# Patient Record
Sex: MALE | Race: WHITE | NOT HISPANIC OR LATINO | Employment: OTHER | URBAN - NONMETROPOLITAN AREA
[De-identification: names, ages, dates, MRNs, and addresses within clinical notes are randomized per-mention and may not be internally consistent; named-entity substitution may affect disease eponyms.]

---

## 2017-01-27 ENCOUNTER — LAB REQUISITION (OUTPATIENT)
Dept: LAB | Facility: HOSPITAL | Age: 62
End: 2017-01-27

## 2017-01-27 DIAGNOSIS — Z94.0 HISTORY OF KIDNEY TRANSPLANT: ICD-10-CM

## 2017-01-27 LAB
ALBUMIN SERPL-MCNC: 4.2 G/DL (ref 3.5–5)
ANION GAP SERPL CALCULATED.3IONS-SCNC: 15 MMOL/L (ref 4–13)
BUN BLD-MCNC: 14 MG/DL (ref 5–21)
BUN/CREAT SERPL: 16.3 (ref 7–25)
CALCIUM SPEC-SCNC: 9.9 MG/DL (ref 8.4–10.4)
CHLORIDE SERPL-SCNC: 105 MMOL/L (ref 98–110)
CO2 SERPL-SCNC: 18 MMOL/L (ref 24–31)
CREAT BLD-MCNC: 0.86 MG/DL (ref 0.5–1.4)
GFR SERPL CREATININE-BSD FRML MDRD: 90 ML/MIN/1.73
GLUCOSE BLD-MCNC: 126 MG/DL (ref 70–100)
PHOSPHATE SERPL-MCNC: 4.4 MG/DL (ref 2.5–4.5)
POTASSIUM BLD-SCNC: 4.5 MMOL/L (ref 3.5–5.3)
SODIUM BLD-SCNC: 138 MMOL/L (ref 135–145)

## 2017-01-27 PROCEDURE — 80069 RENAL FUNCTION PANEL: CPT | Performed by: NURSE PRACTITIONER

## 2017-02-06 ENCOUNTER — OFFICE VISIT (OUTPATIENT)
Dept: PRIMARY CARE CLINIC | Age: 62
End: 2017-02-06
Payer: MEDICARE

## 2017-02-06 VITALS
WEIGHT: 283.8 LBS | OXYGEN SATURATION: 97 % | DIASTOLIC BLOOD PRESSURE: 71 MMHG | BODY MASS INDEX: 37.61 KG/M2 | SYSTOLIC BLOOD PRESSURE: 136 MMHG | HEIGHT: 73 IN | HEART RATE: 75 BPM | TEMPERATURE: 98.5 F | RESPIRATION RATE: 19 BRPM

## 2017-02-06 DIAGNOSIS — J32.9 SINOBRONCHITIS: Primary | ICD-10-CM

## 2017-02-06 DIAGNOSIS — J40 SINOBRONCHITIS: Primary | ICD-10-CM

## 2017-02-06 DIAGNOSIS — Z12.11 SCREENING FOR COLON CANCER: ICD-10-CM

## 2017-02-06 DIAGNOSIS — E11.9 CONTROLLED TYPE 2 DIABETES MELLITUS WITHOUT COMPLICATION, WITHOUT LONG-TERM CURRENT USE OF INSULIN (HCC): ICD-10-CM

## 2017-02-06 PROCEDURE — G8427 DOCREV CUR MEDS BY ELIG CLIN: HCPCS | Performed by: NURSE PRACTITIONER

## 2017-02-06 PROCEDURE — G8419 CALC BMI OUT NRM PARAM NOF/U: HCPCS | Performed by: NURSE PRACTITIONER

## 2017-02-06 PROCEDURE — 99213 OFFICE O/P EST LOW 20 MIN: CPT | Performed by: NURSE PRACTITIONER

## 2017-02-06 PROCEDURE — 1036F TOBACCO NON-USER: CPT | Performed by: NURSE PRACTITIONER

## 2017-02-06 PROCEDURE — G8484 FLU IMMUNIZE NO ADMIN: HCPCS | Performed by: NURSE PRACTITIONER

## 2017-02-06 PROCEDURE — 3046F HEMOGLOBIN A1C LEVEL >9.0%: CPT | Performed by: NURSE PRACTITIONER

## 2017-02-06 PROCEDURE — 3017F COLORECTAL CA SCREEN DOC REV: CPT | Performed by: NURSE PRACTITIONER

## 2017-02-06 RX ORDER — AZITHROMYCIN 250 MG/1
TABLET, FILM COATED ORAL
Qty: 1 PACKET | Refills: 0 | Status: SHIPPED | OUTPATIENT
Start: 2017-02-06 | End: 2017-02-16

## 2017-02-06 RX ORDER — MONTELUKAST SODIUM 10 MG/1
10 TABLET ORAL DAILY
Qty: 30 TABLET | Refills: 3 | Status: SHIPPED | OUTPATIENT
Start: 2017-02-06 | End: 2018-02-22

## 2017-02-06 RX ORDER — BENZONATATE 100 MG/1
200 CAPSULE ORAL 3 TIMES DAILY PRN
Qty: 30 CAPSULE | Refills: 1 | Status: SHIPPED | OUTPATIENT
Start: 2017-02-06 | End: 2017-02-16

## 2017-02-10 ASSESSMENT — ENCOUNTER SYMPTOMS
WHEEZING: 0
SORE THROAT: 1
RHINORRHEA: 1
SINUS PRESSURE: 1
VOICE CHANGE: 1
COUGH: 1
TROUBLE SWALLOWING: 0
SHORTNESS OF BREATH: 0

## 2017-03-22 DIAGNOSIS — E11.29 TYPE 2 DIABETES MELLITUS WITH OTHER KIDNEY COMPLICATION: ICD-10-CM

## 2017-03-22 DIAGNOSIS — I10 ESSENTIAL HYPERTENSION: ICD-10-CM

## 2017-03-22 DIAGNOSIS — Z94.0 KIDNEY TRANSPLANT STATUS, CADAVERIC: ICD-10-CM

## 2017-03-22 LAB
ALBUMIN SERPL-MCNC: 4.2 G/DL (ref 3.5–5.2)
ALP BLD-CCNC: 66 U/L (ref 40–130)
ALT SERPL-CCNC: 21 U/L (ref 5–41)
ANION GAP SERPL CALCULATED.3IONS-SCNC: 16 MMOL/L (ref 7–19)
AST SERPL-CCNC: 23 U/L (ref 5–40)
BILIRUB SERPL-MCNC: 1.1 MG/DL (ref 0.2–1.2)
BILIRUBIN URINE: NEGATIVE
BLOOD, URINE: NEGATIVE
BUN BLDV-MCNC: 12 MG/DL (ref 8–23)
CALCIUM SERPL-MCNC: 9.6 MG/DL (ref 8.8–10.2)
CHLORIDE BLD-SCNC: 103 MMOL/L (ref 98–111)
CHOLESTEROL, TOTAL: 126 MG/DL (ref 160–199)
CLARITY: CLEAR
CO2: 19 MMOL/L (ref 22–29)
COLOR: YELLOW
CREAT SERPL-MCNC: 0.8 MG/DL (ref 0.5–1.2)
CREATININE URINE: 116.8 MG/DL (ref 4.2–622)
GFR NON-AFRICAN AMERICAN: >60
GLOBULIN: 2.7 G/DL
GLUCOSE BLD-MCNC: 159 MG/DL (ref 74–109)
GLUCOSE URINE: NEGATIVE MG/DL
HBA1C MFR BLD: 7.1 %
HCT VFR BLD CALC: 47.6 % (ref 42–52)
HDLC SERPL-MCNC: 28 MG/DL (ref 55–121)
HEMOGLOBIN: 16.2 G/DL (ref 14–18)
KETONES, URINE: NEGATIVE MG/DL
LDL CHOLESTEROL CALCULATED: 63 MG/DL
LEUKOCYTE ESTERASE, URINE: NEGATIVE
MCH RBC QN AUTO: 31 PG (ref 27–31)
MCHC RBC AUTO-ENTMCNC: 34 G/DL (ref 33–37)
MCV RBC AUTO: 91.2 FL (ref 80–94)
NITRITE, URINE: NEGATIVE
PARATHYROID HORMONE INTACT: 78.5 PG/ML (ref 15–65)
PDW BLD-RTO: 14.6 % (ref 11.5–14.5)
PH UA: 6
PLATELET # BLD: 227 K/UL (ref 130–400)
PMV BLD AUTO: 11 FL (ref 7.4–10.4)
POTASSIUM SERPL-SCNC: 4.8 MMOL/L (ref 3.5–5)
PROTEIN PROTEIN: 11 MG/DL (ref 15–45)
PROTEIN UA: NEGATIVE MG/DL
RBC # BLD: 5.22 M/UL (ref 4.7–6.1)
SODIUM BLD-SCNC: 138 MMOL/L (ref 136–145)
SPECIFIC GRAVITY UA: 1.02
TOTAL PROTEIN: 6.9 G/DL (ref 6.6–8.7)
TRIGL SERPL-MCNC: 177 MG/DL (ref 150–199)
TSH SERPL DL<=0.05 MIU/L-ACNC: 1.67 UIU/ML (ref 0.27–4.2)
URIC ACID, SERUM: 6.6 MG/DL (ref 3.4–7)
UROBILINOGEN, URINE: 0.2 E.U./DL
WBC # BLD: 8.6 K/UL (ref 4.8–10.8)

## 2017-03-24 LAB — TACROLIMUS BLOOD: 7.6 NG/ML

## 2017-03-25 LAB
VITAMIN D2 AND D3, TOTAL: 25 NG/ML (ref 30–80)
VITAMIN D2, 25 HYDROXY: 1.6 NG/ML
VITAMIN D3,25 HYDROXY: 23.4 NG/ML

## 2017-03-28 ENCOUNTER — OFFICE VISIT (OUTPATIENT)
Dept: PRIMARY CARE CLINIC | Age: 62
End: 2017-03-28
Payer: MEDICARE

## 2017-03-28 VITALS
HEART RATE: 65 BPM | WEIGHT: 286 LBS | BODY MASS INDEX: 37.91 KG/M2 | SYSTOLIC BLOOD PRESSURE: 140 MMHG | DIASTOLIC BLOOD PRESSURE: 76 MMHG | HEIGHT: 73 IN | RESPIRATION RATE: 20 BRPM | OXYGEN SATURATION: 97 %

## 2017-03-28 DIAGNOSIS — L85.8 KERATOACANTHOMA OF CHEEK: ICD-10-CM

## 2017-03-28 DIAGNOSIS — Z94.0 KIDNEY TRANSPLANT STATUS, CADAVERIC: ICD-10-CM

## 2017-03-28 DIAGNOSIS — E11.9 CONTROLLED TYPE 2 DIABETES MELLITUS WITHOUT COMPLICATION, WITHOUT LONG-TERM CURRENT USE OF INSULIN (HCC): ICD-10-CM

## 2017-03-28 DIAGNOSIS — I10 ESSENTIAL HYPERTENSION: Primary | ICD-10-CM

## 2017-03-28 PROCEDURE — 3017F COLORECTAL CA SCREEN DOC REV: CPT | Performed by: INTERNAL MEDICINE

## 2017-03-28 PROCEDURE — G8417 CALC BMI ABV UP PARAM F/U: HCPCS | Performed by: INTERNAL MEDICINE

## 2017-03-28 PROCEDURE — G8484 FLU IMMUNIZE NO ADMIN: HCPCS | Performed by: INTERNAL MEDICINE

## 2017-03-28 PROCEDURE — 99214 OFFICE O/P EST MOD 30 MIN: CPT | Performed by: INTERNAL MEDICINE

## 2017-03-28 PROCEDURE — 3045F PR MOST RECENT HEMOGLOBIN A1C LEVEL 7.0-9.0%: CPT | Performed by: INTERNAL MEDICINE

## 2017-03-28 PROCEDURE — G8427 DOCREV CUR MEDS BY ELIG CLIN: HCPCS | Performed by: INTERNAL MEDICINE

## 2017-03-28 PROCEDURE — 1036F TOBACCO NON-USER: CPT | Performed by: INTERNAL MEDICINE

## 2017-03-28 RX ORDER — ZOLPIDEM TARTRATE 5 MG/1
5 TABLET ORAL NIGHTLY PRN
Qty: 30 TABLET | Refills: 3 | Status: SHIPPED | OUTPATIENT
Start: 2017-03-28 | End: 2017-04-27

## 2017-03-28 RX ORDER — TAMSULOSIN HYDROCHLORIDE 0.4 MG/1
0.4 CAPSULE ORAL DAILY
Qty: 30 CAPSULE | Refills: 3 | Status: SHIPPED | OUTPATIENT
Start: 2017-03-28 | End: 2017-09-07 | Stop reason: SDUPTHER

## 2017-03-28 ASSESSMENT — ENCOUNTER SYMPTOMS
CONSTIPATION: 0
VOMITING: 0
BACK PAIN: 0
NAUSEA: 0
SHORTNESS OF BREATH: 0
DIARRHEA: 0

## 2017-05-16 ENCOUNTER — PREP FOR SURGERY (OUTPATIENT)
Dept: GASTROENTEROLOGY | Facility: CLINIC | Age: 62
End: 2017-05-16

## 2017-05-16 DIAGNOSIS — Z12.11 ENCOUNTER FOR SCREENING FOR MALIGNANT NEOPLASM OF COLON: Primary | ICD-10-CM

## 2017-05-17 RX ORDER — LIDOCAINE HYDROCHLORIDE 10 MG/ML
0.1 INJECTION, SOLUTION EPIDURAL; INFILTRATION; INTRACAUDAL; PERINEURAL ONCE AS NEEDED
Status: CANCELLED | OUTPATIENT
Start: 2017-05-17

## 2017-05-17 RX ORDER — SODIUM CHLORIDE 0.9 % (FLUSH) 0.9 %
1-10 SYRINGE (ML) INJECTION AS NEEDED
Status: CANCELLED | OUTPATIENT
Start: 2017-05-17

## 2017-06-22 ENCOUNTER — ANESTHESIA EVENT (OUTPATIENT)
Dept: GASTROENTEROLOGY | Facility: HOSPITAL | Age: 62
End: 2017-06-22

## 2017-06-23 ENCOUNTER — TELEPHONE (OUTPATIENT)
Dept: GASTROENTEROLOGY | Facility: CLINIC | Age: 62
End: 2017-06-23

## 2017-06-23 ENCOUNTER — ANESTHESIA (OUTPATIENT)
Dept: GASTROENTEROLOGY | Facility: HOSPITAL | Age: 62
End: 2017-06-23

## 2017-06-23 ENCOUNTER — HOSPITAL ENCOUNTER (OUTPATIENT)
Facility: HOSPITAL | Age: 62
Setting detail: HOSPITAL OUTPATIENT SURGERY
Discharge: HOME | End: 2017-06-23
Attending: INTERNAL MEDICINE | Admitting: INTERNAL MEDICINE

## 2017-06-23 VITALS
HEIGHT: 73 IN | OXYGEN SATURATION: 96 % | SYSTOLIC BLOOD PRESSURE: 151 MMHG | RESPIRATION RATE: 18 BRPM | TEMPERATURE: 98.3 F | HEART RATE: 60 BPM | WEIGHT: 287 LBS | DIASTOLIC BLOOD PRESSURE: 71 MMHG | BODY MASS INDEX: 38.04 KG/M2

## 2017-06-23 DIAGNOSIS — Z12.11 ENCOUNTER FOR SCREENING FOR MALIGNANT NEOPLASM OF COLON: ICD-10-CM

## 2017-06-23 PROCEDURE — 25010000002 PROPOFOL 10 MG/ML EMULSION: Performed by: NURSE ANESTHETIST, CERTIFIED REGISTERED

## 2017-06-23 PROCEDURE — G0105 COLORECTAL SCRN; HI RISK IND: HCPCS | Performed by: INTERNAL MEDICINE

## 2017-06-23 RX ORDER — SODIUM CHLORIDE 9 MG/ML
100 INJECTION, SOLUTION INTRAVENOUS CONTINUOUS
Status: DISCONTINUED | OUTPATIENT
Start: 2017-06-23 | End: 2017-06-23 | Stop reason: HOSPADM

## 2017-06-23 RX ORDER — TAMSULOSIN HYDROCHLORIDE 0.4 MG/1
1 CAPSULE ORAL NIGHTLY
COMMUNITY
End: 2023-03-13

## 2017-06-23 RX ORDER — SODIUM CHLORIDE 0.9 % (FLUSH) 0.9 %
1-10 SYRINGE (ML) INJECTION AS NEEDED
Status: DISCONTINUED | OUTPATIENT
Start: 2017-06-23 | End: 2017-06-23 | Stop reason: HOSPADM

## 2017-06-23 RX ORDER — TACROLIMUS 1 MG/1
1 CAPSULE ORAL 2 TIMES DAILY
COMMUNITY
End: 2023-03-13

## 2017-06-23 RX ORDER — ONDANSETRON 2 MG/ML
4 INJECTION INTRAMUSCULAR; INTRAVENOUS ONCE AS NEEDED
Status: DISCONTINUED | OUTPATIENT
Start: 2017-06-23 | End: 2017-06-23 | Stop reason: HOSPADM

## 2017-06-23 RX ORDER — ASCORBIC ACID 500 MG
500 TABLET ORAL DAILY
COMMUNITY
End: 2023-03-13

## 2017-06-23 RX ORDER — CALCIUM CARBONATE/VITAMIN D3 600 MG-10
1200 TABLET ORAL DAILY
COMMUNITY

## 2017-06-23 RX ORDER — ZOLPIDEM TARTRATE 10 MG/1
10 TABLET ORAL NIGHTLY PRN
COMMUNITY

## 2017-06-23 RX ORDER — LIDOCAINE HYDROCHLORIDE 10 MG/ML
0.1 INJECTION, SOLUTION EPIDURAL; INFILTRATION; INTRACAUDAL; PERINEURAL ONCE AS NEEDED
Status: DISCONTINUED | OUTPATIENT
Start: 2017-06-23 | End: 2017-06-23 | Stop reason: HOSPADM

## 2017-06-23 RX ORDER — MAGNESIUM GLUCONATE 27 MG(500)
500 TABLET ORAL DAILY
COMMUNITY
End: 2023-03-13

## 2017-06-23 RX ORDER — MONTELUKAST SODIUM 10 MG/1
10 TABLET ORAL NIGHTLY
COMMUNITY
End: 2023-03-13

## 2017-06-23 RX ORDER — CALCITRIOL 0.25 UG/1
0.25 CAPSULE, LIQUID FILLED ORAL 3 TIMES WEEKLY
COMMUNITY

## 2017-06-23 RX ORDER — MYCOPHENOLATE MOFETIL 500 MG/1
500 TABLET ORAL 2 TIMES DAILY
COMMUNITY
End: 2023-03-13

## 2017-06-23 RX ORDER — PROPRANOLOL HYDROCHLORIDE 80 MG/1
80 TABLET ORAL 2 TIMES DAILY
COMMUNITY

## 2017-06-23 RX ORDER — ALLOPURINOL 300 MG/1
300 TABLET ORAL DAILY
COMMUNITY
End: 2023-03-13

## 2017-06-23 RX ORDER — PROPOFOL 10 MG/ML
VIAL (ML) INTRAVENOUS AS NEEDED
Status: DISCONTINUED | OUTPATIENT
Start: 2017-06-23 | End: 2017-06-23 | Stop reason: SURG

## 2017-06-23 RX ADMIN — PROPOFOL 40 MG: 10 INJECTION, EMULSION INTRAVENOUS at 09:11

## 2017-06-23 RX ADMIN — PROPOFOL 40 MG: 10 INJECTION, EMULSION INTRAVENOUS at 09:16

## 2017-06-23 RX ADMIN — PROPOFOL 40 MG: 10 INJECTION, EMULSION INTRAVENOUS at 09:14

## 2017-06-23 RX ADMIN — PROPOFOL 40 MG: 10 INJECTION, EMULSION INTRAVENOUS at 09:08

## 2017-06-23 RX ADMIN — PROPOFOL 50 MG: 10 INJECTION, EMULSION INTRAVENOUS at 09:20

## 2017-06-23 RX ADMIN — PROPOFOL 50 MG: 10 INJECTION, EMULSION INTRAVENOUS at 09:25

## 2017-06-23 RX ADMIN — PROPOFOL 40 MG: 10 INJECTION, EMULSION INTRAVENOUS at 09:09

## 2017-06-23 RX ADMIN — SODIUM CHLORIDE 100 ML/HR: 9 INJECTION, SOLUTION INTRAVENOUS at 08:19

## 2017-06-23 NOTE — PLAN OF CARE
Problem: Patient Care Overview (Adult)  Goal: Adult Individualization and Mutuality    06/23/17 0739   Individualization   Patient Specific Preferences none   Patient Specific Goals none   Patient Specific Interventions none   Mutuality/Individual Preferences   What Anxieties, Fears or Concerns Do You Have About Your Health or Care? none   What Questions Do You Have About Your Health or Care? none   What Information Would Help Us Give You More Personalized Care? none

## 2017-06-23 NOTE — PLAN OF CARE
Problem: Patient Care Overview (Adult)  Goal: Plan of Care Review    06/23/17 0926   Patient Care Overview   Progress improving   Outcome Evaluation   Outcome Summary/Follow up Plan tolerated procedure well         Problem: GI Endoscopy (Adult)  Goal: Signs and Symptoms of Listed Potential Problems Will be Absent or Manageable (GI Endoscopy)  Outcome: Ongoing (interventions implemented as appropriate)    06/23/17 0926   GI Endoscopy   Problems Assessed (GI Endoscopy) all   Problems Present (GI Endoscopy) none

## 2017-06-23 NOTE — ANESTHESIA POSTPROCEDURE EVALUATION
"Patient: Jovani Kate    Procedure Summary     Date Anesthesia Start Anesthesia Stop Room / Location    06/23/17 0857 0927  [facility] /  [facility]       Procedure Diagnosis Surgeon Provider    COLONOSCOPY WITH ANESTHESIA (N/A ) Encounter for screening for malignant neoplasm of colon  (Encounter for screening for malignant neoplasm of colon [Z12.11]) MD Vivien Beasley, JAMIR          Anesthesia Type: general  Last vitals  BP      Temp      Pulse     Resp      SpO2        Post Anesthesia Care and Evaluation      Comments: Blood pressure 167/82, pulse 69, temperature 98.3 °F (36.8 °C), temperature source Temporal Artery , resp. rate 20, height 73\" (185.4 cm), weight 287 lb (130 kg), SpO2 94 %.        "

## 2017-06-23 NOTE — ANESTHESIA POSTPROCEDURE EVALUATION
Patient: Jovani Kate    Procedure Summary     Date Anesthesia Start Anesthesia Stop Room / Location    06/23/17 0857 0927  [facility]       Procedure Diagnosis Surgeon Provider    COLONOSCOPY WITH ANESTHESIA (N/A ) Encounter for screening for malignant neoplasm of colon  (Encounter for screening for malignant neoplasm of colon [Z12.11]) MD Vivien Beasley CRNA          Anesthesia Type: general  Last vitals  BP      Temp      Pulse     Resp      SpO2        Post Anesthesia Care and Evaluation    Patient location during evaluation: PHASE II  Patient participation: complete - patient cannot participate  Level of consciousness: awake and awake and alert  Pain score: 0  Pain management: adequate  Airway patency: patent  Anesthetic complications: No anesthetic complications  PONV Status: none  Cardiovascular status: acceptable and stable  Respiratory status: acceptable and unassisted  Hydration status: acceptable

## 2017-06-23 NOTE — PLAN OF CARE
Problem: Patient Care Overview (Adult)  Goal: Discharge Needs Assessment  Outcome: Outcome(s) achieved Date Met:  06/23/17

## 2017-06-23 NOTE — H&P
[facility] Gastroenterology  Pre Procedure History & Physical    Chief Complaint:   Colon polyps    Subjective     HPI:   The patient has a history of colon polyps who presents for exam.    Past Medical History:   Past Medical History:   Diagnosis Date   • Arthritis    • CHF (congestive heart failure)    • Hypertension    • Renal disease        Past Surgical History:  [unfilled]    Family History:  History reviewed. No pertinent family history.    Social History:   reports that he has quit smoking. He does not have any smokeless tobacco history on file. He reports that he does not drink alcohol or use illicit drugs.    Medications:   Prior to Admission medications    Medication Sig Start Date End Date Taking? Authorizing Provider   allopurinol (ZYLOPRIM) 300 MG tablet Take 300 mg by mouth Daily.   Yes Historical Provider, MD   calcitriol (ROCALTROL) 0.25 MCG capsule Take 0.25 mcg by mouth 3 (Three) Times a Week.   Yes Historical Provider, MD   magnesium gluconate (MAGONATE) 500 MG tablet Take 500 mg by mouth Daily.   Yes Historical Provider, MD   metFORMIN (GLUCOPHAGE) 1000 MG tablet Take 1,000 mg by mouth 2 (Two) Times a Day With Meals.   Yes Historical Provider, MD   mycophenolate (CELLCEPT) 500 MG tablet Take 500 mg by mouth 2 (Two) Times a Day.   Yes Historical Provider, MD   Omega 3 1200 MG capsule Take 1,200 mg by mouth Daily.   Yes Historical Provider, MD   propranolol (INDERAL) 40 MG tablet Take 40 mg by mouth 2 (Two) Times a Day.   Yes Historical Provider, MD   tacrolimus (PROGRAF) 1 MG capsule Take 1 mg by mouth 2 (Two) Times a Day.   Yes Historical Provider, MD   tamsulosin (FLOMAX) 0.4 MG capsule 24 hr capsule Take 1 capsule by mouth Every Night.   Yes Historical Provider, MD   vitamin C (ASCORBIC ACID) 500 MG tablet Take 500 mg by mouth Daily.   Yes Historical Provider, MD   zolpidem (AMBIEN) 5 MG tablet Take 5 mg by mouth At Night As Needed for Sleep.   Yes Historical Provider, MD   montelukast  "(SINGULAIR) 10 MG tablet Take 10 mg by mouth Every Night.    Historical Provider, MD       Allergies:  Review of patient's allergies indicates no known allergies.    Objective     Blood pressure 167/82, pulse 69, temperature 98.3 °F (36.8 °C), temperature source Temporal Artery , resp. rate 20, height 73\" (185.4 cm), weight 287 lb (130 kg), SpO2 94 %.    Physical Exam   Constitutional: Pt is oriented to person, place, and in no distress.   HENT: Mouth/Throat: Oropharynx is clear.   Cardiovascular: Normal rate, regular rhythm.    Pulmonary/Chest: Effort normal. No respiratory distress. No  wheezes.   Abdominal: Soft. Non-distended.  Skin: Skin is warm and dry.   Psychiatric: Mood, memory, affect and judgment appear normal.     Assessment/Plan     Diagnosis:  Colon polyps    Anticipated Surgical Procedure:  Colonoscopy    The risks, benefits, and alternatives of this procedure have been discussed with the patient or the responsible party- the patient understands and agrees to proceed.      EMR Dragon/transcription disclaimer:  Much of this encounter note is electronic transcription/translation of spoken language to printed text.  The electronic translation of spoken language may be erroneous, or at times, nonsensical words or phrases may be inadvertently transcribed.  Although I have reviewed the note for such errors, some may still exist.  "

## 2017-06-23 NOTE — PLAN OF CARE
Problem: GI Endoscopy (Adult)  Goal: Signs and Symptoms of Listed Potential Problems Will be Absent or Manageable (GI Endoscopy)  Outcome: Outcome(s) achieved Date Met:  06/23/17

## 2017-06-23 NOTE — PLAN OF CARE
Problem: Patient Care Overview (Adult)  Goal: Plan of Care Review  Outcome: Outcome(s) achieved Date Met:  06/23/17

## 2017-06-23 NOTE — PLAN OF CARE
Problem: Patient Care Overview (Adult)  Goal: Adult Individualization and Mutuality  Outcome: Outcome(s) achieved Date Met:  06/23/17

## 2017-09-07 RX ORDER — TAMSULOSIN HYDROCHLORIDE 0.4 MG/1
0.4 CAPSULE ORAL DAILY
Qty: 30 CAPSULE | Refills: 3 | Status: SHIPPED | OUTPATIENT
Start: 2017-09-07 | End: 2017-09-28 | Stop reason: SDUPTHER

## 2017-09-19 DIAGNOSIS — I10 ESSENTIAL HYPERTENSION: Primary | ICD-10-CM

## 2017-09-19 DIAGNOSIS — Z12.9 CANCER SCREENING: ICD-10-CM

## 2017-09-19 DIAGNOSIS — E11.9 CONTROLLED TYPE 2 DIABETES MELLITUS WITHOUT COMPLICATION, WITHOUT LONG-TERM CURRENT USE OF INSULIN (HCC): ICD-10-CM

## 2017-09-19 DIAGNOSIS — Z94.0 KIDNEY TRANSPLANT STATUS, CADAVERIC: ICD-10-CM

## 2017-09-22 DIAGNOSIS — I10 ESSENTIAL HYPERTENSION: ICD-10-CM

## 2017-09-22 DIAGNOSIS — E11.9 CONTROLLED TYPE 2 DIABETES MELLITUS WITHOUT COMPLICATION, WITHOUT LONG-TERM CURRENT USE OF INSULIN (HCC): ICD-10-CM

## 2017-09-22 DIAGNOSIS — Z12.9 CANCER SCREENING: ICD-10-CM

## 2017-09-22 DIAGNOSIS — Z94.0 KIDNEY TRANSPLANT STATUS, CADAVERIC: ICD-10-CM

## 2017-09-22 LAB
ALBUMIN SERPL-MCNC: 4 G/DL (ref 3.5–5.2)
ALP BLD-CCNC: 64 U/L (ref 40–130)
ALT SERPL-CCNC: 22 U/L (ref 5–41)
ANION GAP SERPL CALCULATED.3IONS-SCNC: 14 MMOL/L (ref 7–19)
AST SERPL-CCNC: 17 U/L (ref 5–40)
BASOPHILS ABSOLUTE: 0.1 K/UL (ref 0–0.2)
BASOPHILS RELATIVE PERCENT: 0.8 % (ref 0–1)
BILIRUB SERPL-MCNC: 1.8 MG/DL (ref 0.2–1.2)
BILIRUBIN URINE: NEGATIVE
BLOOD, URINE: NEGATIVE
BUN BLDV-MCNC: 8 MG/DL (ref 8–23)
CALCIUM SERPL-MCNC: 9.3 MG/DL (ref 8.8–10.2)
CHLORIDE BLD-SCNC: 101 MMOL/L (ref 98–111)
CHOLESTEROL, TOTAL: 122 MG/DL (ref 160–199)
CLARITY: CLEAR
CO2: 22 MMOL/L (ref 22–29)
COLOR: YELLOW
CREAT SERPL-MCNC: 0.8 MG/DL (ref 0.5–1.2)
CREATININE URINE: 98.4 MG/DL (ref 4.2–622)
EOSINOPHILS ABSOLUTE: 1 K/UL (ref 0–0.6)
EOSINOPHILS RELATIVE PERCENT: 7.2 % (ref 0–5)
GFR NON-AFRICAN AMERICAN: >60
GLUCOSE BLD-MCNC: 150 MG/DL (ref 74–109)
GLUCOSE URINE: NEGATIVE MG/DL
HBA1C MFR BLD: 8.5 %
HCT VFR BLD CALC: 48 % (ref 42–52)
HDLC SERPL-MCNC: 32 MG/DL (ref 55–121)
HEMOGLOBIN: 16.3 G/DL (ref 14–18)
KETONES, URINE: NEGATIVE MG/DL
LDL CHOLESTEROL CALCULATED: 67 MG/DL
LEUKOCYTE ESTERASE, URINE: NEGATIVE
LYMPHOCYTES ABSOLUTE: 1.3 K/UL (ref 1.1–4.5)
LYMPHOCYTES RELATIVE PERCENT: 9.5 % (ref 20–40)
MCH RBC QN AUTO: 31 PG (ref 27–31)
MCHC RBC AUTO-ENTMCNC: 34 G/DL (ref 33–37)
MCV RBC AUTO: 91.4 FL (ref 80–94)
MONOCYTES ABSOLUTE: 1.1 K/UL (ref 0–0.9)
MONOCYTES RELATIVE PERCENT: 8.6 % (ref 0–10)
NEUTROPHILS ABSOLUTE: 9.7 K/UL (ref 1.5–7.5)
NEUTROPHILS RELATIVE PERCENT: 73.4 % (ref 50–65)
NITRITE, URINE: NEGATIVE
PARATHYROID HORMONE INTACT: 100.9 PG/ML (ref 15–65)
PDW BLD-RTO: 13.5 % (ref 11.5–14.5)
PH UA: 7
PLATELET # BLD: 174 K/UL (ref 130–400)
PMV BLD AUTO: 10.6 FL (ref 9.4–12.4)
POTASSIUM SERPL-SCNC: 4.3 MMOL/L (ref 3.5–5)
PROSTATE SPECIFIC ANTIGEN: 1.76 NG/ML (ref 0–4)
PROTEIN PROTEIN: 14 MG/DL (ref 15–45)
PROTEIN UA: NEGATIVE MG/DL
RBC # BLD: 5.25 M/UL (ref 4.7–6.1)
SODIUM BLD-SCNC: 137 MMOL/L (ref 136–145)
SPECIFIC GRAVITY UA: 1.01
TOTAL PROTEIN: 6.8 G/DL (ref 6.6–8.7)
TRIGL SERPL-MCNC: 115 MG/DL (ref 150–199)
TSH SERPL DL<=0.05 MIU/L-ACNC: 1.03 UIU/ML (ref 0.27–4.2)
URIC ACID, SERUM: 7.7 MG/DL (ref 3.4–7)
UROBILINOGEN, URINE: 1 E.U./DL
WBC # BLD: 13.3 K/UL (ref 4.8–10.8)

## 2017-09-26 LAB
VITAMIN D2 AND D3, TOTAL: 24.8 NG/ML (ref 30–80)
VITAMIN D2, 25 HYDROXY: 1.4 NG/ML
VITAMIN D3,25 HYDROXY: 23.4 NG/ML

## 2017-09-28 ENCOUNTER — OFFICE VISIT (OUTPATIENT)
Dept: PRIMARY CARE CLINIC | Age: 62
End: 2017-09-28
Payer: MEDICARE

## 2017-09-28 VITALS
HEIGHT: 73 IN | HEART RATE: 68 BPM | SYSTOLIC BLOOD PRESSURE: 138 MMHG | BODY MASS INDEX: 38.2 KG/M2 | OXYGEN SATURATION: 96 % | DIASTOLIC BLOOD PRESSURE: 76 MMHG | WEIGHT: 288.2 LBS | TEMPERATURE: 98.3 F

## 2017-09-28 DIAGNOSIS — I10 ESSENTIAL HYPERTENSION: Primary | ICD-10-CM

## 2017-09-28 DIAGNOSIS — J40 BRONCHITIS: ICD-10-CM

## 2017-09-28 DIAGNOSIS — E11.9 CONTROLLED TYPE 2 DIABETES MELLITUS WITHOUT COMPLICATION, WITHOUT LONG-TERM CURRENT USE OF INSULIN (HCC): ICD-10-CM

## 2017-09-28 DIAGNOSIS — D84.9 IMMUNOSUPPRESSION (HCC): ICD-10-CM

## 2017-09-28 DIAGNOSIS — Z94.0 KIDNEY TRANSPLANT STATUS, CADAVERIC: ICD-10-CM

## 2017-09-28 PROCEDURE — 1036F TOBACCO NON-USER: CPT | Performed by: INTERNAL MEDICINE

## 2017-09-28 PROCEDURE — G8417 CALC BMI ABV UP PARAM F/U: HCPCS | Performed by: INTERNAL MEDICINE

## 2017-09-28 PROCEDURE — 3017F COLORECTAL CA SCREEN DOC REV: CPT | Performed by: INTERNAL MEDICINE

## 2017-09-28 PROCEDURE — 99214 OFFICE O/P EST MOD 30 MIN: CPT | Performed by: INTERNAL MEDICINE

## 2017-09-28 PROCEDURE — G8428 CUR MEDS NOT DOCUMENT: HCPCS | Performed by: INTERNAL MEDICINE

## 2017-09-28 PROCEDURE — 3046F HEMOGLOBIN A1C LEVEL >9.0%: CPT | Performed by: INTERNAL MEDICINE

## 2017-09-28 RX ORDER — TAMSULOSIN HYDROCHLORIDE 0.4 MG/1
0.4 CAPSULE ORAL DAILY
Qty: 30 CAPSULE | Refills: 5 | Status: SHIPPED | OUTPATIENT
Start: 2017-09-28 | End: 2018-02-14 | Stop reason: SDUPTHER

## 2017-09-28 RX ORDER — CALCITRIOL 0.25 UG/1
CAPSULE, LIQUID FILLED ORAL
Qty: 30 CAPSULE | Refills: 5 | Status: SHIPPED | OUTPATIENT
Start: 2017-09-28 | End: 2018-02-22

## 2017-09-28 RX ORDER — ALLOPURINOL 300 MG/1
300 TABLET ORAL DAILY
Qty: 30 TABLET | Refills: 5 | Status: SHIPPED | OUTPATIENT
Start: 2017-09-28 | End: 2018-02-22

## 2017-09-28 RX ORDER — AZITHROMYCIN 250 MG/1
250 TABLET, FILM COATED ORAL DAILY
Qty: 10 TABLET | Refills: 0 | Status: SHIPPED | OUTPATIENT
Start: 2017-09-28 | End: 2017-10-08

## 2017-09-28 ASSESSMENT — ENCOUNTER SYMPTOMS
BACK PAIN: 0
DIARRHEA: 0
NAUSEA: 0
CONSTIPATION: 0
WHEEZING: 1
COUGH: 1
VOMITING: 0
SHORTNESS OF BREATH: 0

## 2017-12-27 RX ORDER — LANCETS 30 GAUGE
EACH MISCELLANEOUS
Qty: 100 EACH | Refills: 0 | Status: SHIPPED | OUTPATIENT
Start: 2017-12-27 | End: 2022-09-20 | Stop reason: SDUPTHER

## 2018-01-18 ENCOUNTER — TELEPHONE (OUTPATIENT)
Dept: PRIMARY CARE CLINIC | Age: 63
End: 2018-01-18

## 2018-02-14 RX ORDER — TAMSULOSIN HYDROCHLORIDE 0.4 MG/1
0.4 CAPSULE ORAL DAILY
Qty: 30 CAPSULE | Refills: 5 | Status: SHIPPED | OUTPATIENT
Start: 2018-02-14 | End: 2018-08-06 | Stop reason: SDUPTHER

## 2018-02-22 ENCOUNTER — APPOINTMENT (OUTPATIENT)
Dept: CT IMAGING | Age: 63
DRG: 252 | End: 2018-02-22
Payer: MEDICARE

## 2018-02-22 ENCOUNTER — HOSPITAL ENCOUNTER (INPATIENT)
Age: 63
LOS: 5 days | Discharge: HOME | DRG: 252 | End: 2018-02-28
Attending: FAMILY MEDICINE | Admitting: FAMILY MEDICINE
Payer: MEDICARE

## 2018-02-22 DIAGNOSIS — L03.113 CELLULITIS OF RIGHT UPPER EXTREMITY: Primary | ICD-10-CM

## 2018-02-22 DIAGNOSIS — D84.9 IMMUNOSUPPRESSION (HCC): ICD-10-CM

## 2018-02-22 PROBLEM — L03.90 CELLULITIS: Status: ACTIVE | Noted: 2018-02-22

## 2018-02-22 LAB
ALBUMIN SERPL-MCNC: 4.2 G/DL (ref 3.5–5.2)
ALP BLD-CCNC: 66 U/L (ref 40–130)
ALT SERPL-CCNC: 15 U/L (ref 5–41)
ANION GAP SERPL CALCULATED.3IONS-SCNC: 15 MMOL/L (ref 7–19)
AST SERPL-CCNC: 17 U/L (ref 5–40)
BASOPHILS ABSOLUTE: 0.1 K/UL (ref 0–0.2)
BASOPHILS RELATIVE PERCENT: 0.7 % (ref 0–1)
BILIRUB SERPL-MCNC: 1.4 MG/DL (ref 0.2–1.2)
BUN BLDV-MCNC: 8 MG/DL (ref 8–23)
C-REACTIVE PROTEIN: 3.35 MG/DL (ref 0–0.5)
CALCIUM SERPL-MCNC: 9 MG/DL (ref 8.8–10.2)
CHLORIDE BLD-SCNC: 99 MMOL/L (ref 98–111)
CO2: 22 MMOL/L (ref 22–29)
CREAT SERPL-MCNC: 0.7 MG/DL (ref 0.5–1.2)
EOSINOPHILS ABSOLUTE: 0.7 K/UL (ref 0–0.6)
EOSINOPHILS RELATIVE PERCENT: 5.2 % (ref 0–5)
GFR NON-AFRICAN AMERICAN: >60
GLUCOSE BLD-MCNC: 160 MG/DL (ref 74–109)
GLUCOSE BLD-MCNC: 207 MG/DL (ref 70–99)
HCT VFR BLD CALC: 47.4 % (ref 42–52)
HEMOGLOBIN: 15.9 G/DL (ref 14–18)
LACTIC ACID: 1.8 MMOL/L (ref 0.5–1.9)
LYMPHOCYTES ABSOLUTE: 1.4 K/UL (ref 1.1–4.5)
LYMPHOCYTES RELATIVE PERCENT: 10.4 % (ref 20–40)
MCH RBC QN AUTO: 30.5 PG (ref 27–31)
MCHC RBC AUTO-ENTMCNC: 33.5 G/DL (ref 33–37)
MCV RBC AUTO: 90.8 FL (ref 80–94)
MONOCYTES ABSOLUTE: 1.3 K/UL (ref 0–0.9)
MONOCYTES RELATIVE PERCENT: 9.1 % (ref 0–10)
NEUTROPHILS ABSOLUTE: 10.3 K/UL (ref 1.5–7.5)
NEUTROPHILS RELATIVE PERCENT: 73.9 % (ref 50–65)
PDW BLD-RTO: 13 % (ref 11.5–14.5)
PERFORMED ON: ABNORMAL
PLATELET # BLD: 189 K/UL (ref 130–400)
PMV BLD AUTO: 9.8 FL (ref 9.4–12.4)
POTASSIUM SERPL-SCNC: 4.5 MMOL/L (ref 3.5–5)
RBC # BLD: 5.22 M/UL (ref 4.7–6.1)
SODIUM BLD-SCNC: 136 MMOL/L (ref 136–145)
TOTAL PROTEIN: 6.2 G/DL (ref 6.6–8.7)
WBC # BLD: 13.9 K/UL (ref 4.8–10.8)

## 2018-02-22 PROCEDURE — 93971 EXTREMITY STUDY: CPT

## 2018-02-22 PROCEDURE — 83605 ASSAY OF LACTIC ACID: CPT

## 2018-02-22 PROCEDURE — 73200 CT UPPER EXTREMITY W/O DYE: CPT

## 2018-02-22 PROCEDURE — 96367 TX/PROPH/DG ADDL SEQ IV INF: CPT

## 2018-02-22 PROCEDURE — 6370000000 HC RX 637 (ALT 250 FOR IP): Performed by: INTERNAL MEDICINE

## 2018-02-22 PROCEDURE — 82948 REAGENT STRIP/BLOOD GLUCOSE: CPT

## 2018-02-22 PROCEDURE — 96365 THER/PROPH/DIAG IV INF INIT: CPT

## 2018-02-22 PROCEDURE — 86140 C-REACTIVE PROTEIN: CPT

## 2018-02-22 PROCEDURE — 2500000003 HC RX 250 WO HCPCS: Performed by: PHYSICIAN ASSISTANT

## 2018-02-22 PROCEDURE — 36415 COLL VENOUS BLD VENIPUNCTURE: CPT

## 2018-02-22 PROCEDURE — 85025 COMPLETE CBC W/AUTO DIFF WBC: CPT

## 2018-02-22 PROCEDURE — G0378 HOSPITAL OBSERVATION PER HR: HCPCS

## 2018-02-22 PROCEDURE — 6360000002 HC RX W HCPCS: Performed by: INTERNAL MEDICINE

## 2018-02-22 PROCEDURE — 99285 EMERGENCY DEPT VISIT HI MDM: CPT

## 2018-02-22 PROCEDURE — 2580000003 HC RX 258: Performed by: PHYSICIAN ASSISTANT

## 2018-02-22 PROCEDURE — 99223 1ST HOSP IP/OBS HIGH 75: CPT | Performed by: INTERNAL MEDICINE

## 2018-02-22 PROCEDURE — 87040 BLOOD CULTURE FOR BACTERIA: CPT

## 2018-02-22 PROCEDURE — 2580000003 HC RX 258: Performed by: INTERNAL MEDICINE

## 2018-02-22 PROCEDURE — 96372 THER/PROPH/DIAG INJ SC/IM: CPT

## 2018-02-22 PROCEDURE — 80053 COMPREHEN METABOLIC PANEL: CPT

## 2018-02-22 RX ORDER — 0.9 % SODIUM CHLORIDE 0.9 %
1000 INTRAVENOUS SOLUTION INTRAVENOUS ONCE
Status: COMPLETED | OUTPATIENT
Start: 2018-02-22 | End: 2018-02-22

## 2018-02-22 RX ORDER — ONDANSETRON 2 MG/ML
4 INJECTION INTRAMUSCULAR; INTRAVENOUS EVERY 6 HOURS PRN
Status: DISCONTINUED | OUTPATIENT
Start: 2018-02-22 | End: 2018-02-28 | Stop reason: HOSPADM

## 2018-02-22 RX ORDER — ACETAMINOPHEN 500 MG
1000 TABLET ORAL PRN
COMMUNITY
End: 2019-11-22

## 2018-02-22 RX ORDER — TAMSULOSIN HYDROCHLORIDE 0.4 MG/1
0.4 CAPSULE ORAL NIGHTLY
Status: DISCONTINUED | OUTPATIENT
Start: 2018-02-22 | End: 2018-02-28 | Stop reason: HOSPADM

## 2018-02-22 RX ORDER — DEXTROSE MONOHYDRATE 25 G/50ML
12.5 INJECTION, SOLUTION INTRAVENOUS PRN
Status: DISCONTINUED | OUTPATIENT
Start: 2018-02-22 | End: 2018-02-28 | Stop reason: HOSPADM

## 2018-02-22 RX ORDER — DEXTROSE MONOHYDRATE 50 MG/ML
100 INJECTION, SOLUTION INTRAVENOUS PRN
Status: DISCONTINUED | OUTPATIENT
Start: 2018-02-22 | End: 2018-02-28 | Stop reason: HOSPADM

## 2018-02-22 RX ORDER — SODIUM CHLORIDE 9 MG/ML
INJECTION, SOLUTION INTRAVENOUS CONTINUOUS
Status: DISCONTINUED | OUTPATIENT
Start: 2018-02-22 | End: 2018-02-23

## 2018-02-22 RX ORDER — PROPRANOLOL HYDROCHLORIDE 40 MG/1
40 TABLET ORAL 2 TIMES DAILY
Status: DISCONTINUED | OUTPATIENT
Start: 2018-02-22 | End: 2018-02-28 | Stop reason: HOSPADM

## 2018-02-22 RX ORDER — NICOTINE POLACRILEX 4 MG
15 LOZENGE BUCCAL PRN
Status: DISCONTINUED | OUTPATIENT
Start: 2018-02-22 | End: 2018-02-28 | Stop reason: HOSPADM

## 2018-02-22 RX ORDER — ZOLPIDEM TARTRATE 5 MG/1
10 TABLET ORAL NIGHTLY PRN
Status: DISCONTINUED | OUTPATIENT
Start: 2018-02-22 | End: 2018-02-28 | Stop reason: HOSPADM

## 2018-02-22 RX ORDER — CLINDAMYCIN PHOSPHATE 600 MG/50ML
600 INJECTION INTRAVENOUS ONCE
Status: COMPLETED | OUTPATIENT
Start: 2018-02-22 | End: 2018-02-22

## 2018-02-22 RX ORDER — ACETAMINOPHEN 325 MG/1
650 TABLET ORAL EVERY 4 HOURS PRN
Status: DISCONTINUED | OUTPATIENT
Start: 2018-02-22 | End: 2018-02-23

## 2018-02-22 RX ORDER — ZOLPIDEM TARTRATE 10 MG/1
TABLET ORAL NIGHTLY PRN
COMMUNITY
End: 2018-03-07 | Stop reason: SDUPTHER

## 2018-02-22 RX ORDER — HYDRALAZINE HYDROCHLORIDE 20 MG/ML
10 INJECTION INTRAMUSCULAR; INTRAVENOUS EVERY 6 HOURS PRN
Status: DISCONTINUED | OUTPATIENT
Start: 2018-02-22 | End: 2018-02-28 | Stop reason: HOSPADM

## 2018-02-22 RX ADMIN — SODIUM CHLORIDE 1000 ML: 9 INJECTION, SOLUTION INTRAVENOUS at 17:08

## 2018-02-22 RX ADMIN — CLINDAMYCIN PHOSPHATE 600 MG: 600 INJECTION INTRAVENOUS at 17:08

## 2018-02-22 RX ADMIN — PROPRANOLOL HYDROCHLORIDE 40 MG: 40 TABLET ORAL at 22:42

## 2018-02-22 RX ADMIN — TAMSULOSIN HYDROCHLORIDE 0.4 MG: 0.4 CAPSULE ORAL at 22:42

## 2018-02-22 RX ADMIN — INSULIN LISPRO 1 UNITS: 100 INJECTION, SOLUTION INTRAVENOUS; SUBCUTANEOUS at 22:37

## 2018-02-22 RX ADMIN — CEFTAROLINE FOSAMIL 600 MG: 600 POWDER, FOR SOLUTION INTRAVENOUS at 22:29

## 2018-02-22 RX ADMIN — SODIUM CHLORIDE: 9 INJECTION, SOLUTION INTRAVENOUS at 22:29

## 2018-02-22 RX ADMIN — ENOXAPARIN SODIUM 40 MG: 40 INJECTION SUBCUTANEOUS at 22:28

## 2018-02-22 ASSESSMENT — ENCOUNTER SYMPTOMS
VOMITING: 0
ORTHOPNEA: 0
RHINORRHEA: 0
EYE PAIN: 0
PHOTOPHOBIA: 0
DIARRHEA: 0
COLOR CHANGE: 1
BLURRED VISION: 0
COUGH: 0
STRIDOR: 0
SORE THROAT: 0
CONSTIPATION: 0
HEMOPTYSIS: 0
HEARTBURN: 0
NAUSEA: 0
CHEST TIGHTNESS: 0
SPUTUM PRODUCTION: 0
ABDOMINAL DISTENTION: 0
SHORTNESS OF BREATH: 0
ABDOMINAL PAIN: 0
DOUBLE VISION: 0
BACK PAIN: 0
WHEEZING: 0

## 2018-02-22 NOTE — ED PROVIDER NOTES
eMERGENCY dEPARTMENT eNCOUnter      Pt Name: Hilton Taylor  MRN: [id number]  [place] 1955  Date of evaluation: 2/22/2018  Provider: Julia Jane Dr       Chief Complaint   Patient presents with    Arm Swelling     right; sent by Zahraa Sanchez for poss blood clot         HISTORY OF PRESENT ILLNESS  (Location/Symptom, Timing/Onset, Context/Setting, Quality, Duration, Modifying Factors, Severity.)   Hilton Taylor is a 57 y.o. male who presents to the emergency department with arm swelling. Patient noted redness and swelling to his right forearm yesterday afternoon. Patient had a previous dialysis shunt in the right forearm however has not been used in over 9 years. He denies any trauma to the arm. Denies any fevers at home nausea or vomiting. He was seen by his primary care doctor who sent him to the emergency department for evaluation of a possible blood clot. Nursing Notes were reviewed and I agree. REVIEW OF SYSTEMS    (2-9 systems for level 4, 10 or more for level 5)     Review of Systems   Constitutional: Negative for chills, fatigue and fever. HENT: Negative for congestion, rhinorrhea, sneezing and sore throat. Respiratory: Negative for cough, chest tightness, shortness of breath, wheezing and stridor. Cardiovascular: Negative for chest pain. Gastrointestinal: Negative for abdominal distention, abdominal pain, constipation, nausea and vomiting. Genitourinary: Negative for dysuria, flank pain and hematuria. Musculoskeletal: Negative for arthralgias, back pain, joint swelling and neck pain. Skin: Positive for color change and wound. Negative for rash. Neurological: Negative for dizziness, syncope and headaches. Psychiatric/Behavioral: Negative for confusion. Except as noted above the remainder of the review of systems was reviewed and negative.        PAST MEDICAL HISTORY     Past Medical History:   Diagnosis Date    Diabetes mellitus ([facility]     5.2 (*)     Neutrophils # 10.3 (*)     Monocytes # 1.30 (*)     Eosinophils # 0.70 (*)     All other components within normal limits   COMPREHENSIVE METABOLIC PANEL - Abnormal; Notable for the following:     Glucose 160 (*)     Total Protein 6.2 (*)     Total Bilirubin 1.4 (*)     All other components within normal limits   C-REACTIVE PROTEIN - Abnormal; Notable for the following:     CRP 3.35 (*)     All other components within normal limits   POCT GLUCOSE - Abnormal; Notable for the following:     POC Glucose 207 (*)     All other components within normal limits   CULTURE BLOOD #1   CULTURE BLOOD #2   LACTIC ACID, PLASMA   CBC WITH AUTO DIFFERENTIAL   BASIC METABOLIC PANEL W/ REFLEX TO MG FOR LOW K    POCT GLUCOSE       All other labs were within normal range or not returned as of this dictation. EMERGENCY DEPARTMENT COURSE and DIFFERENTIAL DIAGNOSIS/MDM:   Vitals:    Vitals:    02/22/18 1730 02/22/18 1802 02/22/18 1856 02/22/18 1926   BP: (!) 153/66 (!) 161/75 (!) 153/84 (!) 176/81   Pulse: 88 70 71 75   Resp: 19 20 18 16   Temp:    98.4 °F (36.9 °C)   TempSrc:    Temporal   SpO2: 96% 94% 97% 98%   Weight:    288 lb 6.4 oz (130.8 kg)   Height:               MDM  Number of Diagnoses or Management Options  Diagnosis management comments: No evidence of DVT on ultrasound of the patient's arm. Patient's arm is diffusely cellulitic. He has an elevated white count, we have started IV clindamycin. I discussed patient's case with Dr. Jeniffer Coronado. I feel the patient needs to be admitted for IV antibiotics. He is a kidney transplant patient is currently immunosuppressed. Discussed patient's case with Dr. Blaise Alves, hospitalist who will admit the patient to the floor. He requested that the patient on the CT of the upper extremity to evaluate for possible abscess. PROCEDURES:    Procedures      FINAL IMPRESSION      1. Cellulitis of right upper extremity    2.  Immunosuppression ([address]          DISPOSITION/PLAN

## 2018-02-23 ENCOUNTER — ANESTHESIA EVENT (OUTPATIENT)
Dept: OPERATING ROOM | Age: 63
DRG: 252 | End: 2018-02-23
Payer: MEDICARE

## 2018-02-23 ENCOUNTER — ANESTHESIA (OUTPATIENT)
Dept: OPERATING ROOM | Age: 63
DRG: 252 | End: 2018-02-23
Payer: MEDICARE

## 2018-02-23 ENCOUNTER — APPOINTMENT (OUTPATIENT)
Dept: GENERAL RADIOLOGY | Age: 63
DRG: 252 | End: 2018-02-23
Payer: MEDICARE

## 2018-02-23 VITALS
DIASTOLIC BLOOD PRESSURE: 62 MMHG | TEMPERATURE: 98 F | SYSTOLIC BLOOD PRESSURE: 150 MMHG | OXYGEN SATURATION: 98 % | RESPIRATION RATE: 37 BRPM

## 2018-02-23 PROBLEM — T82.7XXA INFECTION OF AV GRAFT FOR DIALYSIS (HCC): Status: ACTIVE | Noted: 2018-02-23

## 2018-02-23 LAB
ANION GAP SERPL CALCULATED.3IONS-SCNC: 13 MMOL/L (ref 7–19)
BASOPHILS ABSOLUTE: 0.1 K/UL (ref 0–0.2)
BASOPHILS RELATIVE PERCENT: 0.7 % (ref 0–1)
BILIRUBIN URINE: NEGATIVE
BLOOD, URINE: NEGATIVE
BUN BLDV-MCNC: 8 MG/DL (ref 8–23)
CALCIUM SERPL-MCNC: 8.3 MG/DL (ref 8.8–10.2)
CHLORIDE BLD-SCNC: 99 MMOL/L (ref 98–111)
CLARITY: CLEAR
CO2: 23 MMOL/L (ref 22–29)
COLOR: YELLOW
CREAT SERPL-MCNC: 0.9 MG/DL (ref 0.5–1.2)
EOSINOPHILS ABSOLUTE: 0.6 K/UL (ref 0–0.6)
EOSINOPHILS RELATIVE PERCENT: 5 % (ref 0–5)
GFR NON-AFRICAN AMERICAN: >60
GLUCOSE BLD-MCNC: 131 MG/DL (ref 70–99)
GLUCOSE BLD-MCNC: 151 MG/DL (ref 74–109)
GLUCOSE BLD-MCNC: 152 MG/DL (ref 70–99)
GLUCOSE BLD-MCNC: 185 MG/DL (ref 70–99)
GLUCOSE BLD-MCNC: 246 MG/DL (ref 70–99)
GLUCOSE URINE: 500 MG/DL
HCT VFR BLD CALC: 42.6 % (ref 42–52)
HEMOGLOBIN: 14.1 G/DL (ref 14–18)
KETONES, URINE: ABNORMAL MG/DL
LEUKOCYTE ESTERASE, URINE: NEGATIVE
LYMPHOCYTES ABSOLUTE: 1.5 K/UL (ref 1.1–4.5)
LYMPHOCYTES RELATIVE PERCENT: 13.8 % (ref 20–40)
MCH RBC QN AUTO: 30.1 PG (ref 27–31)
MCHC RBC AUTO-ENTMCNC: 33.1 G/DL (ref 33–37)
MCV RBC AUTO: 91 FL (ref 80–94)
MONOCYTES ABSOLUTE: 1.3 K/UL (ref 0–0.9)
MONOCYTES RELATIVE PERCENT: 11.4 % (ref 0–10)
NEUTROPHILS ABSOLUTE: 7.6 K/UL (ref 1.5–7.5)
NEUTROPHILS RELATIVE PERCENT: 68.4 % (ref 50–65)
NITRITE, URINE: NEGATIVE
PDW BLD-RTO: 13.1 % (ref 11.5–14.5)
PERFORMED ON: ABNORMAL
PH UA: 6
PLATELET # BLD: 169 K/UL (ref 130–400)
PMV BLD AUTO: 9.9 FL (ref 9.4–12.4)
POTASSIUM REFLEX MAGNESIUM: 3.9 MMOL/L (ref 3.5–5)
PROTEIN UA: NEGATIVE MG/DL
RBC # BLD: 4.68 M/UL (ref 4.7–6.1)
SODIUM BLD-SCNC: 135 MMOL/L (ref 136–145)
SPECIFIC GRAVITY UA: 1.01
UROBILINOGEN, URINE: 0.2 E.U./DL
WBC # BLD: 11.1 K/UL (ref 4.8–10.8)

## 2018-02-23 PROCEDURE — 2500000003 HC RX 250 WO HCPCS: Performed by: NURSE ANESTHETIST, CERTIFIED REGISTERED

## 2018-02-23 PROCEDURE — 6360000002 HC RX W HCPCS: Performed by: INTERNAL MEDICINE

## 2018-02-23 PROCEDURE — 87070 CULTURE OTHR SPECIMN AEROBIC: CPT

## 2018-02-23 PROCEDURE — 6370000000 HC RX 637 (ALT 250 FOR IP): Performed by: SURGERY

## 2018-02-23 PROCEDURE — 6360000002 HC RX W HCPCS: Performed by: ANESTHESIOLOGY

## 2018-02-23 PROCEDURE — 93005 ELECTROCARDIOGRAM TRACING: CPT

## 2018-02-23 PROCEDURE — 03U707Z SUPPLEMENT RIGHT BRACHIAL ARTERY WITH AUTOLOGOUS TISSUE SUBSTITUTE, OPEN APPROACH: ICD-10-PCS | Performed by: SURGERY

## 2018-02-23 PROCEDURE — 2580000003 HC RX 258: Performed by: ANESTHESIOLOGY

## 2018-02-23 PROCEDURE — 6360000002 HC RX W HCPCS: Performed by: NURSE ANESTHETIST, CERTIFIED REGISTERED

## 2018-02-23 PROCEDURE — 05PY0JZ REMOVAL OF SYNTHETIC SUBSTITUTE FROM UPPER VEIN, OPEN APPROACH: ICD-10-PCS | Performed by: SURGERY

## 2018-02-23 PROCEDURE — 3700000001 HC ADD 15 MINUTES (ANESTHESIA): Performed by: SURGERY

## 2018-02-23 PROCEDURE — S0028 INJECTION, FAMOTIDINE, 20 MG: HCPCS | Performed by: ANESTHESIOLOGY

## 2018-02-23 PROCEDURE — 3600000004 HC SURGERY LEVEL 4 BASE: Performed by: SURGERY

## 2018-02-23 PROCEDURE — 05BD0ZZ EXCISION OF RIGHT CEPHALIC VEIN, OPEN APPROACH: ICD-10-PCS | Performed by: SURGERY

## 2018-02-23 PROCEDURE — 6370000000 HC RX 637 (ALT 250 FOR IP): Performed by: INTERNAL MEDICINE

## 2018-02-23 PROCEDURE — 1210000000 HC MED SURG R&B

## 2018-02-23 PROCEDURE — 36415 COLL VENOUS BLD VENIPUNCTURE: CPT

## 2018-02-23 PROCEDURE — 80048 BASIC METABOLIC PNL TOTAL CA: CPT

## 2018-02-23 PROCEDURE — 3700000000 HC ANESTHESIA ATTENDED CARE: Performed by: SURGERY

## 2018-02-23 PROCEDURE — 87102 FUNGUS ISOLATION CULTURE: CPT

## 2018-02-23 PROCEDURE — 99232 SBSQ HOSP IP/OBS MODERATE 35: CPT | Performed by: PHYSICIAN ASSISTANT

## 2018-02-23 PROCEDURE — 7100000000 HC PACU RECOVERY - FIRST 15 MIN: Performed by: SURGERY

## 2018-02-23 PROCEDURE — 81003 URINALYSIS AUTO W/O SCOPE: CPT

## 2018-02-23 PROCEDURE — 71045 X-RAY EXAM CHEST 1 VIEW: CPT

## 2018-02-23 PROCEDURE — 86403 PARTICLE AGGLUT ANTBDY SCRN: CPT

## 2018-02-23 PROCEDURE — 2580000003 HC RX 258: Performed by: SURGERY

## 2018-02-23 PROCEDURE — 6360000002 HC RX W HCPCS: Performed by: SURGERY

## 2018-02-23 PROCEDURE — 87205 SMEAR GRAM STAIN: CPT

## 2018-02-23 PROCEDURE — 85025 COMPLETE CBC W/AUTO DIFF WBC: CPT

## 2018-02-23 PROCEDURE — 35903 EXCISION GRAFT EXTREMITY: CPT | Performed by: SURGERY

## 2018-02-23 PROCEDURE — 2580000003 HC RX 258: Performed by: INTERNAL MEDICINE

## 2018-02-23 PROCEDURE — 03PY0JZ REMOVAL OF SYNTHETIC SUBSTITUTE FROM UPPER ARTERY, OPEN APPROACH: ICD-10-PCS | Performed by: SURGERY

## 2018-02-23 PROCEDURE — 82948 REAGENT STRIP/BLOOD GLUCOSE: CPT

## 2018-02-23 PROCEDURE — 2500000003 HC RX 250 WO HCPCS: Performed by: ANESTHESIOLOGY

## 2018-02-23 PROCEDURE — 3600000014 HC SURGERY LEVEL 4 ADDTL 15MIN: Performed by: SURGERY

## 2018-02-23 PROCEDURE — 7100000001 HC PACU RECOVERY - ADDTL 15 MIN: Performed by: SURGERY

## 2018-02-23 PROCEDURE — 87186 SC STD MICRODIL/AGAR DIL: CPT

## 2018-02-23 PROCEDURE — 2720000001 HC MISC SURG SUPPLY STERILE $51-500: Performed by: SURGERY

## 2018-02-23 RX ORDER — MORPHINE SULFATE 4 MG/ML
2 INJECTION, SOLUTION INTRAMUSCULAR; INTRAVENOUS EVERY 5 MIN PRN
Status: DISCONTINUED | OUTPATIENT
Start: 2018-02-23 | End: 2018-02-23 | Stop reason: HOSPADM

## 2018-02-23 RX ORDER — MORPHINE SULFATE 4 MG/ML
4 INJECTION, SOLUTION INTRAMUSCULAR; INTRAVENOUS
Status: DISCONTINUED | OUTPATIENT
Start: 2018-02-23 | End: 2018-02-23 | Stop reason: HOSPADM

## 2018-02-23 RX ORDER — ACETAMINOPHEN 500 MG
1000 TABLET ORAL DAILY
Status: DISCONTINUED | OUTPATIENT
Start: 2018-02-23 | End: 2018-02-28 | Stop reason: HOSPADM

## 2018-02-23 RX ORDER — FENTANYL CITRATE 50 UG/ML
INJECTION, SOLUTION INTRAMUSCULAR; INTRAVENOUS PRN
Status: DISCONTINUED | OUTPATIENT
Start: 2018-02-23 | End: 2018-02-23 | Stop reason: SDUPTHER

## 2018-02-23 RX ORDER — METOCLOPRAMIDE HYDROCHLORIDE 5 MG/ML
10 INJECTION INTRAMUSCULAR; INTRAVENOUS ONCE
Status: COMPLETED | OUTPATIENT
Start: 2018-02-23 | End: 2018-02-23

## 2018-02-23 RX ORDER — VANCOMYCIN HYDROCHLORIDE 1 G/200ML
1000 INJECTION, SOLUTION INTRAVENOUS
Status: COMPLETED | OUTPATIENT
Start: 2018-02-23 | End: 2018-02-23

## 2018-02-23 RX ORDER — SODIUM CHLORIDE 0.9 % (FLUSH) 0.9 %
10 SYRINGE (ML) INJECTION PRN
Status: DISCONTINUED | OUTPATIENT
Start: 2018-02-23 | End: 2018-02-23 | Stop reason: HOSPADM

## 2018-02-23 RX ORDER — SUCCINYLCHOLINE CHLORIDE 20 MG/ML
INJECTION INTRAMUSCULAR; INTRAVENOUS PRN
Status: DISCONTINUED | OUTPATIENT
Start: 2018-02-23 | End: 2018-02-23 | Stop reason: SDUPTHER

## 2018-02-23 RX ORDER — SODIUM CHLORIDE, SODIUM LACTATE, POTASSIUM CHLORIDE, CALCIUM CHLORIDE 600; 310; 30; 20 MG/100ML; MG/100ML; MG/100ML; MG/100ML
INJECTION, SOLUTION INTRAVENOUS CONTINUOUS
Status: DISCONTINUED | OUTPATIENT
Start: 2018-02-23 | End: 2018-02-23

## 2018-02-23 RX ORDER — FENTANYL CITRATE 50 UG/ML
50 INJECTION, SOLUTION INTRAMUSCULAR; INTRAVENOUS
Status: COMPLETED | OUTPATIENT
Start: 2018-02-23 | End: 2018-02-23

## 2018-02-23 RX ORDER — METOCLOPRAMIDE HYDROCHLORIDE 5 MG/ML
10 INJECTION INTRAMUSCULAR; INTRAVENOUS
Status: DISCONTINUED | OUTPATIENT
Start: 2018-02-23 | End: 2018-02-23 | Stop reason: HOSPADM

## 2018-02-23 RX ORDER — LIDOCAINE HYDROCHLORIDE 10 MG/ML
INJECTION, SOLUTION INFILTRATION; PERINEURAL PRN
Status: DISCONTINUED | OUTPATIENT
Start: 2018-02-23 | End: 2018-02-23 | Stop reason: SDUPTHER

## 2018-02-23 RX ORDER — HYDROCODONE BITARTRATE AND ACETAMINOPHEN 5; 325 MG/1; MG/1
2 TABLET ORAL EVERY 4 HOURS PRN
Status: DISCONTINUED | OUTPATIENT
Start: 2018-02-23 | End: 2018-02-28 | Stop reason: HOSPADM

## 2018-02-23 RX ORDER — ENALAPRILAT 2.5 MG/2ML
1.25 INJECTION INTRAVENOUS
Status: DISCONTINUED | OUTPATIENT
Start: 2018-02-23 | End: 2018-02-23 | Stop reason: HOSPADM

## 2018-02-23 RX ORDER — HYDRALAZINE HYDROCHLORIDE 20 MG/ML
5 INJECTION INTRAMUSCULAR; INTRAVENOUS EVERY 10 MIN PRN
Status: DISCONTINUED | OUTPATIENT
Start: 2018-02-23 | End: 2018-02-23 | Stop reason: HOSPADM

## 2018-02-23 RX ORDER — MYCOPHENOLATE MOFETIL 250 MG/1
500 CAPSULE ORAL 2 TIMES DAILY
Status: DISCONTINUED | OUTPATIENT
Start: 2018-02-23 | End: 2018-02-28 | Stop reason: HOSPADM

## 2018-02-23 RX ORDER — SODIUM CHLORIDE 9 MG/ML
INJECTION, SOLUTION INTRAVENOUS CONTINUOUS
Status: DISCONTINUED | OUTPATIENT
Start: 2018-02-23 | End: 2018-02-28 | Stop reason: HOSPADM

## 2018-02-23 RX ORDER — SODIUM CHLORIDE 0.9 % (FLUSH) 0.9 %
10 SYRINGE (ML) INJECTION EVERY 12 HOURS SCHEDULED
Status: DISCONTINUED | OUTPATIENT
Start: 2018-02-23 | End: 2018-02-23 | Stop reason: HOSPADM

## 2018-02-23 RX ORDER — DIPHENHYDRAMINE HYDROCHLORIDE 50 MG/ML
12.5 INJECTION INTRAMUSCULAR; INTRAVENOUS
Status: DISCONTINUED | OUTPATIENT
Start: 2018-02-23 | End: 2018-02-23 | Stop reason: HOSPADM

## 2018-02-23 RX ORDER — HEPARIN SODIUM 1000 [USP'U]/ML
INJECTION, SOLUTION INTRAVENOUS; SUBCUTANEOUS PRN
Status: DISCONTINUED | OUTPATIENT
Start: 2018-02-23 | End: 2018-02-23 | Stop reason: SDUPTHER

## 2018-02-23 RX ORDER — GLYCOPYRROLATE 0.2 MG/ML
INJECTION INTRAMUSCULAR; INTRAVENOUS PRN
Status: DISCONTINUED | OUTPATIENT
Start: 2018-02-23 | End: 2018-02-23 | Stop reason: SDUPTHER

## 2018-02-23 RX ORDER — ROCURONIUM BROMIDE 10 MG/ML
INJECTION, SOLUTION INTRAVENOUS PRN
Status: DISCONTINUED | OUTPATIENT
Start: 2018-02-23 | End: 2018-02-23 | Stop reason: SDUPTHER

## 2018-02-23 RX ORDER — LIDOCAINE HYDROCHLORIDE 10 MG/ML
1 INJECTION, SOLUTION EPIDURAL; INFILTRATION; INTRACAUDAL; PERINEURAL
Status: DISCONTINUED | OUTPATIENT
Start: 2018-02-23 | End: 2018-02-23 | Stop reason: HOSPADM

## 2018-02-23 RX ORDER — MEPERIDINE HYDROCHLORIDE 50 MG/ML
12.5 INJECTION INTRAMUSCULAR; INTRAVENOUS; SUBCUTANEOUS EVERY 5 MIN PRN
Status: DISCONTINUED | OUTPATIENT
Start: 2018-02-23 | End: 2018-02-23 | Stop reason: HOSPADM

## 2018-02-23 RX ORDER — ONDANSETRON 2 MG/ML
INJECTION INTRAMUSCULAR; INTRAVENOUS PRN
Status: DISCONTINUED | OUTPATIENT
Start: 2018-02-23 | End: 2018-02-23 | Stop reason: SDUPTHER

## 2018-02-23 RX ORDER — DEXAMETHASONE SODIUM PHOSPHATE 10 MG/ML
INJECTION INTRAMUSCULAR; INTRAVENOUS PRN
Status: DISCONTINUED | OUTPATIENT
Start: 2018-02-23 | End: 2018-02-23 | Stop reason: SDUPTHER

## 2018-02-23 RX ORDER — TACROLIMUS 0.5 MG/1
0.5 CAPSULE ORAL 2 TIMES DAILY
Status: DISCONTINUED | OUTPATIENT
Start: 2018-02-23 | End: 2018-02-28 | Stop reason: HOSPADM

## 2018-02-23 RX ORDER — MORPHINE SULFATE 4 MG/ML
2 INJECTION, SOLUTION INTRAMUSCULAR; INTRAVENOUS
Status: DISCONTINUED | OUTPATIENT
Start: 2018-02-23 | End: 2018-02-25

## 2018-02-23 RX ORDER — HYDROCODONE BITARTRATE AND ACETAMINOPHEN 5; 325 MG/1; MG/1
1 TABLET ORAL EVERY 4 HOURS PRN
Status: DISCONTINUED | OUTPATIENT
Start: 2018-02-23 | End: 2018-02-28 | Stop reason: HOSPADM

## 2018-02-23 RX ORDER — SCOLOPAMINE TRANSDERMAL SYSTEM 1 MG/1
1 PATCH, EXTENDED RELEASE TRANSDERMAL ONCE
Status: DISCONTINUED | OUTPATIENT
Start: 2018-02-23 | End: 2018-02-23 | Stop reason: HOSPADM

## 2018-02-23 RX ORDER — LABETALOL HYDROCHLORIDE 5 MG/ML
5 INJECTION, SOLUTION INTRAVENOUS EVERY 10 MIN PRN
Status: DISCONTINUED | OUTPATIENT
Start: 2018-02-23 | End: 2018-02-23 | Stop reason: HOSPADM

## 2018-02-23 RX ORDER — MIDAZOLAM HYDROCHLORIDE 1 MG/ML
2 INJECTION INTRAMUSCULAR; INTRAVENOUS
Status: COMPLETED | OUTPATIENT
Start: 2018-02-23 | End: 2018-02-23

## 2018-02-23 RX ORDER — PROMETHAZINE HYDROCHLORIDE 25 MG/ML
6.25 INJECTION, SOLUTION INTRAMUSCULAR; INTRAVENOUS
Status: DISCONTINUED | OUTPATIENT
Start: 2018-02-23 | End: 2018-02-23 | Stop reason: HOSPADM

## 2018-02-23 RX ORDER — PROPOFOL 10 MG/ML
INJECTION, EMULSION INTRAVENOUS PRN
Status: DISCONTINUED | OUTPATIENT
Start: 2018-02-23 | End: 2018-02-23 | Stop reason: SDUPTHER

## 2018-02-23 RX ORDER — ACETAMINOPHEN 325 MG/1
650 TABLET ORAL EVERY 4 HOURS PRN
Status: DISCONTINUED | OUTPATIENT
Start: 2018-02-23 | End: 2018-02-28 | Stop reason: HOSPADM

## 2018-02-23 RX ORDER — MORPHINE SULFATE 4 MG/ML
4 INJECTION, SOLUTION INTRAMUSCULAR; INTRAVENOUS EVERY 5 MIN PRN
Status: DISCONTINUED | OUTPATIENT
Start: 2018-02-23 | End: 2018-02-23 | Stop reason: HOSPADM

## 2018-02-23 RX ADMIN — MYCOPHENOLATE MOFETIL 500 MG: 250 CAPSULE ORAL at 10:30

## 2018-02-23 RX ADMIN — NEOSTIGMINE METHYLSULFATE 2 MG: 1 INJECTION, SOLUTION INTRAMUSCULAR; INTRAVENOUS; SUBCUTANEOUS at 16:22

## 2018-02-23 RX ADMIN — CEFTAROLINE FOSAMIL 600 MG: 600 POWDER, FOR SOLUTION INTRAVENOUS at 23:01

## 2018-02-23 RX ADMIN — PROPRANOLOL HYDROCHLORIDE 40 MG: 40 TABLET ORAL at 23:01

## 2018-02-23 RX ADMIN — HYDRALAZINE HYDROCHLORIDE 5 MG: 20 INJECTION INTRAMUSCULAR; INTRAVENOUS at 16:49

## 2018-02-23 RX ADMIN — HYDROCODONE BITARTRATE AND ACETAMINOPHEN 2 TABLET: 5; 325 TABLET ORAL at 23:00

## 2018-02-23 RX ADMIN — SODIUM CHLORIDE, SODIUM LACTATE, POTASSIUM CHLORIDE, AND CALCIUM CHLORIDE: 600; 310; 30; 20 INJECTION, SOLUTION INTRAVENOUS at 14:54

## 2018-02-23 RX ADMIN — TACROLIMUS 0.5 MG: 0.5 CAPSULE ORAL at 23:01

## 2018-02-23 RX ADMIN — FENTANYL CITRATE 50 MCG: 50 INJECTION, SOLUTION INTRAMUSCULAR; INTRAVENOUS at 15:15

## 2018-02-23 RX ADMIN — INSULIN LISPRO 2 UNITS: 100 INJECTION, SOLUTION INTRAVENOUS; SUBCUTANEOUS at 23:03

## 2018-02-23 RX ADMIN — PROPOFOL 150 MG: 10 INJECTION, EMULSION INTRAVENOUS at 14:58

## 2018-02-23 RX ADMIN — FAMOTIDINE 20 MG: 10 INJECTION, SOLUTION INTRAVENOUS at 14:35

## 2018-02-23 RX ADMIN — ROCURONIUM BROMIDE 10 MG: 10 INJECTION INTRAVENOUS at 14:58

## 2018-02-23 RX ADMIN — HEPARIN SODIUM 4000 UNITS: 1000 INJECTION, SOLUTION INTRAVENOUS; SUBCUTANEOUS at 15:15

## 2018-02-23 RX ADMIN — TAMSULOSIN HYDROCHLORIDE 0.4 MG: 0.4 CAPSULE ORAL at 23:22

## 2018-02-23 RX ADMIN — TACROLIMUS 0.5 MG: 0.5 CAPSULE ORAL at 10:30

## 2018-02-23 RX ADMIN — ENOXAPARIN SODIUM 40 MG: 40 INJECTION SUBCUTANEOUS at 23:01

## 2018-02-23 RX ADMIN — MYCOPHENOLATE MOFETIL 500 MG: 250 CAPSULE ORAL at 23:01

## 2018-02-23 RX ADMIN — LIDOCAINE HYDROCHLORIDE 50 MG: 10 INJECTION, SOLUTION INFILTRATION; PERINEURAL at 14:58

## 2018-02-23 RX ADMIN — SODIUM CHLORIDE: 9 INJECTION, SOLUTION INTRAVENOUS at 10:29

## 2018-02-23 RX ADMIN — CEFTAROLINE FOSAMIL 600 MG: 600 POWDER, FOR SOLUTION INTRAVENOUS at 10:30

## 2018-02-23 RX ADMIN — PROPRANOLOL HYDROCHLORIDE 40 MG: 40 TABLET ORAL at 10:30

## 2018-02-23 RX ADMIN — GLYCOPYRROLATE 0.4 MG: 0.2 INJECTION, SOLUTION INTRAMUSCULAR; INTRAVENOUS at 16:22

## 2018-02-23 RX ADMIN — Medication 160 MG: at 14:58

## 2018-02-23 RX ADMIN — VANCOMYCIN HYDROCHLORIDE 1000 MG: 1 INJECTION, SOLUTION INTRAVENOUS at 14:27

## 2018-02-23 RX ADMIN — PROPOFOL 50 MG: 10 INJECTION, EMULSION INTRAVENOUS at 15:33

## 2018-02-23 RX ADMIN — SODIUM CHLORIDE: 9 INJECTION, SOLUTION INTRAVENOUS at 17:46

## 2018-02-23 RX ADMIN — FENTANYL CITRATE 25 MCG: 50 INJECTION, SOLUTION INTRAMUSCULAR; INTRAVENOUS at 16:21

## 2018-02-23 RX ADMIN — LABETALOL HYDROCHLORIDE 5 MG: 5 INJECTION INTRAVENOUS at 17:01

## 2018-02-23 RX ADMIN — ONDANSETRON HYDROCHLORIDE 4 MG: 2 SOLUTION INTRAMUSCULAR; INTRAVENOUS at 16:05

## 2018-02-23 RX ADMIN — FENTANYL CITRATE 100 MCG: 50 INJECTION, SOLUTION INTRAMUSCULAR; INTRAVENOUS at 14:58

## 2018-02-23 RX ADMIN — FENTANYL CITRATE 50 MCG: 50 INJECTION, SOLUTION INTRAMUSCULAR; INTRAVENOUS at 16:54

## 2018-02-23 RX ADMIN — SODIUM CHLORIDE, SODIUM LACTATE, POTASSIUM CHLORIDE, AND CALCIUM CHLORIDE: 600; 310; 30; 20 INJECTION, SOLUTION INTRAVENOUS at 14:28

## 2018-02-23 RX ADMIN — ACETAMINOPHEN 1000 MG: 500 TABLET ORAL at 20:10

## 2018-02-23 RX ADMIN — TACROLIMUS 0.5 MG: 0.5 CAPSULE ORAL at 02:20

## 2018-02-23 RX ADMIN — DEXAMETHASONE SODIUM PHOSPHATE 10 MG: 10 INJECTION INTRAMUSCULAR; INTRAVENOUS at 15:05

## 2018-02-23 RX ADMIN — ROCURONIUM BROMIDE 20 MG: 10 INJECTION INTRAVENOUS at 15:05

## 2018-02-23 RX ADMIN — METOCLOPRAMIDE 10 MG: 5 INJECTION, SOLUTION INTRAMUSCULAR; INTRAVENOUS at 14:36

## 2018-02-23 RX ADMIN — MYCOPHENOLATE MOFETIL 500 MG: 250 CAPSULE ORAL at 02:20

## 2018-02-23 RX ADMIN — MIDAZOLAM HYDROCHLORIDE 2 MG: 1 INJECTION, SOLUTION INTRAMUSCULAR; INTRAVENOUS at 14:54

## 2018-02-23 ASSESSMENT — PAIN SCALES - GENERAL
PAINLEVEL_OUTOF10: 0
PAINLEVEL_OUTOF10: 6
PAINLEVEL_OUTOF10: 0
PAINLEVEL_OUTOF10: 10
PAINLEVEL_OUTOF10: 0
PAINLEVEL_OUTOF10: 3

## 2018-02-23 ASSESSMENT — LIFESTYLE VARIABLES: SMOKING_STATUS: 0

## 2018-02-23 NOTE — CONSULTS
Inpatient consult to Nephrology  Consult performed by: Jason Pa ordered by: Tierra Jang  Assessment/Recommendations: Renal Consult Note    Thank you to requesting provider: Dr Aaron Ron, for asking us to see Tika Buck    Reason for consultation:  S/p kidney transplant    Chief Complaint:  Right arm pain and redness    History of Presenting Illness     Patient is a 58 yo pleasant man who has had ESRD (from benign hypertension). He had HD then PD. In , he underwent a  donor kidney transplant at [facility]). His course was benign. He is followed by Dr Issa carvajal he enjoyed a good renal function. Recently, he developed severe redness over right arm. He has a AV graft over this limb that was previously used for dialysis. He also has an AVF in his left arm, but it has been deep and difficult to cannulate. He denies NSAIDs. He has developed diabetes after transplant. Renal service ws consulted to manage his immunosuppression. He has no dysuria. Past Medical/Surgical History     Active Ambulatory Problems    Hypertension      Chest pressure         Date Noted: 2016      Diabetes mellitus ([address]         Date Noted: 2016      Ex-cigarette smoker         Date Noted: 2016      Chest pain      Normal cardiac stress test    Resolved Ambulatory Problems  No Resolved Ambulatory Problems  Past Medical History:  No date: Diabetes mellitus ([address]  2016:  Ex-cigarette smoker  No date: Hypertension  No date: Obese  No date: Vision problem      Review of Systems    Constitutional:  No weight loss, no fever/chills  Eyes:  No eye pain, no eye redness  Cardiovascular:  No chest pain, no worsening of edema  Respiratory:  No hemoptysis, no stidor  Gastrointestinal:  No blood in stool, no n/v, no diarrhea  Genitoruinary:  No hematuria, no difficulty with urination  Musculoskeletal:  No joint swelling, no redness  Integumentary:  Right arm rednsess, no itching  Neurological:  No focal weakness, No new sensory deficit  Psychiatric:  No depression, no confusion  Endocrine:  No polyuria, no polydipsia       Medications     Current Facility-Administered Medications:   mycophenolate (CELLCEPT) capsule 500 mg, 500 mg, Oral, BID, Casimiro Yang MD, 500 mg at 02/23/18 1030  tacrolimus (proGRAF) capsule 0.5 mg, 0.5 mg, Oral, BID, Niki Xavier MD, 0.5 mg at 02/23/18 1030  propranolol (INDERAL) tablet 40 mg, 40 mg, Oral, BID, Casimiro Yang MD, 40 mg at 02/23/18 1030  tamsulosin (FLOMAX) capsule 0.4 mg, 0.4 mg, Oral, Nightly, Niki Xavier MD, 0.4 mg at 02/22/18 2242  zolpidem (AMBIEN) tablet 10 mg, 10 mg, Oral, Nightly PRN, Casimiro Yang MD   glucose (GLUTOSE) 40 % oral gel 15 g, 15 g, Oral, PRN, Casimiro Yang MD  dextrose 50 % solution 12.5 g, 12.5 g, Intravenous, PRN, Casimiro Yang MD  glucagon (rDNA) injection 1 mg, 1 mg, Intramuscular, PRN, Casimiro Yang MD   dextrose 5 % solution, 100 mL/hr, Intravenous, PRN, Casimiro Yang MD  insulin lispro (HUMALOG) injection vial 0-12 Units, 0-12 Units, Subcutaneous, TID WC, Casimiro Yang MD  insulin lispro (HUMALOG) injection vial 0-6 Units, 0-6 Units, Subcutaneous, Nightly, Casimiro Yang MD, 1 Units at 02/22/18 2237  0.9 % sodium chloride infusion, , Intravenous, Continuous, Casimiro Yang MD, Last Rate: 75 mL/hr at 02/23/18 1029  acetaminophen (TYLENOL) tablet 650 mg, 650 mg, Oral, Q4H PRN, Casimiro Yang MD  ondansetron (ZOFRAN) injection 4 mg, 4 mg, Intravenous, Q6H PRN, Casimiro Yang MD  enoxaparin (LOVENOX) injection 40 mg, 40 mg, Subcutaneous, Nightly, Casimiro Yang MD, 40 mg at 02/22/18 2228  hydrALAZINE (APRESOLINE) injection 10 mg, 10 mg, Intravenous, Q6H PRN, Casimiro Yang MD  ceftaroline fosamil (TEFLARO) 600 mg in dextrose 5 % 50 mL IVPB, 600 mg, Intravenous, Q12H, Casimiro Yang MD, Last Rate: 50 mL/hr at 02/23/18 1030, 600 mg at 02/23/18 1030  Outpatient

## 2018-02-23 NOTE — PLAN OF CARE
Problem: Pain:  Goal: Pain level will decrease  Pain level will decrease   Outcome: Ongoing    Goal: Control of acute pain  Control of acute pain   Outcome: Ongoing    Goal: Control of chronic pain  Control of chronic pain   Outcome: Ongoing      Problem: Discharge Planning:  Goal: Discharged to appropriate level of care  Discharged to appropriate level of care  Outcome: Ongoing      Problem: Skin Integrity - Impaired:  Goal: Will show no infection signs and symptoms  Will show no infection signs and symptoms  Outcome: Ongoing      Problem: Skin Integrity:  Goal: Will show no infection signs and symptoms  Will show no infection signs and symptoms  Outcome: Ongoing    Goal: Absence of new skin breakdown  Absence of new skin breakdown  Outcome: Ongoing

## 2018-02-23 NOTE — H&P
histories  REVIEW OF SYSTEMS:  Review of Systems   Constitutional: Positive for chills. HENT: Negative for ear discharge, ear pain, hearing loss and tinnitus. Eyes: Negative for blurred vision, double vision, photophobia and pain. Respiratory: Negative for cough, hemoptysis and sputum production. Cardiovascular: Negative for chest pain, palpitations and orthopnea. Gastrointestinal: Negative for abdominal pain, diarrhea, heartburn, nausea and vomiting. Genitourinary: Negative. Musculoskeletal: Negative for back pain, myalgias and neck pain. Skin:        Warm red swelling   Neurological: Negative. Endo/Heme/Allergies: Negative for environmental allergies. Does not bruise/bleed easily. Psychiatric/Behavioral: Negative. PHYSICAL EXAM:  BP (!) 176/81   Pulse 75   Temp 98.4 °F (36.9 °C) (Temporal)   Resp 16   Ht 6' 1\" (1.854 m)   Wt 288 lb 6.4 oz (130.8 kg)   SpO2 98%   BMI 38.05 kg/m²   Physical Exam   Constitutional: He is oriented to person, place, and time. He appears well-developed and well-nourished. Non-toxic appearance. He does not have a sickly appearance. He does not appear ill. No distress. HENT:   Head: Normocephalic and atraumatic. Eyes: Conjunctivae and lids are normal. Pupils are equal, round, and reactive to light. Neck: Neck supple. No JVD present. Carotid bruit is not present. Cardiovascular: Normal rate and regular rhythm. Exam reveals no S3. No murmur heard. Pulses:       Carotid pulses are 2+ on the right side, and 2+ on the left side. Radial pulses are 2+ on the right side, and 2+ on the left side. Pulmonary/Chest: He has no decreased breath sounds. He has no wheezes. He has no rhonchi. He has no rales. Abdominal: Soft. Normal appearance and bowel sounds are normal. There is no hepatosplenomegaly. There is no tenderness. Musculoskeletal:        Right elbow: He exhibits swelling. Right lower leg: He exhibits no swelling and no edema.

## 2018-02-23 NOTE — CONSULTS
Consultation     He has a history of CKD and was started on dialysis in 2007. He had a fistula first in his left arm which failed. In 2008, right arm loop graft placed by  [facility]. Had transplant in 2010 and has been off dialysis ever since. Graft occluded a long time ago. He does have a working fistula in his upper left arm. About Wednesday he developed redness over the graft. He came to the ER after seeing Dr. Erin Chi with fever and elevated WBC and cellulitis of arm. We were consulted for suspected graft infection.       Lab Results   Component Value Date    CREATININE 0.9 02/23/2018    BUN 8 02/23/2018     (L) 02/23/2018    K 3.9 02/23/2018    CL 99 02/23/2018    CO2 23 02/23/2018       Darnell Esquivel is a 57 y.o. male with the following history reviewed and recorded in [employer]:  Patient Active Problem List    Diagnosis Date Noted    Cellulitis 02/22/2018    Chest pressure 07/17/2016    Diabetes mellitus ([address] 07/17/2016    Ex-cigarette smoker 07/17/2016    Hypertension     Chest pain     Normal cardiac stress test      Current Facility-Administered Medications   Medication Dose Route Frequency Provider Last Rate Last Dose    mycophenolate (CELLCEPT) capsule 500 mg  500 mg Oral BID Casimiro Yang MD   500 mg at 02/23/18 1030    tacrolimus (proGRAF) capsule 0.5 mg  0.5 mg Oral BID Casimiro Yang MD   0.5 mg at 02/23/18 1030    vancomycin (VANCOCIN) 1000 mg in dextrose 5% 200 mL IVPB  1,000 mg Intravenous On Call to [facility] Pietro Salguero MD        propranolol (INDERAL) tablet 40 mg  40 mg Oral BID Casimiro Yang MD   40 mg at 02/23/18 1030    tamsulosin (FLOMAX) capsule 0.4 mg  0.4 mg Oral Nightly Casimiro Yang MD   0.4 mg at 02/22/18 2242    zolpidem (AMBIEN) tablet 10 mg  10 mg Oral Nightly PRN Casimiro Yang MD        glucose (GLUTOSE) 40 % oral gel 15 g  15 g Oral PRN Casimiro Yang MD        dextrose 50 % solution 12.5 g  12.5 g Intravenous PRN Alin Faust MD femoral pulse: present 2+; Right popliteal pulse: absent Right DP: absent; Right PT absent; Left femoral pulse: present 2+; Left popliteal pulse: absent; Left DP: absent; Left PT: absent Redness over right forearm graft. No cyanosis, clubbing, or significant edema. No signs atheroembolic event. Palmar arch intact bilaterally. Pulmonary  effort appears normal.  No respiratory distress. Lungs - Breath sounds normal. No wheezes or rales. GI - Abdomen  soft, non tender, bowel sounds X 4 quadrants. No guarding or rebound tenderness. No distension or palpable mass. Genitourinary  deferred. Musculoskeletal  ROM appears normal.  No significant edema. Neurologic  alert and oriented X 3. Physiologic. Skin  redness and erythema of right forearm consistent with graft infection  Psychiatric  mood, affect, and behavior appear normal.  Judgment and thought processes appear normal.    Options have been discussed with the patient including continued medical management vs. proceeding with removal of right arm av graft. Patient has opted to proceed with this. Risks have been discussed with the patient including but not limited to MI, death, CVA, bleed, nerve injury, and infection. I met with patient and went over with him how graft is removed and that there wound be open wounds to heal on his right forearm. He asked appropriate questions and agrees to proceed. Assessment    1. Cellulitis of right upper extremity    2. Immunosuppression ([address]    3. Suspected graft infection      Plan    Proceed with removal of av graft  Vancomycin pre op.

## 2018-02-23 NOTE — CARE COORDINATION
as ordered, but does report that he has some issues with paying for them and has stopped taking some over the past year or two for that reason. Offered to give him info on drug assistance programs. Agreeable. Will follow up at discharge as indicated. No future appointments. Health Maintenance  There are no preventive care reminders to display for this patient.     Riley Murray RN

## 2018-02-23 NOTE — ANESTHESIA PRE PROCEDURE
Department of Anesthesiology  Preprocedure Note       Name:  Yaakov Mcgovern   Age:  57 y.o.  :  1955                                          MRN:  [id number]         Date:  2018      Surgeon: Tamanna Jones):  Lucas Boothe MD    Procedure: Procedure(s):  AV FISTULA GRAFT REMOVAL    Medications prior to admission:   Prior to Admission medications    Medication Sig Start Date End Date Taking? Authorizing Provider   acetaminophen (TYLENOL) 500 MG tablet Take 1,000 mg by mouth daily   Yes Historical Provider, MD   zolpidem (AMBIEN) 10 MG tablet Take by mouth nightly as needed for Sleep.    Yes Historical Provider, MD   tamsulosin (FLOMAX) 0.4 MG capsule Take 1 capsule by mouth daily  Patient taking differently: Take 0.4 mg by mouth nightly  18  Yes Kristina Vargas DO   metFORMIN (GLUCOPHAGE) 1000 MG tablet Take 1 tablet by mouth 2 times daily (with meals) 17  Yes Kristina Vargas DO   Lancets MISC Use to check blood sugar daily Dx E11.9 17  Yes Kristina Vargas DO   glucose blood VI test strips (ACCU-CHEK ANANT) strip Contour strips,check daily E11.9 17  Yes Kristina Vargas DO   tacrolimus (PROGRAF) 0.5 MG capsule Take 0.5 mg by mouth 2 times daily    Yes Historical Provider, MD   mycophenolate (CELLCEPT) 250 MG capsule Take by mouth 2 times daily Indications: Two capsules twice a day   Yes Historical Provider, MD   propranolol (INDERAL) 40 MG tablet Take 40 mg by mouth 2 times daily    Yes Historical Provider, MD       Current medications:    Current Facility-Administered Medications   Medication Dose Route Frequency Provider Last Rate Last Dose    [MAR Hold] mycophenolate (CELLCEPT) capsule 500 mg  500 mg Oral BID Casimiro Yang MD   500 mg at 18 1030    [MAR Hold] tacrolimus (proGRAF) capsule 0.5 mg  0.5 mg Oral BID Casimiro Yang MD   0.5 mg at 18 1030    [MAR Hold] vancomycin (VANCOCIN) 1000 mg in dextrose 5% 200 mL IVPB  1,000 mg Intravenous On Call (+) GERD: well controlled,          ROS comment: S/p kidney transplant 2008. Endo/Other:    (+) Diabetes, . Abdominal:           Vascular: negative vascular ROS. Anesthesia Plan      general     ASA 3 - emergent     (Pepcid/reglan preop)  Induction: intravenous. MIPS: Postoperative opioids intended and Prophylactic antiemetics administered. Anesthetic plan and risks discussed with patient.                       Vita Hernandez MD   2/23/2018

## 2018-02-24 LAB
ANION GAP SERPL CALCULATED.3IONS-SCNC: 20 MMOL/L (ref 7–19)
BUN BLDV-MCNC: 9 MG/DL (ref 8–23)
CALCIUM SERPL-MCNC: 8.8 MG/DL (ref 8.8–10.2)
CHLORIDE BLD-SCNC: 98 MMOL/L (ref 98–111)
CO2: 17 MMOL/L (ref 22–29)
CREAT SERPL-MCNC: 0.7 MG/DL (ref 0.5–1.2)
GFR NON-AFRICAN AMERICAN: >60
GLUCOSE BLD-MCNC: 182 MG/DL (ref 70–99)
GLUCOSE BLD-MCNC: 206 MG/DL (ref 70–99)
GLUCOSE BLD-MCNC: 206 MG/DL (ref 70–99)
GLUCOSE BLD-MCNC: 228 MG/DL (ref 70–99)
GLUCOSE BLD-MCNC: 236 MG/DL (ref 74–109)
HCT VFR BLD CALC: 43.4 % (ref 42–52)
HEMOGLOBIN: 14.7 G/DL (ref 14–18)
MAGNESIUM: 1.5 MG/DL (ref 1.6–2.4)
MCH RBC QN AUTO: 30.5 PG (ref 27–31)
MCHC RBC AUTO-ENTMCNC: 33.9 G/DL (ref 33–37)
MCV RBC AUTO: 90 FL (ref 80–94)
PDW BLD-RTO: 12.9 % (ref 11.5–14.5)
PERFORMED ON: ABNORMAL
PLATELET # BLD: 177 K/UL (ref 130–400)
PMV BLD AUTO: 10.4 FL (ref 9.4–12.4)
POTASSIUM REFLEX MAGNESIUM: 4.6 MMOL/L (ref 3.5–5)
RBC # BLD: 4.82 M/UL (ref 4.7–6.1)
SODIUM BLD-SCNC: 135 MMOL/L (ref 136–145)
URIC ACID, SERUM: 6.8 MG/DL (ref 3.4–7)
WBC # BLD: 12.4 K/UL (ref 4.8–10.8)

## 2018-02-24 PROCEDURE — 6370000000 HC RX 637 (ALT 250 FOR IP): Performed by: INTERNAL MEDICINE

## 2018-02-24 PROCEDURE — 2580000003 HC RX 258: Performed by: SURGERY

## 2018-02-24 PROCEDURE — 80197 ASSAY OF TACROLIMUS: CPT

## 2018-02-24 PROCEDURE — 6370000000 HC RX 637 (ALT 250 FOR IP): Performed by: SURGERY

## 2018-02-24 PROCEDURE — 84550 ASSAY OF BLOOD/URIC ACID: CPT

## 2018-02-24 PROCEDURE — 36415 COLL VENOUS BLD VENIPUNCTURE: CPT

## 2018-02-24 PROCEDURE — 2580000003 HC RX 258: Performed by: INTERNAL MEDICINE

## 2018-02-24 PROCEDURE — 82948 REAGENT STRIP/BLOOD GLUCOSE: CPT

## 2018-02-24 PROCEDURE — 1210000000 HC MED SURG R&B

## 2018-02-24 PROCEDURE — 6360000002 HC RX W HCPCS: Performed by: INTERNAL MEDICINE

## 2018-02-24 PROCEDURE — 85027 COMPLETE CBC AUTOMATED: CPT

## 2018-02-24 PROCEDURE — 99024 POSTOP FOLLOW-UP VISIT: CPT | Performed by: SURGERY

## 2018-02-24 PROCEDURE — 83735 ASSAY OF MAGNESIUM: CPT

## 2018-02-24 PROCEDURE — 99232 SBSQ HOSP IP/OBS MODERATE 35: CPT | Performed by: PHYSICIAN ASSISTANT

## 2018-02-24 PROCEDURE — 80048 BASIC METABOLIC PNL TOTAL CA: CPT

## 2018-02-24 RX ADMIN — SODIUM CHLORIDE: 9 INJECTION, SOLUTION INTRAVENOUS at 04:55

## 2018-02-24 RX ADMIN — MAGNESIUM OXIDE TAB 400 MG (241.3 MG ELEMENTAL MG) 400 MG: 400 (241.3 MG) TAB at 13:00

## 2018-02-24 RX ADMIN — INSULIN LISPRO 4 UNITS: 100 INJECTION, SOLUTION INTRAVENOUS; SUBCUTANEOUS at 08:21

## 2018-02-24 RX ADMIN — CEFTAROLINE FOSAMIL 600 MG: 600 POWDER, FOR SOLUTION INTRAVENOUS at 21:40

## 2018-02-24 RX ADMIN — TACROLIMUS 0.5 MG: 0.5 CAPSULE ORAL at 08:19

## 2018-02-24 RX ADMIN — PROPRANOLOL HYDROCHLORIDE 40 MG: 40 TABLET ORAL at 21:40

## 2018-02-24 RX ADMIN — ACETAMINOPHEN 1000 MG: 500 TABLET ORAL at 08:20

## 2018-02-24 RX ADMIN — INSULIN LISPRO 2 UNITS: 100 INJECTION, SOLUTION INTRAVENOUS; SUBCUTANEOUS at 17:22

## 2018-02-24 RX ADMIN — MYCOPHENOLATE MOFETIL 500 MG: 250 CAPSULE ORAL at 21:40

## 2018-02-24 RX ADMIN — TAMSULOSIN HYDROCHLORIDE 0.4 MG: 0.4 CAPSULE ORAL at 21:41

## 2018-02-24 RX ADMIN — ENOXAPARIN SODIUM 40 MG: 40 INJECTION SUBCUTANEOUS at 21:41

## 2018-02-24 RX ADMIN — MYCOPHENOLATE MOFETIL 500 MG: 250 CAPSULE ORAL at 08:20

## 2018-02-24 RX ADMIN — CEFTAROLINE FOSAMIL 600 MG: 600 POWDER, FOR SOLUTION INTRAVENOUS at 08:19

## 2018-02-24 RX ADMIN — PROPRANOLOL HYDROCHLORIDE 40 MG: 40 TABLET ORAL at 08:20

## 2018-02-24 RX ADMIN — INSULIN LISPRO 2 UNITS: 100 INJECTION, SOLUTION INTRAVENOUS; SUBCUTANEOUS at 21:51

## 2018-02-24 RX ADMIN — INSULIN LISPRO 4 UNITS: 100 INJECTION, SOLUTION INTRAVENOUS; SUBCUTANEOUS at 13:00

## 2018-02-24 RX ADMIN — TACROLIMUS 0.5 MG: 0.5 CAPSULE ORAL at 21:40

## 2018-02-24 ASSESSMENT — PAIN SCALES - GENERAL: PAINLEVEL_OUTOF10: 0

## 2018-02-24 NOTE — PROGRESS NOTES
antibiotic      Antibiotics continued after surgery due to:   Infection -  [x]  Abscess    []  Pneumonia    []  Aspiration Pneumonia    []  Sepsis    [x]  Cellulitis    []  Positive culture    []  UTI    []  Osteomyelitis    []  Fungal infection    []  H.pylori                              DVT prophylaxis: pharmacologic prophylaxis (with any of the following: enoxaparin (Lovenox) 40mg SQ 2h prior to surgery then QD)           Beta Blocker:  continued    Spencer Catheter:  n/a

## 2018-02-24 NOTE — OP NOTE
the  graft and the graft was rather poorly incorporated. I then made an incision through the old incision more distally on the  forearm where the apex of the loop had been. At this point, the patient was given 4000 units of intravenous heparin. I  transected both the graft leaving the venous and arterial anastomosis  intact at this point and then worked the graft loose in each tunnel so as  to totally remove all of the graft material.  I then turned my attention to  the venous anastomosis where I oversewed the vein distal to where the graft  had been sewn in as well as proximal.  Proximally, I did take a piece of  vein to use for an arterial patch and then removed the intervening segment. I oversewed the vein on each side with 4-0 Prolene. I then dissected off  the portion of vein that I could use for the arterial patch. Next, we had exsanguinated the arm and raised sterile tourniquet to 150  mmHg pressure. I very carefully removed the arterial end of the graft and  removed all of the graft material.  There was no graft material remaining  in his arm. This left me with about a 1 cm hole in the brachial artery. I  used my cephalic vein, used a two-suture technique of 6-0 Prolene and  patched open my arterotomy. Just prior to completing the patch closure, we  allowed tourniquet to deflate so as to allow for backbleeding and flushing  and then tied our suture. Hemostasis was secured at the suture line. At this point, we then checked for and secured hemostasis throughout the  wound. I brought Penrose drains through each of the tunnels from the  distal forearm wound as well as proximal.  I did use interrupted sutures of  3-0 Vicryl to close soft tissue over the artery, which fortunately was a  good amount. I then used interrupted 3-0 and 4-0 nylon to partially close  the antecubital incision as well as the distal forearm incision.   At this  point, we then placed dressings as well as an Ace wrap on

## 2018-02-24 NOTE — PROGRESS NOTES
thickening along the anterior soft tissues of the of the forearm slightly more conspicuous, focally along the distal graft. The induration does extend diffusely particularly on the medial aspect of the forearm from the elbow to the wrist region. These findings are compatible with cellulitis. There is edema tracking along the superficial aspect of the forearm musculature anteriorly and mild myositis changes would be difficult to exclude. There is no discrete focal fluid collection indicate definite abscess. There is no abnormal soft tissue gas. There are no bony abnormalities indicate osteomyelitis. 1. CT findings compatible with diffuse cellulitis involving the forearm, anterior superficial soft tissues. No discrete fluid collection to indicate a focal abscess. Signed by Dr Destini Schneider on 2/22/2018 7:05 PM       Assessment     -s.p kidney transplant  -Benign HTN  -Cellulitis right arm (r/o AV graft infection). -Type 2 diabetes  -Hypomagnesemia    Plan:  IV antibiotics for now. Continue current immunosuppression protocol. Follow up labs. Will add Mg suppl.

## 2018-02-25 LAB
ANION GAP SERPL CALCULATED.3IONS-SCNC: 14 MMOL/L (ref 7–19)
BUN BLDV-MCNC: 12 MG/DL (ref 8–23)
CALCIUM SERPL-MCNC: 9 MG/DL (ref 8.8–10.2)
CHLORIDE BLD-SCNC: 102 MMOL/L (ref 98–111)
CO2: 20 MMOL/L (ref 22–29)
CREAT SERPL-MCNC: 0.8 MG/DL (ref 0.5–1.2)
EKG P AXIS: 1 DEGREES
EKG P-R INTERVAL: 164 MS
EKG Q-T INTERVAL: 396 MS
EKG QRS DURATION: 106 MS
EKG QTC CALCULATION (BAZETT): 404 MS
EKG T AXIS: 24 DEGREES
GFR NON-AFRICAN AMERICAN: >60
GLUCOSE BLD-MCNC: 134 MG/DL (ref 70–99)
GLUCOSE BLD-MCNC: 154 MG/DL (ref 70–99)
GLUCOSE BLD-MCNC: 163 MG/DL (ref 70–99)
GLUCOSE BLD-MCNC: 181 MG/DL (ref 70–99)
GLUCOSE BLD-MCNC: 186 MG/DL (ref 74–109)
HCT VFR BLD CALC: 41.8 % (ref 42–52)
HEMOGLOBIN: 13.8 G/DL (ref 14–18)
MAGNESIUM: 1.8 MG/DL (ref 1.6–2.4)
MCH RBC QN AUTO: 30.5 PG (ref 27–31)
MCHC RBC AUTO-ENTMCNC: 33 G/DL (ref 33–37)
MCV RBC AUTO: 92.3 FL (ref 80–94)
PDW BLD-RTO: 13 % (ref 11.5–14.5)
PERFORMED ON: ABNORMAL
PLATELET # BLD: 181 K/UL (ref 130–400)
PMV BLD AUTO: 10.5 FL (ref 9.4–12.4)
POTASSIUM SERPL-SCNC: 4.1 MMOL/L (ref 3.5–5)
RBC # BLD: 4.53 M/UL (ref 4.7–6.1)
SODIUM BLD-SCNC: 136 MMOL/L (ref 136–145)
WBC # BLD: 12.5 K/UL (ref 4.8–10.8)

## 2018-02-25 PROCEDURE — 6370000000 HC RX 637 (ALT 250 FOR IP): Performed by: SURGERY

## 2018-02-25 PROCEDURE — 85027 COMPLETE CBC AUTOMATED: CPT

## 2018-02-25 PROCEDURE — 82948 REAGENT STRIP/BLOOD GLUCOSE: CPT

## 2018-02-25 PROCEDURE — 2580000003 HC RX 258: Performed by: INTERNAL MEDICINE

## 2018-02-25 PROCEDURE — 93005 ELECTROCARDIOGRAM TRACING: CPT

## 2018-02-25 PROCEDURE — 6360000002 HC RX W HCPCS: Performed by: NURSE PRACTITIONER

## 2018-02-25 PROCEDURE — 6360000002 HC RX W HCPCS: Performed by: INTERNAL MEDICINE

## 2018-02-25 PROCEDURE — 6370000000 HC RX 637 (ALT 250 FOR IP): Performed by: INTERNAL MEDICINE

## 2018-02-25 PROCEDURE — 2500000003 HC RX 250 WO HCPCS: Performed by: NURSE PRACTITIONER

## 2018-02-25 PROCEDURE — 36415 COLL VENOUS BLD VENIPUNCTURE: CPT

## 2018-02-25 PROCEDURE — 2580000003 HC RX 258: Performed by: NURSE PRACTITIONER

## 2018-02-25 PROCEDURE — 80197 ASSAY OF TACROLIMUS: CPT

## 2018-02-25 PROCEDURE — 83735 ASSAY OF MAGNESIUM: CPT

## 2018-02-25 PROCEDURE — 2140000000 HC CCU INTERMEDIATE R&B

## 2018-02-25 PROCEDURE — 99024 POSTOP FOLLOW-UP VISIT: CPT | Performed by: SURGERY

## 2018-02-25 PROCEDURE — 99233 SBSQ HOSP IP/OBS HIGH 50: CPT | Performed by: FAMILY MEDICINE

## 2018-02-25 PROCEDURE — 80048 BASIC METABOLIC PNL TOTAL CA: CPT

## 2018-02-25 RX ORDER — DILTIAZEM HYDROCHLORIDE 5 MG/ML
10 INJECTION INTRAVENOUS ONCE
Status: COMPLETED | OUTPATIENT
Start: 2018-02-25 | End: 2018-02-25

## 2018-02-25 RX ORDER — METOPROLOL TARTRATE 5 MG/5ML
5 INJECTION INTRAVENOUS ONCE
Status: COMPLETED | OUTPATIENT
Start: 2018-02-25 | End: 2018-02-25

## 2018-02-25 RX ORDER — PANTOPRAZOLE SODIUM 40 MG/1
40 TABLET, DELAYED RELEASE ORAL
Status: DISCONTINUED | OUTPATIENT
Start: 2018-02-26 | End: 2018-02-28 | Stop reason: HOSPADM

## 2018-02-25 RX ORDER — METOPROLOL TARTRATE 5 MG/5ML
INJECTION INTRAVENOUS
Status: DISPENSED
Start: 2018-02-25 | End: 2018-02-26

## 2018-02-25 RX ADMIN — PROPRANOLOL HYDROCHLORIDE 40 MG: 40 TABLET ORAL at 20:24

## 2018-02-25 RX ADMIN — PROPRANOLOL HYDROCHLORIDE 40 MG: 40 TABLET ORAL at 08:25

## 2018-02-25 RX ADMIN — MYCOPHENOLATE MOFETIL 500 MG: 250 CAPSULE ORAL at 08:25

## 2018-02-25 RX ADMIN — CEFAZOLIN 1 G: 1 INJECTION, POWDER, FOR SOLUTION INTRAMUSCULAR; INTRAVENOUS at 10:50

## 2018-02-25 RX ADMIN — MYCOPHENOLATE MOFETIL 500 MG: 250 CAPSULE ORAL at 20:23

## 2018-02-25 RX ADMIN — INSULIN LISPRO 2 UNITS: 100 INJECTION, SOLUTION INTRAVENOUS; SUBCUTANEOUS at 17:12

## 2018-02-25 RX ADMIN — MAGNESIUM OXIDE TAB 400 MG (241.3 MG ELEMENTAL MG) 400 MG: 400 (241.3 MG) TAB at 08:25

## 2018-02-25 RX ADMIN — SODIUM CHLORIDE: 9 INJECTION, SOLUTION INTRAVENOUS at 03:42

## 2018-02-25 RX ADMIN — CEFAZOLIN 1 G: 1 INJECTION, POWDER, FOR SOLUTION INTRAMUSCULAR; INTRAVENOUS at 17:12

## 2018-02-25 RX ADMIN — TAMSULOSIN HYDROCHLORIDE 0.4 MG: 0.4 CAPSULE ORAL at 20:24

## 2018-02-25 RX ADMIN — TACROLIMUS 0.5 MG: 0.5 CAPSULE ORAL at 20:24

## 2018-02-25 RX ADMIN — ACETAMINOPHEN 1000 MG: 500 TABLET ORAL at 08:25

## 2018-02-25 RX ADMIN — DILTIAZEM HYDROCHLORIDE 10 MG: 5 INJECTION INTRAVENOUS at 17:45

## 2018-02-25 RX ADMIN — ENOXAPARIN SODIUM 135 MG: 150 INJECTION SUBCUTANEOUS at 20:24

## 2018-02-25 RX ADMIN — INSULIN LISPRO 2 UNITS: 100 INJECTION, SOLUTION INTRAVENOUS; SUBCUTANEOUS at 08:31

## 2018-02-25 RX ADMIN — TACROLIMUS 0.5 MG: 0.5 CAPSULE ORAL at 08:27

## 2018-02-25 RX ADMIN — DILTIAZEM HYDROCHLORIDE 5 MG/HR: 5 INJECTION INTRAVENOUS at 17:46

## 2018-02-25 RX ADMIN — ZOLPIDEM TARTRATE 10 MG: 5 TABLET ORAL at 03:10

## 2018-02-25 RX ADMIN — METOROPROLOL TARTRATE 5 MG: 5 INJECTION, SOLUTION INTRAVENOUS at 15:42

## 2018-02-25 RX ADMIN — CEFTAROLINE FOSAMIL 600 MG: 600 POWDER, FOR SOLUTION INTRAVENOUS at 08:25

## 2018-02-25 ASSESSMENT — PAIN SCALES - GENERAL
PAINLEVEL_OUTOF10: 5
PAINLEVEL_OUTOF10: 0
PAINLEVEL_OUTOF10: 0
PAINLEVEL_OUTOF10: 5

## 2018-02-25 NOTE — PLAN OF CARE
Problem: Pain:  Goal: Pain level will decrease  Pain level will decrease   Outcome: Ongoing      Problem: Discharge Planning:  Goal: Discharged to appropriate level of care  Discharged to appropriate level of care   Outcome: Ongoing      Problem: Skin Integrity - Impaired:  Goal: Will show no infection signs and symptoms  Will show no infection signs and symptoms   Outcome: Ongoing      Problem: Skin Integrity:  Goal: Will show no infection signs and symptoms  Will show no infection signs and symptoms   Outcome: Ongoing

## 2018-02-25 NOTE — CONSULTS
CONSULTATION NOTE :ID       Patient - Yancy Alberts,  Age - 57 y.o.    - 1955      Room Number - 0310/310-01   [facility] -  [id number]   Acct # - [de-identified]  Date of Admission -  2018  2:56 PM  Patient's PCP: Nicole Araiza DO     Requesting Physician: Susan Live MD    REASON FOR CONSULTATION   infected dialysis graft     HISTORY OF PRESENT ILLNESS       This is a very pleasant 57 y.o. male who was admitted to the hospital with a chief complaints of  Redness and tenderness of RUE. The patient reports that he noticed His right upper extremity was somewhat red and tender on Wednesday. He reports he did not feel bad and went ahead and went to [employer]. He thought maybe he had prompted up the wrong way or ever used it. He contacted Dr. Hunter Edwards office the following day and relate his concerns. They made an appointment for later that day however the nursing staff call back and would go the emergency department as after Kisha was concern for the possibility of clot or additional infection. Patient sits quietly admitted, started on antibiotics and taken to surgery per Dr. Pooja Odom for removal of infected graft. He does not recall any break in the skin of his right upper extremity. It does not recall any pimples, bites, excoriations or trauma.     PAST MEDICAL AND SURGICAL HISTORY       Past Medical History:   Diagnosis Date    Diabetes mellitus ([address]     Ex-cigarette smoker 2016    Hypertension     Obese     Vision problem     wears glasses       Past Surgical History:   Procedure Laterality Date    CHOLECYSTECTOMY      DIALYSIS FISTULA CREATION  [place]     left arm radial cephalic    HERNIA REPAIR      KIDNEY TRANSPLANT      2010 in [address] TUNNELED VENOUS CATHETER PLACEMENT  multiple    VASCULAR SURGERY Right vera     av loop graft         MEDICATIONS :       Scheduled Meds:   magnesium oxide  400 mg Oral Daily    mycophenolate  500 mg Oral BID    tacrolimus  0.5 mg Oral BID    acetaminophen  1,000 mg Oral Daily    propranolol  40 mg Oral BID    tamsulosin  0.4 mg Oral Nightly    insulin lispro  0-12 Units Subcutaneous TID     insulin lispro  0-6 Units Subcutaneous Nightly    enoxaparin  40 mg Subcutaneous Nightly    ceftaroline fosamil (TEFLARO) IVPB  600 mg Intravenous Q12H     Continuous Infusions:   sodium chloride 50 mL/hr at 18 0342    dextrose       PRN Meds:acetaminophen, HYDROcodone 5 mg - acetaminophen **OR** HYDROcodone 5 mg - acetaminophen, morphine, zolpidem, glucose, dextrose, glucagon (rDNA), dextrose, ondansetron, hydrALAZINE    ALLERGIES:      Patient has no known allergies. SOCIAL HISTORY:     TOBACCO:   reports that he has quit smoking. He does not have any smokeless tobacco history on file. ETOH:   reports that he does not drink alcohol. Patient currently lives at home    FAMILY HISTORY:     History reviewed. No pertinent family history. REVIEW OF SYSTEMS:     Constitutional: no fever, no night sweats  Ears: some hearing difficult  Mouth/throat: Extensive dental work prior to his transplant  Lungs: no cough  no shortness of breath  CVS: no palpitation, no chest pain  GI: no abdominal pain  MIKA: no dysuria  Musculoskeletal: Right upper extremity edema  Hematology: no anemia  Dermatology: Right upper extremity cellulitis on admission      PHYSICAL EXAM:     BP (!) 142/70   Pulse 63   Temp 96.7 °F (35.9 °C) (Temporal)   Resp 16   Ht 6' 1\" (1.854 m)   Wt 284 lb 2 oz (128.9 kg)   SpO2 94%   BMI 37.49 kg/m²  Temp (24hrs), Av.2 °F (36.2 °C), Min:96.7 °F (35.9 °C), Max:97.7 °F (36.5 °C)    General:  Morbidly obese gentleman lying in bed, Awake, alert, not in distress. HEENT: pink conjunctiva, anicteric sclera, moist oral mucosa. Evidence of multiple fillings.  No active or evident  Chest:  Lungs without crackles, ronchi or wheezing  Cardiovascular:  RRR ,S1S2, no called. Narrative:     ORDER#: [id number]                          ORDERED BY: Main Garcia  SOURCE: Blood LFA                          COLLECTED:  02/22/18 17:03  ANTIBIOTICS AT [facility]. :                      RECEIVED :  02/22/18 17:12           UA:   Recent Labs      02/23/18   2030   SPECGRAV  1.007   PHUR  6.0   COLORU  YELLOW   CLARITYU  Clear   PROTEINU  Negative   BLOODU  Negative   NITRU  Negative   GLUCOSEU  500*   BILIRUBINUR  Negative   UROBILINOGEN  0.2   KETUA  TRACE*           IMAGING:   XR CHEST PORTABLE [[id number]] Resulted: 02/23/18 1512      Order Status: Completed Updated: 02/23/18 1414     Narrative:       EXAMINATION: XR CHEST PORTABLE 2/23/2018 2:11 PM  HISTORY: Preoperative evaluation, hypertension, obesity  COMPARISON: 7/17/2016  FINDINGS:  Heart and mediastinal contours appear normal. The lungs demonstrate  some mild interstitial prominence, similar to the previous exam,  without evidence of acute consolidation or effusion. No pneumothorax. The pulmonary vasculature is stable.     Impression:       Mild chronic lung changes without appreciable acute  cardiopulmonary process.   Signed by Dr Yelitza Carmona on 2/23/2018 2:12 PM           ASSESSMENT AND PLAN     Patient Active Problem List   Diagnosis    Hypertension    Chest pressure    Diabetes mellitus ([address]    Ex-cigarette smoker    Chest pain    Normal cardiac stress test    Cellulitis    Infection of AV graft for dialysis ([address]    Cellulitis of right upper extremity     Infected right upper extremity AV dialysis graft with methicillin susceptible Staphylococcus aureus status post removal of AV graft  New Problem additional workup planned    Immunocompromised patient with renal transplant 8 years ago    Diabetes mellitus    Morbid obesity      Discontinue ceftaroline  Begin cefazolin  Recommend Tight glucose control  Local care to right arm surgical site per Dr. Rach Moctezuma        Thank you Beverli Closs, MD for allowing me to participate in this patient's care  Electronically signed by Ted Wilson MD on 2/25/2018 at 9:15 AM

## 2018-02-25 NOTE — PROGRESS NOTES
Chief complaint:   Chief Complaint   Patient presents with   • Follow-up     1 yr fu   • Medication Refill       Vitals:  Visit Vitals  /78 (BP Location: [facility], Patient Position: Sitting, Cuff Size: Regular)   Pulse 71   Ht 5' 8\" (1.727 m)   Wt 107.5 kg   SpO2 96%   BMI 36.04 kg/m²       HISTORY OF PRESENT ILLNESS     Hyperlipidemia - Medications help, nothing provokes, no associated symptoms, improving.          Other significant problems:  Patient Active Problem List    Diagnosis Date Noted   • Chronic atrial fibrillation (CMS/[facility]) 06/13/2016     Priority: Low   • Hypertension, controlled on Lisinopril 03/18/2015     Priority: Low   • Vitamin D deficiency 09/15/2014     Priority: Low     VITAMIN D2 INSUFFICIENCY     • Gout flare      Priority: Low   • Hyperuricemia and renal insufficiency      Priority: Low   • Essential hypertension, benign 09/12/2013     Priority: Low   • Cardiomyopathy, dilated, nonischemic (CMS/[facility]) 09/12/2013     Priority: Low   • Complete heart block, Status post atrioventricular node ablation in 2009      Priority: Low     s/p AV node ablation, 2009     • Long term current use of anticoagulant therapy.on Xarelto      Priority: Low     Warfarin     • Status post biventricular implantable cardioverter defibrillator implant in 12/2009 , [employer], with now biventricular implantable cardioverter defibrillator system.      Priority: Low   • Left ventricular dysfunction, improved with last ejection fraction 61% in 09/2012.      Priority: Low     EF 25% 2009 pre implant, 42% 04/2010, EF - 61% per Echo 9/12/2012     • Dilated nonischemic cardiomyopathy which is resolved, most likely tachycardia induced 08/14/2012     Priority: Low     dilated       • AICD present, double chamber 08/14/2012     Priority: Low   • Congestive heart failure, New York Heart Association class 1 status, chronic, systolic. 08/14/2012     Priority: Low   • Dyspnea 08/14/2012     Priority: Low   • Drug refractory  Tele strip provided to Jennifer Cardona PA-C. atrial fibrillation, CHADS-VASc score is 2, currently on Xarelto. 02/07/2012     Priority: Low     CHADSVASc score 1 (on warfarin)         PAST MEDICAL, FAMILY AND SOCIAL HISTORY     Medications:  Current Outpatient Prescriptions   Medication   • atorvastatin (LIPITOR) 40 MG tablet   • carvedilol (COREG) 25 MG tablet   • lisinopril (ZESTRIL) 20 MG tablet   • XARELTO 20 MG Tab   • Vitamin D, Ergocalciferol, 59432 UNITS capsule   • ergocalciferol (DRISDOL) 85653 UNITS capsule     No current facility-administered medications for this visit.        Allergies:  ALLERGIES:  No Known Allergies    Past Medical  History/Surgeries:  Past Medical History:   Diagnosis Date   • Atrial fibrillation (CMS/[facility]) 2/7/2012   • Cardiomyopathy (CMS/[facility]) 8/14/2012   • CHB (complete heart block) (CMS/[facility])    • CHF (congestive heart failure) (CMS/[facility]) 8/14/2012   • Encounter for long-term (current) use of anticoagulants- Warfarin    • Gout flare    • Hyperuricemia and renal insufficiency    • NYHA class III    • Severe LV dysfunction- EF 25%    • Vitamin D deficiency 9/15/2014    VITAMIN D2 INSUFFICIENCY       Past Surgical History:   Procedure Laterality Date   • ECHO HEART RESTING  09/12/2012    LV EF- 61%   • EP ABLATION  12/21/2009    S/p AVJ ablation; patient tolerant to ACEI   • EP CARDIOVERSION  02/2009   • EP ICD IMPLANT  12/29/2009    [employer] Dual BiV chamber   • LEFT HEART CATH INCLUDING LEFT VENTICULOGRAPHY W MD GIRALDO AND FLORENCE  2009       Family History:  Family History   Problem Relation Age of Onset   • High blood pressure Mother        Social History:  Social History   Substance Use Topics   • Smoking status: Never Smoker   • Smokeless tobacco: Never Used   • Alcohol use 0.0 oz/week      Comment: on occasions       REVIEW OF SYSTEMS     Review of Systems   Constitutional: Negative for fatigue and unexpected weight change.   HENT: Negative for ear pain, postnasal drip, rhinorrhea and sore throat.    Eyes: Negative for  pain and redness.   Respiratory: Negative for cough and shortness of breath.    Cardiovascular: Negative for chest pain and palpitations.   Gastrointestinal: Negative for abdominal pain, constipation, diarrhea, nausea and vomiting.   Genitourinary: Negative for dysuria, frequency and urgency.   Musculoskeletal: Negative for back pain and joint swelling.   Skin: Negative for wound.   Neurological: Negative for tremors and weakness.   Hematological: Negative for adenopathy. Does not bruise/bleed easily.   Psychiatric/Behavioral: Negative for dysphoric mood. The patient is not nervous/anxious.        PHYSICAL EXAM     Physical Exam   Constitutional: He is oriented to person, place, and time. He appears well-developed and well-nourished. No distress.   HENT:   Head: Normocephalic and atraumatic.   Right Ear: External ear normal.   Left Ear: External ear normal.   Nose: Nose normal.   Mouth/Throat: Oropharynx is clear and moist. No oropharyngeal exudate.   Eyes: Conjunctivae and EOM are normal. Pupils are equal, round, and reactive to light. Right eye exhibits no discharge. Left eye exhibits no discharge. No scleral icterus.   Neck: Normal range of motion. Neck supple. No JVD present. No tracheal deviation present. No thyromegaly present.   Cardiovascular: Normal rate, regular rhythm and normal heart sounds.  Exam reveals no gallop and no friction rub.    No murmur heard.  Pulmonary/Chest: Effort normal and breath sounds normal. No stridor. No respiratory distress.   Abdominal: Soft. Bowel sounds are normal. He exhibits no distension and no mass. There is no tenderness. There is no rebound and no guarding.   Musculoskeletal: Normal range of motion. He exhibits no edema or tenderness.   Lymphadenopathy:     He has no cervical adenopathy.   Neurological: He is oriented to person, place, and time. He exhibits normal muscle tone. Coordination normal.   Skin: Skin is warm and dry. No rash noted. He is not diaphoretic. No  erythema.   Psychiatric: He has a normal mood and affect. His behavior is normal. Judgment and thought content normal.       ASSESSMENT/PLAN     Hyperlipidemia - lipitor

## 2018-02-25 NOTE — PROGRESS NOTES
+---------------+-----------------+----------------------+-----------------+ ! Axillary       ! Yes              ! Yes                   ! None             ! +---------------+-----------------+----------------------+-----------------+ ! Brachial       !Yes              ! Yes                   ! None             ! +---------------+-----------------+----------------------+-----------------+ ! Radial         !Yes              ! Yes                   ! None             ! +---------------+-----------------+----------------------+-----------------+ ! Ulnar          ! Yes              ! Yes                   ! None             ! +---------------+-----------------+----------------------+-----------------+ ! Cephalic       ! Yes              ! Yes                   ! None             ! +---------------+-----------------+----------------------+-----------------+ ! Basilic        ! Yes              ! Yes                   ! None             ! +---------------+-----------------+----------------------+-----------------+ Left UE Vein Measurements 2D Measurements +---------------+-----------------+----------------------+-----------------+ ! Location       ! Visualized       ! Compressibility       ! Thrombosis       ! +---------------+-----------------+----------------------+-----------------+ ! IJV            ! Yes              ! Yes                   ! None             ! +---------------+-----------------+----------------------+-----------------+ ! SCV            ! Yes              ! Yes                   ! None             ! +---------------+-----------------+----------------------+-----------------+    Ct Upper Extremity Right Wo Contrast    Result Date: 2/22/2018  CT UPPER EXTREMITY RIGHT WO CONTRAST 2/22/2018 5:15 PM HISTORY: Possible abscess COMPARISON: None. TECHNIQUE:  Radiation dose equals  mGy cm. AUTOMATED EXPOSURE CONTROL dose reduction technique was implemented. Thin section axial images were obtained.  2-D sagittal and coronal reconstruction

## 2018-02-25 NOTE — PROGRESS NOTES
Patient atrial fib 140's stated felt funny. BP elevated 132/93, pulse 143. Lopressor order given IV now pulse 124, /86. Patient states fells better. Denies any discomfort at pressent.

## 2018-02-25 NOTE — PROGRESS NOTES
focal findings or movement disorder noted  Assessment/Plan  1. POD-2 doing well  2. Continue antibiotic  3. Possibly home tomorrow? MSSA  4. ? Home antibiotics      Antibiotics continued after surgery due to:   Infection -  [x]  Abscess    []  Pneumonia    []  Aspiration Pneumonia    []  Sepsis    [x]  Cellulitis    []  Positive culture    []  UTI    []  Osteomyelitis    []  Fungal infection    []  H.pylori                              DVT prophylaxis: pharmacologic prophylaxis (with any of the following: enoxaparin (Lovenox) 40mg SQ 2h prior to surgery then QD)           Beta Blocker:  continued    Spencer Catheter:  n/a

## 2018-02-25 NOTE — PROGRESS NOTES
[facility]      Patient:    Darnell Esquivel  YOB: 1955  Date of Service:  2/25/2018  MRN:    [id number]    Room:   [address] [place] [address]   PCP:    Raquel Lea DO  Advance Directive:  Full Code  Admit Date:   2/22/2018       Hospital Day:  2    Chief Complaint:  Right arm cellulitis    Subjective:  Patient states his pain has resolved. He reports that he has not required pain medication since before surgery. He is eating/ drinking well. He denies fever or chills. He underwent dressing change by Dr. Coral Catalan yesterday. The patient states Dr. Coral Catalan was happy with the appearance. No new complaints.       HPI this admission:  The patient is a 57 y. o. male who presents to er c/o right arm swelling. He has a history of renal transplant, and had a fistula same arm few years ago.  +chills, no fever, no sob    Cumulative Hospital Course: The patient was admitted to the hospital and placed on IV antibiotics. Vascular surgery was consulted. He was found to have an infected right forearm dialysis graft. The patient underwent AV Graft removal on 2/23/18 with Dr. Coral Catalan and tolerated well.      Review of Systems:   Constitutional / general:  Denies fever / chills / sweats  Head:  Denies headache / neck stiffness / trauma / visual change  Eyes:  Denies blurry vision / acute visual change or loss / itching / redness  ENT: Denies sore throat / hoarseness / nasal drainage / ear pain  CV:  Denies chest pain / palpitations/ orthopnea   Respiratory:  Denies cough / shortness of breath / sputum / hemoptysis  GI: Denies nausea / vomiting / abdominal pain / diarrhea / constipation  :  Denies dysuria / hesitancy / urgency / hematuria   Neuro: Denies paralysis / syncope / seizure / dysphagia / headache / paresthesias  Musculoskeletal:  Denies muscle weakness /joint stiffness / pain  Vascular: Denies edema / claudication / varicosities  Heme / endocrine: Denies easy bruising / bleeding / excessive sweating / heat or

## 2018-02-26 PROBLEM — I48.91 ATRIAL FIBRILLATION WITH RVR (HCC): Status: ACTIVE | Noted: 2018-02-26

## 2018-02-26 LAB
ANION GAP SERPL CALCULATED.3IONS-SCNC: 12 MMOL/L (ref 7–19)
BUN BLDV-MCNC: 12 MG/DL (ref 8–23)
CALCIUM SERPL-MCNC: 8.5 MG/DL (ref 8.8–10.2)
CHLORIDE BLD-SCNC: 104 MMOL/L (ref 98–111)
CO2: 22 MMOL/L (ref 22–29)
CREAT SERPL-MCNC: 0.8 MG/DL (ref 0.5–1.2)
GFR NON-AFRICAN AMERICAN: >60
GLUCOSE BLD-MCNC: 149 MG/DL (ref 70–99)
GLUCOSE BLD-MCNC: 152 MG/DL (ref 70–99)
GLUCOSE BLD-MCNC: 153 MG/DL (ref 70–99)
GLUCOSE BLD-MCNC: 163 MG/DL (ref 70–99)
GLUCOSE BLD-MCNC: 174 MG/DL (ref 74–109)
HCT VFR BLD CALC: 42.3 % (ref 42–52)
HEMOGLOBIN: 14 G/DL (ref 14–18)
LV EF: 56 %
LVEF MODALITY: NORMAL
MAGNESIUM: 1.7 MG/DL (ref 1.6–2.4)
MCH RBC QN AUTO: 30.6 PG (ref 27–31)
MCHC RBC AUTO-ENTMCNC: 33.1 G/DL (ref 33–37)
MCV RBC AUTO: 92.6 FL (ref 80–94)
PDW BLD-RTO: 13.2 % (ref 11.5–14.5)
PERFORMED ON: ABNORMAL
PLATELET # BLD: 199 K/UL (ref 130–400)
PMV BLD AUTO: 10.6 FL (ref 9.4–12.4)
POTASSIUM SERPL-SCNC: 4.3 MMOL/L (ref 3.5–5)
RBC # BLD: 4.57 M/UL (ref 4.7–6.1)
SODIUM BLD-SCNC: 138 MMOL/L (ref 136–145)
T4 FREE: 2.2 NG/DL (ref 0.9–1.7)
TROPONIN: <0.01 NG/ML (ref 0–0.03)
TROPONIN: <0.01 NG/ML (ref 0–0.03)
TSH SERPL DL<=0.05 MIU/L-ACNC: 0.86 UIU/ML (ref 0.27–4.2)
WBC # BLD: 9.8 K/UL (ref 4.8–10.8)

## 2018-02-26 PROCEDURE — 36415 COLL VENOUS BLD VENIPUNCTURE: CPT

## 2018-02-26 PROCEDURE — 83735 ASSAY OF MAGNESIUM: CPT

## 2018-02-26 PROCEDURE — 2580000003 HC RX 258: Performed by: INTERNAL MEDICINE

## 2018-02-26 PROCEDURE — 6360000002 HC RX W HCPCS: Performed by: INTERNAL MEDICINE

## 2018-02-26 PROCEDURE — 2140000000 HC CCU INTERMEDIATE R&B

## 2018-02-26 PROCEDURE — 6370000000 HC RX 637 (ALT 250 FOR IP): Performed by: INTERNAL MEDICINE

## 2018-02-26 PROCEDURE — 6370000000 HC RX 637 (ALT 250 FOR IP): Performed by: FAMILY MEDICINE

## 2018-02-26 PROCEDURE — 82948 REAGENT STRIP/BLOOD GLUCOSE: CPT

## 2018-02-26 PROCEDURE — 80048 BASIC METABOLIC PNL TOTAL CA: CPT

## 2018-02-26 PROCEDURE — 84484 ASSAY OF TROPONIN QUANT: CPT

## 2018-02-26 PROCEDURE — 84439 ASSAY OF FREE THYROXINE: CPT

## 2018-02-26 PROCEDURE — 6370000000 HC RX 637 (ALT 250 FOR IP): Performed by: HOSPITALIST

## 2018-02-26 PROCEDURE — 84443 ASSAY THYROID STIM HORMONE: CPT

## 2018-02-26 PROCEDURE — 6360000002 HC RX W HCPCS: Performed by: NURSE PRACTITIONER

## 2018-02-26 PROCEDURE — 6370000000 HC RX 637 (ALT 250 FOR IP): Performed by: SURGERY

## 2018-02-26 PROCEDURE — 99223 1ST HOSP IP/OBS HIGH 75: CPT | Performed by: INTERNAL MEDICINE

## 2018-02-26 PROCEDURE — 85027 COMPLETE CBC AUTOMATED: CPT

## 2018-02-26 PROCEDURE — 99233 SBSQ HOSP IP/OBS HIGH 50: CPT | Performed by: HOSPITALIST

## 2018-02-26 PROCEDURE — 93306 TTE W/DOPPLER COMPLETE: CPT

## 2018-02-26 RX ADMIN — DILTIAZEM HYDROCHLORIDE 30 MG: 30 TABLET, FILM COATED ORAL at 17:17

## 2018-02-26 RX ADMIN — CEFAZOLIN 1 G: 1 INJECTION, POWDER, FOR SOLUTION INTRAMUSCULAR; INTRAVENOUS at 17:17

## 2018-02-26 RX ADMIN — TAMSULOSIN HYDROCHLORIDE 0.4 MG: 0.4 CAPSULE ORAL at 20:01

## 2018-02-26 RX ADMIN — DILTIAZEM HYDROCHLORIDE 30 MG: 30 TABLET, FILM COATED ORAL at 12:49

## 2018-02-26 RX ADMIN — CEFAZOLIN 1 G: 1 INJECTION, POWDER, FOR SOLUTION INTRAMUSCULAR; INTRAVENOUS at 09:42

## 2018-02-26 RX ADMIN — INSULIN LISPRO 3 UNITS: 100 INJECTION, SOLUTION INTRAVENOUS; SUBCUTANEOUS at 12:24

## 2018-02-26 RX ADMIN — CEFAZOLIN 1 G: 1 INJECTION, POWDER, FOR SOLUTION INTRAMUSCULAR; INTRAVENOUS at 03:38

## 2018-02-26 RX ADMIN — ZOLPIDEM TARTRATE 10 MG: 5 TABLET ORAL at 01:11

## 2018-02-26 RX ADMIN — ACETAMINOPHEN 1000 MG: 500 TABLET ORAL at 08:20

## 2018-02-26 RX ADMIN — ONDANSETRON 4 MG: 2 INJECTION, SOLUTION INTRAMUSCULAR; INTRAVENOUS at 11:44

## 2018-02-26 RX ADMIN — MYCOPHENOLATE MOFETIL 500 MG: 250 CAPSULE ORAL at 19:59

## 2018-02-26 RX ADMIN — MAGNESIUM OXIDE TAB 400 MG (241.3 MG ELEMENTAL MG) 400 MG: 400 (241.3 MG) TAB at 08:20

## 2018-02-26 RX ADMIN — TACROLIMUS 0.5 MG: 0.5 CAPSULE ORAL at 08:20

## 2018-02-26 RX ADMIN — INSULIN LISPRO 2 UNITS: 100 INJECTION, SOLUTION INTRAVENOUS; SUBCUTANEOUS at 08:18

## 2018-02-26 RX ADMIN — ENOXAPARIN SODIUM 135 MG: 150 INJECTION SUBCUTANEOUS at 20:00

## 2018-02-26 RX ADMIN — PROPRANOLOL HYDROCHLORIDE 40 MG: 40 TABLET ORAL at 08:25

## 2018-02-26 RX ADMIN — PANTOPRAZOLE SODIUM 40 MG: 40 TABLET, DELAYED RELEASE ORAL at 08:18

## 2018-02-26 RX ADMIN — TACROLIMUS 0.5 MG: 0.5 CAPSULE ORAL at 20:00

## 2018-02-26 RX ADMIN — MYCOPHENOLATE MOFETIL 500 MG: 250 CAPSULE ORAL at 08:20

## 2018-02-26 RX ADMIN — PROPRANOLOL HYDROCHLORIDE 40 MG: 40 TABLET ORAL at 20:02

## 2018-02-26 ASSESSMENT — PAIN SCALES - GENERAL
PAINLEVEL_OUTOF10: 2
PAINLEVEL_OUTOF10: 0

## 2018-02-26 NOTE — CONSULTS
and insight appear appropriate      LABORATORY EVALUATION & TESTING:    I have personally reviewed and interpreted the results of the following diagnostic testing      EKG and or Telemetry:  which was personally reviewed me:  Atrial fibrillation rhythm, 117 bpm    Troponin:  negative myocardial necrosis; the creatinine is normal    CBC:   Recent Labs      02/24/18   0503  02/25/18   0322  02/26/18   0210   WBC  12.4*  12.5*  9.8   HGB  14.7  13.8*  14.0   HCT  43.4  41.8*  42.3   MCV  90.0  92.3  92.6   PLT  177  181  199     BMP:   Recent Labs      02/24/18   0503  02/25/18   0321  02/26/18   0210   NA  135*  136  138   K  4.6  4.1  4.3   CL  98  102  104   CO2  17*  20*  22   BUN  9  12  12   CREATININE  0.7  0.8  0.8     Cardiac Enzymes:   Recent Labs      02/26/18   1323   TROPONINI  <0.01     PT/INR: No results for input(s): PROTIME, INR in the last 72 hours. APTT: No results for input(s): APTT in the last 72 hours.   Liver Profile:  Lab Results   Component Value Date    AST 17 02/22/2018    ALT 15 02/22/2018    BILITOT 1.4 02/22/2018    ALKPHOS 66 02/22/2018     Lab Results   Component Value Date    CHOL 122 09/22/2017    HDL 32 09/22/2017    TRIG 115 09/22/2017     TSH:  Lab Results   Component Value Date    TSH 0.860 02/26/2018     UA:   Lab Results   Component Value Date    COLORU YELLOW 02/23/2018    PHUR 6.0 02/23/2018    CLARITYU Clear 02/23/2018    SPECGRAV 1.007 02/23/2018    LEUKOCYTESUR Negative 02/23/2018    UROBILINOGEN 0.2 02/23/2018    BILIRUBINUR Negative 02/23/2018    BLOODU Negative 02/23/2018    GLUCOSEU 500 02/23/2018             ALL THE CARDIOLOGY PROBLEMS ARE LISTED ABOVE; HOWEVER, THE FOLLOWING SPECIFIC CARDIAC PROBLEMS WERE ADDRESSED AND TREATED DURING THE HOSPITAL VISIT TODAY:

## 2018-02-26 NOTE — PLAN OF CARE
Problem: Skin Integrity - Impaired:  Goal: Will show no infection signs and symptoms  Will show no infection signs and symptoms   Outcome: Met This Shift      Problem: Skin Integrity:  Goal: Will show no infection signs and symptoms  Will show no infection signs and symptoms   Outcome: Met This Shift    Goal: Absence of new skin breakdown  Absence of new skin breakdown   Outcome: Met This Shift

## 2018-02-26 NOTE — PLAN OF CARE
Problem: Pain:  Goal: Pain level will decrease  Pain level will decrease   Outcome: Ongoing      Problem: Discharge Planning:  Goal: Discharged to appropriate level of care  Discharged to appropriate level of care   Outcome: Ongoing      Problem: Skin Integrity - Impaired:  Goal: Will show no infection signs and symptoms  Will show no infection signs and symptoms   Outcome: Ongoing      Problem: Skin Integrity:  Goal: Will show no infection signs and symptoms  Will show no infection signs and symptoms   Outcome: Ongoing      Problem: Tissue Perfusion - Cardiopulmonary, Altered:  Goal: Absence of angina  Absence of angina   Outcome: Ongoing

## 2018-02-26 NOTE — PROGRESS NOTES
Intravenous Continuous CHRIS Arevalo 5 mL/hr at 02/25/18 1746 5 mg/hr at 02/25/18 1746    pantoprazole (PROTONIX) tablet 40 mg  40 mg Oral QAM AC Felicia Faye MD   40 mg at 02/26/18 0818    magnesium oxide (MAG-OX) tablet 400 mg  400 mg Oral Daily Gavino Wolf MD   400 mg at 02/26/18 0820    mycophenolate (CELLCEPT) capsule 500 mg  500 mg Oral BID Ifeanyi Schuler MD   500 mg at 02/26/18 0820    tacrolimus (proGRAF) capsule 0.5 mg  0.5 mg Oral BID Ifeanyi Schuler MD   0.5 mg at 02/26/18 0820    acetaminophen (TYLENOL) tablet 1,000 mg  1,000 mg Oral Daily Keisha Patrick MD   1,000 mg at 02/26/18 0820    acetaminophen (TYLENOL) tablet 650 mg  650 mg Oral Q4H PRN Keisha Patrick MD        HYDROcodone-acetaminophen (NORCO) 5-325 MG per tablet 1 tablet  1 tablet Oral Q4H PRN Keisha Patrick MD        Or    HYDROcodone-acetaminophen (NORCO) 5-325 MG per tablet 2 tablet  2 tablet Oral Q4H PRN Keisha Patrick MD   2 tablet at 02/23/18 2300    0.9 % sodium chloride infusion   Intravenous Continuous Gavino Wolf MD 50 mL/hr at 02/25/18 0342      propranolol (INDERAL) tablet 40 mg  40 mg Oral BID Ifeanyi Schuler MD   40 mg at 02/26/18 0825    tamsulosin (FLOMAX) capsule 0.4 mg  0.4 mg Oral Nightly Anithann Freida Yang MD   0.4 mg at 02/25/18 2024    zolpidem (AMBIEN) tablet 10 mg  10 mg Oral Nightly PRN Ifeanyi Schuler MD   10 mg at 02/26/18 0111    glucose (GLUTOSE) 40 % oral gel 15 g  15 g Oral PRN Casimiro Yang MD        dextrose 50 % solution 12.5 g  12.5 g Intravenous PRN Ifeanyi Schuler MD        glucagon (rDNA) injection 1 mg  1 mg Intramuscular PRN Ifeanyi Schuler MD        dextrose 5 % solution  100 mL/hr Intravenous PRN Casimiro Yang MD        ondansetron (ZOFRAN) injection 4 mg  4 mg Intravenous Q6H PRN Casimiro Yang MD   4 mg at 02/26/18 1144    hydrALAZINE (APRESOLINE) injection 10 mg  10 mg Intravenous Q6H PRN Ifeanyi Schuler MD            Labs: Recent Labs      02/24/18   0503  02/25/18   0322  02/26/18   0210   WBC  12.4*  12.5*  9.8   RBC  4.82  4.53*  4.57*   HGB  14.7  13.8*  14.0   HCT  43.4  41.8*  42.3   MCV  90.0  92.3  92.6   MCH  30.5  30.5  30.6   MCHC  33.9  33.0  33.1   PLT  177  181  199     Recent Labs      02/24/18   0503  02/25/18   0321  02/26/18   0210   NA  135*  136  138   K  4.6  4.1  4.3   ANIONGAP  20*  14  12   CL  98  102  104   CO2  17*  20*  22   BUN  9  12  12   CREATININE  0.7  0.8  0.8   GLUCOSE  236*  186*  174*   CALCIUM  8.8  9.0  8.5*     Recent Labs      02/24/18   0503  02/25/18   0319  02/26/18   1323   MG  1.5*  1.8  1.7       FLP:    Lab Results   Component Value Date    TRIG 115 09/22/2017    HDL 32 09/22/2017    LDLCALC 67 09/22/2017     TSH:    Lab Results   Component Value Date    TSH 0.860 02/26/2018     Troponin T:   Recent Labs      02/26/18   1323   TROPONINI  <0.01     Mg 1.7    pCXR 2/23/28  Impression  Mild chronic lung changes without appreciable acute cardiopulmonary process.    Signed by Dr Pili Hand on 2/23/2018 2:12 PM    Echo -2/26/18 -  pending      Objective:   Vitals: BP (!) 142/76   Pulse 66   Temp 97.4 °F (36.3 °C) (Temporal)   Resp 18   Ht 6' 1\" (1.854 m)   Wt 290 lb 6.4 oz (131.7 kg)   SpO2 94%   BMI 38.31 kg/m²   24HR INTAKE/OUTPUT:      Intake/Output Summary (Last 24 hours) at 02/26/18 1510  Last data filed at 02/26/18 1030   Gross per 24 hour   Intake             4637 ml   Output             1225 ml   Net             3412 ml     General appearance: alert and cooperative with exam  HEENT: atraumatic, eyes with clear conjunctiva and normal lids, pupils and irises normal, external ears and nose are normal, lips normal.  Neck without masses, lympadenopathy, bruit, thyroid normal  Lungs: no increased work of breathing, \"clear to auscultation bilaterally\" without rales, rhonchi or wheezes  Heart: irregular rate and rhythm, S1, S2 normal, no murmur, click, rub or gallop  Abdomen:

## 2018-02-26 NOTE — PROGRESS NOTES
02/25/18 2024    diltiazem 125 mg in dextrose 5 % 125 mL infusion  10 mg/hr Intravenous Continuous CHRIS Ang 5 mL/hr at 02/25/18 1746 5 mg/hr at 02/25/18 1746    pantoprazole (PROTONIX) tablet 40 mg  40 mg Oral QAM AC Lian Bassett MD   40 mg at 02/26/18 0818    magnesium oxide (MAG-OX) tablet 400 mg  400 mg Oral Daily Misbah Coronado MD   400 mg at 02/26/18 0820    mycophenolate (CELLCEPT) capsule 500 mg  500 mg Oral BID Dar Camacho MD   500 mg at 02/26/18 0820    tacrolimus (proGRAF) capsule 0.5 mg  0.5 mg Oral BID Dar Camacho MD   0.5 mg at 02/26/18 0820    acetaminophen (TYLENOL) tablet 1,000 mg  1,000 mg Oral Daily Danay Celis MD   1,000 mg at 02/26/18 0820    acetaminophen (TYLENOL) tablet 650 mg  650 mg Oral Q4H PRN Danay Celis MD        HYDROcodone-acetaminophen (NORCO) 5-325 MG per tablet 1 tablet  1 tablet Oral Q4H PRN Danay Celis MD        Or    HYDROcodone-acetaminophen (NORCO) 5-325 MG per tablet 2 tablet  2 tablet Oral Q4H PRN Danay Celis MD   2 tablet at 02/23/18 2300    0.9 % sodium chloride infusion   Intravenous Continuous Misbah Coronado MD 50 mL/hr at 02/25/18 0342      propranolol (INDERAL) tablet 40 mg  40 mg Oral BID Dar Camacho MD   40 mg at 02/26/18 0825    tamsulosin (FLOMAX) capsule 0.4 mg  0.4 mg Oral Nightly Casimiro Yang MD   0.4 mg at 02/25/18 2024    zolpidem (AMBIEN) tablet 10 mg  10 mg Oral Nightly PRN Dar Camacho MD   10 mg at 02/26/18 0111    glucose (GLUTOSE) 40 % oral gel 15 g  15 g Oral PRN Casimiro Yang MD        dextrose 50 % solution 12.5 g  12.5 g Intravenous PRN Dar Camacho MD        glucagon (rDNA) injection 1 mg  1 mg Intramuscular PRN Dar Camacho MD        dextrose 5 % solution  100 mL/hr Intravenous PRN Casimiro Yang MD        ondansetron (ZOFRAN) injection 4 mg  4 mg Intravenous Q6H PRN Dar Camacho MD   4 mg at 02/26/18 1144    hydrALAZINE (APRESOLINE)

## 2018-02-26 NOTE — PROGRESS NOTES
dextrose 5 % solution PRN   ondansetron (ZOFRAN) injection 4 mg Q6H PRN   hydrALAZINE (APRESOLINE) injection 10 mg Q6H PRN       ROS   Gen. no fever or chills  Cutaneous: No itching or rash  GI: No diarrhea        Vital Signs:  BP (!) 142/76   Pulse 66   Temp 97.4 °F (36.3 °C) (Temporal)   Resp 18   Ht 6' 1\" (1.854 m)   Wt 290 lb 6.4 oz (131.7 kg)   SpO2 94%   BMI 38.31 kg/m²   Temp (24hrs), Av.6 °F (36.4 °C), Min:97.2 °F (36.2 °C), Max:98.2 °F (36.8 °C)      Physical Exam    Gen. : The patient is obese gentleman sitting up at bedside in no acute distress  HEENT: Sclerae anicteric and noninjected  Heart: S1 and S2 rate is regular. Patient is telemetry monitor strips were reviewed as well and he is currently in sinus rhythm. Lungs: Clear posteriorly  Right upper extremity postoperative dressing with Ace wrap is in place and clean dry and intact Psych Colace Pleasant Cooperative  Neuro: He Is Alert and Oriented, Speech Is Clear, Moves Extremities without Difficulty    Line/IV site: No erythema, warmth, induration, or tenderness.  Left upper extremity IV    LAB RESULTS:    CBC with DIFF: Recent Labs      18   0503  18   0322  18   0210   WBC  12.4*  12.5*  9.8   RBC  4.82  4.53*  4.57*   HGB  14.7  13.8*  14.0   HCT  43.4  41.8*  42.3   MCV  90.0  92.3  92.6   MCH  30.5  30.5  30.6   MCHC  33.9  33.0  33.1   RDW  12.9  13.0  13.2   PLT  177  181  199   MPV  10.4  10.5  10.6       CMP/BMP:  Recent Labs      18   0503  18   0321  18   0210   NA  135*  136  138   K  4.6  4.1  4.3   CL  98  102  104   CO2  17*  20*  22   ANIONGAP  20*  14  12   GLUCOSE  236*  186*  174*   BUN  9  12  12   CREATININE  0.7  0.8  0.8   LABGLOM  >60  >60  >60   CALCIUM  8.8  9.0  8.5*             UA: Recent Labs      18   SPECGRAV  1.007   PHUR  6.0   COLORU  YELLOW   CLARITYU  Clear   PROTEINU  Negative   BLOODU  Negative   NITRU  Negative   GLUCOSEU  500*   BILIRUBINUR  Negative

## 2018-02-26 NOTE — PROGRESS NOTES
Tele monitor called to inform pt had converted to Sinus rhythm since 12:13 pm. cardizem gtt turned off. Pt is also on PO cardizem. EKG obtained, pt noted to be in Sinus Rhythm at rate of 63. Will continue to monitor.

## 2018-02-27 ENCOUNTER — APPOINTMENT (OUTPATIENT)
Dept: NUCLEAR MEDICINE | Age: 63
DRG: 252 | End: 2018-02-27
Payer: MEDICARE

## 2018-02-27 LAB
ANAEROBIC CULTURE: ABNORMAL
ANAEROBIC CULTURE: ABNORMAL
ANION GAP SERPL CALCULATED.3IONS-SCNC: 10 MMOL/L (ref 7–19)
BLOOD CULTURE, ROUTINE: NORMAL
BUN BLDV-MCNC: 10 MG/DL (ref 8–23)
CALCIUM SERPL-MCNC: 8.6 MG/DL (ref 8.8–10.2)
CHLORIDE BLD-SCNC: 102 MMOL/L (ref 98–111)
CO2: 24 MMOL/L (ref 22–29)
CREAT SERPL-MCNC: 0.9 MG/DL (ref 0.5–1.2)
CULTURE SURGICAL: ABNORMAL
CULTURE SURGICAL: ABNORMAL
CULTURE, BLOOD 2: NORMAL
EKG P AXIS: 4 DEGREES
EKG P AXIS: NORMAL DEGREES
EKG P-R INTERVAL: 156 MS
EKG P-R INTERVAL: NORMAL MS
EKG Q-T INTERVAL: 334 MS
EKG Q-T INTERVAL: 420 MS
EKG QRS DURATION: 108 MS
EKG QRS DURATION: 114 MS
EKG QTC CALCULATION (BAZETT): 423 MS
EKG QTC CALCULATION (BAZETT): 439 MS
EKG T AXIS: 24 DEGREES
EKG T AXIS: 68 DEGREES
GFR NON-AFRICAN AMERICAN: >60
GLUCOSE BLD-MCNC: 133 MG/DL (ref 70–99)
GLUCOSE BLD-MCNC: 136 MG/DL (ref 70–99)
GLUCOSE BLD-MCNC: 150 MG/DL (ref 70–99)
GLUCOSE BLD-MCNC: 163 MG/DL (ref 70–99)
GLUCOSE BLD-MCNC: 175 MG/DL (ref 74–109)
GRAM STAIN RESULT: ABNORMAL
GRAM STAIN RESULT: ABNORMAL
ORGANISM: ABNORMAL
ORGANISM: ABNORMAL
PERFORMED ON: ABNORMAL
POTASSIUM SERPL-SCNC: 4.2 MMOL/L (ref 3.5–5)
SODIUM BLD-SCNC: 136 MMOL/L (ref 136–145)
TACROLIMUS BLOOD: 5.2 NG/ML
TROPONIN: <0.01 NG/ML (ref 0–0.03)

## 2018-02-27 PROCEDURE — 2140000000 HC CCU INTERMEDIATE R&B

## 2018-02-27 PROCEDURE — 6360000002 HC RX W HCPCS: Performed by: INTERNAL MEDICINE

## 2018-02-27 PROCEDURE — 82948 REAGENT STRIP/BLOOD GLUCOSE: CPT

## 2018-02-27 PROCEDURE — 6370000000 HC RX 637 (ALT 250 FOR IP): Performed by: INTERNAL MEDICINE

## 2018-02-27 PROCEDURE — 99232 SBSQ HOSP IP/OBS MODERATE 35: CPT | Performed by: INTERNAL MEDICINE

## 2018-02-27 PROCEDURE — 3430000000 HC RX DIAGNOSTIC RADIOPHARMACEUTICAL: Performed by: INTERNAL MEDICINE

## 2018-02-27 PROCEDURE — 84484 ASSAY OF TROPONIN QUANT: CPT

## 2018-02-27 PROCEDURE — 80048 BASIC METABOLIC PNL TOTAL CA: CPT

## 2018-02-27 PROCEDURE — 6370000000 HC RX 637 (ALT 250 FOR IP): Performed by: FAMILY MEDICINE

## 2018-02-27 PROCEDURE — 6370000000 HC RX 637 (ALT 250 FOR IP): Performed by: SURGERY

## 2018-02-27 PROCEDURE — 2580000003 HC RX 258: Performed by: INTERNAL MEDICINE

## 2018-02-27 PROCEDURE — 93017 CV STRESS TEST TRACING ONLY: CPT

## 2018-02-27 PROCEDURE — 93005 ELECTROCARDIOGRAM TRACING: CPT

## 2018-02-27 PROCEDURE — 78452 HT MUSCLE IMAGE SPECT MULT: CPT

## 2018-02-27 PROCEDURE — 36415 COLL VENOUS BLD VENIPUNCTURE: CPT

## 2018-02-27 PROCEDURE — A9500 TC99M SESTAMIBI: HCPCS | Performed by: INTERNAL MEDICINE

## 2018-02-27 RX ADMIN — TAMSULOSIN HYDROCHLORIDE 0.4 MG: 0.4 CAPSULE ORAL at 20:37

## 2018-02-27 RX ADMIN — APIXABAN 5 MG: 2.5 TABLET, FILM COATED ORAL at 20:37

## 2018-02-27 RX ADMIN — PROPRANOLOL HYDROCHLORIDE 40 MG: 40 TABLET ORAL at 11:12

## 2018-02-27 RX ADMIN — TETRAKIS(2-METHOXYISOBUTYLISOCYANIDE)COPPER(I) TETRAFLUOROBORATE 30 MILLICURIE: 1 INJECTION, POWDER, LYOPHILIZED, FOR SOLUTION INTRAVENOUS at 11:40

## 2018-02-27 RX ADMIN — ACETAMINOPHEN 1000 MG: 500 TABLET ORAL at 11:11

## 2018-02-27 RX ADMIN — PANTOPRAZOLE SODIUM 40 MG: 40 TABLET, DELAYED RELEASE ORAL at 11:12

## 2018-02-27 RX ADMIN — CEFAZOLIN 1 G: 1 INJECTION, POWDER, FOR SOLUTION INTRAMUSCULAR; INTRAVENOUS at 03:00

## 2018-02-27 RX ADMIN — MAGNESIUM OXIDE TAB 400 MG (241.3 MG ELEMENTAL MG) 400 MG: 400 (241.3 MG) TAB at 11:11

## 2018-02-27 RX ADMIN — MYCOPHENOLATE MOFETIL 500 MG: 250 CAPSULE ORAL at 11:11

## 2018-02-27 RX ADMIN — TACROLIMUS 0.5 MG: 0.5 CAPSULE ORAL at 11:19

## 2018-02-27 RX ADMIN — CEFAZOLIN 1 G: 1 INJECTION, POWDER, FOR SOLUTION INTRAMUSCULAR; INTRAVENOUS at 11:27

## 2018-02-27 RX ADMIN — CEFAZOLIN 1 G: 1 INJECTION, POWDER, FOR SOLUTION INTRAMUSCULAR; INTRAVENOUS at 18:11

## 2018-02-27 RX ADMIN — REGADENOSON 0.4 MG: 0.08 INJECTION, SOLUTION INTRAVENOUS at 11:40

## 2018-02-27 RX ADMIN — TETRAKIS(2-METHOXYISOBUTYLISOCYANIDE)COPPER(I) TETRAFLUOROBORATE 10 MILLICURIE: 1 INJECTION, POWDER, LYOPHILIZED, FOR SOLUTION INTRAVENOUS at 11:40

## 2018-02-27 RX ADMIN — PROPRANOLOL HYDROCHLORIDE 40 MG: 40 TABLET ORAL at 20:37

## 2018-02-27 ASSESSMENT — PAIN SCALES - GENERAL
PAINLEVEL_OUTOF10: 2
PAINLEVEL_OUTOF10: 5
PAINLEVEL_OUTOF10: 0
PAINLEVEL_OUTOF10: 0

## 2018-02-27 NOTE — PLAN OF CARE
Problem: Skin Integrity - Impaired:  Goal: Will show no infection signs and symptoms  Will show no infection signs and symptoms   Outcome: Ongoing

## 2018-02-27 NOTE — PROGRESS NOTES
Nephrology ([facility]) Progress Note    Patient:  Pee Roblero  YOB: 1955  Date of Service: 2018  MRN: [id number]   Acct: [de-identified]   Primary Care Physician: Quinton Bowles DO  Advance Directive: Full Code  Admit Date: 2018       Hospital Day: 4  Referring Provider: Josue Alcazar MD    Patient independently seen and examined, Chart, Consults, Notes, Operative notes, Labs, Cardiology, and Radiology studies reviewed as able. Subjective:  Pee Roblero is a 57 y.o. male  whom we were consulted for kidney transplant. He had ESRD (from benign hypertension). He had HD then PD. In , he underwent a  donor kidney transplant at [facility]). His course was benign. He is followed by Taunya Landau, FNP and he enjoyed a good renal function. Recently, he developed severe redness over right arm. He has a AV graft over this limb that was previously used for dialysis. He also has an AVF in his left arm, but it has been deep and difficult to cannulate. He denies NSAIDs. He has developed diabetes after transplant. Renal service was consulted to manage his immunosuppression. He has no dysuria. On , he had his right arm AV graft removed by vascular surgery. Today, -n/v. No cp/soa.         Allergies:  Patient has no known allergies. Medicines:  Current Facility-Administered Medications   Medication Dose Route Frequency Provider Last Rate Last Dose    technetium sestamibi (CARDIOLITE) injection 10 millicurie  10 millicurie Intravenous ONCE PRN C. Lawyer Merlin MD        regadenoson [facility]) injection 0.4 mg  0.4 mg Intravenous ONCE PRN C. Lawyer Merlin MD        technetium sestamibi (CARDIOLITE) injection 30 millicurie  30 millicurie Intravenous ONCE PRN C.  Lawyer Merlin MD        insulin lispro (HUMALOG) injection vial 0-35 Units  0-35 Units Subcutaneous 4x Daily AC & HS Lei Rueda MD   3 Units at 18 0148 ----------------------------------------------------------------  Velocities are measured in cm/s ; Diameters are measured in mm Right UE Vein Measurements 2D Measurements +---------------+-----------------+----------------------+-----------------+ ! Location       ! Visualized       ! Compressibility       ! Thrombosis       ! +---------------+-----------------+----------------------+-----------------+ ! IJV            ! Yes              ! Yes                   ! None             ! +---------------+-----------------+----------------------+-----------------+ ! SCV            ! Yes              ! Yes                   ! None             ! +---------------+-----------------+----------------------+-----------------+ ! Axillary       ! Yes              ! Yes                   ! None             ! +---------------+-----------------+----------------------+-----------------+ ! Brachial       !Yes              ! Yes                   ! None             ! +---------------+-----------------+----------------------+-----------------+ ! Radial         !Yes              ! Yes                   ! None             ! +---------------+-----------------+----------------------+-----------------+ ! Ulnar          ! Yes              ! Yes                   ! None             ! +---------------+-----------------+----------------------+-----------------+ ! Cephalic       ! Yes              ! Yes                   ! None             ! +---------------+-----------------+----------------------+-----------------+ ! Basilic        ! Yes              ! Yes                   ! None             ! +---------------+-----------------+----------------------+-----------------+ Left UE Vein Measurements 2D Measurements +---------------+-----------------+----------------------+-----------------+ ! Location       ! Visualized       ! Compressibility       ! Thrombosis       ! +---------------+-----------------+----------------------+-----------------+ ! IJV            ! Yes              ! Yes

## 2018-02-27 NOTE — PROGRESS NOTES
Mary Sultana MD   2 tablet at 02/23/18 2300    0.9 % sodium chloride infusion   Intravenous Continuous Laure Car MD 50 mL/hr at 02/25/18 0342      propranolol (INDERAL) tablet 40 mg  40 mg Oral BID Ghassan Chi MD   40 mg at 02/27/18 1112    tamsulosin (FLOMAX) capsule 0.4 mg  0.4 mg Oral Nightly Casimiro Yang MD   0.4 mg at 02/26/18 2001    zolpidem (AMBIEN) tablet 10 mg  10 mg Oral Nightly PRN Ghassan Chi MD   10 mg at 02/26/18 0111    glucose (GLUTOSE) 40 % oral gel 15 g  15 g Oral PRN Casimiro Yang MD        dextrose 50 % solution 12.5 g  12.5 g Intravenous PRN Ghassan Chi MD        glucagon (rDNA) injection 1 mg  1 mg Intramuscular PRN Casimiro Yang MD        dextrose 5 % solution  100 mL/hr Intravenous PRN Ghassan Chi MD        ondansetron (ZOFRAN) injection 4 mg  4 mg Intravenous Q6H PRN Reina Yang MD   4 mg at 02/26/18 1144    hydrALAZINE (APRESOLINE) injection 10 mg  10 mg Intravenous Q6H PRN Ghassan Chi MD            Labs:     Recent Labs      02/25/18   0322  02/26/18   0210   WBC  12.5*  9.8   RBC  4.53*  4.57*   HGB  13.8*  14.0   HCT  41.8*  42.3   MCV  92.3  92.6   MCH  30.5  30.6   MCHC  33.0  33.1   PLT  181  199     Recent Labs      02/25/18   0321  02/26/18   0210  02/27/18   0013   NA  136  138  136   K  4.1  4.3  4.2   ANIONGAP  14  12  10   CL  102  104  102   CO2  20*  22  24   BUN  12  12  10   CREATININE  0.8  0.8  0.9   GLUCOSE  186*  174*  175*   CALCIUM  9.0  8.5*  8.6*     Recent Labs      02/25/18   0319  02/26/18   1323   MG  1.8  1.7     No results for input(s): AST, ALT, ALB, BILITOT, ALKPHOS, ALB in the last 72 hours. ABGs:No results for input(s): PHART, CJE1LQT, PO2ART, XLM7DYC, BEART, HGBAE, M4LFGALB, CARBOXHGBART, 02THERAPY in the last 72 hours. HgBA1c: No results for input(s): LABA1C in the last 72 hours.   FLP:  Lab Results   Component Value Date    TRIG 115 09/22/2017    HDL 32 09/22/2017    LDLCALC 67 09/22/2017

## 2018-02-27 NOTE — PROGRESS NOTES
Vascular Surgery Rounding Note    2/27/2018  3:07 PM  Antibiotic : Ancef 1g IVPB q8h  Post op day #4 -  removal of infected right forearm graft. Subjective- No complaints, states arm feels okay    Objective-   Invalid input(s): 24H    Intake/Output Summary (Last 24 hours) at 02/27/18 1507  Last data filed at 02/27/18 1100   Gross per 24 hour   Intake           2767.5 ml   Output             1250 ml   Net           1517.5 ml     No intake/output data recorded. CBC:   Recent Labs      02/25/18   0322  02/26/18   0210   WBC  12.5*  9.8   RBC  4.53*  4.57*   HGB  13.8*  14.0   HCT  41.8*  42.3   MCV  92.3  92.6   MCH  30.5  30.6   MCHC  33.0  33.1   RDW  13.0  13.2   PLT  181  199   MPV  10.5  10.6      Last 3 CMP:   Recent Labs      02/25/18   0321  02/26/18   0210  02/27/18   0013   NA  136  138  136   K  4.1  4.3  4.2   CL  102  104  102   CO2  20*  22  24   BUN  12  12  10   CREATININE  0.8  0.8  0.9   GLUCOSE  186*  174*  175*   CALCIUM  9.0  8.5*  8.6*        Physical Exam:  Awake, alert, appropriate Yes  BP (!) 156/74   Pulse 63   Temp 97.5 °F (36.4 °C) (Temporal)   Resp 18   Ht 6' 1\" (1.854 m)   Wt 289 lb (131.1 kg)   SpO2 98%   BMI 38.13 kg/m²   Heart-Normal S1 and S2.  Regular rhythm. No murmurs, gallops, or rubs. Lung-negative  Neck-suppleno adenopathy, no carotid bruit, no JVD, supple, symmetrical, trachea midline and thyroid not enlarged, symmetric, no tenderness/mass/nodules  Abdomen-Abdomen soft, non-tender. BS normal. No masses,  No organomegaly  Extremities-arm and wound examined, minimal erythema, minimal drainage, minimal swelling, sutures intact  Neurologic- alert, oriented, normal speech, no focal findings or movement disorder noted    Assessment/Plan  1. POD #4 -doing well  2. Antibiotics per ID  3. Follow up with Vascular for suture removal is in AVS        Antibiotics continued after surgery due to:   Infection -  [x]  Abscess    []  Pneumonia    []  Aspiration Pneumonia    []

## 2018-02-28 VITALS
WEIGHT: 289 LBS | HEIGHT: 73 IN | OXYGEN SATURATION: 90 % | HEART RATE: 62 BPM | RESPIRATION RATE: 20 BRPM | SYSTOLIC BLOOD PRESSURE: 161 MMHG | TEMPERATURE: 98.5 F | BODY MASS INDEX: 38.3 KG/M2 | DIASTOLIC BLOOD PRESSURE: 72 MMHG

## 2018-02-28 LAB
ANAEROBIC CULTURE: ABNORMAL
ANION GAP SERPL CALCULATED.3IONS-SCNC: 12 MMOL/L (ref 7–19)
BUN BLDV-MCNC: 9 MG/DL (ref 8–23)
CALCIUM SERPL-MCNC: 8.9 MG/DL (ref 8.8–10.2)
CHLORIDE BLD-SCNC: 102 MMOL/L (ref 98–111)
CO2: 23 MMOL/L (ref 22–29)
CREAT SERPL-MCNC: 0.9 MG/DL (ref 0.5–1.2)
CULTURE SURGICAL: ABNORMAL
EKG P AXIS: 29 DEGREES
EKG P AXIS: 5 DEGREES
EKG P-R INTERVAL: 156 MS
EKG P-R INTERVAL: 160 MS
EKG Q-T INTERVAL: 414 MS
EKG Q-T INTERVAL: 432 MS
EKG QRS DURATION: 112 MS
EKG QRS DURATION: 114 MS
EKG QTC CALCULATION (BAZETT): 421 MS
EKG QTC CALCULATION (BAZETT): 435 MS
EKG T AXIS: 40 DEGREES
EKG T AXIS: 55 DEGREES
GFR NON-AFRICAN AMERICAN: >60
GLUCOSE BLD-MCNC: 127 MG/DL (ref 70–99)
GLUCOSE BLD-MCNC: 131 MG/DL (ref 70–99)
GLUCOSE BLD-MCNC: 134 MG/DL (ref 70–99)
GLUCOSE BLD-MCNC: 157 MG/DL (ref 74–109)
GRAM STAIN RESULT: ABNORMAL
ORGANISM: ABNORMAL
PARATHYROID HORMONE INTACT: 85.6 PG/ML (ref 15–65)
PERFORMED ON: ABNORMAL
POTASSIUM SERPL-SCNC: 4 MMOL/L (ref 3.5–5)
SODIUM BLD-SCNC: 137 MMOL/L (ref 136–145)
VITAMIN D 25-HYDROXY: 17.7 NG/ML

## 2018-02-28 PROCEDURE — 36415 COLL VENOUS BLD VENIPUNCTURE: CPT

## 2018-02-28 PROCEDURE — 6370000000 HC RX 637 (ALT 250 FOR IP): Performed by: SURGERY

## 2018-02-28 PROCEDURE — 6370000000 HC RX 637 (ALT 250 FOR IP): Performed by: FAMILY MEDICINE

## 2018-02-28 PROCEDURE — 6370000000 HC RX 637 (ALT 250 FOR IP): Performed by: INTERNAL MEDICINE

## 2018-02-28 PROCEDURE — 6360000002 HC RX W HCPCS: Performed by: INTERNAL MEDICINE

## 2018-02-28 PROCEDURE — 99239 HOSP IP/OBS DSCHRG MGMT >30: CPT | Performed by: INTERNAL MEDICINE

## 2018-02-28 PROCEDURE — 2580000003 HC RX 258: Performed by: INTERNAL MEDICINE

## 2018-02-28 PROCEDURE — 83970 ASSAY OF PARATHORMONE: CPT

## 2018-02-28 PROCEDURE — 82948 REAGENT STRIP/BLOOD GLUCOSE: CPT

## 2018-02-28 PROCEDURE — 99232 SBSQ HOSP IP/OBS MODERATE 35: CPT | Performed by: INTERNAL MEDICINE

## 2018-02-28 PROCEDURE — 80048 BASIC METABOLIC PNL TOTAL CA: CPT

## 2018-02-28 PROCEDURE — 93005 ELECTROCARDIOGRAM TRACING: CPT

## 2018-02-28 PROCEDURE — 82306 VITAMIN D 25 HYDROXY: CPT

## 2018-02-28 RX ORDER — CEPHALEXIN 500 MG/1
500 CAPSULE ORAL 4 TIMES DAILY
Qty: 36 CAPSULE | Refills: 0 | Status: SHIPPED | OUTPATIENT
Start: 2018-02-28 | End: 2018-03-09

## 2018-02-28 RX ORDER — CEPHALEXIN 500 MG/1
500 CAPSULE ORAL 4 TIMES DAILY
Qty: 36 CAPSULE | Refills: 0 | Status: SHIPPED | OUTPATIENT
Start: 2018-02-28 | End: 2018-02-28

## 2018-02-28 RX ADMIN — PROPRANOLOL HYDROCHLORIDE 40 MG: 40 TABLET ORAL at 08:06

## 2018-02-28 RX ADMIN — MAGNESIUM OXIDE TAB 400 MG (241.3 MG ELEMENTAL MG) 400 MG: 400 (241.3 MG) TAB at 08:04

## 2018-02-28 RX ADMIN — VITAMIN D, TAB 1000IU (100/BT) 1000 UNITS: 25 TAB at 14:15

## 2018-02-28 RX ADMIN — CEFAZOLIN 1 G: 1 INJECTION, POWDER, FOR SOLUTION INTRAMUSCULAR; INTRAVENOUS at 17:21

## 2018-02-28 RX ADMIN — CEFAZOLIN 1 G: 1 INJECTION, POWDER, FOR SOLUTION INTRAMUSCULAR; INTRAVENOUS at 11:39

## 2018-02-28 RX ADMIN — ACETAMINOPHEN 1000 MG: 500 TABLET ORAL at 08:06

## 2018-02-28 RX ADMIN — MYCOPHENOLATE MOFETIL 500 MG: 250 CAPSULE ORAL at 01:01

## 2018-02-28 RX ADMIN — CEFAZOLIN 1 G: 1 INJECTION, POWDER, FOR SOLUTION INTRAMUSCULAR; INTRAVENOUS at 01:02

## 2018-02-28 RX ADMIN — TACROLIMUS 0.5 MG: 0.5 CAPSULE ORAL at 01:01

## 2018-02-28 RX ADMIN — APIXABAN 5 MG: 2.5 TABLET, FILM COATED ORAL at 08:04

## 2018-02-28 RX ADMIN — SODIUM CHLORIDE: 9 INJECTION, SOLUTION INTRAVENOUS at 08:22

## 2018-02-28 RX ADMIN — TACROLIMUS 0.5 MG: 0.5 CAPSULE ORAL at 08:05

## 2018-02-28 RX ADMIN — MYCOPHENOLATE MOFETIL 500 MG: 250 CAPSULE ORAL at 08:05

## 2018-02-28 ASSESSMENT — PAIN SCALES - GENERAL
PAINLEVEL_OUTOF10: 0

## 2018-02-28 NOTE — PROGRESS NOTES
0013   TROPONINI  <0.01  <0.01  <0.01     FASTING LIPID PANEL:  Lab Results   Component Value Date    HDL 32 2017    LDLCALC 67 2017    TRIG 115 2017     LIVER PROFILE:No results for input(s): AST, ALT, LABALBU in the last 72 hours. NURSE:  CATIE Huston : 1955, Male, 57 y.o. History:  Paroxysmal atrial fibrillation    Nursing note and diagnostics above reviewed and evaluated    [Allergies/Contraindications:  Patient has no known allergies.]     Todays status: ready for discharge    Physical Examination    Respiratory -  clear. Cardiovascular   regular  Abdominal -  Soft. Bowel sounds present. Nontender. Extremities -  Bandage about the right upper extremity. Assessment/Plan     home on Eliquis and his beta blocker. Office follow-up. Discussed with patient and nursing      CFeliz Andrea MD  [facility]

## 2018-02-28 NOTE — PROGRESS NOTES
UTI    []  Osteomyelitis    []  Fungal infection    []  H.pylori                              DVT prophylaxis: pharmacologic prophylaxis (with any of the following: enoxaparin (Lovenox) 40mg SQ 2h prior to surgery then QD)           Beta Blocker:  continued

## 2018-02-28 NOTE — DISCHARGE SUMMARY
Take 1 capsule by mouth daily  Qty: 30 capsule, Refills: 5      metFORMIN (GLUCOPHAGE) 1000 MG tablet Take 1 tablet by mouth 2 times daily (with meals)  Qty: 60 tablet, Refills: 5      Lancets MISC Use to check blood sugar daily Dx E11.9  Qty: 100 each, Refills: 0      glucose blood VI test strips (ACCU-CHEK ANANT) strip Contour strips,check daily E11.9  Qty: 100 each, Refills: 0      tacrolimus (PROGRAF) 0.5 MG capsule Take 0.5 mg by mouth 2 times daily       mycophenolate (CELLCEPT) 250 MG capsule Take by mouth 2 times daily Indications: Two capsules twice a day      propranolol (INDERAL) 40 MG tablet Take 40 mg by mouth 2 times daily            Current Facility-Administered Medications   Medication Dose Route Frequency Provider Last Rate Last Dose    vitamin D (CHOLECALCIFEROL) tablet 1,000 Units  1,000 Units Oral Daily Shawn Jackson MD   1,000 Units at 02/28/18 1415    apixaban (ELIQUIS) tablet 5 mg  5 mg Oral BID MIROSLAVA Asher MD   5 mg at 02/28/18 0804    insulin lispro (HUMALOG) injection vial 0-35 Units  0-35 Units Subcutaneous 4x Daily AC & HS Tita Urbina MD   3 Units at 02/26/18 1224    ceFAZolin (ANCEF) 1 g in sterile water 10 mL IV syringe  1 g Intravenous Q8H Reji Contreras MD   1 g at 02/28/18 1139    pantoprazole (PROTONIX) tablet 40 mg  40 mg Oral QAM AC Kelly Dutta MD   40 mg at 02/27/18 1112    magnesium oxide (MAG-OX) tablet 400 mg  400 mg Oral Daily Vernashima Anna MD   400 mg at 02/28/18 0804    mycophenolate (CELLCEPT) capsule 500 mg  500 mg Oral BID Kirt Yang MD   500 mg at 02/28/18 0805    tacrolimus (proGRAF) capsule 0.5 mg  0.5 mg Oral BID Kirt Yang MD   0.5 mg at 02/28/18 0805    acetaminophen (TYLENOL) tablet 1,000 mg  1,000 mg Oral Daily Keri Benavides MD   1,000 mg at 02/28/18 7503    acetaminophen (TYLENOL) tablet 650 mg  650 mg Oral Q4H PRN Keri Benavides MD        HYDROcodone-acetaminophen (NORCO) 5-325 MG per tablet 1

## 2018-03-01 ENCOUNTER — CARE COORDINATION (OUTPATIENT)
Dept: CASE MANAGEMENT | Age: 63
End: 2018-03-01

## 2018-03-01 NOTE — PROGRESS NOTES
0.4 mg by mouth nightly ) 30 capsule 5    metFORMIN (GLUCOPHAGE) 1000 MG tablet Take 1 tablet by mouth 2 times daily (with meals) 60 tablet 5    Lancets MISC Use to check blood sugar daily Dx E11.9 100 each 0    glucose blood VI test strips (ACCU-CHEK ANANT) strip Contour strips,check daily E11.9 100 each 0    tacrolimus (PROGRAF) 0.5 MG capsule Take 0.5 mg by mouth 2 times daily       mycophenolate (CELLCEPT) 250 MG capsule Take by mouth 2 times daily Indications: Two capsules twice a day      propranolol (INDERAL) 40 MG tablet Take 40 mg by mouth 2 times daily          Past Medical History:  Past Medical History:   Diagnosis Date    Diabetes mellitus ([address]     Ex-cigarette smoker 7/17/2016    Hypertension     Obese     Vision problem     wears glasses       Past Surgical History:  Past Surgical History:   Procedure Laterality Date    CHOLECYSTECTOMY      DIALYSIS FISTULA CREATION  [place] 2007    left arm radial cephalic    HERNIA REPAIR      KIDNEY TRANSPLANT      2/18/2010 in [address] [place] [address] Right 2/23/2018    AV FISTULA GRAFT REMOVAL performed by Keri Benavides MD at [address]  multiple    VASCULAR SURGERY Right vera 2008    av loop graft       Family History  History reviewed. No pertinent family history. Social History  Social History     Social History    Marital status:      Spouse name: N/A    Number of children: N/A    Years of education: N/A     Occupational History    Not on file.      Social History Main Topics    Smoking status: Former Smoker    Smokeless tobacco: Not on file    Alcohol use No    Drug use: Unknown    Sexual activity: Not on file     Other Topics Concern    Not on file     Social History Narrative    No narrative on file         Review of Systems:  History obtained from chart review and the patient  General ROS: No fever or chills  Respiratory ROS: No cough, +---------------+-----------------+----------------------+-----------------+ ! Location       ! Visualized       ! Compressibility       ! Thrombosis       ! +---------------+-----------------+----------------------+-----------------+ ! IJV            ! Yes              ! Yes                   ! None             ! +---------------+-----------------+----------------------+-----------------+ ! SCV            ! Yes              ! Yes                   ! None             ! +---------------+-----------------+----------------------+-----------------+    Ct Upper Extremity Right Wo Contrast    Result Date: 2/22/2018  CT UPPER EXTREMITY RIGHT WO CONTRAST 2/22/2018 5:15 PM HISTORY: Possible abscess COMPARISON: None. TECHNIQUE:  Radiation dose equals  mGy cm. AUTOMATED EXPOSURE CONTROL dose reduction technique was implemented. Thin section axial images were obtained. 2-D sagittal and coronal reconstruction images were generated. FINDINGS: There is a dialysis graft/fistula appreciated in the forearm. There is diffuse subcutaneous edema changes and skin thickening along the anterior soft tissues of the of the forearm slightly more conspicuous, focally along the distal graft. The induration does extend diffusely particularly on the medial aspect of the forearm from the elbow to the wrist region. These findings are compatible with cellulitis. There is edema tracking along the superficial aspect of the forearm musculature anteriorly and mild myositis changes would be difficult to exclude. There is no discrete focal fluid collection indicate definite abscess. There is no abnormal soft tissue gas. There are no bony abnormalities indicate osteomyelitis. 1. CT findings compatible with diffuse cellulitis involving the forearm, anterior superficial soft tissues. No discrete fluid collection to indicate a focal abscess.  Signed by Dr Herber Carrillo on 2/22/2018 7:05 PM       Assessment   CKD s/p DDKT  HTN  RUE

## 2018-03-01 NOTE — PROGRESS NOTES
abscess  COMPARISON: None. TECHNIQUE:  Radiation dose equals  mGy cm. AUTOMATED EXPOSURE  CONTROL dose reduction technique was implemented. Thin section axial images were obtained. 2-D sagittal and coronal  reconstruction images were generated. FINDINGS:   There is a dialysis graft/fistula appreciated in the forearm. There is diffuse subcutaneous edema changes and skin thickening along  the anterior soft tissues of the of the forearm slightly more  conspicuous, focally along the distal graft. The induration does  extend diffusely particularly on the medial aspect of the forearm from  the elbow to the wrist region. These findings are compatible with  cellulitis. There is edema tracking along the superficial aspect of the forearm  musculature anteriorly and mild myositis changes would be difficult to  exclude. There is no discrete focal fluid collection indicate definite abscess. There is no abnormal soft tissue gas. There are no bony abnormalities indicate osteomyelitis.     Impression:       1. CT findings compatible with diffuse cellulitis involving the  forearm, anterior superficial soft tissues. No discrete fluid  collection to indicate a focal abscess.   Signed by Dr Maureen Espinosa on 2/22/2018 7:05 PM           Patient Active Problem List   Diagnosis    Hypertension    Chest pressure    Diabetes mellitus ([address]    Ex-cigarette smoker    Chest pain    Normal cardiac stress test    Cellulitis    Infection of AV graft for dialysis ([address]    Cellulitis of right upper extremity    Atrial fibrillation with RVR (HCC)    Immunosuppression (formerly [facility])       IMPRESSION:  Infected right upper extremity AV dialysis graft with methicillin susceptible Staphylococcus aureus status post removal of AV graft- d/w Will Vides RN as she saw wound today    A. fib with rapid ventricular responseresolved    Immunocompromised patient with renal transplant 8 years ago     Diabetes mellitus     Morbid

## 2018-03-02 LAB
LV EF: 56 %
LVEF MODALITY: NORMAL

## 2018-03-02 NOTE — CARE COORDINATION
[facility] Transitions Initial Follow Up Call    Call within 2 business days of discharge: Yes    Patient: Pee Roblero Patient : 1955   MRN: [id number]  Reason for Admission: There are no discharge diagnoses documented for the most recent discharge. Discharge Date: 18 RARS: [facility] Risk Score: 10.5     Spoke with: [id number] [facility]: [facility]    Non-face-to-face services provided:      Care Transitions 24 Hour Call    Do you have any ongoing symptoms?:  No  Do you have a copy of your discharge instructions?:  Yes  Do you have all of your prescriptions and are they filled?:  Yes  Have you scheduled your follow up appointment?:  Yes  How are you going to get to your appointment?:  Car - family or friend to transport  Were you discharged with any Home Care or Post Acute Services:  No  Do you have support at home?:  Child  Do you feel like you have everything you need to keep you well at home?:  Yes  Are you an active caregiver in your home?:  No  Care Transitions Interventions         Follow Up:Spoke with patient for initial follow up call. He reported that he is doing well today. Reported that he has started his antibiotics and is not having any side effects from them. Denied any unusual symptoms. Reported that his arm is a lot better. Reported that he is eating and drinking well. Reported that he is up and about in the home without any difficulty. No new problems reported at this time. Will follow as  Indicated.   Future Appointments  Date Time Provider Ivan Hall   3/6/2018 10:00 AM SCHEDULE, [facility] VASCULAR 1 Vasc Spec [facility]   3/7/2018 3:00 PM DO MOE Soriano [facility]   2018 10:45 AM CHRIS Alan Cardio [place]       Harriet Nguyen RN

## 2018-03-06 ENCOUNTER — OFFICE VISIT (OUTPATIENT)
Dept: VASCULAR SURGERY | Age: 63
End: 2018-03-06

## 2018-03-06 VITALS
TEMPERATURE: 96.9 F | SYSTOLIC BLOOD PRESSURE: 146 MMHG | HEART RATE: 64 BPM | DIASTOLIC BLOOD PRESSURE: 79 MMHG | RESPIRATION RATE: 18 BRPM

## 2018-03-06 DIAGNOSIS — Z09 FOLLOW UP: Primary | ICD-10-CM

## 2018-03-06 PROCEDURE — 99024 POSTOP FOLLOW-UP VISIT: CPT | Performed by: PHYSICIAN ASSISTANT

## 2018-03-07 ENCOUNTER — CARE COORDINATION (OUTPATIENT)
Dept: CARE COORDINATION | Age: 63
End: 2018-03-07

## 2018-03-07 ENCOUNTER — CARE COORDINATION (OUTPATIENT)
Dept: CASE MANAGEMENT | Age: 63
End: 2018-03-07

## 2018-03-07 ENCOUNTER — OFFICE VISIT (OUTPATIENT)
Dept: PRIMARY CARE CLINIC | Age: 63
End: 2018-03-07
Payer: MEDICARE

## 2018-03-07 VITALS
HEART RATE: 58 BPM | HEIGHT: 73 IN | WEIGHT: 287.8 LBS | DIASTOLIC BLOOD PRESSURE: 72 MMHG | SYSTOLIC BLOOD PRESSURE: 110 MMHG | BODY MASS INDEX: 38.14 KG/M2 | TEMPERATURE: 98 F | OXYGEN SATURATION: 97 %

## 2018-03-07 DIAGNOSIS — E11.9 CONTROLLED TYPE 2 DIABETES MELLITUS WITHOUT COMPLICATION, WITHOUT LONG-TERM CURRENT USE OF INSULIN (HCC): Primary | ICD-10-CM

## 2018-03-07 DIAGNOSIS — D84.9 IMMUNOSUPPRESSION (HCC): ICD-10-CM

## 2018-03-07 DIAGNOSIS — I10 ESSENTIAL HYPERTENSION: ICD-10-CM

## 2018-03-07 LAB — HBA1C MFR BLD: 7.4 %

## 2018-03-07 PROCEDURE — 99496 TRANSJ CARE MGMT HIGH F2F 7D: CPT | Performed by: INTERNAL MEDICINE

## 2018-03-07 PROCEDURE — 83036 HEMOGLOBIN GLYCOSYLATED A1C: CPT | Performed by: INTERNAL MEDICINE

## 2018-03-07 RX ORDER — PROPRANOLOL HYDROCHLORIDE 40 MG/1
40 TABLET ORAL 2 TIMES DAILY
Qty: 60 TABLET | Refills: 11 | Status: SHIPPED | OUTPATIENT
Start: 2018-03-07 | End: 2019-07-08 | Stop reason: SDUPTHER

## 2018-03-07 RX ORDER — ZOLPIDEM TARTRATE 10 MG/1
10 TABLET ORAL NIGHTLY PRN
Qty: 30 TABLET | Refills: 3 | Status: SHIPPED | OUTPATIENT
Start: 2018-03-07 | End: 2018-04-06

## 2018-03-08 ENCOUNTER — CARE COORDINATION (OUTPATIENT)
Dept: CASE MANAGEMENT | Age: 63
End: 2018-03-08

## 2018-03-15 ENCOUNTER — CARE COORDINATION (OUTPATIENT)
Dept: CASE MANAGEMENT | Age: 63
End: 2018-03-15

## 2018-03-27 LAB
FUNGUS (MYCOLOGY) CULTURE: NORMAL
KOH PREP: NORMAL

## 2018-04-09 ENCOUNTER — TELEPHONE (OUTPATIENT)
Dept: NEUROLOGY | Age: 63
End: 2018-04-09

## 2018-04-09 ENCOUNTER — OFFICE VISIT (OUTPATIENT)
Dept: CARDIOLOGY | Age: 63
End: 2018-04-09
Payer: MEDICARE

## 2018-04-09 VITALS
SYSTOLIC BLOOD PRESSURE: 132 MMHG | DIASTOLIC BLOOD PRESSURE: 60 MMHG | HEIGHT: 73 IN | HEART RATE: 59 BPM | WEIGHT: 283 LBS | BODY MASS INDEX: 37.51 KG/M2

## 2018-04-09 DIAGNOSIS — R53.83 FATIGUE, UNSPECIFIED TYPE: ICD-10-CM

## 2018-04-09 DIAGNOSIS — I48.0 PAF (PAROXYSMAL ATRIAL FIBRILLATION) (HCC): ICD-10-CM

## 2018-04-09 DIAGNOSIS — Z87.898 HISTORY OF SNORING: Primary | ICD-10-CM

## 2018-04-09 DIAGNOSIS — R40.0 DAYTIME SOMNOLENCE: ICD-10-CM

## 2018-04-09 DIAGNOSIS — I10 ESSENTIAL HYPERTENSION: ICD-10-CM

## 2018-04-09 PROCEDURE — G8427 DOCREV CUR MEDS BY ELIG CLIN: HCPCS | Performed by: NURSE PRACTITIONER

## 2018-04-09 PROCEDURE — 1036F TOBACCO NON-USER: CPT | Performed by: NURSE PRACTITIONER

## 2018-04-09 PROCEDURE — 3017F COLORECTAL CA SCREEN DOC REV: CPT | Performed by: NURSE PRACTITIONER

## 2018-04-09 PROCEDURE — 99214 OFFICE O/P EST MOD 30 MIN: CPT | Performed by: NURSE PRACTITIONER

## 2018-04-09 PROCEDURE — G8417 CALC BMI ABV UP PARAM F/U: HCPCS | Performed by: NURSE PRACTITIONER

## 2018-04-09 PROCEDURE — 93000 ELECTROCARDIOGRAM COMPLETE: CPT | Performed by: NURSE PRACTITIONER

## 2018-04-17 ENCOUNTER — CARE COORDINATION (OUTPATIENT)
Dept: CASE MANAGEMENT | Age: 63
End: 2018-04-17

## 2018-06-06 ENCOUNTER — TELEPHONE (OUTPATIENT)
Dept: CARDIOLOGY | Age: 63
End: 2018-06-06

## 2018-06-25 ENCOUNTER — OFFICE VISIT (OUTPATIENT)
Dept: CARDIOLOGY | Age: 63
End: 2018-06-25
Payer: MEDICARE

## 2018-06-25 VITALS
WEIGHT: 287 LBS | HEIGHT: 73 IN | HEART RATE: 68 BPM | DIASTOLIC BLOOD PRESSURE: 84 MMHG | BODY MASS INDEX: 38.04 KG/M2 | SYSTOLIC BLOOD PRESSURE: 138 MMHG

## 2018-06-25 DIAGNOSIS — I10 ESSENTIAL HYPERTENSION: ICD-10-CM

## 2018-06-25 DIAGNOSIS — I48.0 PAF (PAROXYSMAL ATRIAL FIBRILLATION) (HCC): Primary | ICD-10-CM

## 2018-06-25 PROCEDURE — G8417 CALC BMI ABV UP PARAM F/U: HCPCS | Performed by: INTERNAL MEDICINE

## 2018-06-25 PROCEDURE — 1036F TOBACCO NON-USER: CPT | Performed by: INTERNAL MEDICINE

## 2018-06-25 PROCEDURE — 99213 OFFICE O/P EST LOW 20 MIN: CPT | Performed by: INTERNAL MEDICINE

## 2018-06-25 PROCEDURE — G8427 DOCREV CUR MEDS BY ELIG CLIN: HCPCS | Performed by: INTERNAL MEDICINE

## 2018-06-25 PROCEDURE — 3017F COLORECTAL CA SCREEN DOC REV: CPT | Performed by: INTERNAL MEDICINE

## 2018-06-25 RX ORDER — MYCOPHENOLATE MOFETIL 250 MG/1
500 CAPSULE ORAL 2 TIMES DAILY
COMMUNITY

## 2018-07-20 NOTE — PROGRESS NOTES
[address] [facility], [address] [place] [address] Drive, [address] [place] [address] Street, [address]  The following was transcribed by Lucero Lazaro M.T. Dub Olszewski : 1955, 57 y.o. Male        Office Visit:  2018    Chief Complaint   Patient presents with    Follow-up     2 month follow up for A-Fib    Hypertension    Atrial Fibrillation     Reason for visit:   1. Follow up of paroxysmal atrial fibrillation and hypertension. History of Present Illness:   Mr. Dub Olszewski has occasional lightheadedness but no jake syncope. He has no paroxysmal nocturnal dyspnea, orthopnea or palpitations. He has had no unusual chest discomfort. Patient Active Problem List   Diagnosis Code    Essential hypertension I10    Chest pressure R07.89    Diabetes mellitus ([address] E11.9    Ex-cigarette smoker Z87.891    Chest pain R07.9    Normal cardiac stress test Z13.6    Cellulitis L03.90    Infection of AV graft for dialysis ([address] T82. 7XXA    Cellulitis of right upper extremity L03. 113    Atrial fibrillation with RVR ([facility]) I48.91    Immunosuppression ([facility]) D89.9    Daytime somnolence R40.0    PAF (paroxysmal atrial fibrillation) ([facility]) I48.0    Fatigue R53.83    History of snoring Z87.898     Past Medical History:   Diagnosis Date    Diabetes mellitus ([address]     Ex-cigarette smoker 2016    Hypertension     Obese     Vision problem     wears glasses     Past Surgical History:   Procedure Laterality Date    CHOLECYSTECTOMY      DIALYSIS FISTULA CREATION  [place]     left arm radial cephalic    HERNIA REPAIR      KIDNEY TRANSPLANT      2010 in [address] [place] [address] Right 2018    AV FISTULA GRAFT REMOVAL performed by Eh Moody MD at [address]  multiple    VASCULAR SURGERY Right evra     av loop graft      His Family history is unknown by patient.     Social History   Substance epistaxis. MOUTH:  Teeth, gums and palate normal.   THROAT:  No lesions on lips or buccal mucosa. Tongue protrudes in midline and is well papillated. NECK:  There are no carotid bruits. No noted jugular venous distention. No thyromegaly. CHEST:  Clear bilateral breath sounds without wheezes or rhonchi. RESPIRATORY:  The lungs are clear. CARDIOVASCULAR:  The heart's rhythm is regular without audible murmurs or gallops. ABDOMEN:  The abdomen is soft without tenderness or mass. UPPER EXTREMITY EVALUATION:  Radial pulses palpable bilaterally. LOWER EXTREMITY EVALUATION:  Negative for peripheral edema. Femoral, popliteal, dorsalis pedis, and posterior tibialis pulses 2+ to palpation bilaterally. No cyanosis or clubbing. MUSCULOSKELETAL:  Normal muscle strength and tone. No atrophy or abnormalities. SKIN:  Warm, dry, intact. No dermatitis or ulcers. NEUROLOGIC:  Intact cranial nerves II through XII and no focal weakness. Impression / Plan:   1. Paroxysmal atrial fibrillation - he is anticoagulated with Eliquis and he also takes propranolol (Inderal). Continue these medications. 2.  Hypertension - blood pressure is well controlled on current therapy. 3.  Return to see nurse practitioner in six months.       _____________________________________________________  Electronically Signed by:   MIROSLAVA Clifton M.D., F.A.C.C.    [facility]  _____________________________________________________  Copy to pcp: Darleen Gonzalez DO

## 2018-08-08 RX ORDER — TAMSULOSIN HYDROCHLORIDE 0.4 MG/1
0.4 CAPSULE ORAL DAILY
Qty: 30 CAPSULE | Refills: 5 | Status: SHIPPED | OUTPATIENT
Start: 2018-08-08 | End: 2019-04-17 | Stop reason: SDUPTHER

## 2019-01-07 ENCOUNTER — OFFICE VISIT (OUTPATIENT)
Dept: CARDIOLOGY | Age: 64
End: 2019-01-07
Payer: MEDICARE

## 2019-01-07 VITALS
HEIGHT: 73 IN | WEIGHT: 280 LBS | DIASTOLIC BLOOD PRESSURE: 88 MMHG | BODY MASS INDEX: 37.11 KG/M2 | SYSTOLIC BLOOD PRESSURE: 138 MMHG | HEART RATE: 60 BPM

## 2019-01-07 DIAGNOSIS — I48.0 PAF (PAROXYSMAL ATRIAL FIBRILLATION) (HCC): ICD-10-CM

## 2019-01-07 DIAGNOSIS — I10 ESSENTIAL HYPERTENSION: ICD-10-CM

## 2019-01-07 DIAGNOSIS — Z79.01 CHRONIC ANTICOAGULATION: ICD-10-CM

## 2019-01-07 DIAGNOSIS — R42 DIZZINESS: Primary | ICD-10-CM

## 2019-01-07 DIAGNOSIS — Z87.898 HISTORY OF TACHYCARDIA: ICD-10-CM

## 2019-01-07 LAB
ANION GAP SERPL CALCULATED.3IONS-SCNC: 19 MMOL/L (ref 7–19)
BUN BLDV-MCNC: 11 MG/DL (ref 8–23)
CALCIUM SERPL-MCNC: 10 MG/DL (ref 8.8–10.2)
CHLORIDE BLD-SCNC: 101 MMOL/L (ref 98–111)
CO2: 20 MMOL/L (ref 22–29)
CREAT SERPL-MCNC: 0.9 MG/DL (ref 0.5–1.2)
GFR NON-AFRICAN AMERICAN: >60
GLUCOSE BLD-MCNC: 152 MG/DL (ref 74–109)
HCT VFR BLD CALC: 50.9 % (ref 42–52)
HEMOGLOBIN: 16.4 G/DL (ref 14–18)
MAGNESIUM: 1.6 MG/DL (ref 1.6–2.4)
MCH RBC QN AUTO: 28.7 PG (ref 27–31)
MCHC RBC AUTO-ENTMCNC: 32.2 G/DL (ref 33–37)
MCV RBC AUTO: 89 FL (ref 80–94)
PDW BLD-RTO: 13.7 % (ref 11.5–14.5)
PLATELET # BLD: 233 K/UL (ref 130–400)
PMV BLD AUTO: 10.5 FL (ref 9.4–12.4)
POTASSIUM SERPL-SCNC: 4.8 MMOL/L (ref 3.5–5)
RBC # BLD: 5.72 M/UL (ref 4.7–6.1)
SODIUM BLD-SCNC: 140 MMOL/L (ref 136–145)
WBC # BLD: 9.2 K/UL (ref 4.8–10.8)

## 2019-01-07 PROCEDURE — 4040F PNEUMOC VAC/ADMIN/RCVD: CPT | Performed by: NURSE PRACTITIONER

## 2019-01-07 PROCEDURE — G8427 DOCREV CUR MEDS BY ELIG CLIN: HCPCS | Performed by: NURSE PRACTITIONER

## 2019-01-07 PROCEDURE — G8417 CALC BMI ABV UP PARAM F/U: HCPCS | Performed by: NURSE PRACTITIONER

## 2019-01-07 PROCEDURE — 1123F ACP DISCUSS/DSCN MKR DOCD: CPT | Performed by: NURSE PRACTITIONER

## 2019-01-07 PROCEDURE — G8484 FLU IMMUNIZE NO ADMIN: HCPCS | Performed by: NURSE PRACTITIONER

## 2019-01-07 PROCEDURE — 0296T PR EXT ECG > 48HR TO 21 DAY RCRD W/CONECT INTL RCRD: CPT | Performed by: NURSE PRACTITIONER

## 2019-01-07 PROCEDURE — 99214 OFFICE O/P EST MOD 30 MIN: CPT | Performed by: NURSE PRACTITIONER

## 2019-01-07 PROCEDURE — 3017F COLORECTAL CA SCREEN DOC REV: CPT | Performed by: NURSE PRACTITIONER

## 2019-01-07 PROCEDURE — 1036F TOBACCO NON-USER: CPT | Performed by: NURSE PRACTITIONER

## 2019-01-07 PROCEDURE — 1101F PT FALLS ASSESS-DOCD LE1/YR: CPT | Performed by: NURSE PRACTITIONER

## 2019-01-29 ENCOUNTER — TELEPHONE (OUTPATIENT)
Dept: CARDIOLOGY | Age: 64
End: 2019-01-29

## 2019-01-29 DIAGNOSIS — I48.0 PAF (PAROXYSMAL ATRIAL FIBRILLATION) (HCC): Primary | ICD-10-CM

## 2019-01-30 ENCOUNTER — TELEPHONE (OUTPATIENT)
Dept: CARDIOLOGY | Age: 64
End: 2019-01-30

## 2019-02-01 DIAGNOSIS — I48.0 PAF (PAROXYSMAL ATRIAL FIBRILLATION) (HCC): ICD-10-CM

## 2019-02-05 ENCOUNTER — OFFICE VISIT (OUTPATIENT)
Dept: CARDIOLOGY | Age: 64
End: 2019-02-05
Payer: MEDICARE

## 2019-02-05 VITALS
BODY MASS INDEX: 36.98 KG/M2 | HEIGHT: 73 IN | HEART RATE: 64 BPM | WEIGHT: 279 LBS | DIASTOLIC BLOOD PRESSURE: 88 MMHG | SYSTOLIC BLOOD PRESSURE: 138 MMHG

## 2019-02-05 DIAGNOSIS — I47.1 PSVT (PAROXYSMAL SUPRAVENTRICULAR TACHYCARDIA) (HCC): ICD-10-CM

## 2019-02-05 DIAGNOSIS — I48.0 PAF (PAROXYSMAL ATRIAL FIBRILLATION) (HCC): Primary | ICD-10-CM

## 2019-02-05 DIAGNOSIS — I10 ESSENTIAL HYPERTENSION: ICD-10-CM

## 2019-02-05 DIAGNOSIS — R42 ORTHOSTATIC DIZZINESS: ICD-10-CM

## 2019-02-05 DIAGNOSIS — Z79.01 CHRONIC ANTICOAGULATION: ICD-10-CM

## 2019-02-05 PROBLEM — I47.10 PSVT (PAROXYSMAL SUPRAVENTRICULAR TACHYCARDIA): Status: ACTIVE | Noted: 2019-02-05

## 2019-02-05 PROCEDURE — G8417 CALC BMI ABV UP PARAM F/U: HCPCS | Performed by: NURSE PRACTITIONER

## 2019-02-05 PROCEDURE — 4040F PNEUMOC VAC/ADMIN/RCVD: CPT | Performed by: NURSE PRACTITIONER

## 2019-02-05 PROCEDURE — 1101F PT FALLS ASSESS-DOCD LE1/YR: CPT | Performed by: NURSE PRACTITIONER

## 2019-02-05 PROCEDURE — 3017F COLORECTAL CA SCREEN DOC REV: CPT | Performed by: NURSE PRACTITIONER

## 2019-02-05 PROCEDURE — G8427 DOCREV CUR MEDS BY ELIG CLIN: HCPCS | Performed by: NURSE PRACTITIONER

## 2019-02-05 PROCEDURE — 1036F TOBACCO NON-USER: CPT | Performed by: NURSE PRACTITIONER

## 2019-02-05 PROCEDURE — 99213 OFFICE O/P EST LOW 20 MIN: CPT | Performed by: NURSE PRACTITIONER

## 2019-02-05 PROCEDURE — 1123F ACP DISCUSS/DSCN MKR DOCD: CPT | Performed by: NURSE PRACTITIONER

## 2019-02-05 PROCEDURE — G8484 FLU IMMUNIZE NO ADMIN: HCPCS | Performed by: NURSE PRACTITIONER

## 2019-02-06 DIAGNOSIS — Z13.9 ENCOUNTER FOR SCREENING: Primary | ICD-10-CM

## 2019-02-06 DIAGNOSIS — E55.9 VITAMIN D DEFICIENCY: ICD-10-CM

## 2019-02-13 DIAGNOSIS — Z13.9 ENCOUNTER FOR SCREENING: ICD-10-CM

## 2019-02-13 DIAGNOSIS — E55.9 VITAMIN D DEFICIENCY: ICD-10-CM

## 2019-02-13 LAB
ALBUMIN SERPL-MCNC: 4.2 G/DL (ref 3.5–5.2)
ALP BLD-CCNC: 72 U/L (ref 40–130)
ALT SERPL-CCNC: 17 U/L (ref 5–41)
ANION GAP SERPL CALCULATED.3IONS-SCNC: 14 MMOL/L (ref 7–19)
AST SERPL-CCNC: 16 U/L (ref 5–40)
BACTERIA: NEGATIVE /HPF
BILIRUB SERPL-MCNC: 1.4 MG/DL (ref 0.2–1.2)
BILIRUBIN URINE: NEGATIVE
BLOOD, URINE: ABNORMAL
BUN BLDV-MCNC: 12 MG/DL (ref 8–23)
CALCIUM SERPL-MCNC: 9.7 MG/DL (ref 8.8–10.2)
CHLORIDE BLD-SCNC: 102 MMOL/L (ref 98–111)
CHOLESTEROL, TOTAL: 134 MG/DL (ref 160–199)
CLARITY: CLEAR
CO2: 22 MMOL/L (ref 22–29)
COLOR: YELLOW
CREAT SERPL-MCNC: 1 MG/DL (ref 0.5–1.2)
CREATININE URINE: 118.2 MG/DL (ref 4.2–622)
EPITHELIAL CELLS, UA: 0 /HPF (ref 0–5)
GFR NON-AFRICAN AMERICAN: >60
GLUCOSE BLD-MCNC: 197 MG/DL (ref 74–109)
GLUCOSE URINE: NEGATIVE MG/DL
HBA1C MFR BLD: 8 % (ref 4–6)
HCT VFR BLD CALC: 47.6 % (ref 42–52)
HDLC SERPL-MCNC: 28 MG/DL (ref 55–121)
HEMOGLOBIN: 15.5 G/DL (ref 14–18)
HIV 1,2 COMBO ANTIGEN/ANTIBODY: NORMAL
HYALINE CASTS: 3 /HPF (ref 0–8)
KETONES, URINE: NEGATIVE MG/DL
LDL CHOLESTEROL CALCULATED: 80 MG/DL
LEUKOCYTE ESTERASE, URINE: NEGATIVE
MCH RBC QN AUTO: 29 PG (ref 27–31)
MCHC RBC AUTO-ENTMCNC: 32.6 G/DL (ref 33–37)
MCV RBC AUTO: 89 FL (ref 80–94)
NITRITE, URINE: NEGATIVE
PDW BLD-RTO: 13.8 % (ref 11.5–14.5)
PH UA: 5.5
PLATELET # BLD: 212 K/UL (ref 130–400)
PMV BLD AUTO: 10.7 FL (ref 9.4–12.4)
POTASSIUM SERPL-SCNC: 5 MMOL/L (ref 3.5–5)
PROTEIN PROTEIN: 11 MG/DL (ref 15–45)
PROTEIN UA: NEGATIVE MG/DL
RBC # BLD: 5.35 M/UL (ref 4.7–6.1)
RBC UA: 1 /HPF (ref 0–4)
SODIUM BLD-SCNC: 138 MMOL/L (ref 136–145)
SPECIFIC GRAVITY UA: 1.02
TOTAL PROTEIN: 6.8 G/DL (ref 6.6–8.7)
TRIGL SERPL-MCNC: 130 MG/DL (ref 0–149)
TSH SERPL DL<=0.05 MIU/L-ACNC: 1.72 UIU/ML (ref 0.27–4.2)
UROBILINOGEN, URINE: 0.2 E.U./DL
VITAMIN D 25-HYDROXY: 15.2 NG/ML
WBC # BLD: 8.7 K/UL (ref 4.8–10.8)
WBC UA: 1 /HPF (ref 0–5)

## 2019-02-18 LAB
HCV QNT BY NAAT IU/ML: NOT DETECTED
HCV QNT BY NAAT LOG IU/ML: NOT DETECTED
INTERPRETATION: NOT DETECTED

## 2019-02-19 ENCOUNTER — OFFICE VISIT (OUTPATIENT)
Dept: FAMILY MEDICINE CLINIC | Age: 64
End: 2019-02-19
Payer: MEDICARE

## 2019-02-19 VITALS
SYSTOLIC BLOOD PRESSURE: 126 MMHG | RESPIRATION RATE: 16 BRPM | DIASTOLIC BLOOD PRESSURE: 80 MMHG | TEMPERATURE: 96.9 F | HEART RATE: 68 BPM | BODY MASS INDEX: 36.68 KG/M2 | WEIGHT: 278 LBS | OXYGEN SATURATION: 98 %

## 2019-02-19 DIAGNOSIS — I48.0 PAF (PAROXYSMAL ATRIAL FIBRILLATION) (HCC): Primary | ICD-10-CM

## 2019-02-19 DIAGNOSIS — Z94.0 KIDNEY TRANSPLANT STATUS, CADAVERIC: ICD-10-CM

## 2019-02-19 DIAGNOSIS — E13.628 OTHER SPECIFIED DIABETES MELLITUS WITH OTHER SKIN COMPLICATION, UNSPECIFIED WHETHER LONG TERM INSULIN USE (HCC): ICD-10-CM

## 2019-02-19 DIAGNOSIS — E11.22 TYPE 2 DIABETES MELLITUS WITH CHRONIC KIDNEY DISEASE, WITHOUT LONG-TERM CURRENT USE OF INSULIN, UNSPECIFIED CKD STAGE (HCC): ICD-10-CM

## 2019-02-19 PROCEDURE — G8484 FLU IMMUNIZE NO ADMIN: HCPCS | Performed by: INTERNAL MEDICINE

## 2019-02-19 PROCEDURE — 3017F COLORECTAL CA SCREEN DOC REV: CPT | Performed by: INTERNAL MEDICINE

## 2019-02-19 PROCEDURE — 3045F PR MOST RECENT HEMOGLOBIN A1C LEVEL 7.0-9.0%: CPT | Performed by: INTERNAL MEDICINE

## 2019-02-19 PROCEDURE — 4040F PNEUMOC VAC/ADMIN/RCVD: CPT | Performed by: INTERNAL MEDICINE

## 2019-02-19 PROCEDURE — 1123F ACP DISCUSS/DSCN MKR DOCD: CPT | Performed by: INTERNAL MEDICINE

## 2019-02-19 PROCEDURE — G8427 DOCREV CUR MEDS BY ELIG CLIN: HCPCS | Performed by: INTERNAL MEDICINE

## 2019-02-19 PROCEDURE — 2022F DILAT RTA XM EVC RTNOPTHY: CPT | Performed by: INTERNAL MEDICINE

## 2019-02-19 PROCEDURE — 1101F PT FALLS ASSESS-DOCD LE1/YR: CPT | Performed by: INTERNAL MEDICINE

## 2019-02-19 PROCEDURE — 99214 OFFICE O/P EST MOD 30 MIN: CPT | Performed by: INTERNAL MEDICINE

## 2019-02-19 PROCEDURE — 1036F TOBACCO NON-USER: CPT | Performed by: INTERNAL MEDICINE

## 2019-02-19 PROCEDURE — G8417 CALC BMI ABV UP PARAM F/U: HCPCS | Performed by: INTERNAL MEDICINE

## 2019-02-19 RX ORDER — MECLIZINE HCL 12.5 MG/1
12.5 TABLET ORAL 3 TIMES DAILY PRN
Qty: 30 TABLET | Refills: 2 | Status: SHIPPED | OUTPATIENT
Start: 2019-02-19 | End: 2019-05-28 | Stop reason: SDUPTHER

## 2019-02-19 ASSESSMENT — PATIENT HEALTH QUESTIONNAIRE - PHQ9
1. LITTLE INTEREST OR PLEASURE IN DOING THINGS: 0
SUM OF ALL RESPONSES TO PHQ QUESTIONS 1-9: 0
SUM OF ALL RESPONSES TO PHQ9 QUESTIONS 1 & 2: 0
SUM OF ALL RESPONSES TO PHQ QUESTIONS 1-9: 0
2. FEELING DOWN, DEPRESSED OR HOPELESS: 0

## 2019-02-25 ENCOUNTER — TELEPHONE (OUTPATIENT)
Dept: FAMILY MEDICINE CLINIC | Age: 64
End: 2019-02-25

## 2019-03-06 ASSESSMENT — ENCOUNTER SYMPTOMS
VOMITING: 0
NAUSEA: 0
RHINORRHEA: 0
BACK PAIN: 0
CONSTIPATION: 0
DIARRHEA: 0
SHORTNESS OF BREATH: 0

## 2019-03-28 ENCOUNTER — TELEPHONE (OUTPATIENT)
Dept: CARDIOLOGY | Age: 64
End: 2019-03-28

## 2019-04-17 ENCOUNTER — OFFICE VISIT (OUTPATIENT)
Dept: FAMILY MEDICINE CLINIC | Age: 64
End: 2019-04-17
Payer: MEDICARE

## 2019-04-17 VITALS
BODY MASS INDEX: 35.39 KG/M2 | SYSTOLIC BLOOD PRESSURE: 130 MMHG | DIASTOLIC BLOOD PRESSURE: 76 MMHG | WEIGHT: 267 LBS | TEMPERATURE: 98.1 F | OXYGEN SATURATION: 97 % | HEIGHT: 73 IN | HEART RATE: 76 BPM | RESPIRATION RATE: 16 BRPM

## 2019-04-17 DIAGNOSIS — G25.0 ESSENTIAL TREMOR: ICD-10-CM

## 2019-04-17 DIAGNOSIS — R39.14 BENIGN PROSTATIC HYPERPLASIA WITH INCOMPLETE BLADDER EMPTYING: ICD-10-CM

## 2019-04-17 DIAGNOSIS — D84.9 IMMUNOSUPPRESSION (HCC): ICD-10-CM

## 2019-04-17 DIAGNOSIS — Z94.0 HISTORY OF KIDNEY TRANSPLANT: ICD-10-CM

## 2019-04-17 DIAGNOSIS — E11.65 TYPE 2 DIABETES MELLITUS WITH HYPERGLYCEMIA, WITHOUT LONG-TERM CURRENT USE OF INSULIN (HCC): Primary | ICD-10-CM

## 2019-04-17 DIAGNOSIS — I48.91 ATRIAL FIBRILLATION, UNSPECIFIED TYPE (HCC): ICD-10-CM

## 2019-04-17 DIAGNOSIS — N40.1 BENIGN PROSTATIC HYPERPLASIA WITH INCOMPLETE BLADDER EMPTYING: ICD-10-CM

## 2019-04-17 PROCEDURE — 1123F ACP DISCUSS/DSCN MKR DOCD: CPT | Performed by: FAMILY MEDICINE

## 2019-04-17 PROCEDURE — 3017F COLORECTAL CA SCREEN DOC REV: CPT | Performed by: FAMILY MEDICINE

## 2019-04-17 PROCEDURE — G8427 DOCREV CUR MEDS BY ELIG CLIN: HCPCS | Performed by: FAMILY MEDICINE

## 2019-04-17 PROCEDURE — 3045F PR MOST RECENT HEMOGLOBIN A1C LEVEL 7.0-9.0%: CPT | Performed by: FAMILY MEDICINE

## 2019-04-17 PROCEDURE — 2022F DILAT RTA XM EVC RTNOPTHY: CPT | Performed by: FAMILY MEDICINE

## 2019-04-17 PROCEDURE — 1036F TOBACCO NON-USER: CPT | Performed by: FAMILY MEDICINE

## 2019-04-17 PROCEDURE — 99214 OFFICE O/P EST MOD 30 MIN: CPT | Performed by: FAMILY MEDICINE

## 2019-04-17 PROCEDURE — G8417 CALC BMI ABV UP PARAM F/U: HCPCS | Performed by: FAMILY MEDICINE

## 2019-04-17 PROCEDURE — 4040F PNEUMOC VAC/ADMIN/RCVD: CPT | Performed by: FAMILY MEDICINE

## 2019-04-17 RX ORDER — TAMSULOSIN HYDROCHLORIDE 0.4 MG/1
0.4 CAPSULE ORAL DAILY
Qty: 90 CAPSULE | Refills: 3 | Status: SHIPPED | OUTPATIENT
Start: 2019-04-17 | End: 2020-04-07

## 2019-04-17 NOTE — PROGRESS NOTES
Khurram Briceño is a 70 y.o. male who presents today for   Chief Complaint   Patient presents with   [address] [facility] Patient     dr Magaly Romo pt    Medication Refill     flomax    Back Pain     middle back  been hurting for 6 months       Chief Complaint: Establish care    HPI  Patient reports that he has a history of BPH and does encounter issues emptying his bladder. He states that with the use of Flomax he's been doing well but without a juice he does struggle more to empty his bladder. Patient does report that he has a history of renal transplant in 2010 has been doing well. He does state that he has a follow-up with the transplant physician next month at this time things seem to be going well. He is tolerating his medications without issues. He does report that he occasionally has some issues with fatigue at times but is been going on since 2006 and denies any specific changes that he is noticed. He has a history of type II diabetes with his lisinopril and A1c of 8.0. He reports he is doing well with the use of Jardiance of metformin and is attempting to be diligent with his diet. He denies any issues with the use of medicine. He denies any symptoms from his diabetes. He does have a history of essential tremor and is taking propranolol which has been beneficial for his tremors. He denies any issues with the use the medicine and denies any significant changes to his tremors. He does have a history of atrial fibrillation and is currently taking Eliquis. He was previously following with Dr. Cong Noel but is still following with cardiology and is going to be getting set up with Dr. Rosario Villarreal in the future. He denies any issues with his Eliquis or any symptoms from his A. fib at this time. Review of Systems   Constitutional: Positive for fatigue. Negative for activity change, appetite change and fever. HENT: Negative for congestion and rhinorrhea. Eyes: Negative for pain and discharge.    Respiratory: Negative for cough and shortness of breath. Cardiovascular: Negative for chest pain and palpitations. Gastrointestinal: Negative for abdominal pain, constipation, diarrhea, nausea and vomiting. Endocrine: Negative for cold intolerance and heat intolerance. Genitourinary: Negative for dysuria and hematuria. Musculoskeletal: Negative for back pain, gait problem and neck pain. Skin: Negative for rash and wound. Neurological: Negative for syncope and weakness. Hematological: Negative for adenopathy. Does not bruise/bleed easily. Psychiatric/Behavioral: Negative for dysphoric mood and sleep disturbance. The patient is not nervous/anxious. Past Medical History:   Diagnosis Date    Diabetes mellitus [facility]     Ex-cigarette smoker 7/17/2016    Hypertension     Obese     Vision problem     wears glasses       Current Outpatient Medications   Medication Sig Dispense Refill    tamsulosin (FLOMAX) 0.4 MG capsule Take 1 capsule by mouth daily 90 capsule 3    empagliflozin (JARDIANCE) 10 MG tablet Take 1 tablet by mouth daily 30 tablet 2    metFORMIN (GLUCOPHAGE) 1000 MG tablet Take 1 tablet by mouth 2 times daily (with meals) 180 tablet 3    ELIQUIS 5 MG TABS tablet TAKE 1 TABLET BY MOUTH TWICE A DAY 60 tablet 5    Tacrolimus (ENVARSUS XR) 1 MG TB24 Take 3 mg by mouth daily      mycophenolate (CELLCEPT) 250 MG capsule Take 500 mg by mouth 2 times daily      propranolol (INDERAL) 40 MG tablet Take 1 tablet by mouth 2 times daily 60 tablet 11    acetaminophen (TYLENOL) 500 MG tablet Take 1,000 mg by mouth daily      Lancets MISC Use to check blood sugar daily Dx E11.9 100 each 0    glucose blood VI test strips (ACCU-CHEK ANANT) strip Contour strips,check daily E11.9 100 each 0     No current facility-administered medications for this visit.          No Known Allergies    Past Surgical History:   Procedure Laterality Date    CHOLECYSTECTOMY      DIALYSIS FISTULA CREATION  [place] 2007    left arm radial cephalic    HERNIA REPAIR      [address]      [address] [facility]    AV FISTULA GRAFT REMOVAL performed by Caron Alcaraz MD at [facility] TUNNELED VENOUS CATHETER PLACEMENT  multiple    VASCULAR SURGERY Right [place] 2008    av loop graft       Social History     Tobacco Use    Smoking status: Former Smoker     Packs/day: 2.00     Years: 4.00     Pack years: 8.00     Last attempt to quit: 1973     Years since quittin.2    Smokeless tobacco: Never Used   Substance Use Topics    Alcohol use: No    Drug use: Not on file       Family History   Family history unknown: Yes       /76 (Site: Right Upper Arm, Position: Sitting, Cuff Size: Large Adult)   Pulse 76   Temp 98.1 °F (36.7 °C) (Oral)   Resp 16   Ht 6' 1\" (1.854 m)   Wt 267 lb (121.1 kg)   SpO2 97%   BMI 35.23 kg/m²     Physical Exam   Constitutional: He is oriented to person, place, and time. He appears well-developed and well-nourished. No distress. HENT:   Head: Normocephalic and atraumatic. Right Ear: External ear normal.   Left Ear: External ear normal.   Nose: Nose normal.   Eyes: Conjunctivae and EOM are normal. Right eye exhibits no discharge. Left eye exhibits no discharge. No scleral icterus. Neck: Neck supple. No tracheal deviation present. No thyromegaly present. Cardiovascular: Normal rate, normal heart sounds and intact distal pulses. Exam reveals no gallop and no friction rub. Pulmonary/Chest: Effort normal and breath sounds normal. No respiratory distress. He has no wheezes. He has no rales. Abdominal: Soft. Bowel sounds are normal. He exhibits no distension. There is no tenderness. Musculoskeletal: He exhibits no edema (Bilateral lower extremities) or deformity (No gross deformities of upper or lower extremities). Lymphadenopathy:     He has no cervical adenopathy.    Neurological: He is alert and oriented to person, place, and time. No cranial nerve deficit. Skin: Skin is warm and dry. No rash noted. He is not diaphoretic. No erythema. Psychiatric: He has a normal mood and affect. His behavior is normal. Thought content normal.   Nursing note and vitals reviewed. Assessment:       ICD-10-CM    1. Type 2 diabetes mellitus with hyperglycemia, without long-term current use of insulin (HCC) E11.65 Hemoglobin A1C     empagliflozin (JARDIANCE) 10 MG tablet     metFORMIN (GLUCOPHAGE) 1000 MG tablet    Discussed importance of DM diet and benefits of exercise. Discussed proper use of medication. Stressed proper foot care and monitoring. Discussed the importance of yearly eye exams. 2. Benign prostatic hyperplasia with incomplete bladder emptying N40.1 tamsulosin (FLOMAX) 0.4 MG capsule    R39.14 Symptomatic improvement with use of Flomax. We'll continue to monitor and adjust course dictates. 3. Essential tremor G25.0 Doing well with the use of propranolol. We'll continue and continue to monitor. 4. Atrial fibrillation, unspecified type ([facility] I48.91 Currently on Eliquis. Denies any symptoms from his A. fib. We'll continue to monitor and assist as needed. Stress importance of maintaining follow-up with cardiology. 5. History of kidney transplant Z94.0 Stress importance of maintaining follow-up with his transplant doctor. We'll continue to monitor and assist as needed. Plan:  Discussed proper use of medication. Discussed signs and symptoms requiring medical attention. All questions were answered and patient voiced understanding and agreement with plan as discussed.     Orders Placed This Encounter   Procedures    Hemoglobin A1C     Standing Status:   Future     Standing Expiration Date:   4/16/2020     Orders Placed This Encounter   Medications    tamsulosin (FLOMAX) 0.4 MG capsule     Sig: Take 1 capsule by mouth daily     Dispense:  90 capsule     Refill:  3    empagliflozin (JARDIANCE) 10 MG tablet Sig: Take 1 tablet by mouth daily     Dispense:  30 tablet     Refill:  2    metFORMIN (GLUCOPHAGE) 1000 MG tablet     Sig: Take 1 tablet by mouth 2 times daily (with meals)     Dispense:  180 tablet     Refill:  3     Medications Discontinued During This Encounter   Medication Reason    tamsulosin (FLOMAX) 0.4 MG capsule REORDER    empagliflozin (JARDIANCE) 10 MG tablet REORDER    metFORMIN (GLUCOPHAGE) 1000 MG tablet REORDER     Patient Instructions   Patient given educational handouts and has had all questions answered. Patient voices understanding and agrees to plans along with risks and benefits of plan. Patient is instructed to continue prior meds,diet, and exercise plans as instructed. Patient agrees to follow up as instructed and sooner if needed. Patient agrees to go to ER if condition becomes emergent. Return in about 3 months (around 7/17/2019), or if symptoms worsen or fail to improve.

## 2019-04-30 DIAGNOSIS — I48.0 PAF (PAROXYSMAL ATRIAL FIBRILLATION) (HCC): ICD-10-CM

## 2019-04-30 RX ORDER — APIXABAN 5 MG/1
TABLET, FILM COATED ORAL
Qty: 60 TABLET | Refills: 5 | Status: SHIPPED | OUTPATIENT
Start: 2019-04-30 | End: 2019-10-30 | Stop reason: SDUPTHER

## 2019-05-02 ASSESSMENT — ENCOUNTER SYMPTOMS
EYE DISCHARGE: 0
ABDOMINAL PAIN: 0
NAUSEA: 0
RHINORRHEA: 0
CONSTIPATION: 0
COUGH: 0
EYE PAIN: 0
VOMITING: 0
BACK PAIN: 0
DIARRHEA: 0
SHORTNESS OF BREATH: 0

## 2019-05-02 NOTE — PATIENT INSTRUCTIONS
Patient Education        Atrial Fibrillation: Care Instructions  Your Care Instructions    Atrial fibrillation is an irregular and often fast heartbeat. Treating this condition is important for several reasons. It can cause blood clots, which can travel from your heart to your brain and cause a stroke. If you have a fast heartbeat, you may feel lightheaded, dizzy, and weak. An irregular heartbeat can also increase your risk for heart failure. Atrial fibrillation is often the result of another heart condition, such as high blood pressure or coronary artery disease. Making changes to improve your heart condition will help you stay healthy and active. Follow-up care is a key part of your treatment and safety. Be sure to make and go to all appointments, and call your doctor if you are having problems. It's also a good idea to know your test results and keep a list of the medicines you take. How can you care for yourself at home? Medicines    · Take your medicines exactly as prescribed. Call your doctor if you think you are having a problem with your medicine. You will get more details on the specific medicines your doctor prescribes.     · If your doctor has given you a blood thinner to prevent a stroke, be sure you get instructions about how to take your medicine safely. Blood thinners can cause serious bleeding problems.     · Do not take any vitamins, over-the-counter drugs, or herbal products without talking to your doctor first.    Lifestyle changes    · Do not smoke. Smoking can increase your chance of a stroke and heart attack. If you need help quitting, talk to your doctor about stop-smoking programs and medicines. These can increase your chances of quitting for good.     · Eat a heart-healthy diet.     · Stay at a healthy weight. Lose weight if you need to.     · Limit alcohol to 2 drinks a day for men and 1 drink a day for women. Too much alcohol can cause health problems.     · Avoid colds and flu.  Get · You have symptoms of a stroke. These may include:  ? Sudden numbness, tingling, weakness, or loss of movement in your face, arm, or leg, especially on only one side of your body. ? Sudden vision changes. ? Sudden trouble speaking. ? Sudden confusion or trouble understanding simple statements. ? Sudden problems with walking or balance. ? A sudden, severe headache that is different from past headaches.     · You passed out (lost consciousness).    Call your doctor now or seek immediate medical care if:    · You have new or increased shortness of breath.     · You feel dizzy or lightheaded, or you feel like you may faint.     · Your heart rate becomes irregular.     · You can feel your heart flutter in your chest or skip heartbeats. Tell your doctor if these symptoms are new or worse.    Watch closely for changes in your health, and be sure to contact your doctor if you have any problems. Where can you learn more? Go to https://Lumenpulse.bluebird bio. org and sign in to your [employer] Away account. Enter U020 in the Wisecam box to learn more about \"Atrial Fibrillation: Care Instructions. \"     If you do not have an account, please click on the \"Sign Up Now\" link. Current as of: July 22, 2018  Content Version: 11.9  © 4591-5266 [employer]. Care instructions adapted under license by [facility] ([facility]). If you have questions about a medical condition or this instruction, always ask your healthcare professional. [place] [address] any warranty or liability for your use of this information.

## 2019-05-17 RX ORDER — MECLIZINE HCL 12.5 MG/1
12.5 TABLET ORAL 3 TIMES DAILY PRN
Qty: 90 TABLET | Refills: 2 | Status: CANCELLED | OUTPATIENT
Start: 2019-05-17 | End: 2019-05-27

## 2019-05-20 DIAGNOSIS — E11.65 TYPE 2 DIABETES MELLITUS WITH HYPERGLYCEMIA, WITHOUT LONG-TERM CURRENT USE OF INSULIN (HCC): ICD-10-CM

## 2019-05-20 LAB — HBA1C MFR BLD: 7.2 % (ref 4–6)

## 2019-05-21 ENCOUNTER — TELEPHONE (OUTPATIENT)
Dept: FAMILY MEDICINE CLINIC | Age: 64
End: 2019-05-21

## 2019-05-22 ENCOUNTER — TELEPHONE (OUTPATIENT)
Dept: FAMILY MEDICINE CLINIC | Age: 64
End: 2019-05-22

## 2019-05-22 NOTE — TELEPHONE ENCOUNTER
Jared Soriano with [facility] left voicemail r/t the order she had faxed over on 5/20/19 to [phone]. Order was for patient to have renal function panel and tacrolimus level. Neither Viral Barton or I have seen the order/form. Faxed request to Jared Soriano asking her to resend the order.

## 2019-05-28 LAB
ALBUMIN SERPL-MCNC: 4.2 G/DL (ref 3.5–5.2)
ANION GAP SERPL CALCULATED.3IONS-SCNC: 14 MMOL/L (ref 7–19)
BUN BLDV-MCNC: 18 MG/DL (ref 8–23)
CALCIUM SERPL-MCNC: 9.5 MG/DL (ref 8.8–10.2)
CHLORIDE BLD-SCNC: 103 MMOL/L (ref 98–111)
CO2: 20 MMOL/L (ref 22–29)
CREAT SERPL-MCNC: 1 MG/DL (ref 0.5–1.2)
GFR NON-AFRICAN AMERICAN: >60
GLUCOSE BLD-MCNC: 150 MG/DL (ref 74–109)
PHOSPHORUS: 3.1 MG/DL (ref 2.5–4.5)
POTASSIUM SERPL-SCNC: 4.7 MMOL/L (ref 3.5–5)
SODIUM BLD-SCNC: 137 MMOL/L (ref 136–145)

## 2019-05-28 RX ORDER — MECLIZINE HCL 12.5 MG/1
12.5 TABLET ORAL 3 TIMES DAILY PRN
Qty: 30 TABLET | Refills: 2 | Status: SHIPPED | OUTPATIENT
Start: 2019-05-28 | End: 2019-06-07

## 2019-05-29 LAB — TACROLIMUS BLOOD: 7.6 NG/ML

## 2019-06-05 DIAGNOSIS — E11.65 TYPE 2 DIABETES MELLITUS WITH HYPERGLYCEMIA, WITHOUT LONG-TERM CURRENT USE OF INSULIN (HCC): Primary | ICD-10-CM

## 2019-07-05 ENCOUNTER — OFFICE VISIT (OUTPATIENT)
Dept: CARDIOLOGY | Age: 64
End: 2019-07-05
Payer: MEDICARE

## 2019-07-05 VITALS
WEIGHT: 266 LBS | HEART RATE: 80 BPM | DIASTOLIC BLOOD PRESSURE: 72 MMHG | BODY MASS INDEX: 35.25 KG/M2 | HEIGHT: 73 IN | SYSTOLIC BLOOD PRESSURE: 132 MMHG

## 2019-07-05 DIAGNOSIS — I48.91 ATRIAL FIBRILLATION, UNSPECIFIED TYPE (HCC): Primary | ICD-10-CM

## 2019-07-05 PROCEDURE — 99203 OFFICE O/P NEW LOW 30 MIN: CPT | Performed by: INTERNAL MEDICINE

## 2019-07-05 ASSESSMENT — ENCOUNTER SYMPTOMS
BACK PAIN: 0
ABDOMINAL DISTENTION: 0
SHORTNESS OF BREATH: 1
BLOOD IN STOOL: 0
ABDOMINAL PAIN: 0
COUGH: 0
VOMITING: 0
WHEEZING: 0
DIARRHEA: 0

## 2019-07-05 NOTE — PROGRESS NOTES
propranolol (INDERAL) 40 MG tablet Take 1 tablet by mouth 2 times daily 60 tablet 11    acetaminophen (TYLENOL) 500 MG tablet Take 1,000 mg by mouth daily      Lancets MISC Use to check blood sugar daily Dx E11.9 100 each 0    glucose blood VI test strips (ACCU-CHEK ANANT) strip Contour strips,check daily E11.9 100 each 0     No current facility-administered medications for this visit. Allergies:  Patient has no known allergies. Past Social History:  Social History     Socioeconomic History    Marital status:       Spouse name: Not on file    Number of children: Not on file    Years of education: Not on file    Highest education level: Not on file   Occupational History    Not on file   Social Needs    Financial resource strain: Not on file    Food insecurity:     Worry: Not on file     Inability: Not on file    Transportation needs:     Medical: Not on file     Non-medical: Not on file   Tobacco Use    Smoking status: Former Smoker     Packs/day: 2.00     Years: 4.00     Pack years: 8.00     Last attempt to quit: 1973     Years since quittin.4    Smokeless tobacco: Never Used   Substance and Sexual Activity    Alcohol use: No    Drug use: Not on file    Sexual activity: Not on file   Lifestyle    Physical activity:     Days per week: Not on file     Minutes per session: Not on file    Stress: Not on file   Relationships    Social connections:     Talks on phone: Not on file     Gets together: Not on file     Attends Confucianism service: Not on file     Active member of club or organization: Not on file     Attends meetings of clubs or organizations: Not on file     Relationship status: Not on file    Intimate partner violence:     Fear of current or ex partner: Not on file     Emotionally abused: Not on file     Physically abused: Not on file     Forced sexual activity: Not on file   Other Topics Concern    Not on file   Social History Narrative    Not on file Family History:       Family history unknown: Yes         Physical Examination:  /72 (Site: Right Upper Arm)   Pulse 80   Ht 6' 1\" (1.854 m)   Wt 266 lb (120.7 kg)   BMI 35.09 kg/m²   Physical Exam  Morbid obesity  No JVD  No edema   No carotid bruits  S1 and S2 of normal intensity, no significant systolic or diastolic murmurs appreciated  Lungs are clear bilaterally with no crepitations or wheezes  Abdomen is soft, nontender, no palpable organomegaly, no abnormal pulsations are felt, no bruits audible  Neurological status is intact  No deformities    Labs:   CBC: No results for input(s): WBC, HGB, HCT, PLT in the last 72 hours. BMP:No results for input(s): NA, K, CO2, BUN, CREATININE, LABGLOM, GLUCOSE in the last 72 hours. BNP: No results for input(s): BNP in the last 72 hours. PT/INR: No results for input(s): PROTIME, INR in the last 72 hours. APTT:No results for input(s): APTT in the last 72 hours. CARDIAC ENZYMES:No results for input(s): CKTOTAL, CKMB, CKMBINDEX, TROPONINI in the last 72 hours. FASTING LIPID PANEL:  Lab Results   Component Value Date    HDL 28 02/13/2019    LDLCALC 80 02/13/2019    TRIG 130 02/13/2019     LIVER PROFILE:No results for input(s): AST, ALT, LABALBU in the last 72 hours. Imaging:          ASSESSMENT and PLAN:  68-year-old gentleman with past medical history of end-stage renal disease status post renal transplantation February 2010, on immunosuppressive therapy, hypertensive disease, non-insulin-dependent diabetes mellitus, paroxysmal atrial fibrillation on Eliquis, normal LV ejection fraction with moderate LVH here for follow-up evaluation. 1.  Despite the negative Lexiscan study, he does have significant fatigue which limits him though this is not new. He does have a high likelihood of obstructive CAD given his renal disease. The option would be to proceed with a cardiac catheterization. I have discussed this with him.   He is not inclined in this direction at this time. If there is any change in status he will be in touch with me. 2.  He remains on anticoagulation for atrial fibrillation and he remains in sinus rhythm. 3.  He will follow-up with me in 6 months. Orders:  No orders of the defined types were placed in this encounter. No orders of the defined types were placed in this encounter. No follow-ups on file. Electronically signed by Hermelindo Roger MD on 7/5/2019 at 11:38 AM    [facility]      Thisdictation was generated by voice recognition computer software. Although all attempts are made to edit the dictation for accuracy, there may be errors in the transcription that are not intended.

## 2019-07-08 RX ORDER — PROPRANOLOL HYDROCHLORIDE 40 MG/1
40 TABLET ORAL 2 TIMES DAILY
Qty: 60 TABLET | Refills: 5 | Status: SHIPPED | OUTPATIENT
Start: 2019-07-08 | End: 2019-11-29 | Stop reason: SDUPTHER

## 2019-07-09 ENCOUNTER — TELEPHONE (OUTPATIENT)
Dept: FAMILY MEDICINE CLINIC | Age: 64
End: 2019-07-09

## 2019-07-09 DIAGNOSIS — E11.65 TYPE 2 DIABETES MELLITUS WITH HYPERGLYCEMIA, WITHOUT LONG-TERM CURRENT USE OF INSULIN (HCC): Primary | ICD-10-CM

## 2019-07-09 DIAGNOSIS — I48.91 ATRIAL FIBRILLATION WITH RVR (HCC): ICD-10-CM

## 2019-07-09 DIAGNOSIS — I47.1 PSVT (PAROXYSMAL SUPRAVENTRICULAR TACHYCARDIA) (HCC): ICD-10-CM

## 2019-07-09 DIAGNOSIS — E55.9 VITAMIN D DEFICIENCY: ICD-10-CM

## 2019-07-09 DIAGNOSIS — I48.0 PAF (PAROXYSMAL ATRIAL FIBRILLATION) (HCC): ICD-10-CM

## 2019-07-11 DIAGNOSIS — E11.65 TYPE 2 DIABETES MELLITUS WITH HYPERGLYCEMIA, WITHOUT LONG-TERM CURRENT USE OF INSULIN (HCC): ICD-10-CM

## 2019-07-11 DIAGNOSIS — I48.0 PAF (PAROXYSMAL ATRIAL FIBRILLATION) (HCC): ICD-10-CM

## 2019-07-11 DIAGNOSIS — I47.10 PSVT (PAROXYSMAL SUPRAVENTRICULAR TACHYCARDIA): ICD-10-CM

## 2019-07-11 DIAGNOSIS — E55.9 VITAMIN D DEFICIENCY: ICD-10-CM

## 2019-07-11 DIAGNOSIS — I48.91 ATRIAL FIBRILLATION WITH RVR (HCC): ICD-10-CM

## 2019-07-11 LAB
ALBUMIN SERPL-MCNC: 4.2 G/DL (ref 3.5–5.2)
ALP BLD-CCNC: 68 U/L (ref 40–130)
ALT SERPL-CCNC: 15 U/L (ref 5–41)
ANION GAP SERPL CALCULATED.3IONS-SCNC: 16 MMOL/L (ref 7–19)
AST SERPL-CCNC: 15 U/L (ref 5–40)
BASOPHILS ABSOLUTE: 0.1 K/UL (ref 0–0.2)
BASOPHILS RELATIVE PERCENT: 1 % (ref 0–1)
BILIRUB SERPL-MCNC: 1.1 MG/DL (ref 0.2–1.2)
BUN BLDV-MCNC: 19 MG/DL (ref 8–23)
CALCIUM SERPL-MCNC: 9.7 MG/DL (ref 8.8–10.2)
CHLORIDE BLD-SCNC: 102 MMOL/L (ref 98–111)
CHOLESTEROL, TOTAL: 116 MG/DL (ref 160–199)
CO2: 19 MMOL/L (ref 22–29)
CREAT SERPL-MCNC: 1 MG/DL (ref 0.5–1.2)
CREATININE URINE: 129.6 MG/DL (ref 4.2–622)
EOSINOPHILS ABSOLUTE: 1.3 K/UL (ref 0–0.6)
EOSINOPHILS RELATIVE PERCENT: 11.7 % (ref 0–5)
GFR NON-AFRICAN AMERICAN: >60
GLUCOSE BLD-MCNC: 145 MG/DL (ref 74–109)
HBA1C MFR BLD: 7.1 % (ref 4–6)
HCT VFR BLD CALC: 49 % (ref 42–52)
HDLC SERPL-MCNC: 25 MG/DL (ref 55–121)
HEMOGLOBIN: 15.6 G/DL (ref 14–18)
LDL CHOLESTEROL CALCULATED: 51 MG/DL
LYMPHOCYTES ABSOLUTE: 1.6 K/UL (ref 1.1–4.5)
LYMPHOCYTES RELATIVE PERCENT: 14.3 % (ref 20–40)
MCH RBC QN AUTO: 29.3 PG (ref 27–31)
MCHC RBC AUTO-ENTMCNC: 31.8 G/DL (ref 33–37)
MCV RBC AUTO: 91.9 FL (ref 80–94)
MICROALBUMIN UR-MCNC: <1.2 MG/DL (ref 0–19)
MICROALBUMIN/CREAT UR-RTO: NORMAL MG/G
MONOCYTES ABSOLUTE: 0.9 K/UL (ref 0–0.9)
MONOCYTES RELATIVE PERCENT: 8.3 % (ref 0–10)
NEUTROPHILS ABSOLUTE: 7 K/UL (ref 1.5–7.5)
NEUTROPHILS RELATIVE PERCENT: 63.6 % (ref 50–65)
PDW BLD-RTO: 13.7 % (ref 11.5–14.5)
PLATELET # BLD: 198 K/UL (ref 130–400)
PMV BLD AUTO: 10.6 FL (ref 9.4–12.4)
POTASSIUM SERPL-SCNC: 4.5 MMOL/L (ref 3.5–5)
RBC # BLD: 5.33 M/UL (ref 4.7–6.1)
SODIUM BLD-SCNC: 137 MMOL/L (ref 136–145)
TOTAL PROTEIN: 7.1 G/DL (ref 6.6–8.7)
TRIGL SERPL-MCNC: 199 MG/DL (ref 0–149)
VITAMIN D 25-HYDROXY: 25.7 NG/ML
WBC # BLD: 11 K/UL (ref 4.8–10.8)

## 2019-07-16 ENCOUNTER — OFFICE VISIT (OUTPATIENT)
Dept: FAMILY MEDICINE CLINIC | Age: 64
End: 2019-07-16
Payer: MEDICARE

## 2019-07-16 VITALS
RESPIRATION RATE: 16 BRPM | DIASTOLIC BLOOD PRESSURE: 60 MMHG | TEMPERATURE: 97.7 F | OXYGEN SATURATION: 96 % | SYSTOLIC BLOOD PRESSURE: 114 MMHG | HEART RATE: 74 BPM | BODY MASS INDEX: 34.72 KG/M2 | HEIGHT: 73 IN | WEIGHT: 262 LBS

## 2019-07-16 DIAGNOSIS — H81.10 BENIGN PAROXYSMAL POSITIONAL VERTIGO, UNSPECIFIED LATERALITY: ICD-10-CM

## 2019-07-16 DIAGNOSIS — I10 ESSENTIAL HYPERTENSION: ICD-10-CM

## 2019-07-16 DIAGNOSIS — D84.9 IMMUNOSUPPRESSION (HCC): ICD-10-CM

## 2019-07-16 DIAGNOSIS — Z94.0 S/P KIDNEY TRANSPLANT: ICD-10-CM

## 2019-07-16 DIAGNOSIS — N40.1 BENIGN PROSTATIC HYPERPLASIA WITH LOWER URINARY TRACT SYMPTOMS, SYMPTOM DETAILS UNSPECIFIED: ICD-10-CM

## 2019-07-16 DIAGNOSIS — E55.9 VITAMIN D DEFICIENCY: ICD-10-CM

## 2019-07-16 DIAGNOSIS — E11.22 TYPE 2 DIABETES MELLITUS WITH CHRONIC KIDNEY DISEASE, WITHOUT LONG-TERM CURRENT USE OF INSULIN, UNSPECIFIED CKD STAGE (HCC): Primary | ICD-10-CM

## 2019-07-16 PROCEDURE — G8427 DOCREV CUR MEDS BY ELIG CLIN: HCPCS | Performed by: FAMILY MEDICINE

## 2019-07-16 PROCEDURE — 2022F DILAT RTA XM EVC RTNOPTHY: CPT | Performed by: FAMILY MEDICINE

## 2019-07-16 PROCEDURE — 99214 OFFICE O/P EST MOD 30 MIN: CPT | Performed by: FAMILY MEDICINE

## 2019-07-16 PROCEDURE — 3017F COLORECTAL CA SCREEN DOC REV: CPT | Performed by: FAMILY MEDICINE

## 2019-07-16 PROCEDURE — G8417 CALC BMI ABV UP PARAM F/U: HCPCS | Performed by: FAMILY MEDICINE

## 2019-07-16 PROCEDURE — 1036F TOBACCO NON-USER: CPT | Performed by: FAMILY MEDICINE

## 2019-07-16 PROCEDURE — 3045F PR MOST RECENT HEMOGLOBIN A1C LEVEL 7.0-9.0%: CPT | Performed by: FAMILY MEDICINE

## 2019-07-16 PROCEDURE — 4040F PNEUMOC VAC/ADMIN/RCVD: CPT | Performed by: FAMILY MEDICINE

## 2019-07-16 PROCEDURE — 1123F ACP DISCUSS/DSCN MKR DOCD: CPT | Performed by: FAMILY MEDICINE

## 2019-07-16 RX ORDER — ERGOCALCIFEROL 1.25 MG/1
50000 CAPSULE ORAL WEEKLY
Qty: 4 CAPSULE | Refills: 5 | Status: ON HOLD | OUTPATIENT
Start: 2019-07-16 | End: 2019-11-02 | Stop reason: SINTOL

## 2019-07-16 NOTE — PROGRESS NOTES
Laterality Date    CHOLECYSTECTOMY      DIALYSIS FISTULA CREATION  [place]     left arm radial cephalic    HERNIA REPAIR      KIDNEY TRANSPLANT      2010 in [address] Right 2018    AV FISTULA GRAFT REMOVAL performed by Renee Call MD at [facility] TUNNELED VENOUS CATHETER PLACEMENT  multiple    VASCULAR SURGERY Right [place]     av loop graft       Social History     Tobacco Use    Smoking status: Former Smoker     Packs/day: 2.00     Years: 4.00     Pack years: 8.00     Last attempt to quit: 1973     Years since quittin.5    Smokeless tobacco: Never Used   Substance Use Topics    Alcohol use: No    Drug use: Not on file       Family History   Family history unknown: Yes       /60 (Site: Right Upper Arm, Position: Sitting, Cuff Size: Large Adult)   Pulse 74   Temp 97.7 °F (36.5 °C) (Oral)   Resp 16   Ht 6' 1\" (1.854 m)   Wt 262 lb (118.8 kg)   SpO2 96%   BMI 34.57 kg/m²     Physical Exam   Constitutional: He is oriented to person, place, and time. He appears well-developed and well-nourished. No distress. HENT:   Head: Normocephalic and atraumatic. Right Ear: External ear normal.   Left Ear: External ear normal.   Nose: Nose normal.   Eyes: Conjunctivae and EOM are normal. Right eye exhibits no discharge. Left eye exhibits no discharge. No scleral icterus. Neck: Neck supple. No tracheal deviation present. No thyromegaly present. Cardiovascular: Normal rate, normal heart sounds and intact distal pulses. Exam reveals no gallop and no friction rub. Pulmonary/Chest: Effort normal and breath sounds normal. No respiratory distress. He has no wheezes. He has no rales. Abdominal: Soft. Bowel sounds are normal. He exhibits no distension. There is no tenderness. Musculoskeletal: He exhibits no edema (Bilateral lower extremities) or deformity (No gross deformities of upper or lower extremities).

## 2019-08-11 ASSESSMENT — ENCOUNTER SYMPTOMS
VOMITING: 0
DIARRHEA: 0
ABDOMINAL PAIN: 0
NAUSEA: 0
BACK PAIN: 0
EYE DISCHARGE: 0
EYE PAIN: 0
COUGH: 0
RHINORRHEA: 0
SHORTNESS OF BREATH: 0
CONSTIPATION: 0

## 2019-08-12 NOTE — PATIENT INSTRUCTIONS
loss of movement in your face, arm, or leg, especially on only one side of your body. ? Sudden vision changes. ? Sudden trouble speaking. ? Sudden confusion or trouble understanding simple statements. ? Sudden problems with walking or balance. ? A sudden, severe headache that is different from past headaches.    Call your doctor now or seek immediate medical care if:    · You have new or worse nausea and vomiting.     · You have new symptoms such as hearing loss or roaring in your ears.    Watch closely for changes in your health, and be sure to contact your doctor if:    · You are not getting better as expected.     · Your vertigo gets worse. Where can you learn more? Go to [email or web address] org and sign in to your [employer] account. Enter  in the [employer] box to learn more about \"Benign Paroxysmal Positional Vertigo (BPPV): Care Instructions. \"     If you do not have an account, please click on the \"Sign Up Now\" link. Current as of: October 21, 2018  Content Version: 12.1  © 4028-3117 Healthwise, Incorporated. Care instructions adapted under license by [facility] ([facility]). If you have questions about a medical condition or this instruction, always ask your healthcare professional. [place] [address] any warranty or liability for your use of this information.

## 2019-09-05 DIAGNOSIS — E11.65 TYPE 2 DIABETES MELLITUS WITH HYPERGLYCEMIA, WITHOUT LONG-TERM CURRENT USE OF INSULIN (HCC): ICD-10-CM

## 2019-10-24 NOTE — ANESTHESIA PREPROCEDURE EVALUATION
Anesthesia Evaluation     Patient summary reviewed   no history of anesthetic complications:  NPO Solid Status: > 8 hours  NPO Liquid Status: > 8 hours     Airway   Mallampati: III  TM distance: >3 FB  Neck ROM: full  no difficulty expected  Dental    (+) poor dentition    Pulmonary - negative pulmonary ROS and normal exam    breath sounds clear to auscultation  Cardiovascular - normal exam  Exercise tolerance: poor (<4 METS)    Patient on routine beta blocker and Beta blocker given within 24 hours of surgery  Rhythm: regular  Rate: normal    (+) hypertension well controlled, CHF,       Neuro/Psych- negative ROS  GI/Hepatic/Renal/Endo    (+) obesity, morbid obesity, renal disease, diabetes mellitus type 2 well controlled,     ROS Comment: S/P right kidney transplant    Musculoskeletal     Abdominal   (+) obese,    Substance History - negative use     OB/GYN          Other   (+) arthritis                                     Anesthesia Plan    ASA 3     general     intravenous induction   Anesthetic plan and risks discussed with patient and child.    Plan discussed with CRNA.       [Negative] : Heme/Lymph [FreeTextEntry9] : As noted in HPI

## 2019-10-30 DIAGNOSIS — I48.0 PAF (PAROXYSMAL ATRIAL FIBRILLATION) (HCC): ICD-10-CM

## 2019-10-30 RX ORDER — APIXABAN 5 MG/1
TABLET, FILM COATED ORAL
Qty: 60 TABLET | Refills: 5 | Status: ON HOLD | OUTPATIENT
Start: 2019-10-30 | End: 2019-11-06 | Stop reason: HOSPADM

## 2019-11-01 ENCOUNTER — HOSPITAL ENCOUNTER (INPATIENT)
Age: 64
LOS: 5 days | Discharge: HOME | DRG: 378 | End: 2019-11-06
Attending: EMERGENCY MEDICINE | Admitting: INTERNAL MEDICINE
Payer: MEDICARE

## 2019-11-01 DIAGNOSIS — Z94.0 HISTORY OF RENAL TRANSPLANT: ICD-10-CM

## 2019-11-01 DIAGNOSIS — Z87.11 HISTORY OF GASTRIC ULCER: ICD-10-CM

## 2019-11-01 DIAGNOSIS — K92.1 BLOODY STOOL: ICD-10-CM

## 2019-11-01 DIAGNOSIS — K92.1 MELENA: Primary | ICD-10-CM

## 2019-11-01 PROBLEM — K92.2 UGI BLEED: Status: ACTIVE | Noted: 2019-11-01

## 2019-11-01 LAB
ALBUMIN SERPL-MCNC: 3.8 G/DL (ref 3.5–5.2)
ALP BLD-CCNC: 53 U/L (ref 40–130)
ALT SERPL-CCNC: 12 U/L (ref 5–41)
ANION GAP SERPL CALCULATED.3IONS-SCNC: 16 MMOL/L (ref 7–19)
APTT: 28 SEC (ref 26–36.2)
AST SERPL-CCNC: 11 U/L (ref 5–40)
BASOPHILS ABSOLUTE: 0.1 K/UL (ref 0–0.2)
BASOPHILS RELATIVE PERCENT: 0.7 % (ref 0–1)
BILIRUB SERPL-MCNC: 0.9 MG/DL (ref 0.2–1.2)
BILIRUBIN URINE: NEGATIVE
BLOOD, URINE: NEGATIVE
BUN BLDV-MCNC: 51 MG/DL (ref 8–23)
CALCIUM SERPL-MCNC: 9.4 MG/DL (ref 8.8–10.2)
CHLORIDE BLD-SCNC: 101 MMOL/L (ref 98–111)
CLARITY: CLEAR
CO2: 20 MMOL/L (ref 22–29)
COLOR: YELLOW
CREAT SERPL-MCNC: 1.1 MG/DL (ref 0.5–1.2)
EOSINOPHILS ABSOLUTE: 1.2 K/UL (ref 0–0.6)
EOSINOPHILS RELATIVE PERCENT: 7.7 % (ref 0–5)
GFR NON-AFRICAN AMERICAN: >60
GLUCOSE BLD-MCNC: 236 MG/DL (ref 74–109)
GLUCOSE URINE: >=1000 MG/DL
HCT VFR BLD CALC: 37.4 % (ref 42–52)
HEMOGLOBIN: 12.1 G/DL (ref 14–18)
IMMATURE GRANULOCYTES #: 0.2 K/UL
INR BLD: 1.39 (ref 0.88–1.18)
KETONES, URINE: 15 MG/DL
LEUKOCYTE ESTERASE, URINE: NEGATIVE
LYMPHOCYTES ABSOLUTE: 2.3 K/UL (ref 1.1–4.5)
LYMPHOCYTES RELATIVE PERCENT: 15 % (ref 20–40)
MCH RBC QN AUTO: 29.5 PG (ref 27–31)
MCHC RBC AUTO-ENTMCNC: 32.4 G/DL (ref 33–37)
MCV RBC AUTO: 91.2 FL (ref 80–94)
MONOCYTES ABSOLUTE: 1.5 K/UL (ref 0–0.9)
MONOCYTES RELATIVE PERCENT: 9.7 % (ref 0–10)
NEUTROPHILS ABSOLUTE: 9.8 K/UL (ref 1.5–7.5)
NEUTROPHILS RELATIVE PERCENT: 65.4 % (ref 50–65)
NITRITE, URINE: NEGATIVE
PDW BLD-RTO: 14 % (ref 11.5–14.5)
PH UA: 5.5 (ref 5–8)
PLATELET # BLD: 318 K/UL (ref 130–400)
PMV BLD AUTO: 11.4 FL (ref 9.4–12.4)
POTASSIUM REFLEX MAGNESIUM: 5.5 MMOL/L (ref 3.5–5)
PROTEIN UA: NEGATIVE MG/DL
PROTHROMBIN TIME: 16.4 SEC (ref 12–14.6)
RBC # BLD: 4.1 M/UL (ref 4.7–6.1)
SODIUM BLD-SCNC: 137 MMOL/L (ref 136–145)
SPECIFIC GRAVITY UA: 1.03 (ref 1–1.03)
TOTAL PROTEIN: 6 G/DL (ref 6.6–8.7)
UROBILINOGEN, URINE: 0.2 E.U./DL
WBC # BLD: 15 K/UL (ref 4.8–10.8)

## 2019-11-01 PROCEDURE — 6370000000 HC RX 637 (ALT 250 FOR IP): Performed by: EMERGENCY MEDICINE

## 2019-11-01 PROCEDURE — 2000000000 HC ICU R&B

## 2019-11-01 PROCEDURE — 99285 EMERGENCY DEPT VISIT HI MDM: CPT

## 2019-11-01 PROCEDURE — 85730 THROMBOPLASTIN TIME PARTIAL: CPT

## 2019-11-01 PROCEDURE — 85610 PROTHROMBIN TIME: CPT

## 2019-11-01 PROCEDURE — 80053 COMPREHEN METABOLIC PANEL: CPT

## 2019-11-01 PROCEDURE — 93005 ELECTROCARDIOGRAM TRACING: CPT | Performed by: EMERGENCY MEDICINE

## 2019-11-01 PROCEDURE — 6360000002 HC RX W HCPCS: Performed by: EMERGENCY MEDICINE

## 2019-11-01 PROCEDURE — 96374 THER/PROPH/DIAG INJ IV PUSH: CPT

## 2019-11-01 PROCEDURE — 36415 COLL VENOUS BLD VENIPUNCTURE: CPT

## 2019-11-01 PROCEDURE — 81003 URINALYSIS AUTO W/O SCOPE: CPT

## 2019-11-01 PROCEDURE — 85025 COMPLETE CBC W/AUTO DIFF WBC: CPT

## 2019-11-01 RX ORDER — METOCLOPRAMIDE HYDROCHLORIDE 5 MG/ML
10 INJECTION INTRAMUSCULAR; INTRAVENOUS ONCE
Status: COMPLETED | OUTPATIENT
Start: 2019-11-01 | End: 2019-11-01

## 2019-11-01 RX ORDER — SODIUM CHLORIDE 9 MG/ML
10 INJECTION INTRAVENOUS DAILY
Status: DISCONTINUED | OUTPATIENT
Start: 2019-11-02 | End: 2019-11-02 | Stop reason: SDUPTHER

## 2019-11-01 RX ORDER — NITROGLYCERIN 0.4 MG/1
TABLET SUBLINGUAL
Status: DISPENSED
Start: 2019-11-01 | End: 2019-11-02

## 2019-11-01 RX ORDER — PANTOPRAZOLE SODIUM 40 MG/10ML
40 INJECTION, POWDER, LYOPHILIZED, FOR SOLUTION INTRAVENOUS DAILY
Status: DISCONTINUED | OUTPATIENT
Start: 2019-11-02 | End: 2019-11-02 | Stop reason: SDUPTHER

## 2019-11-01 RX ORDER — PANTOPRAZOLE SODIUM 40 MG/10ML
40 INJECTION, POWDER, LYOPHILIZED, FOR SOLUTION INTRAVENOUS ONCE
Status: COMPLETED | OUTPATIENT
Start: 2019-11-01 | End: 2019-11-02

## 2019-11-01 RX ADMIN — METOCLOPRAMIDE 10 MG: 5 INJECTION, SOLUTION INTRAMUSCULAR; INTRAVENOUS at 23:43

## 2019-11-01 RX ADMIN — LIDOCAINE HYDROCHLORIDE: 20 SOLUTION ORAL; TOPICAL at 23:43

## 2019-11-01 ASSESSMENT — ENCOUNTER SYMPTOMS
SHORTNESS OF BREATH: 0
DIARRHEA: 0
COUGH: 0
EYE REDNESS: 0
VOMITING: 0
RHINORRHEA: 0
ABDOMINAL PAIN: 0
VOICE CHANGE: 0
EYE PAIN: 0

## 2019-11-02 LAB
ABO/RH: NORMAL
ANTIBODY SCREEN: NORMAL
GLUCOSE BLD-MCNC: 162 MG/DL (ref 70–99)
GLUCOSE BLD-MCNC: 172 MG/DL (ref 70–99)
GLUCOSE BLD-MCNC: 185 MG/DL (ref 70–99)
GLUCOSE BLD-MCNC: 246 MG/DL (ref 70–99)
HCT VFR BLD CALC: 30.1 % (ref 42–52)
HCT VFR BLD CALC: 31.5 % (ref 42–52)
HCT VFR BLD CALC: 31.7 % (ref 42–52)
HCT VFR BLD CALC: 33.4 % (ref 42–52)
HEMOGLOBIN: 10.1 G/DL (ref 14–18)
HEMOGLOBIN: 10.2 G/DL (ref 14–18)
HEMOGLOBIN: 10.6 G/DL (ref 14–18)
HEMOGLOBIN: 9.6 G/DL (ref 14–18)
LACTIC ACID: 3 MMOL/L (ref 0.5–1.9)
PERFORMED ON: ABNORMAL

## 2019-11-02 PROCEDURE — C9113 INJ PANTOPRAZOLE SODIUM, VIA: HCPCS | Performed by: INTERNAL MEDICINE

## 2019-11-02 PROCEDURE — 2580000003 HC RX 258: Performed by: INTERNAL MEDICINE

## 2019-11-02 PROCEDURE — 83605 ASSAY OF LACTIC ACID: CPT

## 2019-11-02 PROCEDURE — 99999 PR OFFICE/OUTPT VISIT,PROCEDURE ONLY: CPT | Performed by: EMERGENCY MEDICINE

## 2019-11-02 PROCEDURE — 86900 BLOOD TYPING SEROLOGIC ABO: CPT

## 2019-11-02 PROCEDURE — 85014 HEMATOCRIT: CPT

## 2019-11-02 PROCEDURE — 36415 COLL VENOUS BLD VENIPUNCTURE: CPT

## 2019-11-02 PROCEDURE — 86901 BLOOD TYPING SEROLOGIC RH(D): CPT

## 2019-11-02 PROCEDURE — 6360000002 HC RX W HCPCS: Performed by: INTERNAL MEDICINE

## 2019-11-02 PROCEDURE — 82948 REAGENT STRIP/BLOOD GLUCOSE: CPT

## 2019-11-02 PROCEDURE — 1210000000 HC MED SURG R&B

## 2019-11-02 PROCEDURE — 6370000000 HC RX 637 (ALT 250 FOR IP): Performed by: INTERNAL MEDICINE

## 2019-11-02 PROCEDURE — 99221 1ST HOSP IP/OBS SF/LOW 40: CPT | Performed by: INTERNAL MEDICINE

## 2019-11-02 PROCEDURE — 86850 RBC ANTIBODY SCREEN: CPT

## 2019-11-02 PROCEDURE — 96375 TX/PRO/DX INJ NEW DRUG ADDON: CPT

## 2019-11-02 PROCEDURE — 85018 HEMOGLOBIN: CPT

## 2019-11-02 RX ORDER — DEXTROSE MONOHYDRATE 25 G/50ML
12.5 INJECTION, SOLUTION INTRAVENOUS PRN
Status: DISCONTINUED | OUTPATIENT
Start: 2019-11-02 | End: 2019-11-06 | Stop reason: HOSPADM

## 2019-11-02 RX ORDER — NICOTINE POLACRILEX 4 MG
15 LOZENGE BUCCAL PRN
Status: DISCONTINUED | OUTPATIENT
Start: 2019-11-02 | End: 2019-11-06 | Stop reason: HOSPADM

## 2019-11-02 RX ORDER — SODIUM CHLORIDE 0.9 % (FLUSH) 0.9 %
10 SYRINGE (ML) INJECTION EVERY 12 HOURS SCHEDULED
Status: DISCONTINUED | OUTPATIENT
Start: 2019-11-02 | End: 2019-11-06 | Stop reason: HOSPADM

## 2019-11-02 RX ORDER — SENNA PLUS 8.6 MG/1
1 TABLET ORAL DAILY PRN
Status: DISCONTINUED | OUTPATIENT
Start: 2019-11-02 | End: 2019-11-06 | Stop reason: HOSPADM

## 2019-11-02 RX ORDER — SODIUM CHLORIDE 0.9 % (FLUSH) 0.9 %
10 SYRINGE (ML) INJECTION PRN
Status: DISCONTINUED | OUTPATIENT
Start: 2019-11-02 | End: 2019-11-06 | Stop reason: HOSPADM

## 2019-11-02 RX ORDER — DEXTROSE MONOHYDRATE 50 MG/ML
100 INJECTION, SOLUTION INTRAVENOUS PRN
Status: DISCONTINUED | OUTPATIENT
Start: 2019-11-02 | End: 2019-11-06 | Stop reason: HOSPADM

## 2019-11-02 RX ORDER — ONDANSETRON 2 MG/ML
4 INJECTION INTRAMUSCULAR; INTRAVENOUS EVERY 6 HOURS PRN
Status: DISCONTINUED | OUTPATIENT
Start: 2019-11-02 | End: 2019-11-06 | Stop reason: HOSPADM

## 2019-11-02 RX ADMIN — PANTOPRAZOLE SODIUM 40 MG: 40 INJECTION, POWDER, FOR SOLUTION INTRAVENOUS at 00:23

## 2019-11-02 RX ADMIN — PANTOPRAZOLE SODIUM 8 MG/HR: 40 INJECTION, POWDER, FOR SOLUTION INTRAVENOUS at 00:23

## 2019-11-02 RX ADMIN — INSULIN LISPRO 2 UNITS: 100 INJECTION, SOLUTION INTRAVENOUS; SUBCUTANEOUS at 22:38

## 2019-11-02 RX ADMIN — PANTOPRAZOLE SODIUM 8 MG/HR: 40 INJECTION, POWDER, FOR SOLUTION INTRAVENOUS at 09:07

## 2019-11-02 RX ADMIN — INSULIN LISPRO 1 UNITS: 100 INJECTION, SOLUTION INTRAVENOUS; SUBCUTANEOUS at 16:23

## 2019-11-02 ASSESSMENT — PAIN DESCRIPTION - LOCATION: LOCATION: ABDOMEN

## 2019-11-02 ASSESSMENT — PAIN SCALES - GENERAL: PAINLEVEL_OUTOF10: 0

## 2019-11-02 ASSESSMENT — ENCOUNTER SYMPTOMS
ABDOMINAL PAIN: 0
VOMITING: 0
DIARRHEA: 0
ORTHOPNEA: 0
HEMOPTYSIS: 0
NAUSEA: 0
SHORTNESS OF BREATH: 0
BACK PAIN: 0
COUGH: 0
SPUTUM PRODUCTION: 0
HEARTBURN: 0
CONSTIPATION: 0

## 2019-11-02 ASSESSMENT — PAIN DESCRIPTION - PAIN TYPE: TYPE: ACUTE PAIN

## 2019-11-03 LAB
ALBUMIN SERPL-MCNC: 3.6 G/DL (ref 3.5–5.2)
ALP BLD-CCNC: 43 U/L (ref 40–130)
ALT SERPL-CCNC: 8 U/L (ref 5–41)
ANION GAP SERPL CALCULATED.3IONS-SCNC: 14 MMOL/L (ref 7–19)
AST SERPL-CCNC: 10 U/L (ref 5–40)
BASOPHILS ABSOLUTE: 0.1 K/UL (ref 0–0.2)
BASOPHILS RELATIVE PERCENT: 0.8 % (ref 0–1)
BILIRUB SERPL-MCNC: 0.8 MG/DL (ref 0.2–1.2)
BUN BLDV-MCNC: 47 MG/DL (ref 8–23)
CALCIUM SERPL-MCNC: 8.8 MG/DL (ref 8.8–10.2)
CHLORIDE BLD-SCNC: 105 MMOL/L (ref 98–111)
CO2: 20 MMOL/L (ref 22–29)
CREAT SERPL-MCNC: 1.3 MG/DL (ref 0.5–1.2)
EOSINOPHILS ABSOLUTE: 0.4 K/UL (ref 0–0.6)
EOSINOPHILS RELATIVE PERCENT: 3.5 % (ref 0–5)
GFR NON-AFRICAN AMERICAN: 55
GLUCOSE BLD-MCNC: 158 MG/DL (ref 70–99)
GLUCOSE BLD-MCNC: 175 MG/DL (ref 70–99)
GLUCOSE BLD-MCNC: 177 MG/DL (ref 70–99)
GLUCOSE BLD-MCNC: 182 MG/DL (ref 70–99)
GLUCOSE BLD-MCNC: 184 MG/DL (ref 70–99)
GLUCOSE BLD-MCNC: 186 MG/DL (ref 74–109)
HCT VFR BLD CALC: 29.3 % (ref 42–52)
HEMOGLOBIN: 9.1 G/DL (ref 14–18)
IMMATURE GRANULOCYTES #: 0.1 K/UL
LYMPHOCYTES ABSOLUTE: 2.2 K/UL (ref 1.1–4.5)
LYMPHOCYTES RELATIVE PERCENT: 18.4 % (ref 20–40)
MCH RBC QN AUTO: 29.5 PG (ref 27–31)
MCHC RBC AUTO-ENTMCNC: 31.1 G/DL (ref 33–37)
MCV RBC AUTO: 95.1 FL (ref 80–94)
MONOCYTES ABSOLUTE: 1.1 K/UL (ref 0–0.9)
MONOCYTES RELATIVE PERCENT: 8.9 % (ref 0–10)
NEUTROPHILS ABSOLUTE: 8 K/UL (ref 1.5–7.5)
NEUTROPHILS RELATIVE PERCENT: 67.2 % (ref 50–65)
PDW BLD-RTO: 14.9 % (ref 11.5–14.5)
PERFORMED ON: ABNORMAL
PLATELET # BLD: 221 K/UL (ref 130–400)
PMV BLD AUTO: 11.1 FL (ref 9.4–12.4)
POTASSIUM REFLEX MAGNESIUM: 4.2 MMOL/L (ref 3.5–5)
RBC # BLD: 3.08 M/UL (ref 4.7–6.1)
SODIUM BLD-SCNC: 139 MMOL/L (ref 136–145)
TOTAL PROTEIN: 5.5 G/DL (ref 6.6–8.7)
WBC # BLD: 11.9 K/UL (ref 4.8–10.8)

## 2019-11-03 PROCEDURE — 6370000000 HC RX 637 (ALT 250 FOR IP): Performed by: INTERNAL MEDICINE

## 2019-11-03 PROCEDURE — 2580000003 HC RX 258: Performed by: INTERNAL MEDICINE

## 2019-11-03 PROCEDURE — 6360000002 HC RX W HCPCS: Performed by: INTERNAL MEDICINE

## 2019-11-03 PROCEDURE — C9113 INJ PANTOPRAZOLE SODIUM, VIA: HCPCS | Performed by: INTERNAL MEDICINE

## 2019-11-03 PROCEDURE — 1210000000 HC MED SURG R&B

## 2019-11-03 PROCEDURE — 99232 SBSQ HOSP IP/OBS MODERATE 35: CPT | Performed by: INTERNAL MEDICINE

## 2019-11-03 PROCEDURE — 36415 COLL VENOUS BLD VENIPUNCTURE: CPT

## 2019-11-03 PROCEDURE — 80053 COMPREHEN METABOLIC PANEL: CPT

## 2019-11-03 PROCEDURE — 82948 REAGENT STRIP/BLOOD GLUCOSE: CPT

## 2019-11-03 PROCEDURE — 85025 COMPLETE CBC W/AUTO DIFF WBC: CPT

## 2019-11-03 RX ORDER — MYCOPHENOLATE MOFETIL 250 MG/1
1000 CAPSULE ORAL 2 TIMES DAILY
Status: DISCONTINUED | OUTPATIENT
Start: 2019-11-03 | End: 2019-11-03

## 2019-11-03 RX ORDER — TACROLIMUS 1 MG/1
1 CAPSULE ORAL 2 TIMES DAILY
Status: DISCONTINUED | OUTPATIENT
Start: 2019-11-03 | End: 2019-11-06 | Stop reason: HOSPADM

## 2019-11-03 RX ORDER — MYCOPHENOLATE MOFETIL 250 MG/1
500 CAPSULE ORAL 2 TIMES DAILY
Status: DISCONTINUED | OUTPATIENT
Start: 2019-11-03 | End: 2019-11-06 | Stop reason: HOSPADM

## 2019-11-03 RX ORDER — TACROLIMUS 1 MG/1
2 CAPSULE ORAL 2 TIMES DAILY
Status: DISCONTINUED | OUTPATIENT
Start: 2019-11-03 | End: 2019-11-03

## 2019-11-03 RX ADMIN — INSULIN LISPRO 1 UNITS: 100 INJECTION, SOLUTION INTRAVENOUS; SUBCUTANEOUS at 04:25

## 2019-11-03 RX ADMIN — Medication 10 ML: at 08:21

## 2019-11-03 RX ADMIN — TACROLIMUS 1 MG: 1 CAPSULE ORAL at 21:26

## 2019-11-03 RX ADMIN — INSULIN LISPRO 1 UNITS: 100 INJECTION, SOLUTION INTRAVENOUS; SUBCUTANEOUS at 21:26

## 2019-11-03 RX ADMIN — INSULIN LISPRO 1 UNITS: 100 INJECTION, SOLUTION INTRAVENOUS; SUBCUTANEOUS at 08:20

## 2019-11-03 RX ADMIN — MYCOPHENOLATE MOFETIL 500 MG: 250 CAPSULE ORAL at 21:26

## 2019-11-03 RX ADMIN — PANTOPRAZOLE SODIUM 8 MG/HR: 40 INJECTION, POWDER, FOR SOLUTION INTRAVENOUS at 04:00

## 2019-11-03 RX ADMIN — PANTOPRAZOLE SODIUM 8 MG/HR: 40 INJECTION, POWDER, FOR SOLUTION INTRAVENOUS at 15:48

## 2019-11-03 RX ADMIN — MYCOPHENOLATE MOFETIL 500 MG: 250 CAPSULE ORAL at 10:34

## 2019-11-03 RX ADMIN — Medication 10 ML: at 21:26

## 2019-11-03 ASSESSMENT — PAIN SCALES - GENERAL
PAINLEVEL_OUTOF10: 0

## 2019-11-03 ASSESSMENT — PAIN DESCRIPTION - LOCATION: LOCATION: ABDOMEN

## 2019-11-04 ENCOUNTER — ANESTHESIA (OUTPATIENT)
Dept: ENDOSCOPY | Age: 64
DRG: 378 | End: 2019-11-04
Payer: MEDICARE

## 2019-11-04 ENCOUNTER — ANESTHESIA EVENT (OUTPATIENT)
Dept: ENDOSCOPY | Age: 64
DRG: 378 | End: 2019-11-04
Payer: MEDICARE

## 2019-11-04 VITALS
DIASTOLIC BLOOD PRESSURE: 51 MMHG | SYSTOLIC BLOOD PRESSURE: 136 MMHG | OXYGEN SATURATION: 93 % | RESPIRATION RATE: 24 BRPM

## 2019-11-04 LAB
ANION GAP SERPL CALCULATED.3IONS-SCNC: 14 MMOL/L (ref 7–19)
BUN BLDV-MCNC: 22 MG/DL (ref 8–23)
CALCIUM SERPL-MCNC: 8.4 MG/DL (ref 8.8–10.2)
CHLORIDE BLD-SCNC: 107 MMOL/L (ref 98–111)
CO2: 20 MMOL/L (ref 22–29)
CREAT SERPL-MCNC: 1.1 MG/DL (ref 0.5–1.2)
EKG P AXIS: 28 DEGREES
EKG P-R INTERVAL: 154 MS
EKG Q-T INTERVAL: 348 MS
EKG QRS DURATION: 98 MS
EKG QTC CALCULATION (BAZETT): 412 MS
EKG T AXIS: 28 DEGREES
GFR NON-AFRICAN AMERICAN: >60
GLUCOSE BLD-MCNC: 117 MG/DL (ref 70–99)
GLUCOSE BLD-MCNC: 132 MG/DL (ref 70–99)
GLUCOSE BLD-MCNC: 141 MG/DL (ref 70–99)
GLUCOSE BLD-MCNC: 151 MG/DL (ref 70–99)
GLUCOSE BLD-MCNC: 155 MG/DL (ref 74–109)
GLUCOSE BLD-MCNC: 157 MG/DL (ref 70–99)
GLUCOSE BLD-MCNC: 167 MG/DL (ref 70–99)
HCT VFR BLD CALC: 23.9 % (ref 42–52)
HEMOGLOBIN: 7.6 G/DL (ref 14–18)
MCH RBC QN AUTO: 29.6 PG (ref 27–31)
MCHC RBC AUTO-ENTMCNC: 31.8 G/DL (ref 33–37)
MCV RBC AUTO: 93 FL (ref 80–94)
PDW BLD-RTO: 14.6 % (ref 11.5–14.5)
PERFORMED ON: ABNORMAL
PLATELET # BLD: 171 K/UL (ref 130–400)
PMV BLD AUTO: 10.1 FL (ref 9.4–12.4)
POTASSIUM SERPL-SCNC: 3.7 MMOL/L (ref 3.5–5)
RBC # BLD: 2.57 M/UL (ref 4.7–6.1)
SODIUM BLD-SCNC: 141 MMOL/L (ref 136–145)
WBC # BLD: 8.6 K/UL (ref 4.8–10.8)

## 2019-11-04 PROCEDURE — 6370000000 HC RX 637 (ALT 250 FOR IP): Performed by: INTERNAL MEDICINE

## 2019-11-04 PROCEDURE — 3700000000 HC ANESTHESIA ATTENDED CARE: Performed by: INTERNAL MEDICINE

## 2019-11-04 PROCEDURE — 82948 REAGENT STRIP/BLOOD GLUCOSE: CPT

## 2019-11-04 PROCEDURE — 3700000001 HC ADD 15 MINUTES (ANESTHESIA): Performed by: INTERNAL MEDICINE

## 2019-11-04 PROCEDURE — 6360000002 HC RX W HCPCS: Performed by: INTERNAL MEDICINE

## 2019-11-04 PROCEDURE — 1210000000 HC MED SURG R&B

## 2019-11-04 PROCEDURE — 0DB68ZX EXCISION OF STOMACH, VIA NATURAL OR ARTIFICIAL OPENING ENDOSCOPIC, DIAGNOSTIC: ICD-10-PCS | Performed by: INTERNAL MEDICINE

## 2019-11-04 PROCEDURE — 2580000003 HC RX 258: Performed by: INTERNAL MEDICINE

## 2019-11-04 PROCEDURE — 2709999900 HC NON-CHARGEABLE SUPPLY: Performed by: INTERNAL MEDICINE

## 2019-11-04 PROCEDURE — 3609012400 HC EGD TRANSORAL BIOPSY SINGLE/MULTIPLE: Performed by: INTERNAL MEDICINE

## 2019-11-04 PROCEDURE — 7100000001 HC PACU RECOVERY - ADDTL 15 MIN: Performed by: INTERNAL MEDICINE

## 2019-11-04 PROCEDURE — 80048 BASIC METABOLIC PNL TOTAL CA: CPT

## 2019-11-04 PROCEDURE — 2500000003 HC RX 250 WO HCPCS: Performed by: NURSE ANESTHETIST, CERTIFIED REGISTERED

## 2019-11-04 PROCEDURE — C9113 INJ PANTOPRAZOLE SODIUM, VIA: HCPCS | Performed by: INTERNAL MEDICINE

## 2019-11-04 PROCEDURE — 7100000000 HC PACU RECOVERY - FIRST 15 MIN: Performed by: INTERNAL MEDICINE

## 2019-11-04 PROCEDURE — 36415 COLL VENOUS BLD VENIPUNCTURE: CPT

## 2019-11-04 PROCEDURE — 6360000002 HC RX W HCPCS: Performed by: NURSE ANESTHETIST, CERTIFIED REGISTERED

## 2019-11-04 PROCEDURE — 85027 COMPLETE CBC AUTOMATED: CPT

## 2019-11-04 PROCEDURE — 93010 ELECTROCARDIOGRAM REPORT: CPT | Performed by: INTERNAL MEDICINE

## 2019-11-04 PROCEDURE — 43239 EGD BIOPSY SINGLE/MULTIPLE: CPT | Performed by: INTERNAL MEDICINE

## 2019-11-04 RX ORDER — SODIUM CHLORIDE 450 MG/100ML
INJECTION, SOLUTION INTRAVENOUS CONTINUOUS
Status: DISCONTINUED | OUTPATIENT
Start: 2019-11-04 | End: 2019-11-04

## 2019-11-04 RX ORDER — FAMOTIDINE 20 MG/1
20 TABLET, FILM COATED ORAL EVERY EVENING
Status: DISCONTINUED | OUTPATIENT
Start: 2019-11-04 | End: 2019-11-06 | Stop reason: HOSPADM

## 2019-11-04 RX ORDER — PANTOPRAZOLE SODIUM 40 MG/1
40 TABLET, DELAYED RELEASE ORAL
Status: DISCONTINUED | OUTPATIENT
Start: 2019-11-04 | End: 2019-11-06 | Stop reason: HOSPADM

## 2019-11-04 RX ORDER — SUCRALFATE 1 G/1
1 TABLET ORAL EVERY 6 HOURS SCHEDULED
Status: DISCONTINUED | OUTPATIENT
Start: 2019-11-04 | End: 2019-11-06 | Stop reason: HOSPADM

## 2019-11-04 RX ORDER — LIDOCAINE HYDROCHLORIDE 10 MG/ML
INJECTION, SOLUTION EPIDURAL; INFILTRATION; INTRACAUDAL; PERINEURAL PRN
Status: DISCONTINUED | OUTPATIENT
Start: 2019-11-04 | End: 2019-11-04 | Stop reason: SDUPTHER

## 2019-11-04 RX ORDER — PROPOFOL 10 MG/ML
INJECTION, EMULSION INTRAVENOUS PRN
Status: DISCONTINUED | OUTPATIENT
Start: 2019-11-04 | End: 2019-11-04 | Stop reason: SDUPTHER

## 2019-11-04 RX ADMIN — PANTOPRAZOLE SODIUM 8 MG/HR: 40 INJECTION, POWDER, FOR SOLUTION INTRAVENOUS at 12:17

## 2019-11-04 RX ADMIN — MYCOPHENOLATE MOFETIL 500 MG: 250 CAPSULE ORAL at 21:53

## 2019-11-04 RX ADMIN — Medication 10 ML: at 12:18

## 2019-11-04 RX ADMIN — PANTOPRAZOLE SODIUM 40 MG: 40 TABLET, DELAYED RELEASE ORAL at 16:10

## 2019-11-04 RX ADMIN — SUCRALFATE 1 G: 1 TABLET ORAL at 12:17

## 2019-11-04 RX ADMIN — IRON SUCROSE 200 MG: 20 INJECTION, SOLUTION INTRAVENOUS at 16:10

## 2019-11-04 RX ADMIN — Medication 10 ML: at 21:53

## 2019-11-04 RX ADMIN — PROPOFOL 250 MG: 10 INJECTION, EMULSION INTRAVENOUS at 11:19

## 2019-11-04 RX ADMIN — TACROLIMUS 1 MG: 1 CAPSULE ORAL at 21:53

## 2019-11-04 RX ADMIN — PANTOPRAZOLE SODIUM 8 MG/HR: 40 INJECTION, POWDER, FOR SOLUTION INTRAVENOUS at 03:10

## 2019-11-04 RX ADMIN — INSULIN LISPRO 1 UNITS: 100 INJECTION, SOLUTION INTRAVENOUS; SUBCUTANEOUS at 21:59

## 2019-11-04 RX ADMIN — SODIUM CHLORIDE: 4.5 INJECTION, SOLUTION INTRAVENOUS at 12:18

## 2019-11-04 RX ADMIN — SUCRALFATE 1 G: 1 TABLET ORAL at 22:00

## 2019-11-04 RX ADMIN — MYCOPHENOLATE MOFETIL 500 MG: 250 CAPSULE ORAL at 12:18

## 2019-11-04 RX ADMIN — DARBEPOETIN ALFA 200 MCG: 200 SOLUTION INTRAVENOUS; SUBCUTANEOUS at 16:10

## 2019-11-04 RX ADMIN — SUCRALFATE 1 G: 1 TABLET ORAL at 18:14

## 2019-11-04 RX ADMIN — LIDOCAINE HYDROCHLORIDE 30 MG: 10 INJECTION, SOLUTION EPIDURAL; INFILTRATION; INTRACAUDAL; PERINEURAL at 11:19

## 2019-11-04 RX ADMIN — SODIUM CHLORIDE: 4.5 INJECTION, SOLUTION INTRAVENOUS at 10:29

## 2019-11-04 RX ADMIN — TACROLIMUS 1 MG: 1 CAPSULE ORAL at 12:18

## 2019-11-04 RX ADMIN — FAMOTIDINE 20 MG: 20 TABLET, FILM COATED ORAL at 18:14

## 2019-11-04 ASSESSMENT — ENCOUNTER SYMPTOMS
DIARRHEA: 0
VOMITING: 0
NAUSEA: 0
CONSTIPATION: 0
BACK PAIN: 0
SHORTNESS OF BREATH: 0

## 2019-11-04 ASSESSMENT — LIFESTYLE VARIABLES: SMOKING_STATUS: 0

## 2019-11-04 ASSESSMENT — PAIN SCALES - GENERAL
PAINLEVEL_OUTOF10: 0
PAINLEVEL_OUTOF10: 0

## 2019-11-05 PROBLEM — Z94.0 HISTORY OF RENAL TRANSPLANT: Status: ACTIVE | Noted: 2019-11-05

## 2019-11-05 LAB
ALBUMIN SERPL-MCNC: 3.2 G/DL (ref 3.5–5.2)
ALP BLD-CCNC: 52 U/L (ref 40–130)
ALT SERPL-CCNC: 12 U/L (ref 5–41)
ANION GAP SERPL CALCULATED.3IONS-SCNC: 11 MMOL/L (ref 7–19)
AST SERPL-CCNC: 17 U/L (ref 5–40)
BASOPHILS ABSOLUTE: 0.1 K/UL (ref 0–0.2)
BASOPHILS ABSOLUTE: 0.1 K/UL (ref 0–0.2)
BASOPHILS RELATIVE PERCENT: 0.7 % (ref 0–1)
BASOPHILS RELATIVE PERCENT: 0.8 % (ref 0–1)
BILIRUB SERPL-MCNC: 0.9 MG/DL (ref 0.2–1.2)
BUN BLDV-MCNC: 13 MG/DL (ref 8–23)
CALCIUM SERPL-MCNC: 8.2 MG/DL (ref 8.8–10.2)
CHLORIDE BLD-SCNC: 110 MMOL/L (ref 98–111)
CO2: 17 MMOL/L (ref 22–29)
CREAT SERPL-MCNC: 1 MG/DL (ref 0.5–1.2)
EOSINOPHILS ABSOLUTE: 0.4 K/UL (ref 0–0.6)
EOSINOPHILS ABSOLUTE: 0.5 K/UL (ref 0–0.6)
EOSINOPHILS RELATIVE PERCENT: 5 % (ref 0–5)
EOSINOPHILS RELATIVE PERCENT: 6.5 % (ref 0–5)
GFR NON-AFRICAN AMERICAN: >60
GLUCOSE BLD-MCNC: 140 MG/DL (ref 74–109)
GLUCOSE BLD-MCNC: 143 MG/DL (ref 70–99)
GLUCOSE BLD-MCNC: 151 MG/DL (ref 70–99)
GLUCOSE BLD-MCNC: 155 MG/DL (ref 70–99)
GLUCOSE BLD-MCNC: 173 MG/DL (ref 70–99)
HCT VFR BLD CALC: 24 % (ref 42–52)
HCT VFR BLD CALC: 26.3 % (ref 42–52)
HEMOGLOBIN: 7.6 G/DL (ref 14–18)
HEMOGLOBIN: 8 G/DL (ref 14–18)
HYPOCHROMIA: ABNORMAL
IMMATURE GRANULOCYTES #: 0.1 K/UL
IMMATURE GRANULOCYTES #: 0.1 K/UL
LYMPHOCYTES ABSOLUTE: 1 K/UL (ref 1.1–4.5)
LYMPHOCYTES ABSOLUTE: 1.2 K/UL (ref 1.1–4.5)
LYMPHOCYTES RELATIVE PERCENT: 13.5 % (ref 20–40)
LYMPHOCYTES RELATIVE PERCENT: 16.2 % (ref 20–40)
MACROCYTES: ABNORMAL
MCH RBC QN AUTO: 29.9 PG (ref 27–31)
MCH RBC QN AUTO: 30.5 PG (ref 27–31)
MCHC RBC AUTO-ENTMCNC: 28.9 G/DL (ref 33–37)
MCHC RBC AUTO-ENTMCNC: 33.3 G/DL (ref 33–37)
MCV RBC AUTO: 103.5 FL (ref 80–94)
MCV RBC AUTO: 91.6 FL (ref 80–94)
MONOCYTES ABSOLUTE: 0.7 K/UL (ref 0–0.9)
MONOCYTES ABSOLUTE: 0.8 K/UL (ref 0–0.9)
MONOCYTES RELATIVE PERCENT: 10.7 % (ref 0–10)
MONOCYTES RELATIVE PERCENT: 9.9 % (ref 0–10)
NEUTROPHILS ABSOLUTE: 4.7 K/UL (ref 1.5–7.5)
NEUTROPHILS ABSOLUTE: 5.1 K/UL (ref 1.5–7.5)
NEUTROPHILS RELATIVE PERCENT: 64.4 % (ref 50–65)
NEUTROPHILS RELATIVE PERCENT: 69.6 % (ref 50–65)
OVALOCYTES: ABNORMAL
PDW BLD-RTO: 14.8 % (ref 11.5–14.5)
PDW BLD-RTO: 15.1 % (ref 11.5–14.5)
PERFORMED ON: ABNORMAL
PLATELET # BLD: 192 K/UL (ref 130–400)
PLATELET # BLD: 67 K/UL (ref 130–400)
PMV BLD AUTO: 10.4 FL (ref 9.4–12.4)
PMV BLD AUTO: 11.1 FL (ref 9.4–12.4)
POTASSIUM SERPL-SCNC: 3.9 MMOL/L (ref 3.5–5)
RBC # BLD: 2.54 M/UL (ref 4.7–6.1)
RBC # BLD: 2.62 M/UL (ref 4.7–6.1)
SODIUM BLD-SCNC: 138 MMOL/L (ref 136–145)
TEAR DROP CELLS: ABNORMAL
TOTAL PROTEIN: 5.1 G/DL (ref 6.6–8.7)
WBC # BLD: 7.4 K/UL (ref 4.8–10.8)
WBC # BLD: 7.4 K/UL (ref 4.8–10.8)

## 2019-11-05 PROCEDURE — 6360000002 HC RX W HCPCS: Performed by: INTERNAL MEDICINE

## 2019-11-05 PROCEDURE — 6370000000 HC RX 637 (ALT 250 FOR IP): Performed by: INTERNAL MEDICINE

## 2019-11-05 PROCEDURE — 36415 COLL VENOUS BLD VENIPUNCTURE: CPT

## 2019-11-05 PROCEDURE — 1210000000 HC MED SURG R&B

## 2019-11-05 PROCEDURE — 82948 REAGENT STRIP/BLOOD GLUCOSE: CPT

## 2019-11-05 PROCEDURE — 2580000003 HC RX 258: Performed by: INTERNAL MEDICINE

## 2019-11-05 PROCEDURE — 99232 SBSQ HOSP IP/OBS MODERATE 35: CPT | Performed by: INTERNAL MEDICINE

## 2019-11-05 PROCEDURE — 85025 COMPLETE CBC W/AUTO DIFF WBC: CPT

## 2019-11-05 PROCEDURE — 80053 COMPREHEN METABOLIC PANEL: CPT

## 2019-11-05 RX ADMIN — FAMOTIDINE 20 MG: 20 TABLET, FILM COATED ORAL at 16:39

## 2019-11-05 RX ADMIN — PANTOPRAZOLE SODIUM 40 MG: 40 TABLET, DELAYED RELEASE ORAL at 05:53

## 2019-11-05 RX ADMIN — IRON SUCROSE 200 MG: 20 INJECTION, SOLUTION INTRAVENOUS at 16:39

## 2019-11-05 RX ADMIN — Medication 10 ML: at 21:22

## 2019-11-05 RX ADMIN — SUCRALFATE 1 G: 1 TABLET ORAL at 16:39

## 2019-11-05 RX ADMIN — SUCRALFATE 1 G: 1 TABLET ORAL at 12:08

## 2019-11-05 RX ADMIN — MYCOPHENOLATE MOFETIL 500 MG: 250 CAPSULE ORAL at 21:22

## 2019-11-05 RX ADMIN — TACROLIMUS 1 MG: 1 CAPSULE ORAL at 09:18

## 2019-11-05 RX ADMIN — SUCRALFATE 1 G: 1 TABLET ORAL at 22:47

## 2019-11-05 RX ADMIN — MYCOPHENOLATE MOFETIL 500 MG: 250 CAPSULE ORAL at 09:18

## 2019-11-05 RX ADMIN — Medication 10 ML: at 09:18

## 2019-11-05 RX ADMIN — INSULIN LISPRO 1 UNITS: 100 INJECTION, SOLUTION INTRAVENOUS; SUBCUTANEOUS at 21:25

## 2019-11-05 RX ADMIN — TACROLIMUS 1 MG: 1 CAPSULE ORAL at 21:22

## 2019-11-05 RX ADMIN — SUCRALFATE 1 G: 1 TABLET ORAL at 04:46

## 2019-11-05 RX ADMIN — PANTOPRAZOLE SODIUM 40 MG: 40 TABLET, DELAYED RELEASE ORAL at 16:39

## 2019-11-05 ASSESSMENT — PAIN SCALES - GENERAL
PAINLEVEL_OUTOF10: 0
PAINLEVEL_OUTOF10: 0

## 2019-11-06 ENCOUNTER — TELEPHONE (OUTPATIENT)
Dept: GASTROENTEROLOGY | Age: 64
End: 2019-11-06

## 2019-11-06 VITALS
OXYGEN SATURATION: 96 % | SYSTOLIC BLOOD PRESSURE: 123 MMHG | WEIGHT: 249.6 LBS | RESPIRATION RATE: 20 BRPM | DIASTOLIC BLOOD PRESSURE: 60 MMHG | BODY MASS INDEX: 33.08 KG/M2 | HEIGHT: 73 IN | TEMPERATURE: 98.1 F | HEART RATE: 81 BPM

## 2019-11-06 LAB
ANION GAP SERPL CALCULATED.3IONS-SCNC: 10 MMOL/L (ref 7–19)
BUN BLDV-MCNC: 9 MG/DL (ref 8–23)
CALCIUM SERPL-MCNC: 8.2 MG/DL (ref 8.8–10.2)
CHLORIDE BLD-SCNC: 107 MMOL/L (ref 98–111)
CO2: 22 MMOL/L (ref 22–29)
CREAT SERPL-MCNC: 1 MG/DL (ref 0.5–1.2)
GFR NON-AFRICAN AMERICAN: >60
GLUCOSE BLD-MCNC: 138 MG/DL (ref 70–99)
GLUCOSE BLD-MCNC: 145 MG/DL (ref 74–109)
HCT VFR BLD CALC: 25.5 % (ref 42–52)
HEMOGLOBIN: 7.8 G/DL (ref 14–18)
MCH RBC QN AUTO: 29.2 PG (ref 27–31)
MCHC RBC AUTO-ENTMCNC: 30.6 G/DL (ref 33–37)
MCV RBC AUTO: 95.5 FL (ref 80–94)
PDW BLD-RTO: 15.9 % (ref 11.5–14.5)
PERFORMED ON: ABNORMAL
PLATELET # BLD: 202 K/UL (ref 130–400)
PMV BLD AUTO: 10.8 FL (ref 9.4–12.4)
POTASSIUM SERPL-SCNC: 3.6 MMOL/L (ref 3.5–5)
RBC # BLD: 2.67 M/UL (ref 4.7–6.1)
SODIUM BLD-SCNC: 139 MMOL/L (ref 136–145)
WBC # BLD: 8 K/UL (ref 4.8–10.8)

## 2019-11-06 PROCEDURE — 6370000000 HC RX 637 (ALT 250 FOR IP): Performed by: INTERNAL MEDICINE

## 2019-11-06 PROCEDURE — 85027 COMPLETE CBC AUTOMATED: CPT

## 2019-11-06 PROCEDURE — 82948 REAGENT STRIP/BLOOD GLUCOSE: CPT

## 2019-11-06 PROCEDURE — 80048 BASIC METABOLIC PNL TOTAL CA: CPT

## 2019-11-06 PROCEDURE — 36415 COLL VENOUS BLD VENIPUNCTURE: CPT

## 2019-11-06 PROCEDURE — 2580000003 HC RX 258: Performed by: INTERNAL MEDICINE

## 2019-11-06 PROCEDURE — 6360000002 HC RX W HCPCS: Performed by: INTERNAL MEDICINE

## 2019-11-06 RX ORDER — PANTOPRAZOLE SODIUM 40 MG/1
40 TABLET, DELAYED RELEASE ORAL
Qty: 30 TABLET | Refills: 3 | Status: SHIPPED | OUTPATIENT
Start: 2019-11-06 | End: 2019-11-22 | Stop reason: DRUGHIGH

## 2019-11-06 RX ORDER — SUCRALFATE 1 G/1
1 TABLET ORAL 4 TIMES DAILY
Qty: 120 TABLET | Refills: 0 | Status: SHIPPED | OUTPATIENT
Start: 2019-11-06 | End: 2019-11-11 | Stop reason: SDUPTHER

## 2019-11-06 RX ADMIN — SUCRALFATE 1 G: 1 TABLET ORAL at 06:52

## 2019-11-06 RX ADMIN — TACROLIMUS 1 MG: 1 CAPSULE ORAL at 08:16

## 2019-11-06 RX ADMIN — MYCOPHENOLATE MOFETIL 500 MG: 250 CAPSULE ORAL at 08:16

## 2019-11-06 RX ADMIN — SUCRALFATE 1 G: 1 TABLET ORAL at 11:30

## 2019-11-06 RX ADMIN — Medication 10 ML: at 08:16

## 2019-11-06 RX ADMIN — PANTOPRAZOLE SODIUM 40 MG: 40 TABLET, DELAYED RELEASE ORAL at 06:52

## 2019-11-07 ENCOUNTER — TELEPHONE (OUTPATIENT)
Dept: PRIMARY CARE CLINIC | Age: 64
End: 2019-11-07

## 2019-11-07 ENCOUNTER — CARE COORDINATION (OUTPATIENT)
Dept: CASE MANAGEMENT | Age: 64
End: 2019-11-07

## 2019-11-07 DIAGNOSIS — K92.2 UGI BLEED: Primary | ICD-10-CM

## 2019-11-07 PROCEDURE — 1111F DSCHRG MED/CURRENT MED MERGE: CPT | Performed by: FAMILY MEDICINE

## 2019-11-11 ENCOUNTER — OFFICE VISIT (OUTPATIENT)
Dept: FAMILY MEDICINE CLINIC | Age: 64
End: 2019-11-11
Payer: MEDICARE

## 2019-11-11 VITALS
DIASTOLIC BLOOD PRESSURE: 64 MMHG | BODY MASS INDEX: 34.06 KG/M2 | SYSTOLIC BLOOD PRESSURE: 108 MMHG | OXYGEN SATURATION: 97 % | HEIGHT: 73 IN | HEART RATE: 82 BPM | TEMPERATURE: 97.5 F | RESPIRATION RATE: 16 BRPM | WEIGHT: 257 LBS

## 2019-11-11 DIAGNOSIS — Z28.21 REFUSED PNEUMOCOCCAL VACCINATION: ICD-10-CM

## 2019-11-11 DIAGNOSIS — E61.1 IRON DEFICIENCY: ICD-10-CM

## 2019-11-11 DIAGNOSIS — R79.89 ABNORMAL CBC: Primary | ICD-10-CM

## 2019-11-11 DIAGNOSIS — D64.9 ANEMIA, UNSPECIFIED TYPE: ICD-10-CM

## 2019-11-11 DIAGNOSIS — K25.0 ACUTE GASTRIC ULCER WITH HEMORRHAGE: ICD-10-CM

## 2019-11-11 DIAGNOSIS — K92.2 UPPER GI BLEED: Primary | ICD-10-CM

## 2019-11-11 DIAGNOSIS — E11.22 TYPE 2 DIABETES MELLITUS WITH CHRONIC KIDNEY DISEASE, WITHOUT LONG-TERM CURRENT USE OF INSULIN, UNSPECIFIED CKD STAGE (HCC): ICD-10-CM

## 2019-11-11 DIAGNOSIS — K92.2 UPPER GI BLEED: ICD-10-CM

## 2019-11-11 DIAGNOSIS — I48.0 PAF (PAROXYSMAL ATRIAL FIBRILLATION) (HCC): ICD-10-CM

## 2019-11-11 LAB
ANISOCYTOSIS: ABNORMAL
BASOPHILS ABSOLUTE: 0.1 K/UL (ref 0–0.2)
BASOPHILS RELATIVE PERCENT: 0.8 % (ref 0–1)
EOSINOPHILS ABSOLUTE: 0.6 K/UL (ref 0–0.6)
EOSINOPHILS RELATIVE PERCENT: 7.1 % (ref 0–5)
HBA1C MFR BLD: 6 % (ref 4–6)
HCT VFR BLD CALC: 30.8 % (ref 42–52)
HEMOGLOBIN: 8.9 G/DL (ref 14–18)
HYPOCHROMIA: ABNORMAL
IMMATURE GRANULOCYTES #: 0.2 K/UL
IRON SATURATION: 11 % (ref 14–50)
IRON: 34 UG/DL (ref 59–158)
LYMPHOCYTES ABSOLUTE: 1.2 K/UL (ref 1.1–4.5)
LYMPHOCYTES RELATIVE PERCENT: 15.5 % (ref 20–40)
MCH RBC QN AUTO: 28.6 PG (ref 27–31)
MCHC RBC AUTO-ENTMCNC: 28.9 G/DL (ref 33–37)
MCV RBC AUTO: 99 FL (ref 80–94)
MICROCYTES: ABNORMAL
MONOCYTES ABSOLUTE: 0.7 K/UL (ref 0–0.9)
MONOCYTES RELATIVE PERCENT: 9.1 % (ref 0–10)
NEUTROPHILS ABSOLUTE: 5.2 K/UL (ref 1.5–7.5)
NEUTROPHILS RELATIVE PERCENT: 65 % (ref 50–65)
OVALOCYTES: ABNORMAL
PDW BLD-RTO: 16.3 % (ref 11.5–14.5)
PLATELET # BLD: 289 K/UL (ref 130–400)
PLATELET SLIDE REVIEW: ADEQUATE
PMV BLD AUTO: 10.4 FL (ref 9.4–12.4)
POLYCHROMASIA: ABNORMAL
RBC # BLD: 3.11 M/UL (ref 4.7–6.1)
STOMATOCYTES: ABNORMAL
TOTAL IRON BINDING CAPACITY: 311 UG/DL (ref 250–400)
WBC # BLD: 8 K/UL (ref 4.8–10.8)

## 2019-11-11 PROCEDURE — 99496 TRANSJ CARE MGMT HIGH F2F 7D: CPT | Performed by: FAMILY MEDICINE

## 2019-11-11 PROCEDURE — 1111F DSCHRG MED/CURRENT MED MERGE: CPT | Performed by: FAMILY MEDICINE

## 2019-11-11 RX ORDER — ZOLPIDEM TARTRATE 5 MG/1
5 TABLET ORAL
COMMUNITY
End: 2019-11-22

## 2019-11-11 RX ORDER — SUCRALFATE 1 G/1
1 TABLET ORAL 4 TIMES DAILY
Qty: 120 TABLET | Refills: 2 | Status: SHIPPED | OUTPATIENT
Start: 2019-11-11 | End: 2019-11-22 | Stop reason: SDUPTHER

## 2019-11-11 RX ORDER — FERROUS SULFATE 325(65) MG
325 TABLET ORAL
Qty: 30 TABLET | Refills: 5 | Status: SHIPPED | OUTPATIENT
Start: 2019-11-11 | End: 2020-01-21 | Stop reason: SDUPTHER

## 2019-11-11 ASSESSMENT — ENCOUNTER SYMPTOMS
ABDOMINAL PAIN: 0
DIARRHEA: 0
VOMITING: 0
RHINORRHEA: 0
BACK PAIN: 0
EYE PAIN: 0
CONSTIPATION: 0
WHEEZING: 0
NAUSEA: 0
SORE THROAT: 0
SHORTNESS OF BREATH: 0
EYE DISCHARGE: 0
COUGH: 0

## 2019-11-12 ENCOUNTER — CARE COORDINATION (OUTPATIENT)
Dept: CASE MANAGEMENT | Age: 64
End: 2019-11-12

## 2019-11-18 ENCOUNTER — CARE COORDINATION (OUTPATIENT)
Dept: CASE MANAGEMENT | Age: 64
End: 2019-11-18

## 2019-11-19 ENCOUNTER — OFFICE VISIT (OUTPATIENT)
Dept: CARDIOLOGY | Age: 64
End: 2019-11-19
Payer: MEDICARE

## 2019-11-19 VITALS
HEART RATE: 71 BPM | SYSTOLIC BLOOD PRESSURE: 118 MMHG | BODY MASS INDEX: 34.19 KG/M2 | WEIGHT: 258 LBS | HEIGHT: 73 IN | DIASTOLIC BLOOD PRESSURE: 68 MMHG

## 2019-11-19 DIAGNOSIS — K92.2 UGI BLEED: ICD-10-CM

## 2019-11-19 DIAGNOSIS — Z94.0 HISTORY OF RENAL TRANSPLANT: ICD-10-CM

## 2019-11-19 DIAGNOSIS — I10 ESSENTIAL HYPERTENSION: ICD-10-CM

## 2019-11-19 DIAGNOSIS — I48.0 PAF (PAROXYSMAL ATRIAL FIBRILLATION) (HCC): Primary | ICD-10-CM

## 2019-11-19 PROCEDURE — 4040F PNEUMOC VAC/ADMIN/RCVD: CPT | Performed by: CLINICAL NURSE SPECIALIST

## 2019-11-19 PROCEDURE — 99214 OFFICE O/P EST MOD 30 MIN: CPT | Performed by: CLINICAL NURSE SPECIALIST

## 2019-11-19 PROCEDURE — G8417 CALC BMI ABV UP PARAM F/U: HCPCS | Performed by: CLINICAL NURSE SPECIALIST

## 2019-11-19 PROCEDURE — 3017F COLORECTAL CA SCREEN DOC REV: CPT | Performed by: CLINICAL NURSE SPECIALIST

## 2019-11-19 PROCEDURE — 1123F ACP DISCUSS/DSCN MKR DOCD: CPT | Performed by: CLINICAL NURSE SPECIALIST

## 2019-11-19 PROCEDURE — 1036F TOBACCO NON-USER: CPT | Performed by: CLINICAL NURSE SPECIALIST

## 2019-11-19 PROCEDURE — 1111F DSCHRG MED/CURRENT MED MERGE: CPT | Performed by: CLINICAL NURSE SPECIALIST

## 2019-11-19 PROCEDURE — G8484 FLU IMMUNIZE NO ADMIN: HCPCS | Performed by: CLINICAL NURSE SPECIALIST

## 2019-11-19 PROCEDURE — G8427 DOCREV CUR MEDS BY ELIG CLIN: HCPCS | Performed by: CLINICAL NURSE SPECIALIST

## 2019-11-19 RX ORDER — ZOLPIDEM TARTRATE 10 MG/1
10 TABLET ORAL PRN
COMMUNITY
End: 2019-11-22

## 2019-11-20 ENCOUNTER — CARE COORDINATION (OUTPATIENT)
Dept: CASE MANAGEMENT | Age: 64
End: 2019-11-20

## 2019-11-21 ENCOUNTER — CARE COORDINATION (OUTPATIENT)
Dept: CASE MANAGEMENT | Age: 64
End: 2019-11-21

## 2019-11-22 ENCOUNTER — OFFICE VISIT (OUTPATIENT)
Dept: GASTROENTEROLOGY | Age: 64
End: 2019-11-22
Payer: MEDICARE

## 2019-11-22 VITALS
HEIGHT: 73 IN | SYSTOLIC BLOOD PRESSURE: 138 MMHG | DIASTOLIC BLOOD PRESSURE: 60 MMHG | HEART RATE: 69 BPM | WEIGHT: 253 LBS | OXYGEN SATURATION: 94 % | BODY MASS INDEX: 33.53 KG/M2

## 2019-11-22 DIAGNOSIS — K21.9 GASTROESOPHAGEAL REFLUX DISEASE, ESOPHAGITIS PRESENCE NOT SPECIFIED: ICD-10-CM

## 2019-11-22 DIAGNOSIS — K25.0 ACUTE GASTRIC ULCER WITH HEMORRHAGE: Primary | ICD-10-CM

## 2019-11-22 PROCEDURE — 99214 OFFICE O/P EST MOD 30 MIN: CPT | Performed by: NURSE PRACTITIONER

## 2019-11-22 RX ORDER — PANTOPRAZOLE SODIUM 40 MG/1
40 TABLET, DELAYED RELEASE ORAL
Qty: 60 TABLET | Refills: 2 | Status: SHIPPED | OUTPATIENT
Start: 2019-11-22 | End: 2020-01-21

## 2019-11-22 RX ORDER — SUCRALFATE 1 G/1
1 TABLET ORAL 4 TIMES DAILY
Qty: 120 TABLET | Refills: 2 | Status: SHIPPED | OUTPATIENT
Start: 2019-11-22 | End: 2020-01-21

## 2019-11-22 ASSESSMENT — ENCOUNTER SYMPTOMS
DIARRHEA: 0
RECTAL PAIN: 0
CHOKING: 0
ANAL BLEEDING: 0
CONSTIPATION: 0
BLOOD IN STOOL: 1
TROUBLE SWALLOWING: 0
COUGH: 0
NAUSEA: 0
SHORTNESS OF BREATH: 0
ABDOMINAL PAIN: 0
VOMITING: 0
ABDOMINAL DISTENTION: 0

## 2019-12-02 RX ORDER — PROPRANOLOL HYDROCHLORIDE 40 MG/1
TABLET ORAL
Qty: 180 TABLET | Refills: 1 | Status: SHIPPED | OUTPATIENT
Start: 2019-12-02 | End: 2020-07-02 | Stop reason: SDUPTHER

## 2019-12-05 DIAGNOSIS — E11.22 TYPE 2 DIABETES MELLITUS WITH CHRONIC KIDNEY DISEASE, WITHOUT LONG-TERM CURRENT USE OF INSULIN, UNSPECIFIED CKD STAGE (HCC): ICD-10-CM

## 2019-12-05 DIAGNOSIS — E11.65 TYPE 2 DIABETES MELLITUS WITH HYPERGLYCEMIA, WITHOUT LONG-TERM CURRENT USE OF INSULIN (HCC): Primary | ICD-10-CM

## 2019-12-05 RX ORDER — GLUCOSAMINE HCL/CHONDROITIN SU 500-400 MG
CAPSULE ORAL
Qty: 100 STRIP | Refills: 5 | Status: SHIPPED | OUTPATIENT
Start: 2019-12-05 | End: 2021-04-15 | Stop reason: SDUPTHER

## 2020-01-08 ENCOUNTER — OFFICE VISIT (OUTPATIENT)
Dept: CARDIOLOGY | Age: 65
End: 2020-01-08
Payer: MEDICARE

## 2020-01-08 VITALS
DIASTOLIC BLOOD PRESSURE: 70 MMHG | SYSTOLIC BLOOD PRESSURE: 114 MMHG | HEIGHT: 73 IN | BODY MASS INDEX: 33.53 KG/M2 | HEART RATE: 64 BPM | WEIGHT: 253 LBS

## 2020-01-08 PROCEDURE — 1123F ACP DISCUSS/DSCN MKR DOCD: CPT | Performed by: INTERNAL MEDICINE

## 2020-01-08 PROCEDURE — G8417 CALC BMI ABV UP PARAM F/U: HCPCS | Performed by: INTERNAL MEDICINE

## 2020-01-08 PROCEDURE — 99213 OFFICE O/P EST LOW 20 MIN: CPT | Performed by: INTERNAL MEDICINE

## 2020-01-08 PROCEDURE — 1036F TOBACCO NON-USER: CPT | Performed by: INTERNAL MEDICINE

## 2020-01-08 PROCEDURE — 4040F PNEUMOC VAC/ADMIN/RCVD: CPT | Performed by: INTERNAL MEDICINE

## 2020-01-08 PROCEDURE — G8427 DOCREV CUR MEDS BY ELIG CLIN: HCPCS | Performed by: INTERNAL MEDICINE

## 2020-01-08 PROCEDURE — 3017F COLORECTAL CA SCREEN DOC REV: CPT | Performed by: INTERNAL MEDICINE

## 2020-01-08 PROCEDURE — G8484 FLU IMMUNIZE NO ADMIN: HCPCS | Performed by: INTERNAL MEDICINE

## 2020-01-08 ASSESSMENT — ENCOUNTER SYMPTOMS
VOMITING: 0
SHORTNESS OF BREATH: 0
ABDOMINAL DISTENTION: 0
BLOOD IN STOOL: 0
COUGH: 0
ABDOMINAL PAIN: 0
DIARRHEA: 0
WHEEZING: 0
BACK PAIN: 0

## 2020-01-08 NOTE — PROGRESS NOTES
[facility]  Cardiology Office Note  [address]  [id number]  Phone: [phone]  Fax: [phone]                            Date:  1/8/2020  Patient: Braxton Bonds  Age:  75 y.o., 1955    Referral: Marcus Garcia MD      PROBLEM LIST:    Patient Active Problem List    Diagnosis Date Noted    History of renal transplant 11/05/2019     Priority: Low    UGI bleed 11/01/2019     Priority: Low    PSVT (paroxysmal supraventricular tachycardia) ([address] 02/05/2019     Priority: Low    Chronic anticoagulation 01/07/2019     Priority: Low     Overview Note:     Eliquis      Orthostatic dizziness 01/07/2019     Priority: Low    Daytime somnolence 04/09/2018     Priority: Low    PAF (paroxysmal atrial fibrillation) ([address] 04/09/2018     Priority: Low    Fatigue 04/09/2018     Priority: Low    History of tachycardia 04/09/2018     Priority: Low    Immunosuppression ([address]      Priority: Low    Atrial fibrillation with RVR ([address] 02/26/2018     Priority: Low    Infection of AV graft for dialysis ([address] 02/23/2018     Priority: Low    Cellulitis of right upper extremity      Priority: Low    Cellulitis 02/22/2018     Priority: Low    Chest pressure 07/17/2016     Priority: Low    Diabetes mellitus ([address] 07/17/2016     Priority: Low    Ex-cigarette smoker 07/17/2016     Priority: Low    Essential hypertension      Priority: Low    Chest pain      Priority: Low     1. Paroxysmal atrial fibrillation, on Eliquis. LV ejection fraction 56%, moderate LVH, negative Lexiscan study 3/2/2018, short runs of SVT. 2.  End-stage renal disease due to hypertensive renal disease status post renal transplantation 2/18/2010.  3.  Diabetes mellitus non-insulin-requiring. 4.  Morbid obesity    PRESENTATION: Braxton Bonds is a 75y.o. year old male presents for follow-up evaluation. In November he was admitted with dark stools and low hemoglobin of 7.6.   EGD showed a 1.2 cm gastric ulcer with oozing. He was taken off Eliquis. He was placed on Protonix twice daily and Carafate. He has had no further dark stools. He is scheduled for an endoscopy in the coming week. During the [place] time after taking some eggnog he did have a brief bit of atrial fibrillation which she felt by palpitations with heart rate around 110 to 120 bpm.  This subsided on its own. He does feel it when he goes into atrial fibrillation. His initial bout was in February 2018 after removal of an AV fistula that was infected. He had been taking Lindy-Bout[place] previously. He reports not taking any other nonsteroidals. REVIEW OF SYSTEMS:  Review of Systems   Constitutional: Negative for activity change, diaphoresis and fatigue. HENT: Negative for hearing loss, nosebleeds and tinnitus. Eyes: Negative for visual disturbance. Respiratory: Negative for cough, shortness of breath and wheezing. Cardiovascular: Positive for palpitations. Negative for chest pain and leg swelling. Gastrointestinal: Negative for abdominal distention, abdominal pain, blood in stool, diarrhea and vomiting. Endocrine: Negative for cold intolerance, heat intolerance, polydipsia, polyphagia and polyuria. Genitourinary: Negative for difficulty urinating, flank pain and hematuria. Musculoskeletal: Negative for arthralgias, back pain, joint swelling and myalgias. Skin: Negative for pallor and rash. Neurological: Negative for dizziness, seizures, syncope and headaches. Psychiatric/Behavioral: Negative for behavioral problems and dysphoric mood. The patient is not nervous/anxious.         Past Medical History:      Diagnosis Date    Bleeding ulcer 11/2019    Diabetes mellitus [address]     Ex-cigarette smoker 7/17/2016    History of gastric ulcer 2009    Hypertension     Obese     Vision problem     wears glasses       Past Surgical History:      Procedure Laterality Date    CHOLECYSTECTOMY      COLONOSCOPY (ENVARSUS XR) 1 MG TB24 Take 2 mg by mouth every morning Indications: on an empty stomach       mycophenolate (CELLCEPT) 250 MG capsule Take 500 mg by mouth 2 times daily      Lancets MISC Use to check blood sugar daily Dx E11.9 100 each 0    glucose blood VI test strips (ACCU-CHEK ANANT) strip Contour strips,check daily E11.9 100 each 0     No current facility-administered medications for this visit. Allergies:  Patient has no known allergies. Past Social History:  Social History     Socioeconomic History    Marital status:       Spouse name: Not on file    Number of children: Not on file    Years of education: Not on file    Highest education level: Not on file   Occupational History    Not on file   Social Needs    Financial resource strain: Not on file    Food insecurity:     Worry: Not on file     Inability: Not on file    Transportation needs:     Medical: Not on file     Non-medical: Not on file   Tobacco Use    Smoking status: Former Smoker     Packs/day: 2.00     Years: 4.00     Pack years: 8.00     Last attempt to quit: 1973     Years since quittin.9    Smokeless tobacco: Never Used   Substance and Sexual Activity    Alcohol use: No    Drug use: Not on file    Sexual activity: Not on file   Lifestyle    Physical activity:     Days per week: Not on file     Minutes per session: Not on file    Stress: Not on file   Relationships    Social connections:     Talks on phone: Not on file     Gets together: Not on file     Attends Zoroastrian service: Not on file     Active member of club or organization: Not on file     Attends meetings of clubs or organizations: Not on file     Relationship status: Not on file    Intimate partner violence:     Fear of current or ex partner: Not on file     Emotionally abused: Not on file     Physically abused: Not on file     Forced sexual activity: Not on file   Other Topics Concern    Not on file   Social History Narrative    Not on file       Family History:       Problem Relation Age of Onset    Colon Cancer Neg Hx     Colon Polyps Neg Hx          Physical Examination:  /70   Pulse 64   Ht 6' 1\" (1.854 m)   Wt 253 lb (114.8 kg)   BMI 33.38 kg/m²   Physical Exam  Constitutional:       Appearance: He is well-developed. He is obese. Comments: Severe truncal obesity  Blood pressure right arm sitting 120/70 mmHg, pulse 64 bpm regular   HENT:      Mouth/Throat:      Pharynx: No oropharyngeal exudate. Eyes:      General: No scleral icterus. Right eye: No discharge. Left eye: No discharge. Neck:      Thyroid: No thyromegaly. Vascular: No JVD. Cardiovascular:      Rate and Rhythm: Normal rate and regular rhythm. Heart sounds: No murmur. No friction rub. No gallop. Pulmonary:      Effort: No respiratory distress. Breath sounds: No stridor. No wheezing or rales. Abdominal:      General: Bowel sounds are normal. There is no distension. Palpations: Abdomen is soft. There is no mass. Tenderness: There is no tenderness. There is no guarding or rebound. Musculoskeletal:         General: No deformity. Skin:     General: Skin is warm. Coloration: Skin is not pale. Findings: No erythema or rash. Neurological:      Mental Status: He is alert and oriented to person, place, and time. Motor: No abnormal muscle tone. Coordination: Coordination normal.      Deep Tendon Reflexes: Reflexes normal.           Labs:   CBC: No results for input(s): WBC, HGB, HCT, PLT in the last 72 hours. BMP:No results for input(s): NA, K, CO2, BUN, CREATININE, LABGLOM, GLUCOSE in the last 72 hours. BNP: No results for input(s): BNP in the last 72 hours. PT/INR: No results for input(s): PROTIME, INR in the last 72 hours. APTT:No results for input(s): APTT in the last 72 hours. CARDIAC ENZYMES:No results for input(s): CKTOTAL, CKMB, CKMBINDEX, TROPONINI in the last 72 hours.   FASTING LIPID PANEL:  Lab Results   Component Value Date    HDL 25 07/11/2019    LDLCALC 51 07/11/2019    TRIG 199 07/11/2019     LIVER PROFILE:No results for input(s): AST, ALT, LABALBU in the last 72 hours. Imaging:          ASSESSMENT and PLAN:    68-year-old gentleman with past medical history of end-stage renal disease status post renal transplantation February 2010, on immunosuppressive therapy, hypertensive disease, non-insulin-dependent diabetes mellitus, paroxysmal atrial fibrillation on Eliquis, normal LV ejection fraction with moderate LVH, recent bout of upper GI bleed from gastric ulcer, here for follow-up evaluation. 1.  His gastric ulcer/bleed was possibly triggered by Lindy-[place] use compounded by Eliquis. At this time he is off Eliquis. He does get intermittent atrial fibrillation as evidenced during Christmastime as he felt the palpitations. He would benefit from being on Eliquis. He is scheduled for endoscopy. If his ulcer is healed I would recommend that he go back on Eliquis with appropriate GI protection. 2.  He will follow-up with me in 6 months. Orders:  No orders of the defined types were placed in this encounter. No orders of the defined types were placed in this encounter. Return in about 6 months (around 7/8/2020). Electronically signed by Ida Jones MD on 1/8/2020 at 9:58 AM    [facility]      Thisdictation was generated by voice recognition computer software. Although all attempts are made to edit the dictation for accuracy, there may be errors in the transcription that are not intended.

## 2020-01-10 ENCOUNTER — HOSPITAL ENCOUNTER (OUTPATIENT)
Age: 65
Setting detail: OUTPATIENT SURGERY
Discharge: HOME | End: 2020-01-10
Attending: INTERNAL MEDICINE | Admitting: INTERNAL MEDICINE
Payer: MEDICARE

## 2020-01-10 ENCOUNTER — ANESTHESIA EVENT (OUTPATIENT)
Dept: ENDOSCOPY | Age: 65
End: 2020-01-10
Payer: MEDICARE

## 2020-01-10 ENCOUNTER — ANESTHESIA (OUTPATIENT)
Dept: ENDOSCOPY | Age: 65
End: 2020-01-10
Payer: MEDICARE

## 2020-01-10 ENCOUNTER — TELEPHONE (OUTPATIENT)
Dept: GASTROENTEROLOGY | Age: 65
End: 2020-01-10

## 2020-01-10 VITALS
OXYGEN SATURATION: 90 % | RESPIRATION RATE: 17 BRPM | SYSTOLIC BLOOD PRESSURE: 136 MMHG | DIASTOLIC BLOOD PRESSURE: 49 MMHG

## 2020-01-10 VITALS
OXYGEN SATURATION: 97 % | DIASTOLIC BLOOD PRESSURE: 61 MMHG | BODY MASS INDEX: 33.13 KG/M2 | WEIGHT: 250 LBS | HEART RATE: 69 BPM | TEMPERATURE: 97.7 F | HEIGHT: 73 IN | SYSTOLIC BLOOD PRESSURE: 130 MMHG | RESPIRATION RATE: 18 BRPM

## 2020-01-10 LAB
GLUCOSE BLD-MCNC: 145 MG/DL (ref 70–99)
PERFORMED ON: ABNORMAL

## 2020-01-10 PROCEDURE — 2709999900 HC NON-CHARGEABLE SUPPLY: Performed by: INTERNAL MEDICINE

## 2020-01-10 PROCEDURE — 3700000000 HC ANESTHESIA ATTENDED CARE: Performed by: INTERNAL MEDICINE

## 2020-01-10 PROCEDURE — 6360000002 HC RX W HCPCS: Performed by: NURSE ANESTHETIST, CERTIFIED REGISTERED

## 2020-01-10 PROCEDURE — 3609012400 HC EGD TRANSORAL BIOPSY SINGLE/MULTIPLE: Performed by: INTERNAL MEDICINE

## 2020-01-10 PROCEDURE — 7100000010 HC PHASE II RECOVERY - FIRST 15 MIN: Performed by: INTERNAL MEDICINE

## 2020-01-10 PROCEDURE — 2500000003 HC RX 250 WO HCPCS: Performed by: INTERNAL MEDICINE

## 2020-01-10 PROCEDURE — 7100000011 HC PHASE II RECOVERY - ADDTL 15 MIN: Performed by: INTERNAL MEDICINE

## 2020-01-10 PROCEDURE — 2500000003 HC RX 250 WO HCPCS: Performed by: NURSE ANESTHETIST, CERTIFIED REGISTERED

## 2020-01-10 PROCEDURE — 2580000003 HC RX 258: Performed by: INTERNAL MEDICINE

## 2020-01-10 PROCEDURE — 3700000001 HC ADD 15 MINUTES (ANESTHESIA): Performed by: INTERNAL MEDICINE

## 2020-01-10 PROCEDURE — 88342 IMHCHEM/IMCYTCHM 1ST ANTB: CPT

## 2020-01-10 PROCEDURE — 43239 EGD BIOPSY SINGLE/MULTIPLE: CPT | Performed by: INTERNAL MEDICINE

## 2020-01-10 PROCEDURE — 82948 REAGENT STRIP/BLOOD GLUCOSE: CPT

## 2020-01-10 PROCEDURE — 88305 TISSUE EXAM BY PATHOLOGIST: CPT

## 2020-01-10 RX ORDER — MEPERIDINE HYDROCHLORIDE 50 MG/ML
12.5 INJECTION INTRAMUSCULAR; INTRAVENOUS; SUBCUTANEOUS EVERY 5 MIN PRN
Status: DISCONTINUED | OUTPATIENT
Start: 2020-01-10 | End: 2020-01-10 | Stop reason: HOSPADM

## 2020-01-10 RX ORDER — LABETALOL 20 MG/4 ML (5 MG/ML) INTRAVENOUS SYRINGE
5 EVERY 10 MIN PRN
Status: DISCONTINUED | OUTPATIENT
Start: 2020-01-10 | End: 2020-01-10 | Stop reason: HOSPADM

## 2020-01-10 RX ORDER — ENALAPRILAT 2.5 MG/2ML
1.25 INJECTION INTRAVENOUS
Status: DISCONTINUED | OUTPATIENT
Start: 2020-01-10 | End: 2020-01-10 | Stop reason: HOSPADM

## 2020-01-10 RX ORDER — MORPHINE SULFATE 4 MG/ML
4 INJECTION, SOLUTION INTRAMUSCULAR; INTRAVENOUS EVERY 5 MIN PRN
Status: DISCONTINUED | OUTPATIENT
Start: 2020-01-10 | End: 2020-01-10 | Stop reason: HOSPADM

## 2020-01-10 RX ORDER — DIPHENHYDRAMINE HYDROCHLORIDE 50 MG/ML
12.5 INJECTION INTRAMUSCULAR; INTRAVENOUS
Status: DISCONTINUED | OUTPATIENT
Start: 2020-01-10 | End: 2020-01-10 | Stop reason: HOSPADM

## 2020-01-10 RX ORDER — SODIUM CHLORIDE, SODIUM LACTATE, POTASSIUM CHLORIDE, CALCIUM CHLORIDE 600; 310; 30; 20 MG/100ML; MG/100ML; MG/100ML; MG/100ML
INJECTION, SOLUTION INTRAVENOUS CONTINUOUS
Status: DISCONTINUED | OUTPATIENT
Start: 2020-01-10 | End: 2020-01-10 | Stop reason: HOSPADM

## 2020-01-10 RX ORDER — MORPHINE SULFATE 4 MG/ML
2 INJECTION, SOLUTION INTRAMUSCULAR; INTRAVENOUS EVERY 5 MIN PRN
Status: DISCONTINUED | OUTPATIENT
Start: 2020-01-10 | End: 2020-01-10 | Stop reason: HOSPADM

## 2020-01-10 RX ORDER — PROPOFOL 10 MG/ML
INJECTION, EMULSION INTRAVENOUS PRN
Status: DISCONTINUED | OUTPATIENT
Start: 2020-01-10 | End: 2020-01-10 | Stop reason: SDUPTHER

## 2020-01-10 RX ORDER — LIDOCAINE HYDROCHLORIDE 10 MG/ML
1 INJECTION, SOLUTION EPIDURAL; INFILTRATION; INTRACAUDAL; PERINEURAL ONCE
Status: COMPLETED | OUTPATIENT
Start: 2020-01-10 | End: 2020-01-10

## 2020-01-10 RX ORDER — PROMETHAZINE HYDROCHLORIDE 25 MG/ML
6.25 INJECTION, SOLUTION INTRAMUSCULAR; INTRAVENOUS
Status: DISCONTINUED | OUTPATIENT
Start: 2020-01-10 | End: 2020-01-10 | Stop reason: HOSPADM

## 2020-01-10 RX ORDER — METOCLOPRAMIDE HYDROCHLORIDE 5 MG/ML
10 INJECTION INTRAMUSCULAR; INTRAVENOUS
Status: DISCONTINUED | OUTPATIENT
Start: 2020-01-10 | End: 2020-01-10 | Stop reason: HOSPADM

## 2020-01-10 RX ORDER — HYDRALAZINE HYDROCHLORIDE 20 MG/ML
5 INJECTION INTRAMUSCULAR; INTRAVENOUS EVERY 10 MIN PRN
Status: DISCONTINUED | OUTPATIENT
Start: 2020-01-10 | End: 2020-01-10 | Stop reason: HOSPADM

## 2020-01-10 RX ORDER — GLYCOPYRROLATE 1 MG/5 ML
SYRINGE (ML) INTRAVENOUS PRN
Status: DISCONTINUED | OUTPATIENT
Start: 2020-01-10 | End: 2020-01-10 | Stop reason: SDUPTHER

## 2020-01-10 RX ORDER — PANTOPRAZOLE SODIUM 40 MG/1
40 TABLET, DELAYED RELEASE ORAL
Qty: 60 TABLET | Refills: 1 | Status: SHIPPED | OUTPATIENT
Start: 2020-01-10 | End: 2020-01-21 | Stop reason: SDUPTHER

## 2020-01-10 RX ADMIN — SODIUM CHLORIDE, POTASSIUM CHLORIDE, SODIUM LACTATE AND CALCIUM CHLORIDE: 600; 310; 30; 20 INJECTION, SOLUTION INTRAVENOUS at 07:56

## 2020-01-10 RX ADMIN — LIDOCAINE HYDROCHLORIDE 5 ML: 10 INJECTION, SOLUTION EPIDURAL; INFILTRATION; INTRACAUDAL; PERINEURAL at 08:51

## 2020-01-10 RX ADMIN — Medication 0.2 MG: at 08:51

## 2020-01-10 RX ADMIN — PROPOFOL 100 MG: 10 INJECTION, EMULSION INTRAVENOUS at 08:53

## 2020-01-10 ASSESSMENT — PAIN - FUNCTIONAL ASSESSMENT: PAIN_FUNCTIONAL_ASSESSMENT: 0-10

## 2020-01-10 NOTE — OP NOTE
Endoscopic Procedure Note    Patient: Carmelita Jackson : 1955  [facility] Rec#: [id number] Acc#: [id number]     Primary Care Provider Claudia Xie MD    Endoscopist: Lillian Chadwick MD    Date of Procedure:  1/10/2020    Procedure:   1. EGD with cold biopsies    Indications:      1. Acute gastric ulcer with hemorrhage    2. Gastroesophageal reflux disease, esophagitis presence not specified    3. Current anticoagulant use. 4. Hx of melena recently requiring hospital visit and past hx of ulcers    Anesthesia:  Sedation was administered by anesthesia who monitored the patient during the procedure. Estimated Blood Loss: minimal    Procedure:   After reviewing the patient's chart and obtaining informed consent, the patient was placed in the left lateral decubitus position. A forward-viewing Olympus endoscope was lubricated and inserted through the mouth into the oropharynx. Under direct visualization, the upper esophagus was intubated. The scope was advanced to the level of the third portion of duodenum. Scope was slowly withdrawn with careful inspection of the mucosal surfaces. The scope was retroflexed for inspection of the gastric fundus and incisura. Findings and maneuvers are listed in impression below. The patient tolerated the procedure well. The scope was removed. There were no immediate complications. Findings:   Esophagus: normal; EG junction was at 40 cm. There is a small sliding hiatal hernia present. Stomach:  abnormal:  mucosal changes with patchy erythema and 3 small 3-5 mm erosions without any stigmata suggestive of persistent or residual gastritis noted; the antral ulcer noted on previous EGD in  appears to have healed but no obvious scar was seen -  Gastric biopsies were taken from the antrum  to rule out Helicobacter pylori infection. Duodenum: normal       RECOMMENDATIONS:    1. Await path results, the patient will be contacted in 7-10 days with biopsy results.    2.

## 2020-01-10 NOTE — TELEPHONE ENCOUNTER
Findings:   Esophagus: normal; EG junction was at 40 cm. There is a small sliding hiatal hernia present.       Stomach:  abnormal:  mucosal changes with patchy erythema and 3 small 3-5 mm erosions without any stigmata suggestive of persistent or residual gastritis noted; the antral ulcer noted on previous EGD in 11/19 appears to have healed but no obvious scar was seen -  Gastric biopsies were taken from the antrum  to rule out Helicobacter pylori infection. Duodenum: normal        RECOMMENDATIONS:    1. Await path results, the patient will be contacted in 7-10 days with biopsy results. 2.  - Resume previous meds including PPI and diet  - GI clinic f/u PRN   - Keep scheduled f/u appts with other MDs      - NO ASA/NSAIDs x 2 weeks     The results were discussed with the patient and family. A copy of the images obtained were given to the patient.      Ileana Rowe MD  1/10/2020  8:48 AM      Francine Alan wants this patient to have a RF on his Protonix 40 mg, I will forward the RF to his Pharmacy.  Bryon silverman

## 2020-01-10 NOTE — H&P
(65 Fe) MG tablet Take 1 tablet by mouth daily (with breakfast) 11/11/19  Yes Marisol Anguiano MD   empagliflozin (JARDIANCE) 10 MG tablet Take 1 tablet by mouth daily 9/5/19  Yes Marisol Anguiano MD   tamsulosin [facility]) 0.4 MG capsule Take 1 capsule by mouth daily 4/17/19  Yes Marisol Anguiano MD   metFORMIN (GLUCOPHAGE) 1000 MG tablet Take 1 tablet by mouth 2 times daily (with meals) 4/17/19  Yes Marisol Anguiano MD   Tacrolimus (ENVARSUS XR) 1 MG TB24 Take 2 mg by mouth every morning Indications: on an empty stomach    Yes Historical Provider, MD   mycophenolate (CELLCEPT) 250 MG capsule Take 500 mg by mouth 2 times daily   Yes Historical Provider, MD   blood glucose monitor strips Test one time a day & as needed for symptoms of irregular blood glucose. 12/5/19   Marisol Anguiano MD   vitamin D (CHOLECALCIFEROL) 69233 UNIT CAPS Take 50,000 Units by mouth once a week     Historical Provider, MD   Lancets MISC Use to check blood sugar daily Dx E11.9 12/27/17   Evelin Maier DO   glucose blood VI test strips (ACCU-CHEK ANANT) strip Contour strips,check daily E11.9 12/27/17   Evelin Maier DO       Past Medical History:  Past Medical History:   Diagnosis Date    Arthritis     Bleeding ulcer 11/2019    [address] [address]estive [facility] failure) [address] [address] [facility]     Diabetes mellitus ([facility]     Ex-cigarette smoker 7/17/2016    History of gastric ulcer 2009    Hypertension     hx.  10 years ago    Kidney disease, chronic, end stage on dialysis [facility]) 2006 2007, dialysis for 3 years, then had a kidney transplant,    Obese     Vision problem     wears glasses       Past Surgical History:  Past Surgical History:   Procedure Laterality Date    CHOLECYSTECTOMY      COLONOSCOPY  06/23/2017    Dr Maral Serrano: Diverticulosis, internal hemorrhoids, 5yr recall    COLONOSCOPY  01/05/2012    Dr Sushil Martinez, 5 yr recall    DIALYSIS FISTULA CREATION  [place] 2007    left arm radial cephalic    HERNIA REPAIR      KIDNEY TRANSPLANT

## 2020-01-10 NOTE — ANESTHESIA PRE PROCEDURE
Department of Anesthesiology  Preprocedure Note       Name:  Mary Blandon   Age:  75 y.o.  :  1955                                          MRN:  [id number]         Date:  1/10/2020      Surgeon: Madeleine Ghosh):  Liz Ruelas MD    Procedure: EGD BIOPSY (N/A Abdomen)    Medications prior to admission:   Prior to Admission medications    Medication Sig Start Date End Date Taking? Authorizing Provider   propranolol (INDERAL) 40 MG tablet TAKE 1 TABLET BY MOUTH 2 TIMES DAILY BEFORE MEALS 19  Yes Shashank Cole MD   pantoprazole (PROTONIX) 40 MG tablet Take 1 tablet by mouth 2 times daily (before meals) 19  Yes CHRIS Ravi NP   sucralfate (CARAFATE) 1 GM tablet Take 1 tablet by mouth 4 times daily 19  Yes CHRIS Ravi NP   ferrous sulfate 325 (65 Fe) MG tablet Take 1 tablet by mouth daily (with breakfast) 19  Yes Shashank Cole MD   empagliflozin (JARDIANCE) 10 MG tablet Take 1 tablet by mouth daily 19  Yes Shashank Cole MD   tamsulosin [facility]) 0.4 MG capsule Take 1 capsule by mouth daily 19  Yes Shashank Cole MD   metFORMIN (GLUCOPHAGE) 1000 MG tablet Take 1 tablet by mouth 2 times daily (with meals) 19  Yes Shashank Cole MD   Tacrolimus (ENVARSUS XR) 1 MG TB24 Take 2 mg by mouth every morning Indications: on an empty stomach    Yes Historical Provider, MD   mycophenolate (CELLCEPT) 250 MG capsule Take 500 mg by mouth 2 times daily   Yes Historical Provider, MD   blood glucose monitor strips Test one time a day & as needed for symptoms of irregular blood glucose.  19   Shashank Cole MD   vitamin D (CHOLECALCIFEROL) 95251 UNIT CAPS Take 50,000 Units by mouth once a week     Historical Provider, MD   Lancets MISC Use to check blood sugar daily Dx E11.9 17   David Pathak DO   glucose blood VI test strips (ACCU-CHEK ANANT) strip Contour strips,check daily E11.9 17   David Pathak DO       Current medications:    Current Facility-Administered Medications   Medication Dose Route Frequency Provider Last Rate Last Dose    lactated ringers infusion   Intravenous Continuous Louise Martin  mL/hr at 01/10/20 0756      lidocaine PF 1 % injection 1 mL  1 mL Intradermal Once Louise Martin MD           Allergies:  No Known Allergies    Problem List:    Patient Active Problem List   Diagnosis Code    Essential hypertension I10    Chest pressure R07.89    Diabetes mellitus ([facility] E11.9    Ex-cigarette smoker Z87.891    Chest pain R07.9    Cellulitis L03.90    Infection of AV graft for dialysis ([facility] T82. 7XXA    Cellulitis of right upper extremity L03. 113    Atrial fibrillation with RVR ([facility]) I48.91    Immunosuppression ([facility]) D89.9    Daytime somnolence R40.0    PAF (paroxysmal atrial fibrillation) ([facility]) I48.0    Fatigue R53.83    History of tachycardia Z87.898    Chronic anticoagulation Z79.01    Orthostatic dizziness R42    PSVT (paroxysmal supraventricular tachycardia) ([facility]) I47.1    UGI bleed K92.2    History of renal transplant Z94.0       Past Medical History:        Diagnosis Date    Arthritis     Bleeding ulcer 11/2019    CHF (congestive heart failure) ([facility]     Diabetes mellitus ([facility]     Ex-cigarette smoker 7/17/2016    History of gastric ulcer 2009    Hypertension     hx.  10 years ago    Kidney disease, chronic, end stage on dialysis [facility]) 2006 2007, dialysis for 3 years, then had a kidney transplant,    Obese     Vision problem     wears glasses       Past Surgical History:        Procedure Laterality Date    CHOLECYSTECTOMY      COLONOSCOPY  06/23/2017    Dr Tianna Mckenzie: Diverticulosis, internal hemorrhoids, 5yr recall    COLONOSCOPY  01/05/2012    Dr Jayant Ambrocio, 5 yr recall    DIALYSIS FISTULA CREATION  [place] 2007    left arm radial cephalic    HERNIA REPAIR      KIDNEY TRANSPLANT      2/18/2010 in [address] [place] [address] Right 2/23/2018 AV FISTULA GRAFT REMOVAL performed by Hailey Mills MD at [facility] TUNNELED VENOUS CATHETER PLACEMENT  multiple    UPPER GASTROINTESTINAL ENDOSCOPY N/A 2019    Dr Hiral Burk (Dr Jd Brock pt)1.2 cm superficial gastric ulcer in the antrum with surrounding moderate gastritis and oozing of blood    UPPER GASTROINTESTINAL ENDOSCOPY  10/05/2009    Dr Stephenie Araiza w/resolution of ulcer, lenin +    UPPER GASTROINTESTINAL ENDOSCOPY  07/10/2009    Dr Lucy St ulceration w/pigmented base on posterior wall of the body, small erosions, active gastritis    VASCULAR SURGERY Right [place]     av loop graft       Social History:    Social History     Tobacco Use    Smoking status: Former Smoker     Packs/day: 2.00     Years: 4.00     Pack years: 8.00     Last attempt to quit: 1973     Years since quittin.9    Smokeless tobacco: Never Used   Substance Use Topics    Alcohol use: No                                Counseling given: Not Answered      Vital Signs (Current):   Vitals:    01/10/20 0747   BP: (!) 148/75   Pulse: 60   Resp: 22   Temp: 98.6 °F (37 °C)   TempSrc: Temporal   SpO2: 94%   Weight: 250 lb (113.4 kg)   Height: 6' 1\" (1.854 m)                                              BP Readings from Last 3 Encounters:   01/10/20 (!) 148/75   20 114/70   19 138/60       NPO Status: Time of last liquid consumption:                         Time of last solid consumption:                         Date of last liquid consumption: 20                        Date of last solid food consumption: 20    BMI:   Wt Readings from Last 3 Encounters:   01/10/20 250 lb (113.4 kg)   20 253 lb (114.8 kg)   19 253 lb (114.8 kg)     Body mass index is 32.98 kg/m².     CBC:   Lab Results   Component Value Date    WBC 8.0 2019    RBC 3.11 2019    HGB 8.9 2019    HCT 30.8 2019    MCV 99.0 2019    RDW 16.3 2019

## 2020-01-14 ENCOUNTER — TELEPHONE (OUTPATIENT)
Dept: FAMILY MEDICINE CLINIC | Age: 65
End: 2020-01-14

## 2020-01-14 NOTE — ANESTHESIA POSTPROCEDURE EVALUATION
Department of Anesthesiology  Postprocedure Note    Patient: Fidel Dugan  MRN: [id number]  YOB: 1955  Date of evaluation: 1/14/2020  Time:  6:43 AM     Procedure Summary     Date:  01/10/20 Room / Location:  [address]    Anesthesia Start:  Yolie Goldie Anesthesia Stop:  0829    Procedure:  EGD BIOPSY (N/A Abdomen) Diagnosis:  (RECHECK ULCER)    Surgeon:  Michael De La Cruz MD Responsible Provider:  CHRIS Cano CRNA    Anesthesia Type:  general, TIVA ASA Status:  4          Anesthesia Type: general, TIVA    Harsha Phase I: Harsha Score: 10    Harsha Phase II: Harsha Score: 10    Last vitals: Reviewed and per EMR flowsheets.        Anesthesia Post Evaluation    Patient location during evaluation: bedside  Patient participation: complete - patient participated  Level of consciousness: awake  Pain score: 0  Airway patency: patent  Nausea & Vomiting: no nausea and no vomiting  Complications: no  Cardiovascular status: hemodynamically stable  Respiratory status: acceptable  Hydration status: euvolemic

## 2020-01-16 DIAGNOSIS — R79.89 ABNORMAL CBC: ICD-10-CM

## 2020-01-16 LAB
BASOPHILS ABSOLUTE: 0.1 K/UL (ref 0–0.2)
BASOPHILS RELATIVE PERCENT: 0.9 % (ref 0–1)
EOSINOPHILS ABSOLUTE: 1.2 K/UL (ref 0–0.6)
EOSINOPHILS RELATIVE PERCENT: 11.2 % (ref 0–5)
HCT VFR BLD CALC: 48.7 % (ref 42–52)
HEMOGLOBIN: 14.7 G/DL (ref 14–18)
IMMATURE GRANULOCYTES #: 0.1 K/UL
LYMPHOCYTES ABSOLUTE: 1.3 K/UL (ref 1.1–4.5)
LYMPHOCYTES RELATIVE PERCENT: 11.7 % (ref 20–40)
MCH RBC QN AUTO: 26.4 PG (ref 27–31)
MCHC RBC AUTO-ENTMCNC: 30.2 G/DL (ref 33–37)
MCV RBC AUTO: 87.6 FL (ref 80–94)
MONOCYTES ABSOLUTE: 0.8 K/UL (ref 0–0.9)
MONOCYTES RELATIVE PERCENT: 7.8 % (ref 0–10)
NEUTROPHILS ABSOLUTE: 7.3 K/UL (ref 1.5–7.5)
NEUTROPHILS RELATIVE PERCENT: 67.9 % (ref 50–65)
PDW BLD-RTO: 16.1 % (ref 11.5–14.5)
PLATELET # BLD: 227 K/UL (ref 130–400)
PMV BLD AUTO: 11.5 FL (ref 9.4–12.4)
RBC # BLD: 5.56 M/UL (ref 4.7–6.1)
WBC # BLD: 10.7 K/UL (ref 4.8–10.8)

## 2020-01-21 ENCOUNTER — OFFICE VISIT (OUTPATIENT)
Dept: FAMILY MEDICINE CLINIC | Age: 65
End: 2020-01-21
Payer: COMMERCIAL

## 2020-01-21 VITALS
BODY MASS INDEX: 32.74 KG/M2 | RESPIRATION RATE: 20 BRPM | SYSTOLIC BLOOD PRESSURE: 122 MMHG | OXYGEN SATURATION: 97 % | HEIGHT: 73 IN | WEIGHT: 247 LBS | DIASTOLIC BLOOD PRESSURE: 84 MMHG | HEART RATE: 76 BPM | TEMPERATURE: 97.8 F

## 2020-01-21 PROCEDURE — 99214 OFFICE O/P EST MOD 30 MIN: CPT | Performed by: FAMILY MEDICINE

## 2020-01-21 RX ORDER — PANTOPRAZOLE SODIUM 40 MG/1
40 TABLET, DELAYED RELEASE ORAL
Qty: 180 TABLET | Refills: 1 | Status: SHIPPED | OUTPATIENT
Start: 2020-01-21 | End: 2020-07-08

## 2020-01-21 RX ORDER — FERROUS SULFATE 325(65) MG
325 TABLET ORAL
Qty: 90 TABLET | Refills: 1 | Status: SHIPPED | OUTPATIENT
Start: 2020-01-21 | End: 2020-07-08

## 2020-01-21 ASSESSMENT — ENCOUNTER SYMPTOMS
DIARRHEA: 0
BACK PAIN: 0
VOMITING: 0
EYE PAIN: 0
SORE THROAT: 0
COUGH: 0
EYE DISCHARGE: 0
WHEEZING: 0
ABDOMINAL PAIN: 0
RHINORRHEA: 0
SHORTNESS OF BREATH: 0
CONSTIPATION: 0
NAUSEA: 0

## 2020-01-21 ASSESSMENT — PATIENT HEALTH QUESTIONNAIRE - PHQ9
SUM OF ALL RESPONSES TO PHQ9 QUESTIONS 1 & 2: 1
SUM OF ALL RESPONSES TO PHQ QUESTIONS 1-9: 1
2. FEELING DOWN, DEPRESSED OR HOPELESS: 0
1. LITTLE INTEREST OR PLEASURE IN DOING THINGS: 1
SUM OF ALL RESPONSES TO PHQ QUESTIONS 1-9: 1

## 2020-01-21 NOTE — PROGRESS NOTES
Lopez Tovar is a 75 y.o. male who presents today for   Chief Complaint   Patient presents with    6 Month Follow-Up    Diabetes    Rash     rash on left ankle       Chief Complaint: Follow-up    HPI  Patient has a history of type 2 diabetes is doing well with his current medications. Denies any issues with use of his medicine and reports that things seem to be going well at this time with his diet and has not had any complications from his diabetes at this time. He does report that he recent went through an esophagogastroduodenoscopy with Dr. Lalit Guerrero and some raw spots were noted in his stomach. He was instructed to hold his Eliquis and has been doing so with his cardiologist opal. At this time the instructions were for him to reinitiate the Eliquis approximately 3 weeks following the esophagogastroduodenoscopy and he is to continue the Protonix but the Carafate has been discontinued. He does have a history of arterial hypertension is doing well with his current medications. He denies any issues with use of his medicine. He denies any symptoms of abnormal blood pressures. He does attempt avoid excess salt intake. He is immunosuppressed from his antirejection medicine following his kidney transplant and does state that he has follow-up with [facility] in the next few months and is going to have labs performed with them is going to get us a copy to attempt to avoid duplication of lab work. He states that everything seems to be going well at this time and denies any problems that he is aware of with his kidney transplant. He does report that he is noticed a rash on his left ankle since about Sunday. He states that he is not been do anything or attempting anything in efforts to improve the rash and denies any specific new products or other changes that are contributing to the involved area. Review of Systems   Constitutional: Negative for activity change, appetite change and fever.    HENT: Negative for congestion, rhinorrhea and sore throat. Eyes: Negative for pain and discharge. Respiratory: Negative for cough, shortness of breath and wheezing. Cardiovascular: Negative for chest pain and palpitations. Gastrointestinal: Negative for abdominal pain, constipation, diarrhea, nausea and vomiting. Endocrine: Negative for cold intolerance and heat intolerance. Genitourinary: Negative for dysuria and hematuria. Musculoskeletal: Negative for back pain, gait problem and neck pain. Skin: Positive for rash. Negative for wound. Neurological: Negative for syncope and weakness. Hematological: Negative for adenopathy. Does not bruise/bleed easily. Psychiatric/Behavioral: Negative for dysphoric mood and sleep disturbance. The patient is not nervous/anxious. Past Medical History:   Diagnosis Date    Arthritis     Bleeding ulcer 11/2019    CH[facility] (congestive [facility] failure) [facility]     Diabetes mellitus ([facility]     Ex-cigarette smoker 7/17/2016    History of gastric ulcer 2009    Hypertension     hx. 10 years ago    Kidney disease, chronic, end stage on dialysis [facility]) 2006 2007, dialysis for 3 years, then had a kidney transplant,    Obese     Vision problem     wears glasses       Current Outpatient Medications   Medication Sig Dispense Refill    empagliflozin (JARDIANCE) 10 MG tablet Take 1 tablet by mouth daily 90 tablet 3    pantoprazole (PROTONIX) 40 MG tablet Take 1 tablet by mouth 2 times daily (before meals) 180 tablet 1    ferrous sulfate 325 (65 Fe) MG tablet Take 1 tablet by mouth daily (with breakfast) 90 tablet 1    blood glucose monitor strips Test one time a day & as needed for symptoms of irregular blood glucose.  100 strip 5    propranolol (INDERAL) 40 MG tablet TAKE 1 TABLET BY MOUTH 2 TIMES DAILY BEFORE MEALS 180 tablet 1    vitamin D (CHOLECALCIFEROL) 62720 UNIT CAPS Take 50,000 Units by mouth once a week       tamsulosin (FLOMAX) 0.4 MG capsule Take 1 capsule by mouth daily 90 capsule 3    metFORMIN (GLUCOPHAGE) 1000 MG tablet Take 1 tablet by mouth 2 times daily (with meals) 180 tablet 3    Tacrolimus (ENVARSUS XR) 1 MG TB24 Take 2 mg by mouth every morning Indications: on an empty stomach       mycophenolate (CELLCEPT) 250 MG capsule Take 500 mg by mouth 2 times daily      Lancets MISC Use to check blood sugar daily Dx E11.9 100 each 0    glucose blood VI test strips (ACCU-CHEK ANANT) strip Contour strips,check daily E11.9 100 each 0     No current facility-administered medications for this visit.          No Known Allergies    Past Surgical History:   Procedure Laterality Date    CHOLECYSTECTOMY      COLONOSCOPY  06/23/2017    Dr Ronda St: Diverticulosis, internal hemorrhoids, 5yr recall    COLONOSCOPY  01/05/2012    Dr Viji Coello, 5 yr recall    DIALYSIS FISTULA CREATION  [place] 2007    left arm radial cephalic    HERNIA REPAIR      KIDNEY TRANSPLANT      2/18/2010 in [facility] Right 2/23/2018    AV FISTULA GRAFT REMOVAL performed by Marely Gil MD at [address] [place] [address] TUNNELED VENOUS CATHETER PLACEMENT  multiple    UPPER GASTROINTESTINAL ENDOSCOPY N/A 11/4/2019    Dr Dov Monzon (Dr Ronda St pt)1.2 cm superficial gastric ulcer in the antrum with surrounding moderate gastritis and oozing of blood    UPPER GASTROINTESTINAL ENDOSCOPY  10/05/2009    Dr Loretta Summers w/resolution of ulcer, lenin +    UPPER GASTROINTESTINAL ENDOSCOPY  07/10/2009    Dr Cezar Finley ulceration w/pigmented base on posterior wall of the body, small erosions, active gastritis    UPPER GASTROINTESTINAL ENDOSCOPY N/A 1/10/2020    Dr Hays Safe hiatal hernia, healed previous antral ulcer-Gastritis    VASCULAR SURGERY Right [place] 2008    av loop graft       Social History     Tobacco Use    Smoking status: Former Smoker     Packs/day: 2.00     Years: 4.00     Pack years: 8.00     Last attempt to quit: 2/19/1973 answered and patient voiced understanding and agreement with plan as discussed. No orders of the defined types were placed in this encounter. Orders Placed This Encounter   Medications    empagliflozin (JARDIANCE) 10 MG tablet     Sig: Take 1 tablet by mouth daily     Dispense:  90 tablet     Refill:  3    pantoprazole (PROTONIX) 40 MG tablet     Sig: Take 1 tablet by mouth 2 times daily (before meals)     Dispense:  180 tablet     Refill:  1    ferrous sulfate 325 (65 Fe) MG tablet     Sig: Take 1 tablet by mouth daily (with breakfast)     Dispense:  90 tablet     Refill:  1     Medications Discontinued During This Encounter   Medication Reason    sucralfate (CARAFATE) 1 GM tablet     pantoprazole (PROTONIX) 40 MG tablet     empagliflozin (JARDIANCE) 10 MG tablet REORDER    pantoprazole (PROTONIX) 40 MG tablet REORDER    ferrous sulfate 325 (65 Fe) MG tablet REORDER     Patient Instructions   Patient given educational handouts and has had all questions answered. Patient voices understanding and agrees to plans along with risks and benefits of plan. Patient is instructed to continue prior meds,diet, and exercise plans as instructed. Patient agrees to follow up as instructed and sooner if needed. Patient agrees to go to ER if condition becomes emergent. Return in about 6 months (around 7/21/2020), or if symptoms worsen or fail to improve.

## 2020-01-22 NOTE — PATIENT INSTRUCTIONS
numbers will appear on the screen. · Write your numbers in your log book, along with the date and time. Manual blood pressure monitors  · Place the earpieces of a stethoscope in your ears, and place the bell of the stethoscope over the artery, just below the cuff. · Close the valve on the rubber inflating bulb. · Squeeze the bulb rapidly with your opposite hand to inflate the cuff until the dial or column of mercury reads about 30 mm Hg higher than your usual systolic pressure. If you do not know your usual pressure, inflate the cuff to 210 mm Hg or until the pulse at your wrist disappears. · Open the pressure valve just slightly by twisting or pressing the valve on the bulb. · As you watch the pressure slowly fall, note the level on the dial at which you first start to hear a pulsing or tapping sound through the stethoscope. This is your systolic blood pressure. · Continue letting the air out slowly. The sounds will become muffled and will finally disappear. Note the pressure when the sounds completely disappear. This is your diastolic blood pressure. Let out all the remaining air. · Write your numbers in your log book, along with the date and time. What else should you know about the test?  It is more accurate to take the average of several readings made throughout the day than to rely on a single reading. It's normal for blood pressure to go up and down throughout the day. Follow-up care is a key part of your treatment and safety. Be sure to make and go to all appointments, and call your doctor if you are having problems. It's also a good idea to keep a list of the medicines you take. Where can you learn more? Go to [email or web address] and sign in to your [email or web address] account. Enter C427 in the [employer] box to learn more about \"Home Blood Pressure Test: About This Test.\"     If you do not have an account, please click on the \"Sign Up Now\" link.   Current as of: April 9,

## 2020-04-07 RX ORDER — TAMSULOSIN HYDROCHLORIDE 0.4 MG/1
CAPSULE ORAL
Qty: 90 CAPSULE | Refills: 3 | Status: SHIPPED | OUTPATIENT
Start: 2020-04-07 | End: 2021-01-21 | Stop reason: SDUPTHER

## 2020-05-12 RX ORDER — ERGOCALCIFEROL 1.25 MG/1
CAPSULE ORAL
Qty: 12 CAPSULE | Refills: 1 | Status: SHIPPED | OUTPATIENT
Start: 2020-05-12 | End: 2020-10-06

## 2020-06-09 ENCOUNTER — VIRTUAL VISIT (OUTPATIENT)
Dept: FAMILY MEDICINE CLINIC | Age: 65
End: 2020-06-09
Payer: MEDICARE

## 2020-06-09 VITALS
HEIGHT: 73 IN | HEART RATE: 83 BPM | SYSTOLIC BLOOD PRESSURE: 134 MMHG | WEIGHT: 250 LBS | DIASTOLIC BLOOD PRESSURE: 74 MMHG | BODY MASS INDEX: 33.13 KG/M2

## 2020-06-09 PROCEDURE — G0438 PPPS, INITIAL VISIT: HCPCS | Performed by: FAMILY MEDICINE

## 2020-06-09 RX ORDER — OMEGA-3 FATTY ACIDS CAP DELAYED RELEASE 1000 MG 1000 MG
1000 CAPSULE DELAYED RELEASE ORAL 2 TIMES DAILY
COMMUNITY

## 2020-06-09 ASSESSMENT — PATIENT HEALTH QUESTIONNAIRE - PHQ9
SUM OF ALL RESPONSES TO PHQ QUESTIONS 1-9: 0
SUM OF ALL RESPONSES TO PHQ QUESTIONS 1-9: 0

## 2020-06-09 ASSESSMENT — LIFESTYLE VARIABLES: HOW OFTEN DO YOU HAVE A DRINK CONTAINING ALCOHOL: 0

## 2020-06-09 NOTE — PATIENT INSTRUCTIONS
Personalized Preventive Plan for Stacey [place] - 6/9/2020  Medicare offers a range of preventive health benefits. Some of the tests and screenings are paid in full while other may be subject to a deductible, co-insurance, and/or copay. Some of these benefits include a comprehensive review of your medical history including lifestyle, illnesses that may run in your family, and various assessments and screenings as appropriate. After reviewing your medical record and screening and assessments performed today your provider may have ordered immunizations, labs, imaging, and/or referrals for you. A list of these orders (if applicable) as well as your Preventive Care list are included within your After Visit Summary for your review. Other Preventive Recommendations:    · A preventive eye exam performed by an eye specialist is recommended every 1-2 years to screen for glaucoma; cataracts, macular degeneration, and other eye disorders. · A preventive dental visit is recommended every 6 months. · Try to get at least 150 minutes of exercise per week or 10,000 steps per day on a pedometer . · Order or download the FREE \"Exercise & Physical Activity: Your Everyday Guide\" from [employer]. Call 4-264.779.8478 or search [employer] online. · You need 9695-5180 mg of calcium and 6329-1551 IU of vitamin D per day. It is possible to meet your calcium requirement with diet alone, but a vitamin D supplement is usually necessary to meet this goal.  · When exposed to the sun, use a sunscreen that protects against both UVA and UVB radiation with an SPF of 30 or greater. Reapply every 2 to 3 hours or after sweating, drying off with a towel, or swimming. · Always wear a seat belt when traveling in a car. Always wear a helmet when riding a bicycle or motorcycle.   Patient information: Weight loss treatments    INTRODUCTION -- Obesity is a major international problem, and Americans are 29.9 and have other medical problems, such as diabetes, high cholesterol, or high blood pressure  Two weight loss medicines are approved in the [place] for long-term use. These are sibutramine and orlistat. Other weight loss medicines (phentermine, diethylpropion) are available but are only approved for short-term use (up to 12 weeks). Sibutramine -- Sibutramine (Meridia®, Reductil®) is a medicine that reduces your appetite. In people who take the medicine for one year, the average weight loss is 10 percent of the initial body weight (5 percent more than those who took a placebo treatment). Side effects of sibutramine include insomnia, dry mouth, and constipation. Increases in blood pressure can occur. Therefore, blood pressure is usually monitored during treatment. There is no evidence that sibutramine causes heart or lung problems (like dexfenfluramine and fenfluramine (Phen/Fen)). However, experts agree that sibutramine should not used by people with coronary heart disease, heart failure, uncontrolled hypertension, stroke, irregular heart rhythms, or peripheral vascular disease (poor circulation in the legs). Orlistat -- Orlistat (Xenical® 120 mg capsules) is a medicine that reduces the amount of fat your body absorbs from the foods you eat. A lower-dose version is now available without a prescription (Javon® 60 mg capsules) in many countries, including the [place]. The medicine is recommended three times per day, taken with a meal; you can skip a dose if you skip a meal or if the meal contains no fat. After one year of treatment with orlistat, the average weight loss is approximately 8 to 10 percent of initial body weight (4 percent more than in those who took a placebo). Cholesterol levels often improve, and blood pressure sometimes falls. In people with diabetes, orlistat may help control blood sugar levels.   Side effects occur in 15 to 10 percent of people and may include stomach cramps, gas, stretch, allowing you to eat more food. The body absorbs fewer calories, since food bypasses most of the stomach as well as the upper small intestine. This new arrangement seems to decrease your appetite and change how you break down foods by changing the release of various hormones. Gastric bypass can be performed as open surgery (through an incision on the abdomen) or laparoscopically, which uses smaller incisions and smaller instruments. Both the laparoscopic and open techniques have risks and benefits. You and your surgeon should work together to decide which surgery, if any, is right for you. Gastric bypass has a high success rate, and people lose an average of 62 to 68 percent of their excess body weight in the first year. Weight loss typically levels off after one to two years, with an overall excess weight loss between 50 and 75 percent. For a person who is 120 pounds overweight, an average of 60 to 90 pounds of weight loss would be expected. Gastric sleeve -- Gastric sleeve, also known as sleeve gastrectomy, is a surgery that reduces the size of the stomach and makes it into a narrow tube (figure 3). The new stomach is much smaller and produces less of the hormone (ghrelin) that causes hunger, helping you feel satisfied with less food. Sleeve gastrectomy is safer than gastric bypass because the intestines are not rearranged, and there is less chance of malnutrition. It also appears to control hunger better than lap banding. It might be safer than the lap banding because no foreign materials are used. The gastric sleeve has a good success rate, and people lose an average of 33 percent of their excess body weight in the first year. For a person who is 120 pounds overweight, this would mean losing about 40 pounds in the first year. WEIGHT LOSS SURGERY COMPLICATIONS -- A variety of complications can occur with weight loss surgery.  The risks of surgery depend upon which surgery you have and any medical your doctor, nurse, and dietitian on a regular basis after surgery to monitor your health, diet, and weight loss. You will be able to slowly increase how much you eat over time, although it will always be important to:  Eat small, frequent meals and not skip meals   Chew your food slowly and completely   Avoid eating while \"distracted\" (such as eating while watching TV)   Stop eating when you feel full   Drink liquids at least 30 minutes before or after eating   Avoid foods high in fat or sugar   Take vitamin supplements, as recommended  It can take several months to learn to listen to your body so that you know when you are hungry and when you are full. You may dislike foods you previously loved, and you may begin to prefer new foods. This can be a frustrating process for some people, so talk to your dietitian if you are having trouble. It usually takes between one and two years to lose weight after surgery. After reaching their goal weight, some people have plastic surgery (called \"body contouring\") to remove excess skin from the body, particularly in the abdominal area. Before you decide to have weight loss surgery, you must commit to staying healthy for life. This includes following up with your healthcare team, exercising most days of the week, and eating a sensible diet every day. It can be difficult to develop new eating and exercise habits after weight loss surgery, and you will have to work hard to stick to your goals. Recovering from surgery and losing weight can be stressful and emotional, and it is important to have the support of family and friends. Working with a , therapist, or support group can help you through the ups and downs. WHERE TO GET MORE INFORMATION -- Your healthcare provider is the best source of information for questions and concerns related to your medical problem.   This article will be updated as needed every four months on our Web site complicated or your family can't agree on what should be in your living will. You can change your living will at any time. Some people find that their wishes about end-of-life care change as their health changes. If you make big changes to your living will, complete a new form. If you move to another state, make sure that your living will is legal in the state where you now live. In most cases, doctors will respect your wishes even if you have a form from a different state. You might use a universal form that has been approved by many states. This kind of form can sometimes be filled out and stored online. Your digital copy will then be available wherever you have a connection to the internet. The doctors and nurses who need to treat you can find it right away. Your state may offer an online registry. This is another place where you can store your living will online. It's a good idea to get your living will notarized. This means using a person called a [employer] to watch two people sign, or witness, your living will. What should you know when you create a living will? Here are some questions to ask yourself as you make your living will:  Do you know enough about life support methods that might be used? If not, talk to your doctor so you know what might be done if you can't breathe on your own, your heart stops, or you can't swallow. What things would you still want to be able to do after you receive life-support methods? Would you want to be able to walk? To speak? To eat on your own? To live without the help of machines? Do you want certain Congregational practices performed if you become very ill? If you have a choice, where do you want to be cared for? In your home? At a hospital or nursing home? If you have a choice at the end of your life, where would you prefer to die? At home? In a hospital or nursing home? Somewhere else? Would you prefer to be buried or cremated?   Do you want your organs to be donated after you die? What should you do with your living will? Make sure that your family members and your health care agent have copies of your living will (also called a declaration). Give your doctor a copy of your living will. Ask him or her to keep it as part of your medical record. If you have more than one doctor, make sure that each one has a copy. Put a copy of your living will where it can be easily found. For example, some people may put a copy on their refrigerator door. If you are using a digital copy, be sure your doctor, family members, and health care agent know how to find and access it. Where can you learn more? Go to [email or web address] and sign in to your [employer] account. Enter F348 in the "[employer]" box to learn more about \"Learning About Living [place]. \"     If you do not have an account, please click on the \"Sign Up Now\" link. Current as of: December 9, 2019               Content Version: 12.5  © 2625-4575 [employer], Inc[employer]. Care instructions adapted under license by [facility] ([facility]). If you have questions about a medical condition or this instruction, always ask your healthcare professional. [place] [address] any warranty or liability for your use of this information.     ·

## 2020-06-09 NOTE — PROGRESS NOTES
performed the documented evaluation under the direct supervision of the attending physician. Robin Cassidy is a 75 y.o. male evaluated via telephone on 6/9/2020. Consent:  He and/or health care decision maker is aware that that he may receive a bill for this telephone service, depending on his insurance coverage, and has provided verbal consent to proceed: Yes      Documentation:  I communicated with the patient and/or health care decision maker about AWV. Details of this discussion including any medical advice provided:       I affirm this is a Patient Initiated Episode with a Patient who has not had a related appointment within my department in the past 7 days or scheduled within the next 24 hours.     Patient identification was verified at the start of the visit: Yes    Total Time: minutes: 21-30 minutes    Note: not billable if this call serves to triage the patient into an appointment for the relevant concern      Onehermesa Flatten

## 2020-07-02 RX ORDER — PROPRANOLOL HYDROCHLORIDE 40 MG/1
TABLET ORAL
Qty: 180 TABLET | Refills: 1 | Status: SHIPPED | OUTPATIENT
Start: 2020-07-02 | End: 2020-12-29

## 2020-07-08 ENCOUNTER — OFFICE VISIT (OUTPATIENT)
Dept: CARDIOLOGY | Age: 65
End: 2020-07-08
Payer: MEDICARE

## 2020-07-08 VITALS
SYSTOLIC BLOOD PRESSURE: 110 MMHG | HEIGHT: 73 IN | DIASTOLIC BLOOD PRESSURE: 60 MMHG | WEIGHT: 256 LBS | HEART RATE: 110 BPM | BODY MASS INDEX: 33.93 KG/M2

## 2020-07-08 PROBLEM — I51.7 LVH (LEFT VENTRICULAR HYPERTROPHY): Status: ACTIVE | Noted: 2020-07-08

## 2020-07-08 PROCEDURE — 93000 ELECTROCARDIOGRAM COMPLETE: CPT | Performed by: NURSE PRACTITIONER

## 2020-07-08 PROCEDURE — 99214 OFFICE O/P EST MOD 30 MIN: CPT | Performed by: NURSE PRACTITIONER

## 2020-07-08 NOTE — PROGRESS NOTES
hernia, healed previous antral ulcer-Gastritis    VASCULAR SURGERY [place]     av loop graft     Family History   Problem Relation Age of Onset    Kidney Disease Father     Heart Disease Father     Colon Cancer Neg Hx     Colon Polyps Neg Hx      Social History     Tobacco Use    Smoking status: Former Smoker     Packs/day: 2.00     Years: 4.00     Pack years: 8.00     Last attempt to quit: 1973     Years since quittin.4    Smokeless tobacco: Never Used   Substance Use Topics    Alcohol use: No      Current Outpatient Medications   Medication Sig Dispense Refill    propranolol (INDERAL) 40 MG tablet TAKE 1 TABLET BY MOUTH 2 TIMES DAILY BEFORE MEALS 180 tablet 1    SODIUM BICARBONATE, ANTACID, PO Take by mouth 2 times daily       Omega-3 Fatty Acids (FISH OIL) 1000 MG CPDR Take 3,000 mg by mouth 2 times daily      vitamin D (ERGOCALCIFEROL) 1.25 MG (37919 UT) CAPS capsule TAKE 1 CAPSULE BY MOUTH ONE TIME PER WEEK 12 capsule 1    metFORMIN (GLUCOPHAGE) 1000 MG tablet TAKE 1 TABLET BY MOUTH TWICE A DAY WITH MEALS 180 tablet 3    tamsulosin (FLOMAX) 0.4 MG capsule TAKE 1 CAPSULE BY MOUTH EVERY DAY 90 capsule 3    empagliflozin (JARDIANCE) 10 MG tablet Take 1 tablet by mouth daily 90 tablet 3    blood glucose monitor strips Test one time a day & as needed for symptoms of irregular blood glucose. 100 strip 5    Tacrolimus (ENVARSUS XR) 1 MG TB24 Take 2 mg by mouth every morning Indications: on an empty stomach       mycophenolate (CELLCEPT) 250 MG capsule Take 500 mg by mouth 2 times daily      Lancets MISC Use to check blood sugar daily Dx E11.9 100 each 0    glucose blood VI test strips (ACCU-CHEK ANANT) strip Contour strips,check daily E11.9 100 each 0     No current facility-administered medications for this visit. Allergies: Patient has no known allergies. Review of Systems  Constitutional - no appetite change, or unexpected weight change.  No fever, chills or rhonchi. Cardiovascular - No jugular venous distention. Auscultation reveals irregularly irregular rate and rhythm. No audible clicks, gallop or rub. No murmur. No lower extremity varicosities. No carotid bruits. Abdominal -  No visible distention, mass or pulsations. Extremities - No clubbing or cyanosis. No statis dermatitis or ulcers. No edema. Musculoskeletal -   No Osler's nodes. No kyphosis or scoliosis. Gait is even and regular without limp or shuffle. Ambulates without assistance. Skin -  Warm and dry; no rash or pallor. No new surgical wound. Neurological - No focal neurological deficits. Thought processes coherent. No apparent tremor. Oriented to person, place and time. Psychiatric -  Appropriate affect and mood. Assessment:     Diagnosis Orders   1. PAF (paroxysmal atrial fibrillation) ([facility])  EKG 12 lead   2. Essential hypertension     3. LVH (left ventricular hypertrophy)       [id number] Score: 3  Disclaimer: Risk Score calculation is dependent on accuracy of patient problem list and past encounter diagnosis.     Data reviewed:  Lab Results   Component Value Date    WBC 10.7 01/16/2020    HGB 14.7 01/16/2020    HCT 48.7 01/16/2020    MCV 87.6 01/16/2020     01/16/2020     Lab Results   Component Value Date     11/06/2019    K 3.6 11/06/2019     11/06/2019    CO2 22 11/06/2019    BUN 9 11/06/2019    CREATININE 1.0 11/06/2019    GLUCOSE 145 (H) 11/06/2019    CALCIUM 8.2 (L) 11/06/2019    PROT 5.1 (L) 11/05/2019    LABALBU 3.2 (L) 11/05/2019    BILITOT 0.9 11/05/2019    ALKPHOS 52 11/05/2019    AST 17 11/05/2019    ALT 12 11/05/2019    LABGLOM >60 11/06/2019    GLOB 2.7 03/22/2017       Lab Results   Component Value Date    CHOL 116 (L) 07/11/2019    CHOL 134 (L) 02/13/2019    CHOL 122 (L) 09/22/2017     Lab Results   Component Value Date    TRIG 199 (H) 07/11/2019    TRIG 130 02/13/2019    TRIG 115 (L) 09/22/2017     Lab Results   Component Value Date HDL 25 (L) 07/11/2019    HDL 28 (L) 02/13/2019    HDL 32 (L) 09/22/2017     Lab Results   Component Value Date    LDLCALC 51 07/11/2019    [address] 02/13/2019    LDLCALC 67 09/22/2017     1/10/20 EGD  \"Stomach:  abnormal:  mucosal changes with patchy erythema and 3 small 3-5 mm erosions without any stigmata suggestive of persistent or residual gastritis noted; the antral ulcer noted on previous EGD in 11/19 appears to have healed but no obvious scar was seen -  Gastric biopsies were taken from the antrum  to rule out Helicobacter pylori infection. \"    3/2/18 Lexiscan  Impression   1. Negative Lexiscan for the presence of ischemia or infarction. 2. The left ventricular ejection fraction is 56%. Signed by Dr Helen Reeves on 3/2/2018 12:33 PM     2/22/18 echo   Summary   Mild tricuspid regurgitation. RVSP = 45 mmHg   Normal left ventricular size and function. Moderate concentric left ventricular hypertrophy. Signature    ----------------------------------------------------------------   Electronically signed by Hang Stringer MD(Interpreting   physician) on 02/28/2018 05:55 PM   ----------------------------------------------------------------   Findings    Mitral Valve   Mitral valve leaflets are mildly thickened with preserved leaflet   mobility. Calcification of the mitral valve noted. Mild mitral regurgitation is present. Aortic Valve   Mildly thickened aortic valve leaflets with preserved leaflet mobility. Aortic valve appears to be tricuspid. Tricuspid Valve   Mild tricuspid regurgitation. RVSP = 45 mmHg      Pulmonic Valve   Trace pulmonic regurgitation present. Left Atrium   Severely dilated left atrium. Left Ventricle   Normal left ventricular size and function. Moderate concentric left ventricular hypertrophy. Right Atrium   Mildly enlarged right atrium size. Right Ventricle   Normal right ventricular size with preserved RV function.       Pericardial Effusion   No evidence of significant pericardial effusion is noted. Miscellaneous   IVC dilated. EKG reviewed:   BPM; no acute ischemic changes or ectopy. Recurrent AF - not on on [address] at this time. Dr. Gianna Ramirez note form 1/2020 recommended that Eliquis be resumed after ulcer healed. Per 1/10/20 EGD report antral ulcer healed from 11/2019. No current report of bleeding issues. Resume Eliquis 5 mg BID. (Age 75, Creatinine 1.0, 116 kg weight). Plan to inform Dr. Sussy Esparza, gastroenterologist.  Plan for cardioversion in 6 weeks. HTN - normatensive on current regimen. Patient is compliant with medication regimen. BP Readings from Last 3 Encounters:   07/08/20 110/60   06/09/20 134/74   01/21/20 122/84    Pulse Readings from Last 3 Encounters:   07/08/20 110   06/09/20 83   01/21/20 76        Wt Readings from Last 3 Encounters:   07/08/20 256 lb (116.1 kg)   06/09/20 250 lb (113.4 kg)   01/21/20 247 lb (112 kg)     Plan  Previous cardiac history and records reviewed. Continue current medical management. Start back Eliquis 5 mg (1) tab BID. Plan for DCCV in 6 weeks. Continue other current medications as directed. Continue to follow up with primary care provider for non cardiac medical problems. Call the office with any problems, questions or concerns at [phone]. Cardiology follow up: one month. Educational included in patient instructions. Heart health. AF. Eliquis.      Amanda Wright, [facility]

## 2020-07-08 NOTE — PATIENT INSTRUCTIONS
New instructions for today:  Start back on Eliquis 5 mg (1) tab twice daily. Report any bright red or tarry stools. If you continue to be out of rhythm, you will need to be back on Eliquis for 6 weeks before proceeding with a cardioversion by Dr. Nilda Mike. Eliquis can increase your risk of bleeding. If you notice blood in urine or stool, bleeding gums, excessive bruising or cough productive of bloody sputum, notify the office. Information on this blood thinner has been included in your after visit summary. Avoid aspirin and all anti-inflammatories. Patient Instructions:  Continue current medications as prescribed. Always keep a current medication list. Bring your medications to every office visit. Continue to follow up with primary care provider for non cardiac medical problems. Call the office with any problems, questions or concerns at [phone]. If you have been asked to keep a blood pressure log, do so for 2 weeks. Call the office to report readings to the triage nurse at [phone]. Follow up with cardiologist as scheduled. The following educational material has been included in this after visit summary for your review: Life simple 7. Atrial fibrillation. Eliquis. Life simple 7  1) Manage blood pressure - high blood pressure is a major risk factor for heart disease and stroke. Keeping blood pressure in health range reduces strain on your heart, arteries and kidneys. Blood pressure goal is less than 130/80. 2) Control cholesterol - contributes to plaque, which can clog arteries and lead to heart disease and stroke. When you control your cholesterol you are giving your arteries their best chance to remain clear. It is recommended that you get cholesterol lab work done once a year. 3) Reduce blood sugar - most of the food we eat is turning into glucose or blood sugar that our body uses for energy.   Over time, high levels of blood sugar can damage your heart, kidneys, eyes and nerves. 4) Get active - living an active life is one of the most rewarding gifts you can give yourself and those you love. Simply put, daily physical activity increases your length and quality of life. Strive to exercise 15 minutes most days of the week. 5)  Eat better - A healthy diet is one of your best weapons for fighting cardiovascular disease. When you eat a heart healthy diet, you improve your chances for feeling good and staying healthy for life. 6)  Lose weight - when you shed extra fat an unnecessary pounds, you reduce the burden on your hear, lungs, blood vessels and skeleton. You give yourself the gift of active living, you lower your blood pressure and help yourself feel better. 7) Stop smoking - cigarette smokers have a higher risk of developing cardiovascular disease. If  You smoke, quitting is the best thing you can do for your health. Check American Heart Association on line for more information on Life's Simple 7 and tips for healthy living. Patient Education        Learning About Atrial Fibrillation  What is atrial fibrillation? Atrial fibrillation (say \"AY-tree-sil iyl-fhty-ZIW-shun\") is the most common type of irregular heartbeat (arrhythmia). Normally, the heart beats in a strong, steady rhythm. In atrial fibrillation, a problem with the heart's electrical system causes the two upper parts of the heart (the atria) to quiver, or fibrillate. Your heart rate also may be faster than normal.  Atrial fibrillation can be dangerous because if the heartbeat isn't strong and steady, blood can collect, or pool, in the atria. And pooled blood is more likely to form clots. Clots can travel to the brain, block blood flow, and cause a stroke. Atrial fibrillation can also lead to heart failure. Treatment for atrial fibrillation helps prevent stroke and heart failure. It also helps relieve symptoms. Atrial fibrillation is often caused by another heart problem.  It may happen after heart gums, nosebleeds, heavy menstrual periods or abnormal vaginal bleeding, blood in your urine, bloody or tarry stools, coughing up blood or vomit that looks like coffee grounds, or any bleeding that will not stop. Apixaban can cause a very serious blood clot around your spinal cord if you undergo a spinal tap or receive spinal anesthesia (epidural), especially if you have a genetic spinal defect, if you have a spinal catheter in place, if you have a history of spinal surgery or repeated spinal taps, or if you are also using other drugs that can affect blood clotting. This type of blood clot can lead to long-term or permanent paralysis. Get emergency medical help if you have symptoms of a spinal cord blood clot such as back pain, numbness or muscle weakness in your lower body, or loss of bladder or bowel control. Do not stop taking apixaban unless your doctor tells you to. Stopping suddenly can increase your risk of blood clot or stroke. What is apixaban? Apixaban is used to lower the risk of stroke caused by a blood clot in people with a heart rhythm disorder called atrial fibrillation. Apixaban is also used after hip or knee replacement surgery to prevent a type of blood clot called deep vein thrombosis (DVT), which can lead to blood clots in the lungs (pulmonary embolism). Apixaban is also used to treat DVT or pulmonary embolism (PE), and to lower your risk of having a repeat DVT or PE. Apixaban may also be used for purposes not listed in this medication guide. What should I discuss with my healthcare provider before taking apixaban? You should not take apixaban if you are allergic to it, or if you have active bleeding from a surgery, injury, or other cause. Apixaban may cause you to bleed more easily, especially if you have a bleeding disorder that is inherited or caused by disease.    Tell your doctor if you have an artificial heart valve, or if you have ever had:  · liver or kidney disease;  · if you to stop taking apixaban for a short time. Do not stop taking apixaban unless your doctor tells you to. Stopping suddenly can increase your risk of blood clot or stroke. If you stop taking apixaban for any reason, your doctor may prescribe another medication to prevent blood clots until you start taking apixaban again. Store at room temperature away from moisture and heat. What happens if I miss a dose? Take the missed dose on the same day you remember it. Take your next dose at the regular time and stay on your twice-daily schedule. Do not take two doses at one time. Get your prescription refilled before you run out of medicine completely. What happens if I overdose? Seek emergency medical attention or call the Poison Help line at 1-415.356.4867. What should I avoid while taking apixaban? Avoid activities that may increase your risk of bleeding or injury. Use extra care to prevent bleeding while shaving or brushing your teeth. What are the possible side effects of apixaban? Get emergency medical help if you have signs of an allergic reaction: hives; chest pain, wheezing, difficult breathing; feeling light-headed; swelling of your face, lips, tongue, or throat. Also seek emergency medical attention if you have symptoms of a spinal blood clot: back pain, numbness or muscle weakness in your lower body, or loss of bladder or bowel control. Call your doctor at once if you have:  · easy bruising, unusual bleeding (nose, mouth, vagina, or rectum), bleeding from wounds or needle injections, any bleeding that will not stop;  · heavy menstrual periods;  · headache, dizziness, weakness, feeling like you might pass out;  · urine that looks red, pink, or brown; or  · black or bloody stools, coughing up blood or vomit that looks like coffee grounds. This is not a complete list of side effects and others may occur. Call your doctor for medical advice about side effects.  You may report side effects to FDA at 1-800-FDA-1088. What other drugs will affect apixaban? Sometimes it is not safe to use certain medications at the same time. Some drugs can affect your blood levels of other drugs you take, which may increase side effects or make the medications less effective. Many other drugs (including some over-the-counter medicines) can increase your risk of bleeding or blood clots, or your risk of developing blood clots around the brain or spinal cord during a spinal tap or epidural. It is very important to tell your doctor about all medicines you have recently used, especially:  · any other medicines to treat or prevent blood clots;  · a blood thinner such as heparin or warfarin (Coumadin, Jantoven);  · an antidepressant; or  · an NSAID (nonsteroidal anti-inflammatory drug) used long term. This list is not complete and many other drugs may affect apixaban. This includes prescription and over-the-counter medicines, vitamins, and herbal products. Not all possible drug interactions are listed here. Where can I get more information? Your pharmacist can provide more information about apixaban. Remember, keep this and all other medicines out of the reach of children, never share your medicines with others, and use this medication only for the indication prescribed. Every effort has been made to ensure that the information provided by [address] is accurate, up-to-date, and complete, but no guarantee is made to that effect. Drug information contained herein may be time sensitive. [facility] information has been compiled for use by healthcare practitioners and consumers in the [place] and therefore [facility] does not warrant that uses outside of the [place] are appropriate, unless specifically indicated otherwise. [facility]'s drug information does not endorse drugs, diagnose patients or recommend therapy.  [facility]'s drug information is an informational resource designed to assist licensed healthcare practitioners in caring for their patients and/or to serve consumers viewing this service as a supplement to, and not a substitute for, the expertise, skill, knowledge and judgment of healthcare practitioners. The absence of a warning for a given drug or drug combination in no way should be construed to indicate that the drug or drug combination is safe, effective or appropriate for any given patient. [facility] does not assume any responsibility for any aspect of healthcare administered with the aid of information [facility] provides. The information contained herein is not intended to cover all possible uses, directions, precautions, warnings, drug interactions, allergic reactions, or adverse effects. If you have questions about the drugs you are taking, check with your doctor, nurse or pharmacist.  Copyright 7692-6189 [facility] [place]. Version: 4.01. Revision date: 6/21/2019. Care instructions adapted under license by [facility] ([facility]). If you have questions about a medical condition or this instruction, always ask your healthcare professional. [place] [address] any warranty or liability for your use of this information.

## 2020-07-09 ENCOUNTER — TELEPHONE (OUTPATIENT)
Dept: CARDIOLOGY | Age: 65
End: 2020-07-09

## 2020-07-09 NOTE — TELEPHONE ENCOUNTER
Patient seen in office with recurrent AF. Had ulcer in 11/2019 and Eliquis with stopped due to bleeding. Dr. Maxine Vargas note from 1/2020 recommended that Eliquis be resumed if possible when ulcer healed. Follow up EDG in 1/2020 showed healed antral ulcer with some small erosions. At that time recommended avoidance of ASA and NSAID for 2 weeks. Yesterday Eliquis resumed at 5 mg BID. Plan to confirm with Dr. Emmy bearden to continue Eliquis.   tlm

## 2020-07-10 ENCOUNTER — TELEPHONE (OUTPATIENT)
Dept: GASTROENTEROLOGY | Age: 65
End: 2020-07-10

## 2020-07-10 NOTE — TELEPHONE ENCOUNTER
Findings:   Esophagus: normal; EG junction was at 40 cm. There is a small sliding hiatal hernia present.       Stomach:  abnormal:  mucosal changes with patchy erythema and 3 small 3-5 mm erosions without any stigmata suggestive of persistent or residual gastritis noted; the antral ulcer noted on previous EGD in 11/19 appears to have healed but no obvious scar was seen -  Gastric biopsies were taken from the antrum  to rule out Helicobacter pylori infection. Duodenum: normal        RECOMMENDATIONS:    1. Await path results, the patient will be contacted in 7-10 days with biopsy results. 2.  - Resume previous meds including PPI and diet  - GI clinic f/u PRN   - Keep scheduled f/u appts with other MDs      - NO ASA/NSAIDs x 2 weeks     The results were discussed with the patient and family. A copy of the images obtained were given to the patient.      Jeronimo Schmitz MD  1/10/2020  8:48 AM        The above was the last EGD per  dated 1-10-20. NO FU is scheduled here. Rashmi RN with [facility] called from [phone] wanted to let Lorrayne Section know that Alex Hand wanted the patient back on his Eliquis daily so he had the patient restart this yesterday but said to notify Lorrayne Section and see if he feels this will be ok.  Bryon silverman

## 2020-07-23 DIAGNOSIS — R79.89 ABNORMAL CBC: ICD-10-CM

## 2020-07-23 DIAGNOSIS — D64.9 ANEMIA, UNSPECIFIED TYPE: ICD-10-CM

## 2020-07-23 DIAGNOSIS — E11.65 TYPE 2 DIABETES MELLITUS WITH HYPERGLYCEMIA, WITHOUT LONG-TERM CURRENT USE OF INSULIN (HCC): ICD-10-CM

## 2020-07-23 LAB
ANION GAP SERPL CALCULATED.3IONS-SCNC: 18 MMOL/L (ref 7–19)
BASOPHILS ABSOLUTE: 0.1 K/UL (ref 0–0.2)
BASOPHILS RELATIVE PERCENT: 0.8 % (ref 0–1)
BILIRUBIN URINE: NEGATIVE
BLOOD, URINE: NEGATIVE
BUN BLDV-MCNC: 18 MG/DL (ref 8–23)
CALCIUM SERPL-MCNC: 9.7 MG/DL (ref 8.8–10.2)
CHLORIDE BLD-SCNC: 102 MMOL/L (ref 98–111)
CLARITY: CLEAR
CO2: 17 MMOL/L (ref 22–29)
COLOR: YELLOW
CREAT SERPL-MCNC: 1.1 MG/DL (ref 0.5–1.2)
CREATININE URINE: 112.4 MG/DL (ref 4.2–622)
EOSINOPHILS ABSOLUTE: 0.7 K/UL (ref 0–0.6)
EOSINOPHILS RELATIVE PERCENT: 7.2 % (ref 0–5)
GFR AFRICAN AMERICAN: >59
GFR NON-AFRICAN AMERICAN: >60
GLUCOSE BLD-MCNC: 128 MG/DL (ref 74–109)
GLUCOSE URINE: >=1000 MG/DL
HBA1C MFR BLD: 7.4 % (ref 4–6)
HCT VFR BLD CALC: 51.1 % (ref 42–52)
HEMOGLOBIN: 16.9 G/DL (ref 14–18)
IMMATURE GRANULOCYTES #: 0.1 K/UL
KETONES, URINE: NEGATIVE MG/DL
LEUKOCYTE ESTERASE, URINE: NEGATIVE
LYMPHOCYTES ABSOLUTE: 1.7 K/UL (ref 1.1–4.5)
LYMPHOCYTES RELATIVE PERCENT: 18.4 % (ref 20–40)
MCH RBC QN AUTO: 29.8 PG (ref 27–31)
MCHC RBC AUTO-ENTMCNC: 33.1 G/DL (ref 33–37)
MCV RBC AUTO: 90 FL (ref 80–94)
MICROALBUMIN UR-MCNC: <1.2 MG/DL (ref 0–19)
MICROALBUMIN/CREAT UR-RTO: NORMAL MG/G
MONOCYTES ABSOLUTE: 0.9 K/UL (ref 0–0.9)
MONOCYTES RELATIVE PERCENT: 9.1 % (ref 0–10)
NEUTROPHILS ABSOLUTE: 6 K/UL (ref 1.5–7.5)
NEUTROPHILS RELATIVE PERCENT: 63.4 % (ref 50–65)
NITRITE, URINE: NEGATIVE
PDW BLD-RTO: 13.8 % (ref 11.5–14.5)
PH UA: 5.5 (ref 5–8)
PLATELET # BLD: 261 K/UL (ref 130–400)
PMV BLD AUTO: 10.9 FL (ref 9.4–12.4)
POTASSIUM SERPL-SCNC: 4.3 MMOL/L (ref 3.5–5)
PROTEIN UA: NEGATIVE MG/DL
RBC # BLD: 5.68 M/UL (ref 4.7–6.1)
SODIUM BLD-SCNC: 137 MMOL/L (ref 136–145)
SPECIFIC GRAVITY UA: 1.03 (ref 1–1.03)
UROBILINOGEN, URINE: 0.2 E.U./DL
WBC # BLD: 9.5 K/UL (ref 4.8–10.8)

## 2020-07-27 ENCOUNTER — OFFICE VISIT (OUTPATIENT)
Dept: FAMILY MEDICINE CLINIC | Age: 65
End: 2020-07-27
Payer: MEDICARE

## 2020-07-27 VITALS
TEMPERATURE: 98.2 F | RESPIRATION RATE: 16 BRPM | HEIGHT: 73 IN | WEIGHT: 260 LBS | HEART RATE: 101 BPM | BODY MASS INDEX: 34.46 KG/M2 | DIASTOLIC BLOOD PRESSURE: 74 MMHG | OXYGEN SATURATION: 98 % | SYSTOLIC BLOOD PRESSURE: 106 MMHG

## 2020-07-27 PROCEDURE — 99214 OFFICE O/P EST MOD 30 MIN: CPT | Performed by: FAMILY MEDICINE

## 2020-07-27 PROCEDURE — 3051F HG A1C>EQUAL 7.0%<8.0%: CPT | Performed by: FAMILY MEDICINE

## 2020-07-27 RX ORDER — PANTOPRAZOLE SODIUM 40 MG/1
40 TABLET, DELAYED RELEASE ORAL
Qty: 90 TABLET | Refills: 1 | Status: SHIPPED | OUTPATIENT
Start: 2020-07-27 | End: 2020-11-23

## 2020-07-27 RX ORDER — EMPAGLIFLOZIN 25 MG/1
1 TABLET, FILM COATED ORAL DAILY
Qty: 90 TABLET | Refills: 1 | Status: SHIPPED | OUTPATIENT
Start: 2020-07-27 | End: 2020-12-29

## 2020-07-27 NOTE — PROGRESS NOTES
External ear normal.      Left Ear: External ear normal.      Nose: Nose normal.      Mouth/Throat:      Mouth: Mucous membranes are moist.      Pharynx: Oropharynx is clear. Eyes:      General: No scleral icterus. Right eye: No discharge. Left eye: No discharge. Conjunctiva/sclera: Conjunctivae normal.   Neck:      Musculoskeletal: Neck supple. Thyroid: No thyromegaly. Trachea: No tracheal deviation. Cardiovascular:      Rate and Rhythm: Normal rate. Rhythm irregularly irregular. Heart sounds: Normal heart sounds. No friction rub. No gallop. Pulmonary:      Effort: Pulmonary effort is normal. No respiratory distress. Breath sounds: Normal breath sounds. No wheezing or rales. Abdominal:      General: Bowel sounds are normal. There is no distension. Palpations: Abdomen is soft. Tenderness: There is no abdominal tenderness. Musculoskeletal:         General: No deformity (No gross deformities of upper or lower extremities). Lymphadenopathy:      Cervical: No cervical adenopathy. Skin:     General: Skin is warm and dry. Findings: No erythema or rash. Neurological:      Mental Status: He is alert and oriented to person, place, and time. Cranial Nerves: No cranial nerve deficit. Psychiatric:         Behavior: Behavior normal.         Thought Content: Thought content normal.         Assessment:       ICD-10-CM    1. Type 2 diabetes mellitus without complication, without long-term current use of insulin ([facility])  E11.9 empagliflozin (JARDIANCE) 25 MG tablet     Hemoglobin A1C     Hemoglobin A1C    Discussed importance of DM diet and benefits of exercise. Discussed proper use of medication. Stressed proper foot care and monitoring. Discussed the importance of yearly eye exams. 2. Atrial fibrillation, unspecified type ([facility] [address]  I48.91  discussed concerns with GELY givens and him not being anticoagulated.   Based on his last esophagogastroduodenoscopy the ulcer it healed up and recommended reinitiation of Eliquis at this time. Patient reports that he has a follow-up with cardiology next week and is going to discuss with him further. 3. Gastric ulcer, unspecified chronicity, unspecified whether gastric ulcer hemorrhage or perforation present  K25.9 pantoprazole (PROTONIX) 40 MG tablet    Doing well with current use of Protonix. Will continue to monitor and adjust as course dictates. Plan:  Discussed proper use of medication. Discussed signs and symptoms requiring medical attention. All questions were answered and patient voiced understanding and agreement with plan as discussed. Orders Placed This Encounter   Procedures    Hemoglobin A1C     Standing Status:   Future     Standing Expiration Date:   7/27/2021    Hemoglobin A1C     Standing Status:   Future     Standing Expiration Date:   7/27/2021     Orders Placed This Encounter   Medications    empagliflozin (JARDIANCE) 25 MG tablet     Sig: Take 1 tablet by mouth daily     Dispense:  90 tablet     Refill:  1    pantoprazole (PROTONIX) 40 MG tablet     Sig: Take 1 tablet by mouth every morning (before breakfast)     Dispense:  90 tablet     Refill:  1     Medications Discontinued During This Encounter   Medication Reason    empagliflozin (JARDIANCE) 10 MG tablet      Patient Instructions   Patient given educational handouts and has had all questions answered. Patient voices understanding and agrees to plans along with risks and benefits of plan. Patient is instructed to continue prior meds,diet, and exercise plans as instructed. Patient agrees to follow up as instructed and sooner if needed. Patient agrees to go to ER if condition becomes emergent. Return in about 3 months (around 10/27/2020).

## 2020-08-06 ENCOUNTER — OFFICE VISIT (OUTPATIENT)
Dept: CARDIOLOGY | Age: 65
End: 2020-08-06
Payer: MEDICARE

## 2020-08-06 VITALS
WEIGHT: 263 LBS | OXYGEN SATURATION: 98 % | HEART RATE: 77 BPM | BODY MASS INDEX: 34.85 KG/M2 | DIASTOLIC BLOOD PRESSURE: 70 MMHG | SYSTOLIC BLOOD PRESSURE: 118 MMHG | HEIGHT: 73 IN

## 2020-08-06 PROCEDURE — 99214 OFFICE O/P EST MOD 30 MIN: CPT | Performed by: CLINICAL NURSE SPECIALIST

## 2020-08-06 PROCEDURE — 93000 ELECTROCARDIOGRAM COMPLETE: CPT | Performed by: CLINICAL NURSE SPECIALIST

## 2020-08-06 NOTE — PROGRESS NOTES
[address] [facility] Cardiology  [facility] [address] [address]  Phone: [phone]  Fax: [phone]    OFFICE VISIT:  2020    Paige Mireles - : 1955    Reason For Visit:  Issa Hayward is a 77 y.o. male who is here for Follow-up (pt states he is in AFIB) and Atrial Fibrillation  History of paroxysmal atrial fibrillation, hypertension, diabetes and kidney disease status post kidney transplant  His Eliquis has been discontinued due to previous GI bleeding 2019. Was seen in July by CHRIS Webb. Recurrent atrial fibrillation. Recommended going back on Eliquis as EGD 1/10/2020 showed healed ulcer from previous November. No recurrent bleeding. Eliquis 5 mg was restarted. Information was relayed to gastroenterologist    He started the Eliquis on that Wed. On the following  he had 2 stools with bright red blood and he stopped th Eliquis and the bleeding resolved and he has been in afib but off the Eliquis      Subjective  Issa Hayward denies exertional chest pain  Has some mild ADAMS  No resting shortness of breath, orthopnea, paroxysmal nocturnal dyspnea, syncope, presyncope, arrhythmia, edema and fatigue. The patient denies numbness or weakness to suggest cerebrovascular accident or transient ischemic attack. Xenia Bowman MD is PCP and follows labs .   Lana Turcios has the following history as recorded in [facility]:    Patient Active Problem List    Diagnosis Date Noted    LVH (left ventricular hypertrophy) 2020    History of renal transplant 2019    UGI bleed 2019    PSVT ([facility]oxysmal supraventricular tachycardia) ([facility] 2019    Chronic anticoagulation 2019    Orthostatic dizziness 2019    Daytime somnolence 2018    PAF (paroxysmal atrial fibrillation) ([address] 2018    Fatigue 2018    History of tachycardia 2018    Immunosuppression (HCC)     Atrial fibrillation with RVR ([facility] 2018    Infection of AV graft for dialysis ([facility] 02/23/2018    Cellulitis of right upper extremity     Cellulitis 02/22/2018    Chest pressure 07/17/2016    Diabetes mellitus ([facility] 07/17/2016    Ex-cigarette smoker 07/17/2016    Essential hypertension     Chest pain      Past Medical History:   Diagnosis Date    Arthritis     Bleeding ulcer 11/2019    CHF (congestive heart failure) ([facility]     Diabetes mellitus ([facility]     Ex-cigarette smoker 7/17/2016    History of gastric ulcer 2009    Hypertension     hx.  10 years ago    Kidney disease, chronic, end stage on dialysis [facility]) 2006 2007, dialysis for 3 years, then had a kidney transplant,    Obese     Vision problem     wears glasses     Past Surgical History:   Procedure Laterality Date    CHOLECYSTECTOMY      COLONOSCOPY  06/23/2017    Dr Christiano Miller: Diverticulosis, internal hemorrhoids, 5yr recall    COLONOSCOPY  01/05/2012    Dr Amie Rivera, 5 yr recall    DIALYSIS FISTULA CREATION  [place] 2007    left arm radial cephalic    HERNIA REPAIR      KIDNEY TRANSPLANT      2/18/[address] Right 2/23/2018    AV FISTULA GRAFT REMOVAL performed by Ubaldo Walsh MD at [facility] TUNNELED VENOUS CATHETER PLACEMENT  multiple    UPPER GASTROINTESTINAL ENDOSCOPY N/A 11/4/2019    Dr Jones Aponte (Dr Christiano Miller pt)1.2 cm superficial gastric ulcer in the antrum with surrounding moderate gastritis and oozing of blood    UPPER GASTROINTESTINAL ENDOSCOPY  10/05/2009    Dr Leno Rae w/resolution of ulcer, lenin +    UPPER GASTROINTESTINAL ENDOSCOPY  07/10/2009    Dr Mani Narayan ulceration w/pigmented base on posterior wall of the body, small erosions, active gastritis    UPPER GASTROINTESTINAL ENDOSCOPY N/A 1/10/2020    Dr Hawkins Alamin hiatal hernia, healed previous antral ulcer-Gastritis    VASCULAR SURGERY Right [place] 2008    av loop graft     Family History   Problem Relation Age of Onset    Kidney Disease Father    [place] Heart Disease Father     Colon Cancer Neg Hx     Colon Polyps Neg Hx      Social History     Tobacco Use    Smoking status: Former Smoker     Packs/day: 2.00     Years: 4.00     Pack years: 8.00     Last attempt to quit: 1973     Years since quittin.5    Smokeless tobacco: Never Used   Substance Use Topics    Alcohol use: No      Current Outpatient Medications   Medication Sig Dispense Refill    empagliflozin (JARDIANCE) 25 MG tablet Take 1 tablet by mouth daily 90 tablet 1    pantoprazole (PROTONIX) 40 MG tablet Take 1 tablet by mouth every morning (before breakfast) 90 tablet 1    propranolol (INDERAL) 40 MG tablet TAKE 1 TABLET BY MOUTH 2 TIMES DAILY BEFORE MEALS 180 tablet 1    SODIUM BICARBONATE, ANTACID, PO Take by mouth 2 times daily       Omega-3 Fatty Acids (FISH OIL) 1000 MG CPDR Take 3,000 mg by mouth 2 times daily      vitamin D (ERGOCALCIFEROL) 1.25 MG (78972 UT) CAPS capsule TAKE 1 CAPSULE BY MOUTH ONE TIME PER WEEK 12 capsule 1    metFORMIN (GLUCOPHAGE) 1000 MG tablet TAKE 1 TABLET BY MOUTH TWICE A DAY WITH MEALS 180 tablet 3    tamsulosin (FLOMAX) 0.4 MG capsule TAKE 1 CAPSULE BY MOUTH EVERY DAY 90 capsule 3    blood glucose monitor strips Test one time a day & as needed for symptoms of irregular blood glucose. 100 strip 5    Tacrolimus (ENVARSUS XR) 1 MG TB24 Take 2 mg by mouth every morning Indications: on an empty stomach       mycophenolate (CELLCEPT) 250 MG capsule Take 500 mg by mouth 2 times daily      Lancets MISC Use to check blood sugar daily Dx E11.9 100 each 0    glucose blood VI test strips (ACCU-CHEK ANANT) strip Contour strips,check daily E11.9 100 each 0     No current facility-administered medications for this visit. Allergies: Patient has no known allergies. Review of Systems  Constitutional - no significant activity change, appetite change, or unexpected weight change. No fever, chills or diaphoresis. No fatigue.    HEENT - no significant rhinorrhea or epistaxis. No tinnitus or significant hearing loss. Eyes - no sudden vision change or amaurosis. Respiratory - no significant wheezing, stridor, apnea or cough. No dyspnea on exertion or shortness of breath. Cardiovascular - no exertional chest pain, orthopnea or PND. No sensation of arrhythmia or slow heart rate. No claudication or leg edema. Gastrointestinal - no abdominal swelling or pain. No blood in stool. No severe constipation, diarrhea, nausea, or vomiting. Genitourinary - no difficulty urinating, dysuria, frequency, or urgency. No flank pain or hematuria. Musculoskeletal - no back pain, gait disturbance, or myalgia. Skin - no color change or rash. No pallor. No new surgical incision. Neurologic - no speech difficulty, facial asymmetry or lateralizing weakness. No seizures, presyncope, syncope, or significant dizziness. Hematologic - no easy bruising or excessive bleeding. Psychiatric - no severe anxiety or insomnia. No confusion. All other review of systems are negative. Objective  Vital Signs - /70   Pulse 77   Ht 6' 1\" (1.854 m)   Wt 263 lb (119.3 kg)   SpO2 98%   BMI 34.70 kg/m²   General - Vidya Cobb is alert, cooperative, and pleasant. Well groomed. No acute distress. Body habitus is obese. HEENT - The head is normocephalic. No circumoral cyanosis. Dentition is normal.   EYES -  No Xanthelasma, no arcus senilis, no conjunctival hemorrhages or discharge. Neck - Supple, without increased jugular venous pressures. No carotid bruits. No mass. Respiratory - Lungs are clear bilaterally. No wheezes or rales. Normal effort without use of accessory muscles. Cardiovascular - Heart has irregular rhythm and rate. No murmurs, rubs or gallops. + pedal pulses and no varicosities. Abdominal -  Soft, nontender, nondistended. Bowel sounds are intact. Extremities - No clubbing, cyanosis, or  edema. Musculoskeletal -  No clubbing .   No Osler's nodes. Gait normal .  No kyphosis or scoliosis. Skin -  no statis ulcers or dermatitis. Neurological - No focal signs are identified. Oriented to person, place and time. Psychiatric -  Appropriate affect and mood. Assessment:     Diagnosis Orders   1. PAF (paroxysmal atrial fibrillation) ([facility])  EKG 12 lead   2. Essential hypertension     3. LVH (left ventricular hypertrophy)     4. UGI bleed     5. History of renal transplant       Data:  BP Readings from Last 3 Encounters:   08/06/20 118/70   07/27/20 106/74   07/08/20 110/60    Pulse Readings from Last 3 Encounters:   08/06/20 77   07/27/20 101   07/08/20 110        Wt Readings from Last 3 Encounters:   08/06/20 263 lb (119.3 kg)   07/27/20 260 lb (117.9 kg)   07/08/20 256 lb (116.1 kg)     EKG today shows atrial fibrillation with a rate of 83. Left axis-anterior fascicular block  No change from previous EKG  Cherry scan 3/2/2018 was negative for myocardial ischemia  2D echo at that time showed normal LV size and function with moderate concentric LVH. Mild MR, mildly thickened aortic valve leaflets with preserved mobility. Mild TR with RVSP elevated at 45 mmHg. Developed bright red stools 5 days after starting Eliquis. He stopped the Eliquis and has not been on it since. He has had no strokelike symptoms. Previously bleeding had been from upper GI tract. He is not had any dark coffee-ground-like stools. Suspicious for possibly hemorrhoids although he is never been diagnosed  Unfortunately at this time he is concerned about restarting any anticoagulation. Without anticoagulation would not be able to get him back into sinus rhythm safely. We did discuss the risks of stroke versus bleed.   Could possibly be a candidate for watchman device if he can tolerate short-term anticoagulant  [id number] Score for Atrial Fibrillation Stroke Risk   Risk   Factors  Component Value   C CHF No 0   H HTN Yes 1   A2 Age >= 76 No,  (76 y.o.) 0   D DM Yes 1   S2 Prior Stroke/TIA No 0   V Vascular Disease No 0   A Age 74-69 Yes,  (76 y.o.) 1   Sc Sex male 0    [id number]  Score  3   Score last updated 8/6/20 4:28 PM CDT    Dr Rony Zayas is  Nephrologist at [facility]  Will discuss with DR Mario Mas about anticoagulation and option of Watchman  Watch for any stroke like symptoms   Follow up to be determined  With Dr. Mario Mas   Call with any questions or concerns  Follow up with Bala Pulido MD for non cardiac problems  Report any new problems  Cardiovascular Fitness-Exercise as tolerated. Strive for 30 minutes of exercise most days of the week. Cardiac / Healthy Diet  Continue current medications as directed  Continue plan of treatment  It is always recommended that you bring your medications bottles with you to each visit - this is for your safety! CHRIS Segura dragon/transcription disclaimer: Much of this encounter note is electronic transcription/translation of spoken language to printed tach. Electronic translation of spoken language may be erroneous, or at times, nonsensical words or phrases may be inadvertently transcribed.  Although, I have reviewed the note for such errors, some may still exist.

## 2020-08-14 ASSESSMENT — ENCOUNTER SYMPTOMS
VOMITING: 0
DIARRHEA: 0
EYE DISCHARGE: 0
CONSTIPATION: 0
SHORTNESS OF BREATH: 0
SORE THROAT: 0
ABDOMINAL PAIN: 0
COUGH: 0
WHEEZING: 0
EYE PAIN: 0
NAUSEA: 0
BACK PAIN: 0
RHINORRHEA: 0

## 2020-08-14 NOTE — PATIENT INSTRUCTIONS

## 2020-08-17 ENCOUNTER — OFFICE VISIT (OUTPATIENT)
Dept: CARDIOLOGY | Age: 65
End: 2020-08-17
Payer: MEDICARE

## 2020-08-17 VITALS
SYSTOLIC BLOOD PRESSURE: 116 MMHG | HEART RATE: 98 BPM | DIASTOLIC BLOOD PRESSURE: 74 MMHG | WEIGHT: 260 LBS | BODY MASS INDEX: 34.46 KG/M2 | HEIGHT: 73 IN

## 2020-08-17 PROCEDURE — 99213 OFFICE O/P EST LOW 20 MIN: CPT | Performed by: INTERNAL MEDICINE

## 2020-08-17 NOTE — PROGRESS NOTES
[facility] Cardiology  [facility] [address]  [address]  Phone: [phone]  Fax: [phone]    OFFICE VISIT:  2020    Aneesh Mode Key - : 1955    Reason For Visit:  Fly Vanegas is a 77 y.o. male who is here discussion regarding Watchman procedure    History of paroxysmal atrial fibrillation, hypertension, diabetes and kidney disease status post kidney transplant  His Eliquis has been discontinued due to previous GI bleeding 2019. Was seen in July by CHRIS Orta. Recurrent atrial fibrillation. Recommended going back on Eliquis as EGD 1/10/2020 showed healed ulcer from previous November. No recurrent bleeding. Eliquis 5 mg was restarted. Information was relayed to gastroenterologist    He started the Eliquis on that Wed. On the following  he had 2 stools with bright red blood and he stopped th Eliquis and the bleeding resolved and he has been in afib but off the Eliquis    He is currently off any anticoagulations, he is worried about his stroke risk and he was referred to me to discuss the option of left atrial appendage closure device using watchman      Subjective  Fly Vanegas denies exertional chest pain  Has some mild ADAMS  No resting shortness of breath, orthopnea, paroxysmal nocturnal dyspnea, syncope, presyncope, arrhythmia, edema and fatigue. The patient denies numbness or weakness to suggest cerebrovascular accident or transient ischemic attack. Saundra Pearl MD is PCP and follows labs .   Naun Rand has the following history as recorded in [facility]:    Patient Active Problem List    Diagnosis Date Noted    LVH (left ventricular hypertrophy) 2020    History of renal transplant 2019    UGI bleed 2019    PSVT (paroxysmal supraventricular tachycardia) ([facility] 2019    Chronic anticoagulation 2019    Orthostatic dizziness 2019    Daytime somnolence 2018    PAF (paroxysmal atrial fibrillation) (HCC) 2018    Fatigue 04/09/2018    History of tachycardia 04/09/2018    Immunosuppression (HCC)     Atrial fibrillation with RVR ([facility] 02/26/2018    Infection of AV graft for dialysis ([facility] 02/23/2018    Cellulitis of right upper extremity     Cellulitis 02/22/2018    Chest pressure 07/17/2016    Diabetes mellitus ([facility] 07/17/2016    Ex-cigarette smoker 07/17/2016    Essential hypertension     Chest pain      Past Medical History:   Diagnosis Date    Arthritis     Bleeding ulcer 11/2019    [facility]     Diabetes mellitus ([facility]     Ex-cigarette smoker 7/17/2016    History of gastric ulcer 2009    Hypertension     hx.  10 years ago    Kidney disease, chronic, end stage on dialysis [facility]) 2006 2007, dialysis for 3 years, then had a kidney transplant,    Obese     Vision problem     wears glasses     Past Surgical History:   Procedure Laterality Date    CHOLECYSTECTOMY      COLONOSCOPY  06/23/2017    Dr Helen Hammans: Diverticulosis, internal hemorrhoids, 5yr recall    COLONOSCOPY  01/05/2012    Dr Lucero Lewis, 5 yr recall    DIALYSIS FISTULA CREATION  [place] 2007    left arm radial cephalic    HERNIA REPAIR      KIDNEY TRANSPLANT      2/18/2010 in [address] [place] [address] Right 2/23/2018    AV FISTULA GRAFT REMOVAL performed by Hoda Dunlap MD at [facility] TUNNELED VENOUS CATHETER PLACEMENT  multiple    UPPER GASTROINTESTINAL ENDOSCOPY N/A 11/4/2019    Dr Shayne Diaz (Dr Helen Hammans pt)1.2 cm superficial gastric ulcer in the antrum with surrounding moderate gastritis and oozing of blood    UPPER GASTROINTESTINAL ENDOSCOPY  10/05/2009    Dr Rj Quinones w/resolution of ulcer, lenin +    UPPER GASTROINTESTINAL ENDOSCOPY  07/10/2009    Dr Hsieh Gut ulceration w/pigmented base on posterior wall of the body, small erosions, active gastritis    UPPER GASTROINTESTINAL ENDOSCOPY N/A 1/10/2020    Dr Castillo Seeds hiatal hernia, healed previous antral ulcer-Gastritis    VASCULAR SURGERY [place]     av loop graft     Family History   Problem Relation Age of Onset    Kidney Disease Father     Heart Disease Father     Colon Cancer Neg Hx     Colon Polyps Neg Hx      Social History     Tobacco Use    Smoking status: Former Smoker     Packs/day: 2.00     Years: 4.00     Pack years: 8.00     Last attempt to quit: 1973     Years since quittin.5    Smokeless tobacco: Never Used   Substance Use Topics    Alcohol use: No      Current Outpatient Medications   Medication Sig Dispense Refill    empagliflozin (JARDIANCE) 25 MG tablet Take 1 tablet by mouth daily 90 tablet 1    pantoprazole (PROTONIX) 40 MG tablet Take 1 tablet by mouth every morning (before breakfast) 90 tablet 1    propranolol (INDERAL) 40 MG tablet TAKE 1 TABLET BY MOUTH 2 TIMES DAILY BEFORE MEALS 180 tablet 1    SODIUM BICARBONATE, ANTACID, PO Take by mouth 2 times daily       Omega-3 Fatty Acids (FISH OIL) 1000 MG CPDR Take 3,000 mg by mouth 2 times daily      vitamin D (ERGOCALCIFEROL) 1.25 MG (05579 UT) CAPS capsule TAKE 1 CAPSULE BY MOUTH ONE TIME PER WEEK 12 capsule 1    metFORMIN (GLUCOPHAGE) 1000 MG tablet TAKE 1 TABLET BY MOUTH TWICE A DAY WITH MEALS 180 tablet 3    tamsulosin (FLOMAX) 0.4 MG capsule TAKE 1 CAPSULE BY MOUTH EVERY DAY 90 capsule 3    blood glucose monitor strips Test one time a day & as needed for symptoms of irregular blood glucose. 100 strip 5    Tacrolimus (ENVARSUS XR) 1 MG TB24 Take 2 mg by mouth every morning Indications: on an empty stomach       mycophenolate (CELLCEPT) 250 MG capsule Take 500 mg by mouth 2 times daily      Lancets MISC Use to check blood sugar daily Dx E11.9 100 each 0    glucose blood VI test strips (ACCU-CHEK ANANT) strip Contour strips,check daily E11.9 100 each 0     No current facility-administered medications for this visit. Allergies: Patient has no known allergies.     Review of Systems  Constitutional - no significant activity change, appetite change, or unexpected weight change. No fever, chills or diaphoresis. No fatigue. HEENT - no significant rhinorrhea or epistaxis. No tinnitus or significant hearing loss. Eyes - no sudden vision change or amaurosis. Respiratory - no significant wheezing, stridor, apnea or cough. No dyspnea on exertion or shortness of breath. Cardiovascular - no exertional chest pain, orthopnea or PND. No sensation of arrhythmia or slow heart rate. No claudication or leg edema. Gastrointestinal - no abdominal swelling or pain. No blood in stool. No severe constipation, diarrhea, nausea, or vomiting. Genitourinary - no difficulty urinating, dysuria, frequency, or urgency. No flank pain or hematuria. Musculoskeletal - no back pain, gait disturbance, or myalgia. Skin - no color change or rash. No pallor. No new surgical incision. Neurologic - no speech difficulty, facial asymmetry or lateralizing weakness. No seizures, presyncope, syncope, or significant dizziness. Hematologic - no easy bruising or excessive bleeding. Psychiatric - no severe anxiety or insomnia. No confusion. All other review of systems are negative. Objective  Vital Signs - /74   Pulse 98   Ht 6' 1\" (1.854 m)   Wt 260 lb (117.9 kg)   BMI 34.30 kg/m²   General - Fly Vanegas is alert, cooperative, and pleasant. Well groomed. No acute distress. Body habitus is obese. HEENT - The head is normocephalic. No circumoral cyanosis. Dentition is normal.   EYES -  No Xanthelasma, no arcus senilis, no conjunctival hemorrhages or discharge. Neck - Supple, without increased jugular venous pressures. No carotid bruits. No mass. Respiratory - Lungs are clear bilaterally. No wheezes or rales. Normal effort without use of accessory muscles. Cardiovascular - Heart has irregular rhythm and rate. No murmurs, rubs or gallops. + pedal pulses and no varicosities.

## 2020-08-17 NOTE — PATIENT INSTRUCTIONS
[place] at the [facility] Push and [address] located on the first floor of [place] [address] through hospital main entrance and turn immediately to your left. Tuesday 8/25/20 at 1230. NPO after 8 am, may take all morning meds with water. Also advised patient must have COVID testing completed on Friday 8/21/20 anywhere from 800-1100 am at [facility]. Advised patient that they will be able to proceed with procedure as long as test results are negative. Patient made aware that if testing is not resulted evening prior to procedure that they may have to be rescheduled and possibly retested. Cardiologist's nurse will schedule this procedure. Patient's contact number:  [phone] (home)      Transesophageal Echocardiogram (LINDA)   A transesophageal echo (LINDA) test is a type of echo test in which the ultrasound transducer, positioned on an endoscope, is guided down the patient's throat into the esophagus (the \"food pipe\" leading from the mouth into the stomach). An endoscope is a long, thin, flexible instrument that is about ½ inch in diameter. The LINDA test provides a close look at the heart's valves and chambers, without interference from the ribs or lungs. LINDA is often used when the results from standard echo tests are not sufficient, or when your doctor wants a closer look at your heart. LINDA may be combined with Doppler ultrasound and color Doppler to evaluate blood flow across the heart's valves. Before the test  When this test is recommended, please tell your doctor if you have any problems with your esophagus, such as a hiatal hernia, problems swallowing or cancer. DO NOT eat or drink anything after midnight. If you must take medication before the test, take it only with a small sip of water. · Someone should come with you the day of the test to drive you home. You should not drive until the day after the procedure.  The sedation given during the test causes drowsiness, dizziness and impairs your judgment, making it unsafe for you to drive or operate machinery. If you need to change this appointment, please call outpatient scheduling at [phone].   

## 2020-08-21 ENCOUNTER — OFFICE VISIT (OUTPATIENT)
Age: 65
End: 2020-08-21

## 2020-08-21 VITALS — HEART RATE: 100 BPM | TEMPERATURE: 97.3 F | OXYGEN SATURATION: 95 %

## 2020-08-21 NOTE — PATIENT INSTRUCTIONS
Preventing the Spread of Coronavirus Disease 2019 in Homes and Residential Communities   For the most recent information go to [email or web address]    Prevention steps for People with confirmed or suspected COVID-19 (including persons under investigation) who do not need to be hospitalized  and   People with confirmed COVID-19 who were hospitalized and determined to be medically stable to go home    Your healthcare provider and public health staff will evaluate whether you can be cared for at home. If it is determined that you do not need to be hospitalized and can be isolated at home, you will be monitored by staff from your local or state health department. You should follow the prevention steps below until a healthcare provider or local or state health department says you can return to your normal activities. Stay home except to get medical care  People who are mildly ill with COVID-19 are able to isolate at home during their illness. You should restrict activities outside your home, except for getting medical care. Do not go to work, school, or public areas. Avoid using public transportation, ride-sharing, or taxis. Separate yourself from other people and animals in your home  People: As much as possible, you should stay in a specific room and away from other people in your home. Also, you should use a separate bathroom, if available. Animals: You should restrict contact with pets and other animals while you are sick with COVID-19, just like you would around other people. Although there have not been reports of pets or other animals becoming sick with COVID-19, it is still recommended that people sick with COVID-19 limit contact with animals until more information is known about the virus. When possible, have another member of your household care for your animals while you are sick.  If you are sick with COVID-19, avoid contact with your pet, including petting, snuggling, being kissed or licked, and sharing food. If you must care for your pet or be around animals while you are sick, wash your hands before and after you interact with pets and wear a facemask. Call ahead before visiting your doctor  If you have a medical appointment, call the healthcare provider and tell them that you have or may have COVID-19. This will help the healthcare providers office take steps to keep other people from getting infected or exposed. Wear a facemask  You should wear a facemask when you are around other people (e.g., sharing a room or vehicle) or pets and before you enter a healthcare providers office. If you are not able to wear a facemask (for example, because it causes trouble breathing), then people who live with you should not stay in the same room with you, or they should wear a facemask if they enter your room. Cover your coughs and sneezes  Cover your mouth and nose with a tissue when you cough or sneeze. Throw used tissues in a lined trash can. Immediately wash your hands with soap and water for at least 20 seconds or, if soap and water are not available, clean your hands with an alcohol-based hand  that contains at least 60% alcohol. Clean your hands often  Wash your hands often with soap and water for at least 20 seconds, especially after blowing your nose, coughing, or sneezing; going to the bathroom; and before eating or preparing food. If soap and water are not readily available, use an alcohol-based hand  with at least 60% alcohol, covering all surfaces of your hands and rubbing them together until they feel dry. Soap and water are the best option if hands are visibly dirty. Avoid touching your eyes, nose, and mouth with unwashed hands. Avoid sharing personal household items  You should not share dishes, drinking glasses, cups, eating utensils, towels, or bedding with other people or pets in your home.  After using these items, they should be washed thoroughly with soap and water. Clean all high-touch surfaces everyday  High touch surfaces include counters, tabletops, doorknobs, bathroom fixtures, toilets, phones, keyboards, tablets, and bedside tables. Also, clean any surfaces that may have blood, stool, or body fluids on them. Use a household cleaning spray or wipe, according to the label instructions. Labels contain instructions for safe and effective use of the cleaning product including precautions you should take when applying the product, such as wearing gloves and making sure you have good ventilation during use of the product. Monitor your symptoms  Seek prompt medical attention if your illness is worsening (e.g., difficulty breathing). Before seeking care, call your healthcare provider and tell them that you have, or are being evaluated for, COVID-19. Put on a facemask before you enter the facility. These steps will help the healthcare providers office to keep other people in the office or waiting room from getting infected or exposed. Ask your healthcare provider to call the local or [facility] health department. Persons who are placed under active monitoring or facilitated self-monitoring should follow instructions provided by their local health department or occupational health professionals, as appropriate. When working with your local health department check their available hours. If you have a medical emergency and need to call 911, notify the dispatch personnel that you have, or are being evaluated for COVID-19. If possible, put on a facemask before emergency medical services arrive. Discontinuing home isolation  Patients with confirmed COVID-19 should remain under home isolation precautions until the risk of secondary transmission to others is thought to be low.  The decision to discontinue home isolation precautions should be made on a case-by-case basis, in consultation with healthcare providers and state and [facility] health departments. iRidge allows you to send messages to your doctor, view your test results, renew your prescriptions, schedule appointments, view visit notes, and more. How Do I Sign Up? 1. In your Internet browser, go to [email or web address]  2. Click on the Sign Up Now link in the Sign In box. You will see the New Member Sign Up page. 3. Enter your iRidge Access Code exactly as it appears below. You will not need to use this code after youve completed the sign-up process. If you do not sign up before the expiration date, you must request a new code. YapTimet Access Code: Activation code not generated  Current iRidge Status: Active    4. Enter your Social Security Number ([id number]) and Date of Birth (mm/dd/yyyy) as indicated and click Submit. You will be taken to the next sign-up page. 5. Create a iRidge ID. This will be your iRidge login ID and cannot be changed, so think of one that is secure and easy to remember. 6. Create a iRidge password. You can change your password at any time. 7. Enter your Password Reset Question and Answer. This can be used at a later time if you forget your password. 8. Enter your e-mail address. You will receive e-mail notification when new information is available in [address]. 9. Click Sign Up. You can now view your medical record. Additional Information  If you have questions, please contact the physician practice where you receive care. Remember, [employer]idge is NOT to be used for urgent needs. For medical emergencies, dial 911. For questions regarding your iRidge account call [phone]. If you have a clinical question, please call your doctor's office.

## 2020-08-21 NOTE — PROGRESS NOTES
2020    Jewel Peguero (:  1955) is a 77 y.o. male, here requesting COVID-19 testing    History of Present Illness  Patient here today for covid swab in vehicle. Not evaluated by provider in clinic. Vitals:    20 1054   Pulse: 100   Temp: 97.3 °F (36.3 °C)   SpO2: 95%       ASSESSMENT  Screening for COVID-19/ Viral disease    PLAN    COVID-19 sample collected and submitted  Patient given detailed CDC instructions contained within After Visit Summary       An  electronic signature was used to authenticate this note.     --CHRIS Holden - CNP on 2020 at 10:55 AM

## 2020-08-22 LAB — SARS-COV-2, NAA: NOT DETECTED

## 2020-08-25 ENCOUNTER — HOSPITAL ENCOUNTER (OUTPATIENT)
Dept: CARDIAC CATH/INVASIVE PROCEDURES | Age: 65
Discharge: HOME | End: 2020-08-25
Attending: INTERNAL MEDICINE | Admitting: INTERNAL MEDICINE
Payer: MEDICARE

## 2020-08-25 VITALS
TEMPERATURE: 97.5 F | OXYGEN SATURATION: 96 % | SYSTOLIC BLOOD PRESSURE: 120 MMHG | HEIGHT: 73 IN | WEIGHT: 260 LBS | BODY MASS INDEX: 34.46 KG/M2 | DIASTOLIC BLOOD PRESSURE: 70 MMHG | HEART RATE: 92 BPM | RESPIRATION RATE: 17 BRPM

## 2020-08-25 LAB
ANION GAP SERPL CALCULATED.3IONS-SCNC: 12 MMOL/L (ref 7–19)
BUN BLDV-MCNC: 13 MG/DL (ref 8–23)
CALCIUM SERPL-MCNC: 9.9 MG/DL (ref 8.8–10.2)
CHLORIDE BLD-SCNC: 102 MMOL/L (ref 98–111)
CO2: 25 MMOL/L (ref 22–29)
CREAT SERPL-MCNC: 1.1 MG/DL (ref 0.5–1.2)
GFR AFRICAN AMERICAN: >59
GFR NON-AFRICAN AMERICAN: >60
GLUCOSE BLD-MCNC: 139 MG/DL (ref 74–109)
HCT VFR BLD CALC: 52.1 % (ref 42–52)
HEMOGLOBIN: 17.1 G/DL (ref 14–18)
MCH RBC QN AUTO: 29.8 PG (ref 27–31)
MCHC RBC AUTO-ENTMCNC: 32.8 G/DL (ref 33–37)
MCV RBC AUTO: 90.9 FL (ref 80–94)
PDW BLD-RTO: 13.8 % (ref 11.5–14.5)
PLATELET # BLD: 171 K/UL (ref 130–400)
PMV BLD AUTO: 10 FL (ref 9.4–12.4)
POTASSIUM SERPL-SCNC: 4.9 MMOL/L (ref 3.5–5)
RBC # BLD: 5.73 M/UL (ref 4.7–6.1)
SODIUM BLD-SCNC: 139 MMOL/L (ref 136–145)
WBC # BLD: 9.3 K/UL (ref 4.8–10.8)

## 2020-08-25 PROCEDURE — 93312 ECHO TRANSESOPHAGEAL: CPT

## 2020-08-25 PROCEDURE — 36415 COLL VENOUS BLD VENIPUNCTURE: CPT

## 2020-08-25 PROCEDURE — 6360000002 HC RX W HCPCS

## 2020-08-25 PROCEDURE — 93325 DOPPLER ECHO COLOR FLOW MAPG: CPT

## 2020-08-25 PROCEDURE — 2580000003 HC RX 258: Performed by: INTERNAL MEDICINE

## 2020-08-25 PROCEDURE — 80048 BASIC METABOLIC PNL TOTAL CA: CPT

## 2020-08-25 PROCEDURE — 99152 MOD SED SAME PHYS/QHP 5/>YRS: CPT

## 2020-08-25 PROCEDURE — 93321 DOPPLER ECHO F-UP/LMTD STD: CPT

## 2020-08-25 PROCEDURE — 6370000000 HC RX 637 (ALT 250 FOR IP)

## 2020-08-25 PROCEDURE — 85027 COMPLETE CBC AUTOMATED: CPT

## 2020-08-25 RX ORDER — SODIUM CHLORIDE 9 MG/ML
INJECTION, SOLUTION INTRAVENOUS CONTINUOUS
Status: DISCONTINUED | OUTPATIENT
Start: 2020-08-25 | End: 2020-08-27 | Stop reason: HOSPADM

## 2020-08-25 RX ADMIN — SODIUM CHLORIDE: 9 INJECTION, SOLUTION INTRAVENOUS at 16:00

## 2020-08-25 NOTE — H&P
[facility]  [facility] [address] [address] [id number]  Phone: [phone]  Fax: [phone]    OFFICE VISIT:  2020    Stan Mireles - : 1955    Reason For Visit:  Afsaneh Chao is a 77 y.o. male who is here discussion regarding Watchman procedure    History of paroxysmal atrial fibrillation, hypertension, diabetes and kidney disease status post kidney transplant  His Eliquis has been discontinued due to previous GI bleeding 2019. Was seen in July by CHRIS Patton. Recurrent atrial fibrillation. Recommended going back on Eliquis as EGD 1/10/2020 showed healed ulcer from previous November. No recurrent bleeding. Eliquis 5 mg was restarted. Information was relayed to gastroenterologist    He started the Eliquis on that Wed. On the following  he had 2 stools with bright red blood and he stopped th Eliquis and the bleeding resolved and he has been in afib but off the Eliquis    He is currently off any anticoagulations, he is worried about his stroke risk and he was referred to me to discuss the option of left atrial appendage closure device using watchman      Subjective  Afsaneh Chao denies exertional chest pain  Has some mild ADAMS  No resting shortness of breath, orthopnea, paroxysmal nocturnal dyspnea, syncope, presyncope, arrhythmia, edema and fatigue. The patient denies numbness or weakness to suggest cerebrovascular accident or transient ischemic attack. Lise Delaney MD is PCP and follows labs .   Yvette Foster has the following history as recorded in [facility]:    Patient Active Problem List    Diagnosis Date Noted    LVH (left ventricular hypertrophy) 2020    History of renal transplant 2019    UGI bleed 2019    PSVT (paroxysmal supraventricular tachycardia) ([address] 2019    Chronic anticoagulation 2019    Orthostatic dizziness 2019    Daytime somnolence 2018    PAF (paroxysmal atrial fibrillation) (HCC) 2018    Fatigue infusion   Intravenous Continuous Michael Parehk MD 75 mL/hr at 08/25/20 1600       Allergies: Patient has no known allergies. Review of Systems  Constitutional - no significant activity change, appetite change, or unexpected weight change. No fever, chills or diaphoresis. No fatigue. HEENT - no significant rhinorrhea or epistaxis. No tinnitus or significant hearing loss. Eyes - no sudden vision change or amaurosis. Respiratory - no significant wheezing, stridor, apnea or cough. No dyspnea on exertion or shortness of breath. Cardiovascular - no exertional chest pain, orthopnea or PND. No sensation of arrhythmia or slow heart rate. No claudication or leg edema. Gastrointestinal - no abdominal swelling or pain. No blood in stool. No severe constipation, diarrhea, nausea, or vomiting. Genitourinary - no difficulty urinating, dysuria, frequency, or urgency. No flank pain or hematuria. Musculoskeletal - no back pain, gait disturbance, or myalgia. Skin - no color change or rash. No pallor. No new surgical incision. Neurologic - no speech difficulty, facial asymmetry or lateralizing weakness. No seizures, presyncope, syncope, or significant dizziness. Hematologic - no easy bruising or excessive bleeding. Psychiatric - no severe anxiety or insomnia. No confusion. All other review of systems are negative. Objective  Vital Signs - /73   Pulse 101   Resp 19   SpO2 95%   General - Raya Grout is alert, cooperative, and pleasant. Well groomed. No acute distress. Body habitus is obese. HEENT - The head is normocephalic. No circumoral cyanosis. Dentition is normal.   EYES -  No Xanthelasma, no arcus senilis, no conjunctival hemorrhages or discharge. Neck - Supple, without increased jugular venous pressures. No carotid bruits. No mass. Respiratory - Lungs are clear bilaterally. No wheezes or rales. Normal effort without use of accessory muscles.   Cardiovascular - Heart has irregular rhythm and rate. No murmurs, rubs or gallops. + pedal pulses and no varicosities. Abdominal -  Soft, nontender, nondistended. Bowel sounds are intact. Extremities - No clubbing, cyanosis, or  edema. Musculoskeletal -  No clubbing . No Osler's nodes. Gait normal .  No kyphosis or scoliosis. Skin -  no statis ulcers or dermatitis. Neurological - No focal signs are identified. Oriented to person, place and time. Psychiatric -  Appropriate affect and mood. Assessment:     Diagnosis Orders   1. Longstanding persistent atrial fibrillation       Data:  BP Readings from Last 3 Encounters:   08/25/20 108/73   08/17/20 116/74   08/06/20 118/70    Pulse Readings from Last 3 Encounters:   08/25/20 101   08/21/20 100   08/17/20 98        Wt Readings from Last 3 Encounters:   08/17/20 260 lb (117.9 kg)   08/06/20 263 lb (119.3 kg)   07/27/20 260 lb (117.9 kg)     EKG shows atrial fibrillation with a rate of 83. Left axis-anterior fascicular block  No change from previous EKG  Cherry scan 3/2/2018 was negative for myocardial ischemia    2D echo at that time showed normal LV size and function with moderate concentric LVH. Mild MR, mildly thickened aortic valve leaflets with preserved mobility. Mild TR with RVSP elevated at 45 mmHg. Developed bright red stools 5 days after starting Eliquis. He stopped the Eliquis and has not been on it since. He has had no strokelike symptoms. Previously bleeding had been from upper GI tract. He is not had any dark coffee-ground-like stools. Suspicious for possibly hemorrhoids although he is never been diagnosed  Unfortunately at this time he is concerned about restarting any anticoagulation.       [id number] Score for Atrial Fibrillation Stroke Risk   Risk   Factors  Component Value   C CHF No 0   H HTN Yes 1   A2 Age >= 76 No,  (76 y.o.) 0   D DM Yes 1   S2 Prior Stroke/TIA No 0   V Vascular Disease No 0   A Age 74-69 Yes,  (76 y.o.) 1   Sc Sex male 0    [id number]  Score  3   Score last updated 8/6/20 4:28 PM CDT    Dr Clarine Kehr is  Nephrologist at [address] [place] [address] fibrillation with chads 2 VasC Score of 3  It seems that the patient tried anticoagulants and failed due to multiple GI bleeding episodes, he is here to discuss the option of watchman implant as an alternative to long-term anticoagulation    Today we discussed the procedure in details, I showed him a model and the video of the procedure, we discussed the benefits alternatives and risks, we will need to start with transesophageal echo to rule out any left it appendage clot and also assess the left ear appendage size to make sure watchman can be done    He agreed to proceed with LINDA next      Nallely Velázquez MD, [place] [address] Cardiologist, Endovascular Specialist   Medical Director, [place] [address]    EMR dragon/transcription disclaimer: Much of this encounter note is electronic transcription/translation of spoken language to printed tach. Electronic translation of spoken language may be erroneous, or at times, nonsensical words or phrases may be inadvertently transcribed. Although, I have reviewed the note for such errors, some may still exist.         Risks, benefits, alternatives of LINDA discussed with the patient and full informed consent obtained.   Acceptable Mallampati score  Consent for moderate conscious sedation  ASA 3      Nallely Velázquez MD, [facility], [address] [place] [address], Endovascular Specialist   Medical Director, [facility]   [facility]

## 2020-10-06 RX ORDER — ERGOCALCIFEROL 1.25 MG/1
CAPSULE ORAL
Qty: 12 CAPSULE | Refills: 1 | Status: SHIPPED | OUTPATIENT
Start: 2020-10-06 | End: 2022-01-10

## 2020-10-08 ENCOUNTER — TELEPHONE (OUTPATIENT)
Dept: CARDIOLOGY | Age: 65
End: 2020-10-08

## 2020-10-09 ENCOUNTER — TELEPHONE (OUTPATIENT)
Dept: CARDIOLOGY | Age: 65
End: 2020-10-09

## 2020-10-09 NOTE — TELEPHONE ENCOUNTER
Returned patients call regarding watchman. Informed patient that I will be reaching out to him within a week to discuss the plan for Watchman with him.

## 2020-10-27 ENCOUNTER — TELEPHONE (OUTPATIENT)
Dept: CARDIOLOGY | Age: 65
End: 2020-10-27

## 2020-10-27 NOTE — TELEPHONE ENCOUNTER
Spoke with patient to let him know that his appendage is too large for the Watchman device that we currently have at this hospital.  I informed him that I am speaking to the company rep to see which hospital offers the Watchman FLX that may be able to fit his appendage. He request that if [facility] offers it that we refer him there. I told the patient that I will be in touch with him soon to let him know the plan for his procedure.

## 2020-11-19 RX ORDER — SODIUM BICARBONATE 325 MG/1
325 TABLET ORAL 2 TIMES DAILY
Qty: 60 TABLET | Refills: 3 | Status: SHIPPED | OUTPATIENT
Start: 2020-11-19 | End: 2021-10-12 | Stop reason: SDUPTHER

## 2020-11-23 RX ORDER — PANTOPRAZOLE SODIUM 40 MG/1
TABLET, DELAYED RELEASE ORAL
Qty: 90 TABLET | Refills: 1 | Status: SHIPPED | OUTPATIENT
Start: 2020-11-23 | End: 2021-01-21 | Stop reason: SDUPTHER

## 2020-12-29 RX ORDER — PROPRANOLOL HYDROCHLORIDE 40 MG/1
TABLET ORAL
Qty: 180 TABLET | Refills: 1 | Status: SHIPPED | OUTPATIENT
Start: 2020-12-29 | End: 2021-06-28

## 2020-12-29 RX ORDER — EMPAGLIFLOZIN 25 MG/1
TABLET, FILM COATED ORAL
Qty: 90 TABLET | Refills: 1 | Status: SHIPPED | OUTPATIENT
Start: 2020-12-29 | End: 2021-04-28 | Stop reason: SDUPTHER

## 2021-01-15 DIAGNOSIS — E11.9 TYPE 2 DIABETES MELLITUS WITHOUT COMPLICATION, WITHOUT LONG-TERM CURRENT USE OF INSULIN (HCC): Primary | ICD-10-CM

## 2021-01-15 DIAGNOSIS — E55.9 VITAMIN D DEFICIENCY: ICD-10-CM

## 2021-01-18 DIAGNOSIS — E55.9 VITAMIN D DEFICIENCY: ICD-10-CM

## 2021-01-18 DIAGNOSIS — E11.9 TYPE 2 DIABETES MELLITUS WITHOUT COMPLICATION, WITHOUT LONG-TERM CURRENT USE OF INSULIN (HCC): ICD-10-CM

## 2021-01-18 LAB
ANION GAP SERPL CALCULATED.3IONS-SCNC: 15 MMOL/L (ref 7–19)
BASOPHILS ABSOLUTE: 0.1 K/UL (ref 0–0.2)
BASOPHILS RELATIVE PERCENT: 1 % (ref 0–1)
BUN BLDV-MCNC: 14 MG/DL (ref 8–23)
CALCIUM SERPL-MCNC: 9.7 MG/DL (ref 8.8–10.2)
CHLORIDE BLD-SCNC: 104 MMOL/L (ref 98–111)
CO2: 21 MMOL/L (ref 22–29)
CREAT SERPL-MCNC: 1.1 MG/DL (ref 0.5–1.2)
CREATININE URINE: 70.4 MG/DL (ref 4.2–622)
EOSINOPHILS ABSOLUTE: 0.6 K/UL (ref 0–0.6)
EOSINOPHILS RELATIVE PERCENT: 7.2 % (ref 0–5)
GFR AFRICAN AMERICAN: >59
GFR NON-AFRICAN AMERICAN: >60
GLUCOSE BLD-MCNC: 163 MG/DL (ref 74–109)
HBA1C MFR BLD: 7.7 % (ref 4–6)
HCT VFR BLD CALC: 53.5 % (ref 42–52)
HEMOGLOBIN: 17 G/DL (ref 14–18)
IMMATURE GRANULOCYTES #: 0.1 K/UL
LYMPHOCYTES ABSOLUTE: 1.8 K/UL (ref 1.1–4.5)
LYMPHOCYTES RELATIVE PERCENT: 21.9 % (ref 20–40)
MCH RBC QN AUTO: 29.7 PG (ref 27–31)
MCHC RBC AUTO-ENTMCNC: 31.8 G/DL (ref 33–37)
MCV RBC AUTO: 93.4 FL (ref 80–94)
MICROALBUMIN UR-MCNC: <1.2 MG/DL (ref 0–19)
MICROALBUMIN/CREAT UR-RTO: NORMAL MG/G
MONOCYTES ABSOLUTE: 0.7 K/UL (ref 0–0.9)
MONOCYTES RELATIVE PERCENT: 8.7 % (ref 0–10)
NEUTROPHILS ABSOLUTE: 5.1 K/UL (ref 1.5–7.5)
NEUTROPHILS RELATIVE PERCENT: 60.4 % (ref 50–65)
PDW BLD-RTO: 14.6 % (ref 11.5–14.5)
PLATELET # BLD: 201 K/UL (ref 130–400)
PMV BLD AUTO: 10.6 FL (ref 9.4–12.4)
POTASSIUM SERPL-SCNC: 4.4 MMOL/L (ref 3.5–5)
RBC # BLD: 5.73 M/UL (ref 4.7–6.1)
SODIUM BLD-SCNC: 140 MMOL/L (ref 136–145)
VITAMIN D 25-HYDROXY: 58.2 NG/ML
WBC # BLD: 8.4 K/UL (ref 4.8–10.8)

## 2021-01-21 ENCOUNTER — OFFICE VISIT (OUTPATIENT)
Dept: FAMILY MEDICINE CLINIC | Age: 66
End: 2021-01-21
Payer: MEDICARE

## 2021-01-21 VITALS
SYSTOLIC BLOOD PRESSURE: 130 MMHG | TEMPERATURE: 97.1 F | WEIGHT: 260 LBS | OXYGEN SATURATION: 98 % | HEIGHT: 73 IN | HEART RATE: 89 BPM | BODY MASS INDEX: 34.46 KG/M2 | DIASTOLIC BLOOD PRESSURE: 76 MMHG

## 2021-01-21 DIAGNOSIS — R39.14 BENIGN PROSTATIC HYPERPLASIA WITH INCOMPLETE BLADDER EMPTYING: ICD-10-CM

## 2021-01-21 DIAGNOSIS — E55.9 VITAMIN D DEFICIENCY: ICD-10-CM

## 2021-01-21 DIAGNOSIS — K25.9 GASTRIC ULCER, UNSPECIFIED CHRONICITY, UNSPECIFIED WHETHER GASTRIC ULCER HEMORRHAGE OR PERFORATION PRESENT: ICD-10-CM

## 2021-01-21 DIAGNOSIS — N40.1 BENIGN PROSTATIC HYPERPLASIA WITH INCOMPLETE BLADDER EMPTYING: ICD-10-CM

## 2021-01-21 DIAGNOSIS — E11.65 TYPE 2 DIABETES MELLITUS WITH HYPERGLYCEMIA, WITHOUT LONG-TERM CURRENT USE OF INSULIN (HCC): Primary | ICD-10-CM

## 2021-01-21 DIAGNOSIS — I48.91 ATRIAL FIBRILLATION, UNSPECIFIED TYPE (HCC): ICD-10-CM

## 2021-01-21 PROCEDURE — 3051F HG A1C>EQUAL 7.0%<8.0%: CPT | Performed by: FAMILY MEDICINE

## 2021-01-21 PROCEDURE — 99214 OFFICE O/P EST MOD 30 MIN: CPT | Performed by: FAMILY MEDICINE

## 2021-01-21 RX ORDER — PANTOPRAZOLE SODIUM 40 MG/1
TABLET, DELAYED RELEASE ORAL
Qty: 90 TABLET | Refills: 1 | Status: SHIPPED | OUTPATIENT
Start: 2021-01-21 | End: 2021-04-28

## 2021-01-21 RX ORDER — ERGOCALCIFEROL 1.25 MG/1
CAPSULE ORAL
Qty: 12 CAPSULE | Refills: 1 | Status: CANCELLED | OUTPATIENT
Start: 2021-01-21

## 2021-01-21 RX ORDER — TAMSULOSIN HYDROCHLORIDE 0.4 MG/1
0.4 CAPSULE ORAL DAILY
Qty: 90 CAPSULE | Refills: 3 | Status: SHIPPED | OUTPATIENT
Start: 2021-01-21 | End: 2022-04-05

## 2021-01-21 ASSESSMENT — PATIENT HEALTH QUESTIONNAIRE - PHQ9
1. LITTLE INTEREST OR PLEASURE IN DOING THINGS: 0
SUM OF ALL RESPONSES TO PHQ QUESTIONS 1-9: 0
SUM OF ALL RESPONSES TO PHQ9 QUESTIONS 1 & 2: 0
2. FEELING DOWN, DEPRESSED OR HOPELESS: 0

## 2021-01-21 NOTE — PROGRESS NOTES
[facility]  [address] [address] [place] [address] [address] [address]  Dept: [phone]  Dept Fax: [phone]: [phone]     Visit type: Established patient    Reason for Visit: Check-Up and Medication Refill      Assessment and Plan       1. Type 2 diabetes mellitus with hyperglycemia, without long-term current use of insulin (HCC)  -     Hemoglobin A1C; Future  2. Benign prostatic hyperplasia with incomplete bladder emptying  -     tamsulosin (FLOMAX) 0.4 MG capsule; Take 1 capsule by mouth daily, Disp-90 capsule, R-3Normal  3. Gastric ulcer, unspecified chronicity, unspecified whether gastric ulcer hemorrhage or perforation present  -     pantoprazole (PROTONIX) 40 MG tablet; TAKE 1 TABLET BY MOUTH EVERY DAY BEFORE BREAKFAST, Disp-90 tablet, R-1Normal  4. Atrial fibrillation, unspecified type ([facility]  5. Vitamin D deficiency    After discussion of his diabetes and hemoglobin A1c he is interested in having the ability to be more diligent with his diet in efforts of controlling his diabetes as he is very confident that he knows what he is been eating that has been contributing to his increased A1c and is agreeable that if it is elevated and we are not seeing continued improvements in that he would be agreeable to medication adjustments at that time. Benefiting from the use of Flomax. His labs on Monday had shown signs of improvement. We will back off to a maintenance dose moving forward and continue to monitor. We will continue at this time continue to monitor. Doing well with current use of Protonix. We will continue and adjust course dictates. Discussed concerns with GELY givens and the lack of anticoagulation and patient voices understanding and understanding of his increased risk but he is also not wishing to travel to a larger city to have a watchman placed.   He states that he will entertain the idea in the year when it is hopeful that the procedure will be have been performed in our area. Stressed importance of maintaining follow-up with cardiology. Discussed signs symptoms that would require medical attention. All questions were answered patient voiced understanding and agreement with plan as discussed. Return in about 3 months (around 4/21/2021), or if symptoms worsen or fail to improve. Subjective       HPI   Patient has a history of type 2 diabetes and was noted to have an elevated hemoglobin A1c today in office. He does report that he has been eating more fruit recently and believes that could be the culprit behind his increase in his A1c but otherwise he has been diligent with his diet and is not have any problems from his diabetes at this time. He is tolerating his medications without issues. He denies any new symptoms from his diabetes. He does have a history of BPH with incomplete bladder emptying and has had improvement with use of Flomax. He denies any problems with the medicine and states that he is urinating much better. He does have gastroesophageal reflux disease and is benefiting from the use of Protonix. He denies any issues with the medicine and reports has been beneficial for his symptoms. He has A. fib with any problems stating that he is doing well with his medications and has not had any recent issues. He is following with cardiology and the discussion of watchman arose and was determined that he would need a larger device that is not done in our area yet but may be available next year. He does not want pursue further at this time. He does have vitamin D deficiency which was good at his last check. Review of Systems   Constitutional: Negative for activity change, appetite change and fever. HENT: Negative for congestion and rhinorrhea. Eyes: Negative for pain and discharge. Respiratory: Negative for cough and shortness of breath. Cardiovascular: Negative for chest pain and palpitations.    Gastrointestinal: Negative for abdominal pain, constipation, diarrhea, nausea and vomiting. Endocrine: Negative for cold intolerance and heat intolerance. Genitourinary: Negative for dysuria and hematuria. Musculoskeletal: Negative for back pain, gait problem and neck pain. Skin: Negative for rash and wound. Neurological: Negative for syncope and weakness. Hematological: Negative for adenopathy. Does not bruise/bleed easily. Psychiatric/Behavioral: Negative for dysphoric mood and sleep disturbance. The patient is not nervous/anxious. No Known Allergies    Outpatient Medications Prior to Visit   Medication Sig Dispense Refill    JARDIANCE 25 MG tablet TAKE 1 TABLET BY MOUTH EVERY DAY 90 tablet 1    propranolol (INDERAL) 40 MG tablet TAKE 1 TABLET BY MOUTH TWICE A DAY BEFORE MEALS 180 tablet 1    sodium bicarbonate 325 MG tablet Take 1 tablet by mouth 2 times daily 60 tablet 3    vitamin D (ERGOCALCIFEROL) 1.25 MG (21976 UT) CAPS capsule TAKE 1 CAPSULE BY MOUTH ONE TIME PER WEEK 12 capsule 1    SODIUM BICARBONATE, ANTACID, PO Take by mouth 2 times daily       Omega-3 Fatty Acids (FISH OIL) 1000 MG CPDR Take 3,000 mg by mouth 2 times daily      metFORMIN (GLUCOPHAGE) 1000 MG tablet TAKE 1 TABLET BY MOUTH TWICE A DAY WITH MEALS 180 tablet 3    blood glucose monitor strips Test one time a day & as needed for symptoms of irregular blood glucose.  100 strip 5    Tacrolimus (ENVARSUS XR) 1 MG TB24 Take 2 mg by mouth every morning Indications: on an empty stomach       mycophenolate (CELLCEPT) 250 MG capsule Take 500 mg by mouth 2 times daily      Lancets MISC Use to check blood sugar daily Dx E11.9 100 each 0    glucose blood VI test strips (ACCU-CHEK ANANT) strip Contour strips,check daily E11.9 100 each 0    pantoprazole (PROTONIX) 40 MG tablet TAKE 1 TABLET BY MOUTH EVERY DAY BEFORE BREAKFAST 90 tablet 1    tamsulosin (FLOMAX) 0.4 MG capsule TAKE 1 CAPSULE BY MOUTH EVERY DAY 90 capsule 3     No facility-administered medications prior to visit. Past Medical History:   Diagnosis Date    Arthritis     Bleeding ulcer 2019    [facility]congestive heart failure) [facility]     Diabetes mellitus ([facility]     Ex-cigarette smoker 2016    History of gastric ulcer     Hypertension     hx.  10 years ago    Kidney disease, chronic, end stage on dialysis ([facility] 2006, dialysis for 3 years, then had a kidney transplant,    Obese     Vision problem     wears glasses        Social History     Tobacco Use    Smoking status: Former Smoker     Packs/day: 2.00     Years: 4.00     Pack years: 8.00     Quit date: 1973     Years since quittin.0    Smokeless tobacco: Never Used   Substance Use Topics    Alcohol use: No        Past Surgical History:   Procedure Laterality Date    CHOLECYSTECTOMY      COLONOSCOPY  2017    Dr Aaron Soria: Diverticulosis, internal hemorrhoids, 5yr recall    COLONOSCOPY  2012    Dr Bety Zuñiga, 5 yr recall    DIALYSIS FISTULA CREATION  [place]     left arm radial cephalic    HERNIA REPAIR      KIDNEY TRANSPLANT      2010 in [address] [place] [address] Right 2018    AV FISTULA GRAFT REMOVAL performed by Selin Vale MD at [facility] TUNNELED VENOUS CATHETER PLACEMENT  multiple    UPPER GASTROINTESTINAL ENDOSCOPY N/A 2019    Dr Plummer Party (Dr Aaron Soria pt)1.2 cm superficial gastric ulcer in the antrum with surrounding moderate gastritis and oozing of blood    UPPER GASTROINTESTINAL ENDOSCOPY  10/05/2009    Dr Juan Jose Saldana w/resolution of ulcer, lenin +    UPPER GASTROINTESTINAL ENDOSCOPY  07/10/2009    Dr Briana Florez ulceration w/pigmented base on posterior wall of the body, small erosions, active gastritis    UPPER GASTROINTESTINAL ENDOSCOPY N/A 1/10/2020    Dr Terry Perez hiatal hernia, healed previous antral ulcer-Gastritis    VASCULAR SURGERY Right [place]     av loop graft       Family History   Problem Relation Age of Onset    Kidney Disease Father     Heart Disease Father     Colon Cancer Neg Hx     Colon Polyps Neg Hx        Objective       /76 (Site: Right Upper Arm, Position: Sitting, Cuff Size: Large Adult)   Pulse 89   Temp 97.1 °F (36.2 °C) (Infrared)   Ht 6' 1\" (1.854 m)   Wt 260 lb (117.9 kg)   SpO2 98%   BMI 34.30 kg/m²   Physical Exam  Vitals signs and nursing note reviewed. Constitutional:       General: He is not in acute distress. Appearance: He is well-developed. He is not diaphoretic. HENT:      Head: Normocephalic and atraumatic. Right Ear: External ear normal.      Left Ear: External ear normal.      Mouth/Throat:      Comments: Mask in place  Eyes:      General: No scleral icterus. Right eye: No discharge. Left eye: No discharge. Conjunctiva/sclera: Conjunctivae normal.   Neck:      Musculoskeletal: Neck supple. Thyroid: No thyromegaly. Trachea: No tracheal deviation. Cardiovascular:      Rate and Rhythm: Normal rate. Rhythm irregularly irregular. Heart sounds: Normal heart sounds. No friction rub. No gallop. Pulmonary:      Effort: Pulmonary effort is normal. No respiratory distress. Breath sounds: Normal breath sounds. No wheezing or rales. Abdominal:      General: Bowel sounds are normal. There is no distension. Palpations: Abdomen is soft. Tenderness: There is no abdominal tenderness. Musculoskeletal:         General: No deformity (No gross deformities of upper or lower extremities). Lymphadenopathy:      Cervical: No cervical adenopathy. Skin:     General: Skin is warm and dry. Findings: No erythema or rash. Neurological:      Mental Status: He is alert and oriented to person, place, and time. Cranial Nerves: No cranial nerve deficit. Psychiatric:         Behavior: Behavior normal.         Thought Content:  Thought content normal.           Data Reviewed and Summarized       Labs:     Imaging/Testing:          Dilan Rios MD

## 2021-02-20 ASSESSMENT — ENCOUNTER SYMPTOMS
BACK PAIN: 0
SHORTNESS OF BREATH: 0
DIARRHEA: 0
CONSTIPATION: 0
EYE PAIN: 0
COUGH: 0
VOMITING: 0
NAUSEA: 0
EYE DISCHARGE: 0
RHINORRHEA: 0
ABDOMINAL PAIN: 0

## 2021-04-15 DIAGNOSIS — E11.22 TYPE 2 DIABETES MELLITUS WITH CHRONIC KIDNEY DISEASE, WITHOUT LONG-TERM CURRENT USE OF INSULIN, UNSPECIFIED CKD STAGE (HCC): ICD-10-CM

## 2021-04-15 DIAGNOSIS — E11.65 TYPE 2 DIABETES MELLITUS WITH HYPERGLYCEMIA, WITHOUT LONG-TERM CURRENT USE OF INSULIN (HCC): ICD-10-CM

## 2021-04-15 RX ORDER — GLUCOSAMINE HCL/CHONDROITIN SU 500-400 MG
CAPSULE ORAL
Qty: 100 STRIP | Refills: 5 | Status: SHIPPED | OUTPATIENT
Start: 2021-04-15 | End: 2021-04-28 | Stop reason: SDUPTHER

## 2021-04-22 DIAGNOSIS — E11.65 TYPE 2 DIABETES MELLITUS WITH HYPERGLYCEMIA, WITHOUT LONG-TERM CURRENT USE OF INSULIN (HCC): ICD-10-CM

## 2021-04-22 LAB — HBA1C MFR BLD: 7.2 % (ref 4–6)

## 2021-04-25 DIAGNOSIS — E11.65 TYPE 2 DIABETES MELLITUS WITH HYPERGLYCEMIA, WITHOUT LONG-TERM CURRENT USE OF INSULIN (HCC): ICD-10-CM

## 2021-04-28 ENCOUNTER — OFFICE VISIT (OUTPATIENT)
Dept: FAMILY MEDICINE CLINIC | Age: 66
End: 2021-04-28
Payer: MEDICARE

## 2021-04-28 ENCOUNTER — TELEPHONE (OUTPATIENT)
Dept: FAMILY MEDICINE CLINIC | Age: 66
End: 2021-04-28

## 2021-04-28 VITALS
HEART RATE: 116 BPM | SYSTOLIC BLOOD PRESSURE: 126 MMHG | OXYGEN SATURATION: 97 % | WEIGHT: 261 LBS | BODY MASS INDEX: 34.59 KG/M2 | RESPIRATION RATE: 20 BRPM | DIASTOLIC BLOOD PRESSURE: 82 MMHG | HEIGHT: 73 IN | TEMPERATURE: 97.4 F

## 2021-04-28 DIAGNOSIS — K25.9 GASTRIC ULCER, UNSPECIFIED CHRONICITY, UNSPECIFIED WHETHER GASTRIC ULCER HEMORRHAGE OR PERFORATION PRESENT: ICD-10-CM

## 2021-04-28 DIAGNOSIS — B07.9 VIRAL WARTS, UNSPECIFIED TYPE: ICD-10-CM

## 2021-04-28 DIAGNOSIS — N18.2 CKD (CHRONIC KIDNEY DISEASE) STAGE 2, GFR 60-89 ML/MIN: ICD-10-CM

## 2021-04-28 DIAGNOSIS — E11.65 TYPE 2 DIABETES MELLITUS WITH HYPERGLYCEMIA, WITHOUT LONG-TERM CURRENT USE OF INSULIN (HCC): Primary | ICD-10-CM

## 2021-04-28 PROCEDURE — 99214 OFFICE O/P EST MOD 30 MIN: CPT | Performed by: FAMILY MEDICINE

## 2021-04-28 PROCEDURE — 3051F HG A1C>EQUAL 7.0%<8.0%: CPT | Performed by: FAMILY MEDICINE

## 2021-04-28 RX ORDER — OMEPRAZOLE 40 MG/1
40 CAPSULE, DELAYED RELEASE ORAL
Qty: 90 CAPSULE | Refills: 1 | Status: SHIPPED | OUTPATIENT
Start: 2021-04-28 | End: 2021-10-12

## 2021-04-28 RX ORDER — EMPAGLIFLOZIN 25 MG/1
TABLET, FILM COATED ORAL
Qty: 90 TABLET | Refills: 2 | Status: SHIPPED | OUTPATIENT
Start: 2021-04-28 | End: 2021-10-12 | Stop reason: SDUPTHER

## 2021-04-28 RX ORDER — PANTOPRAZOLE SODIUM 40 MG/1
TABLET, DELAYED RELEASE ORAL
Qty: 90 TABLET | Refills: 1 | Status: CANCELLED | OUTPATIENT
Start: 2021-04-28

## 2021-04-28 RX ORDER — GLUCOSAMINE HCL/CHONDROITIN SU 500-400 MG
CAPSULE ORAL
Qty: 100 STRIP | Refills: 5 | Status: SHIPPED | OUTPATIENT
Start: 2021-04-28

## 2021-04-28 ASSESSMENT — PATIENT HEALTH QUESTIONNAIRE - PHQ9
SUM OF ALL RESPONSES TO PHQ QUESTIONS 1-9: 0
SUM OF ALL RESPONSES TO PHQ QUESTIONS 1-9: 0
SUM OF ALL RESPONSES TO PHQ9 QUESTIONS 1 & 2: 0
1. LITTLE INTEREST OR PLEASURE IN DOING THINGS: 0

## 2021-04-28 NOTE — PROGRESS NOTES
[facility]  [address] [facility] [address]  Dept: [phone]  Dept Fax: [phone]: [phone]     Visit type: Established patient    Reason for Visit: 3 Month Follow-Up, Diabetes, and Discuss Labs      Assessment and Plan       1. Type 2 diabetes mellitus with hyperglycemia, without long-term current use of insulin ([facility])  -     blood glucose monitor strips; Test one time a day & as needed for symptoms of irregular blood glucose., Disp-100 strip, R-5, Normal  -     empagliflozin (JARDIANCE) 25 MG tablet; TAKE 1 TABLET BY MOUTH EVERY DAY, Disp-90 tablet, R-2Normal  2. CKD (chronic kidney disease) stage 2, GFR 60-89 ml/min  3. Gastric ulcer, unspecified chronicity, unspecified whether gastric ulcer hemorrhage or perforation present  -     omeprazole (PRILOSEC) 40 MG delayed release capsule; Take 1 capsule by mouth every morning (before breakfast), Disp-90 capsule, R-1Normal  4. Viral warts, unspecified type    Discussed proper use of medication and continue monitoring of his comorbid conditions. Discussed dietary and lifestyle modifications that may beneficial for his medical problems. Discussed the continued monitoring of his kidney function. Discussed appearance of the wart on the back of his hand and management options and at this time patient wishing to continue to monitor but we may discuss the possibility of further management and refreezing in the future. Discussed signs symptoms require medical attention. All questions were answered patient voiced understanding and agreement with plan as discussed. Return in about 6 months (around 10/28/2021), or if symptoms worsen or fail to improve. Subjective       HPI   Patient has history of type 2 diabetes and states that has been doing well with his current medications. Denies any issues with use of his medicine. Denies any symptoms that he associates with his diabetes at this time.   He is attempting to be diligent with his diet and try to stay physically active. He does have history of chronic kidney disease but this time appears to have improved to stage II. He denies any recent changes or symptoms. He does also have issues with gastroesophageal reflux disease and history of gastric ulcers and was previously on Protonix but reports that it gave him stomach issues. He denies any problems at this time. He does state that he has noticed a wart on the back of his left hand. He states is not giving any problems but was wanting it looked at. Goes back to [facility]. Review of Systems   Constitutional: Negative for activity change, appetite change and fever. HENT: Negative for congestion and rhinorrhea. Eyes: Negative for pain and discharge. Respiratory: Negative for cough and shortness of breath. Cardiovascular: Negative for chest pain and palpitations. Gastrointestinal: Negative for abdominal pain, constipation, diarrhea, nausea and vomiting. Endocrine: Negative for cold intolerance and heat intolerance. Genitourinary: Negative for dysuria and hematuria. Musculoskeletal: Negative for back pain, gait problem and neck pain. Skin: Negative for rash and wound. Neurological: Negative for syncope and weakness. Hematological: Negative for adenopathy. Does not bruise/bleed easily. Psychiatric/Behavioral: Negative for dysphoric mood and sleep disturbance. The patient is not nervous/anxious. No Known Allergies    Outpatient Medications Prior to Visit   Medication Sig Dispense Refill    metFORMIN (GLUCOPHAGE) 1000 MG tablet TAKE 1 TABLET BY MOUTH TWICE A DAY WITH MEALS 180 tablet 3    blood glucose monitor strips Test one time a day & as needed for symptoms of irregular blood glucose.  100 strip 5    tamsulosin (FLOMAX) 0.4 MG capsule Take 1 capsule by mouth daily 90 capsule 3    pantoprazole (PROTONIX) 40 MG tablet TAKE 1 TABLET BY MOUTH EVERY DAY BEFORE BREAKFAST 90 tablet 1    propranolol (INDERAL) 40 MG tablet TAKE 1 TABLET BY MOUTH TWICE A DAY BEFORE MEALS 180 tablet 1    JARDIANCE 25 MG tablet TAKE 1 TABLET BY MOUTH EVERY DAY 90 tablet 1    sodium bicarbonate 325 MG tablet Take 1 tablet by mouth 2 times daily 60 tablet 3    vitamin D (ERGOCALCIFEROL) 1.25 MG (10187 UT) CAPS capsule TAKE 1 CAPSULE BY MOUTH ONE TIME PER WEEK 12 capsule 1    SODIUM BICARBONATE, ANTACID, PO Take by mouth 2 times daily       Omega-3 Fatty Acids (FISH OIL) 1000 MG CPDR Take 3,000 mg by mouth 2 times daily      Tacrolimus (ENVARSUS XR) 1 MG TB24 Take 2 mg by mouth every morning Indications: on an empty stomach       mycophenolate (CELLCEPT) 250 MG capsule Take 500 mg by mouth 2 times daily      Lancets MISC Use to check blood sugar daily Dx E11.9 100 each 0    glucose blood VI test strips (ACCU-CHEK ANANT) strip Contour strips,check daily E11.9 100 each 0     No facility-administered medications prior to visit. Past Medical History:   Diagnosis Date    Arthritis     Bleeding ulcer 2019    [facility]     Diabetes mellitus ([facility]     Ex-cigarette smoker 2016    History of gastric ulcer     Hypertension     hx.  10 years ago    Kidney disease, chronic, end stage on dialysis [facility]) 2006, dialysis for 3 years, then had a kidney transplant,    Obese     Vision problem     wears glasses        Social History     Tobacco Use    Smoking status: Former Smoker     Packs/day: 2.00     Years: 4.00     Pack years: 8.00     Quit date: 1973     Years since quittin.2    Smokeless tobacco: Never Used   Substance Use Topics    Alcohol use: No        Past Surgical History:   Procedure Laterality Date    CHOLECYSTECTOMY      COLONOSCOPY  2017    Dr Rico Fleming: Diverticulosis, internal hemorrhoids, 5yr recall    COLONOSCOPY  2012    Dr Bree Proctor, 5 yr recall   [facility] 2007    left arm radial cephalic    HERNIA REPAIR      KIDNEY TRANSPLANT      2/18/2010 in [address] [facility] [address] Right 2/23/2018    AV FISTULA GRAFT REMOVAL performed [facility]y [facility] TUNNELED VENOUS CATHETER PLACEMENT  multiple    UPPER GASTROINTESTINAL ENDOSCOPY N/A 11/4/2019    Dr Lyle Cesar (Dr Radha Cooley pt)1.2 cm superficial gastric ulcer in the antrum with surrounding moderate gastritis and oozing of blood    UPPER GASTROINTESTINAL ENDOSCOPY  10/05/2009    Dr Sanchez Mew w/resolution of ulcer, lenin +    UPPER GASTROINTESTINAL ENDOSCOPY  07/10/2009    Dr Watson Mireles ulceration w/pigmented base on posterior wall of the body, small erosions, active gastritis    UPPER GASTROINTESTINAL ENDOSCOPY N/A 1/10/2020    Dr Roseanne Camarillo hiatal hernia, healed previous antral ulcer-Gastritis    VASCULAR SURGERY Right [place] 2008    av loop graft       Family History   Problem Relation Age of Onset    Kidney Disease Father     Heart Disease Father     Colon Cancer Neg Hx     Colon Polyps Neg Hx        Objective       /82 (Site: Right Upper Arm, Position: Sitting, Cuff Size: Large Adult)   Pulse 116   Temp 97.4 °F (36.3 °C) (Oral)   Resp 20   Ht 6' 1\" (1.854 m)   Wt 261 lb (118.4 kg)   SpO2 97%   BMI 34.43 kg/m²   Physical Exam  Vitals and nursing note reviewed. Constitutional:       General: He is not in acute distress. Appearance: He is well-developed. He is not diaphoretic. HENT:      Head: Normocephalic and atraumatic. Right Ear: External ear normal.      Left Ear: External ear normal.      Mouth/Throat:      Comments: Mask in place  Eyes:      General: No scleral icterus. Right eye: No discharge. Left eye: No discharge. Conjunctiva/sclera: Conjunctivae normal.   Neck:      Thyroid: No thyromegaly. Trachea: No tracheal deviation. Cardiovascular:      Rate and Rhythm: Normal rate.  Rhythm

## 2021-05-19 ASSESSMENT — ENCOUNTER SYMPTOMS
VOMITING: 0
CONSTIPATION: 0
RHINORRHEA: 0
BACK PAIN: 0
COUGH: 0
EYE DISCHARGE: 0
ABDOMINAL PAIN: 0
EYE PAIN: 0
DIARRHEA: 0
SHORTNESS OF BREATH: 0
NAUSEA: 0

## 2021-05-19 NOTE — PATIENT INSTRUCTIONS
Patient Education        Diabetes Blood Sugar Emergencies: Your Action Plan  How can you prevent a blood sugar emergency? An important part of living with diabetes is keeping your blood sugar in your target range. You'll need to know what to do if it's too high or too low. Managing your blood sugar levels helps you avoid emergencies. This care sheet will teach you about the signs of high and low blood sugar. It will help you make an action plan with your doctor for when these signs occur. Low blood sugar is more likely to happen if you take certain medicines for diabetes. It can also happen if you skip a meal, drink alcohol, or exercise more than usual.  You may get high blood sugar if you eat differently than you normally do. One example is eating more carbohydrate than usual. Having a cold, the flu, or other sudden illness can also cause high blood sugar levels. Levels can also rise if you miss a dose of medicine. Any change in how you take your medicine may affect your blood sugar level. So it's important to work with your doctor before you make any changes. Track your blood sugar  Work with your doctor to fill in the blank spaces below that apply to you. Track your levels, know your target range, and write down ways you can get your blood sugar back in your target range. A log book can help you track your levels. Take the book to all of your medical appointments. · Check your blood sugar _____ times a day, at these times:________________________________________________. (For example: Before meals, at bedtime, before exercise, during exercise, other.)  · Your blood sugar target range before a meal is ___________________. Your blood sugar target range after a meal is _______________________. · Do this___________________________________________________to get your blood sugar back within your safe range if your blood sugar results are _________________________________________.  (For example: Less than 70 or above 250 mg/dL.)  Call your doctor when your blood sugar results are ___________________________________. (For example: Less than 70 or above 250 mg/dL.)  What are the symptoms of low and high blood sugar? Common symptoms of low blood sugar are sweating and feeling shaky, weak, hungry, or confused. Symptoms can start quickly. Common symptoms of high blood sugar are feeling very thirsty or very hungry. You may also pass urine more often than usual. You may have blurry vision and may lose weight without trying. But some people may have high or low blood sugar without having any symptoms. That's a good reason to check your blood sugar on a regular schedule. What should you do if you have symptoms? Work with your doctor to fill in the blank spaces below that apply to you. Low blood sugar  If you have symptoms of low blood sugar, check your blood sugar. If it's below _____ ( for example, below 70), eat or drink a quick-sugar food that has about 15 grams of carbohydrate. Your goal is to get your level back to your safe range. Check your blood sugar again 15 minutes later. If it's still not in your target range, take another 15 grams of carbohydrate and check your blood sugar again in 15 minutes. Repeat this until you reach your target. Then go back to your regular testing schedule. Children usually need less than 15 grams of carbohydrate. Check with your doctor or diabetes educator for the amount that is right for your child. When you have low blood sugar, it's best to stop or reduce any physical activity until your blood sugar is back in your target range and is stable. If you must stay active, eat or drink 30 grams of carbohydrate. Then check your blood sugar again in 15 minutes. If it's not in your target range, take another 30 grams of carbohydrates. Check your blood sugar again in 15 minutes. Keep doing this until you reach your target. You can then go back to your regular testing schedule.   If your symptoms or blood sugar levels are getting worse or have not improved after 15 minutes, seek medical care right away. Here are some examples of quick-sugar foods with 15 grams of carbohydrate:  · 3 or 4 glucose tablets  · 1 tablespoon (3 teaspoons) table sugar  · ½ cup to ¾ cup (4 to 6 ounces) of fruit juice or regular (not diet) soda  · Hard candy (such as 6 Life Savers)  High blood sugar  If you have symptoms of high blood sugar, check your blood sugar. Your goal is to get your level back to your target range. If it's above ______ ( for example, above 250), follow these steps:  · If you missed a dose of your diabetes medicine, take it now. Take only the amount of medicine that you have been prescribed. Do not take more or less medicine. · Give yourself insulin if your doctor has prescribed it for high blood sugar. · Test for ketones, if the doctor told you to do so. If the results of the ketone test show a moderate-to-large amount of ketones, call the doctor for advice. · Wait 30 minutes after you take the extra insulin or the missed medicine. Check your blood sugar again. If your symptoms or blood sugar levels are getting worse or have not improved after taking these steps, seek medical care right away. Follow-up care is a key part of your treatment and safety. Be sure to make and go to all appointments, and call your doctor if you are having problems. It's also a good idea to know your test results and keep a list of the medicines you take. Where can you learn more? Go to https://[employer]. org and sign in to your [employer] account. Enter Z825 in the [facility] box to learn more about \"Diabetes Blood Sugar Emergencies: Your Action Plan. \"     If you do not have an account, please click on the \"Sign Up Now\" link. Current as of: August 31, 2020               Content Version: 12.8  © 9450-7023 [employer], Inc[employer].    Care instructions adapted under license by [id number]

## 2021-06-09 ENCOUNTER — TELEPHONE (OUTPATIENT)
Dept: CARDIOLOGY CLINIC | Age: 66
End: 2021-06-09

## 2021-06-09 NOTE — TELEPHONE ENCOUNTER
Called patient to update on status of watchman flex. At this time patient states he would like to wait due to abnormal lab work he recently had at [facility]. I will send patient my number and he will call when he is ready to proceed.

## 2021-06-28 RX ORDER — PROPRANOLOL HYDROCHLORIDE 40 MG/1
TABLET ORAL
Qty: 180 TABLET | Refills: 1 | Status: SHIPPED | OUTPATIENT
Start: 2021-06-28 | End: 2021-12-30

## 2021-07-29 ENCOUNTER — FOLLOWUP TELEPHONE ENCOUNTER (OUTPATIENT)
Dept: CARDIOLOGY | Age: 66
End: 2021-07-29

## 2021-07-29 NOTE — PROGRESS NOTES
Patient called office stating he is ready to be scheduled for Watchman procedure. Information will be forwarded to [facility] AND [facility].

## 2021-08-03 ENCOUNTER — TELEPHONE (OUTPATIENT)
Dept: CARDIOLOGY CLINIC | Age: 66
End: 2021-08-03

## 2021-08-03 DIAGNOSIS — I48.0 PAF (PAROXYSMAL ATRIAL FIBRILLATION) (HCC): Primary | ICD-10-CM

## 2021-08-03 NOTE — TELEPHONE ENCOUNTER
Leona Valera requests that Lestine Cushing return their call. Pt called wanting to schedule Watchman Procedure. The best time to reach him is Anytime. Thank you.

## 2021-08-03 NOTE — TELEPHONE ENCOUNTER
Attempted to call patient to notify him of CTA scheduled for left atrial appendage measurements for watchman device. Patient stated he was in a drive-through and he would call me back.

## 2021-08-06 ENCOUNTER — HOSPITAL ENCOUNTER (OUTPATIENT)
Dept: CT IMAGING | Age: 66
Discharge: HOME | End: 2021-08-06
Payer: MEDICARE

## 2021-08-06 DIAGNOSIS — I48.0 PAF (PAROXYSMAL ATRIAL FIBRILLATION) (HCC): ICD-10-CM

## 2021-08-06 LAB
GFR AFRICAN AMERICAN: >60
GFR NON-AFRICAN AMERICAN: 55
PERFORMED ON: ABNORMAL
POC CREATININE: 1.3 MG/DL (ref 0.3–1.3)
POC SAMPLE TYPE: ABNORMAL

## 2021-08-06 PROCEDURE — 6360000004 HC RX CONTRAST MEDICATION: Performed by: NURSE PRACTITIONER

## 2021-08-06 PROCEDURE — 71275 CT ANGIOGRAPHY CHEST: CPT

## 2021-08-06 PROCEDURE — 82565 ASSAY OF CREATININE: CPT

## 2021-08-06 RX ADMIN — IOPAMIDOL 90 ML: 755 INJECTION, SOLUTION INTRAVENOUS at 10:38

## 2021-08-23 ENCOUNTER — TELEPHONE (OUTPATIENT)
Dept: CARDIOLOGY CLINIC | Age: 66
End: 2021-08-23

## 2021-08-23 NOTE — TELEPHONE ENCOUNTER
Scottie requests that Shanda  return their call. The best time to reach him is Anytime. The pt has a question about the Watchman device. Thank you. Called and spoke with patient, have watchman scheduled for 9/2/2021 at [facility] with arrival of 0615. Patient is to be NPO after midnight. Patient instructed to arrive through front entrance of hospital and make immediate left. Patient advised they can have one person with them but they both must wear a mask. Patient advised may take morning medications with sip of water. Also advised patient must have COVID testing completed on 08/30/2021 anywhere from 700 am - 1200 pm at [facility]. Advised patient that they will be able to proceed with procedure as long as test results are negative. Patient made aware that if testing is not resulted evening prior to procedure that they may have to be rescheduled and possibly retested. Given instructions on where to go and to self quarantine between testing and procedure. Patient does not have IV dye allergy. Patient verbally understood.

## 2021-08-25 DIAGNOSIS — I48.0 PAF (PAROXYSMAL ATRIAL FIBRILLATION) (HCC): Primary | ICD-10-CM

## 2021-08-30 ENCOUNTER — OFFICE VISIT (OUTPATIENT)
Age: 66
End: 2021-08-30

## 2021-08-30 DIAGNOSIS — Z11.59 SCREENING FOR VIRAL DISEASE: Primary | ICD-10-CM

## 2021-08-30 LAB — SARS-COV-2, PCR: NOT DETECTED

## 2021-08-30 PROCEDURE — 99999 PR OFFICE/OUTPT VISIT,PROCEDURE ONLY: CPT | Performed by: NURSE PRACTITIONER

## 2021-08-30 RX ORDER — SODIUM CHLORIDE 9 MG/ML
25 INJECTION, SOLUTION INTRAVENOUS PRN
Status: CANCELLED | OUTPATIENT
Start: 2021-08-30

## 2021-08-30 RX ORDER — SODIUM CHLORIDE 9 MG/ML
INJECTION, SOLUTION INTRAVENOUS CONTINUOUS
Status: CANCELLED | OUTPATIENT
Start: 2021-08-30

## 2021-08-30 RX ORDER — SODIUM CHLORIDE 0.9 % (FLUSH) 0.9 %
5-40 SYRINGE (ML) INJECTION PRN
Status: CANCELLED | OUTPATIENT
Start: 2021-08-30

## 2021-08-30 RX ORDER — SODIUM CHLORIDE 0.9 % (FLUSH) 0.9 %
5-40 SYRINGE (ML) INJECTION EVERY 12 HOURS SCHEDULED
Status: CANCELLED | OUTPATIENT
Start: 2021-08-30

## 2021-09-01 ENCOUNTER — TELEPHONE (OUTPATIENT)
Dept: CARDIOLOGY | Age: 66
End: 2021-09-01

## 2021-09-02 ENCOUNTER — HOSPITAL ENCOUNTER (OUTPATIENT)
Dept: CARDIAC CATH/INVASIVE PROCEDURES | Age: 66
Discharge: HOME | End: 2021-09-02
Payer: MEDICARE

## 2021-09-23 ENCOUNTER — TELEPHONE (OUTPATIENT)
Dept: CARDIOLOGY CLINIC | Age: 66
End: 2021-09-23

## 2021-09-23 ENCOUNTER — TELEPHONE (OUTPATIENT)
Dept: CARDIOLOGY | Age: 66
End: 2021-09-23

## 2021-09-23 NOTE — TELEPHONE ENCOUNTER
Roland Burkitt requests that Sherman Briseno return their call. The best time to reach him is Anytime. Thank you.

## 2021-09-23 NOTE — TELEPHONE ENCOUNTER
Called and spoke with patient, nic rhodes scheduled for 9/30/2021 at 1100 with arrival of 0900. Patient is to be NPO after midnight. Patient instructed to arrive through front entrance of hospital and make immediate left. Patient advised they can have one person with them but they both must wear a mask. Patient advised may take morning medications with sip of water. Also advised patient must have COVID testing completed on 9/27/2021 anywhere from 700 am - 1200 pm at [facility]. Advised patient that they will be able to proceed with procedure as long as test results are negative. Patient made aware that if testing is not resulted evening prior to procedure that they may have to be rescheduled and possibly retested. Given instructions on where to go and to self quarantine between testing and procedure. Patient does no have IV dye allergy. Patient verbally understood.

## 2021-09-27 ENCOUNTER — OFFICE VISIT (OUTPATIENT)
Age: 66
End: 2021-09-27

## 2021-09-27 DIAGNOSIS — Z11.59 SCREENING FOR VIRAL DISEASE: Primary | ICD-10-CM

## 2021-09-27 LAB — SARS-COV-2, PCR: NOT DETECTED

## 2021-09-27 PROCEDURE — 99999 PR OFFICE/OUTPT VISIT,PROCEDURE ONLY: CPT | Performed by: NURSE PRACTITIONER

## 2021-09-29 NOTE — CARE COORDINATION
Call placed to the patient to inform him that he will need to be rescheduled for his Watchman procedure. Patient voiced he understood. Informed patient that he will be called with new appointment date and time.

## 2021-09-30 ENCOUNTER — HOSPITAL ENCOUNTER (OUTPATIENT)
Dept: CARDIAC CATH/INVASIVE PROCEDURES | Age: 66
Discharge: HOME | End: 2021-09-30
Payer: MEDICARE

## 2021-10-06 ENCOUNTER — TELEPHONE (OUTPATIENT)
Dept: CARDIOLOGY CLINIC | Age: 66
End: 2021-10-06

## 2021-10-06 NOTE — TELEPHONE ENCOUNTER
Declan Gaston with [facility] called to see if patient had his surgery and if so when he discharged. Returned call and advised that surgery was cancelled and the office will call him back to reschedule. She is going to extend the authorization date to 11/8/21.

## 2021-10-12 ENCOUNTER — OFFICE VISIT (OUTPATIENT)
Dept: FAMILY MEDICINE CLINIC | Age: 66
End: 2021-10-12
Payer: MEDICARE

## 2021-10-12 VITALS
SYSTOLIC BLOOD PRESSURE: 114 MMHG | TEMPERATURE: 97 F | HEART RATE: 101 BPM | WEIGHT: 263 LBS | DIASTOLIC BLOOD PRESSURE: 72 MMHG | BODY MASS INDEX: 34.7 KG/M2 | OXYGEN SATURATION: 98 % | RESPIRATION RATE: 16 BRPM

## 2021-10-12 DIAGNOSIS — E87.5 HYPERKALEMIA: ICD-10-CM

## 2021-10-12 DIAGNOSIS — R39.14 BENIGN PROSTATIC HYPERPLASIA WITH INCOMPLETE BLADDER EMPTYING: ICD-10-CM

## 2021-10-12 DIAGNOSIS — S39.012A BACK STRAIN, INITIAL ENCOUNTER: Primary | ICD-10-CM

## 2021-10-12 DIAGNOSIS — N40.1 BENIGN PROSTATIC HYPERPLASIA WITH INCOMPLETE BLADDER EMPTYING: ICD-10-CM

## 2021-10-12 DIAGNOSIS — E11.9 TYPE 2 DIABETES MELLITUS WITHOUT COMPLICATION, WITHOUT LONG-TERM CURRENT USE OF INSULIN (HCC): ICD-10-CM

## 2021-10-12 PROCEDURE — 3051F HG A1C>EQUAL 7.0%<8.0%: CPT | Performed by: FAMILY MEDICINE

## 2021-10-12 PROCEDURE — 99214 OFFICE O/P EST MOD 30 MIN: CPT | Performed by: FAMILY MEDICINE

## 2021-10-12 PROCEDURE — 96372 THER/PROPH/DIAG INJ SC/IM: CPT | Performed by: FAMILY MEDICINE

## 2021-10-12 RX ORDER — TIZANIDINE 4 MG/1
4 TABLET ORAL 3 TIMES DAILY
Qty: 45 TABLET | Refills: 0 | Status: ON HOLD | OUTPATIENT
Start: 2021-10-12 | End: 2021-11-18

## 2021-10-12 RX ORDER — SODIUM BICARBONATE 325 MG/1
325 TABLET ORAL 2 TIMES DAILY
Qty: 60 TABLET | Refills: 5 | Status: SHIPPED | OUTPATIENT
Start: 2021-10-12 | End: 2022-05-09

## 2021-10-12 RX ORDER — TRIAMCINOLONE ACETONIDE 40 MG/ML
60 INJECTION, SUSPENSION INTRA-ARTICULAR; INTRAMUSCULAR ONCE
Status: COMPLETED | OUTPATIENT
Start: 2021-10-12 | End: 2021-10-12

## 2021-10-12 RX ORDER — MELOXICAM 15 MG/1
15 TABLET ORAL DAILY
Qty: 30 TABLET | Refills: 0 | Status: SHIPPED | OUTPATIENT
Start: 2021-10-12 | End: 2021-11-08

## 2021-10-12 RX ORDER — EMPAGLIFLOZIN 25 MG/1
TABLET, FILM COATED ORAL
Qty: 90 TABLET | Refills: 1 | Status: SHIPPED | OUTPATIENT
Start: 2021-10-12 | End: 2022-07-07

## 2021-10-12 RX ADMIN — TRIAMCINOLONE ACETONIDE 60 MG: 40 INJECTION, SUSPENSION INTRA-ARTICULAR; INTRAMUSCULAR at 08:27

## 2021-10-12 ASSESSMENT — PATIENT HEALTH QUESTIONNAIRE - PHQ9
SUM OF ALL RESPONSES TO PHQ QUESTIONS 1-9: 0
SUM OF ALL RESPONSES TO PHQ QUESTIONS 1-9: 0
2. FEELING DOWN, DEPRESSED OR HOPELESS: 0
1. LITTLE INTEREST OR PLEASURE IN DOING THINGS: 0
SUM OF ALL RESPONSES TO PHQ QUESTIONS 1-9: 0
SUM OF ALL RESPONSES TO PHQ9 QUESTIONS 1 & 2: 0

## 2021-10-12 ASSESSMENT — ENCOUNTER SYMPTOMS
SHORTNESS OF BREATH: 0
COUGH: 0
RHINORRHEA: 0
BACK PAIN: 1
DIARRHEA: 0
NAUSEA: 0
CONSTIPATION: 0
ABDOMINAL PAIN: 0
EYE DISCHARGE: 0
VOMITING: 0
EYE PAIN: 0

## 2021-10-12 NOTE — PATIENT INSTRUCTIONS
Patient Education        Back Strain: Care Instructions  Overview     A back strain happens when you overstretch, or pull, a muscle in your back. You may hurt your back in an accident or when you exercise or lift something. Sometimes you may not know how you hurt your back. Most back pain will get better with rest and time. You can take care of yourself at home to help your back heal.  Follow-up care is a key part of your treatment and safety. Be sure to make and go to all appointments, and call your doctor if you are having problems. It's also a good idea to know your test results and keep a list of the medicines you take. How can you care for yourself at home? · Try to stay as active as you can, but stop or reduce any activity that causes pain. · Put ice or a cold pack on the sore muscle for 10 to 20 minutes at a time to stop swelling. Try this every 1 to 2 hours for 3 days (when you are awake) or until the swelling goes down. Put a thin cloth between the ice pack and your skin. · After 2 or 3 days, apply a heating pad on low or a warm cloth to your back. Some doctors suggest that you go back and forth between hot and cold treatments. · Take pain medicines exactly as directed. ? If the doctor gave you a prescription medicine for pain, take it as prescribed. ? If you are not taking a prescription pain medicine, ask your doctor if you can take an over-the-counter medicine. · Try sleeping on your side with a pillow between your legs. Or put a pillow under your knees when you lie on your back. These measures can ease pain in your lower back. · Return to your usual level of activity slowly. When should you call for help? Call 911 anytime you think you may need emergency care. For example, call if:    · You are unable to move a leg at all. Call your doctor now or seek immediate medical care if:    · You have new or worse symptoms in your legs, belly, or buttocks.  Symptoms may include:  ? Numbness or tingling. ? Weakness. ? Pain.     · You lose bladder or bowel control. Watch closely for changes in your health, and be sure to contact your doctor if:    · You have a fever, lose weight, or don't feel well.     · You are not getting better as expected. Where can you learn more? Go to [email or web address] and sign in to your [employer] account. Enter G667 in the 6sicuro.it box to learn more about \"Back Strain: Care Instructions. \"     If you do not have an account, please click on the \"Sign Up Now\" link. Current as of: July 1, 2021               Content Version: 13.0  © 2006-2021 Healthwise, Incorporated. Care instructions adapted under license by [facility] ([facility]). If you have questions about a medical condition or this instruction, always ask your healthcare professional. [address] any warranty or liability for your use of this information.

## 2021-10-12 NOTE — PROGRESS NOTES
Pavithra SCHNEIDER [address]  Dept: [phone]  Dept Fax: [phone]    Visit type: Established patient    Reason for Visit: Back Pain (x 6 days. states better today)         Assessment and Plan       1. Back strain, initial encounter  -     tiZANidine (ZANAFLEX) 4 MG tablet; Take 1 tablet by mouth 3 times daily, Disp-45 tablet, R-0Normal  -     meloxicam (MOBIC) 15 MG tablet; Take 1 tablet by mouth daily, Disp-30 tablet, R-0Normal  -     triamcinolone acetonide (KENALOG-40) injection 60 mg; 60 mg, IntraMUSCular, ONCE, On Tue 10/12/21 at 0845, For 1 dose  2. Type 2 diabetes mellitus without complication, without long-term current use of insulin (HCC)  -     Basic Metabolic Panel; Future  -     Hemoglobin A1C; Future  -     empagliflozin (JARDIANCE) 25 MG tablet; TAKE 1 TABLET BY MOUTH EVERY DAY, Disp-90 tablet, R-1Normal  3. Benign prostatic hyperplasia with incomplete bladder emptying  4. Hyperkalemia  -     Basic Metabolic Panel; Future    Discussed suggestive diagnosis, expected course, and proper use of medication, including OTC medications if prescription is too expensive or insurance does not cover. Discussed lifestyle modifications that may be beneficial.  Stressed importance of being diligent with his medication and diabetic diet. With patient doing well with his current use of Flomax will continue at this time and continue to monitor. Discussed signs and symptoms requiring medical attention. All questions were answered and patient voiced understanding and agreement with plan as discussed. Return if symptoms worsen or fail to improve, for next scheduled follow up with PCP. Subjective       HPI   Low back pain and hip pain that has been ongoing since Thursday night. He reports that he has not done anything to hurt it that he is aware of. He state that he has taken tylenol which has helped the pain and has taken hot showers that seem to help.  He states that the pain is improved but is still going on and not going away. He states that bending over makes the pain worse. He states that he notices it worse when he is getting up and down more than anything else. Patient has a history of type 2 diabetes and reports that he is doing well with his current medications. Denies any problems with his diabetes at this time and is trying to be diligent with his diet. He denies any new symptoms that he attributes to his diabetes. He does have issues with BPH and incomplete emptying but has been having improvement with the use of Flomax. He denies any problems with the Flomax or any recent changes to his symptoms. Patient was previously noted to have hyperkalemia on labs. Review of Systems   Constitutional: Negative for activity change, appetite change and fever. HENT: Negative for congestion and rhinorrhea. Eyes: Negative for pain and discharge. Respiratory: Negative for cough and shortness of breath. Cardiovascular: Negative for chest pain and palpitations. Gastrointestinal: Negative for abdominal pain, constipation, diarrhea, nausea and vomiting. Endocrine: Negative for cold intolerance and heat intolerance. Genitourinary: Negative for dysuria and hematuria. Musculoskeletal: Positive for back pain. Negative for gait problem and neck pain. Skin: Negative for rash and wound. Neurological: Negative for syncope and weakness. Hematological: Negative for adenopathy. Does not bruise/bleed easily. Psychiatric/Behavioral: Negative for dysphoric mood and sleep disturbance. The patient is not nervous/anxious. No Known Allergies    Outpatient Medications Prior to Visit   Medication Sig Dispense Refill    propranolol (INDERAL) 40 MG tablet TAKE 1 TABLET BY MOUTH TWICE A DAY BEFORE MEALS 180 tablet 1    blood glucose monitor strips Test one time a day & as needed for symptoms of irregular blood glucose.  100 strip 5    metFORMIN (GLUCOPHAGE) 1000 MG tablet TAKE 1 TABLET BY MOUTH TWICE A DAY WITH MEALS 180 tablet 3    tamsulosin (FLOMAX) 0.4 MG capsule Take 1 capsule by mouth daily 90 capsule 3    vitamin D (ERGOCALCIFEROL) 1.25 MG (17610 UT) CAPS capsule TAKE 1 CAPSULE BY MOUTH ONE TIME PER WEEK (Patient taking differently: every 30 days ) 12 capsule 1    Omega-3 Fatty Acids (FISH OIL) 1000 MG CPDR Take 3,000 mg by mouth 2 times daily      Tacrolimus (ENVARSUS XR) 1 MG TB24 Take 2 mg by mouth every morning Indications: on an empty stomach       mycophenolate (CELLCEPT) 250 MG capsule Take 500 mg by mouth 2 times daily      Lancets MISC Use to check blood sugar daily Dx E11.9 100 each 0    glucose blood VI test strips (ACCU-CHEK ANANT) strip Contour strips,check daily E11.9 100 each 0    empagliflozin (JARDIANCE) 25 MG tablet TAKE 1 TABLET BY MOUTH EVERY DAY 90 tablet 2    omeprazole (PRILOSEC) 40 MG delayed release capsule Take 1 capsule by mouth every morning (before breakfast) (Patient not taking: Reported on 10/12/2021) 90 capsule 1    sodium bicarbonate 325 MG tablet Take 1 tablet by mouth 2 times daily 60 tablet 3    SODIUM BICARBONATE, ANTACID, PO Take by mouth 2 times daily        No facility-administered medications prior to visit. Past Medical History:   Diagnosis Date    Arthritis     Bleeding ulcer 2019    [facility] failure[facility]     Diabetes mellitus ([facility]     Ex-cigarette smoker 2016    History of gastric ulcer 2009    Hypertension     hx.  10 years ago    Kidney disease, chronic, end stage on dialysis [facility]) 2006, dialysis for 3 years, then had a kidney transplant,    Obese     Vision problem     wears glasses        Social History     Tobacco Use    Smoking status: Former Smoker     Packs/day: 2.00     Years: 4.00     Pack years: 8.00     Quit date: 1973     Years since quittin.6    Smokeless tobacco: Never Used   Substance Use Topics    Alcohol use: No        Past Surgical History:   Procedure Laterality Date    CHOLECYSTECTOMY      COLONOSCOPY  06/23/2017    Dr Steffen Joshua: Diverticulosis, internal hemorrhoids, 5yr recall    COLONOSCOPY  01/05/2012    Dr Sanaz Dominguez, 5 yr recall    DIALYSIS FISTULA CREATION  [place] 2007    left arm radial cephalic    HERNIA REPAIR      KIDNEY TRANSPLANT      2/18/2010 in [address] [place] [address] Road Right 2/23/2018    AV FISTULA GRAFT REMOVAL performed by Jalen Magallanes MD at [facility] TUNNELED VENOUS CATHETER PLACEMENT  multiple    UPPER GASTROINTESTINAL ENDOSCOPY N/A 11/4/2019    Dr Jaleel Choi (Dr Steffen Joshua pt)1.2 cm superficial gastric ulcer in the antrum with surrounding moderate gastritis and oozing of blood    UPPER GASTROINTESTINAL ENDOSCOPY  10/05/2009    Dr Angeles Coleman w/resolution of ulcer, lenin +    UPPER GASTROINTESTINAL ENDOSCOPY  07/10/2009    Dr Geno Hines ulceration w/pigmented base on posterior wall of the body, small erosions, active gastritis    UPPER GASTROINTESTINAL ENDOSCOPY N/A 1/10/2020    Dr Megha Padilla hiatal hernia, healed previous antral ulcer-Gastritis    VASCULAR SURGERY Right [place] 2008    av loop graft       Family History   Problem Relation Age of Onset    Kidney Disease Father     Heart Disease Father     Colon Cancer Neg Hx     Colon Polyps Neg Hx        Objective       /72 (Site: Right Upper Arm, Position: Sitting, Cuff Size: Large Adult)   Pulse 101   Temp 97 °F (36.1 °C) (Skin)   Resp 16   Wt 263 lb (119.3 kg)   SpO2 98%   BMI 34.70 kg/m²   Physical Exam  Vitals and nursing note reviewed. Constitutional:       General: He is not in acute distress. Appearance: He is well-developed. He is not diaphoretic. HENT:      Head: Normocephalic and atraumatic. Right Ear: External ear normal.      Left Ear: External ear normal.      Mouth/Throat:      Comments: Mask in place  Eyes:      General: No scleral icterus. Right eye: No discharge. Left eye: No discharge. Conjunctiva/sclera: Conjunctivae normal.   Neck:      Thyroid: No thyromegaly. Trachea: No tracheal deviation. Cardiovascular:      Rate and Rhythm: Normal rate. Rhythm irregularly irregular. Heart sounds: Normal heart sounds. No friction rub. No gallop. Pulmonary:      Effort: Pulmonary effort is normal. No respiratory distress. Breath sounds: Normal breath sounds. No wheezing or rales. Abdominal:      General: Bowel sounds are normal. There is no distension. Palpations: Abdomen is soft. Tenderness: There is no abdominal tenderness. Musculoskeletal:         General: Tenderness present. No deformity (No gross deformities of upper or lower extremities). Cervical back: Neck supple. Right lower leg: No edema. Left lower leg: No edema. Comments: Right lower back tenderness with some muscle fullness appreciated. No apparent spinal tenderness or hip tenderness on exam.   Lymphadenopathy:      Cervical: No cervical adenopathy. Skin:     General: Skin is warm and dry. Findings: No erythema or rash. Comments: Patient has a wart on the dorsal surface of his left hand. Neurological:      Mental Status: He is alert and oriented to person, place, and time. Cranial Nerves: No cranial nerve deficit. Psychiatric:         Behavior: Behavior normal.         Thought Content:  Thought content normal.           Data Reviewed and Summarized       Labs:     Imaging/Testing:        Geovanny Adamson MD

## 2021-10-14 ENCOUNTER — TELEPHONE (OUTPATIENT)
Dept: CARDIOLOGY CLINIC | Age: 66
End: 2021-10-14

## 2021-10-14 NOTE — TELEPHONE ENCOUNTER
Called and spoke with patient. He is unable to have watchman procedure 10/21/2021.  Gave tentative date of 11/18/2021

## 2021-10-25 ENCOUNTER — TELEPHONE (OUTPATIENT)
Dept: PRIMARY CARE CLINIC | Age: 66
End: 2021-10-25

## 2021-11-05 DIAGNOSIS — S39.012A BACK STRAIN, INITIAL ENCOUNTER: ICD-10-CM

## 2021-11-08 RX ORDER — MELOXICAM 15 MG/1
TABLET ORAL
Qty: 30 TABLET | Refills: 0 | Status: ON HOLD | OUTPATIENT
Start: 2021-11-08 | End: 2021-11-18 | Stop reason: ALTCHOICE

## 2021-11-10 LAB
ALBUMIN SERPL-MCNC: 4.3 G/DL (ref 3.5–5.2)
ALP BLD-CCNC: 66 U/L (ref 40–130)
ALT SERPL-CCNC: 18 U/L (ref 5–41)
ANION GAP SERPL CALCULATED.3IONS-SCNC: 15 MMOL/L (ref 7–19)
AST SERPL-CCNC: 16 U/L (ref 5–40)
BASOPHILS ABSOLUTE: 0.1 K/UL (ref 0–0.2)
BASOPHILS RELATIVE PERCENT: 0.9 % (ref 0–1)
BILIRUB SERPL-MCNC: 2.2 MG/DL (ref 0.2–1.2)
BILIRUBIN URINE: NEGATIVE
BLOOD, URINE: NEGATIVE
BUN BLDV-MCNC: 19 MG/DL (ref 8–23)
CALCIUM SERPL-MCNC: 9.9 MG/DL (ref 8.8–10.2)
CHLORIDE BLD-SCNC: 102 MMOL/L (ref 98–111)
CLARITY: CLEAR
CO2: 19 MMOL/L (ref 22–29)
COLOR: YELLOW
CREAT SERPL-MCNC: 1.2 MG/DL (ref 0.5–1.2)
CREATININE URINE: 68.7 MG/DL (ref 4.2–622)
EOSINOPHILS ABSOLUTE: 0.7 K/UL (ref 0–0.6)
EOSINOPHILS RELATIVE PERCENT: 8 % (ref 0–5)
GFR AFRICAN AMERICAN: >59
GFR NON-AFRICAN AMERICAN: >60
GLUCOSE BLD-MCNC: 145 MG/DL (ref 74–109)
GLUCOSE URINE: =>1000 MG/DL
HCT VFR BLD CALC: 54.4 % (ref 42–52)
HEMOGLOBIN: 17.5 G/DL (ref 14–18)
IMMATURE GRANULOCYTES #: 0.1 K/UL
KETONES, URINE: ABNORMAL MG/DL
LEUKOCYTE ESTERASE, URINE: NEGATIVE
LYMPHOCYTES ABSOLUTE: 1.4 K/UL (ref 1.1–4.5)
LYMPHOCYTES RELATIVE PERCENT: 16.3 % (ref 20–40)
MAGNESIUM: 1.8 MG/DL (ref 1.6–2.4)
MCH RBC QN AUTO: 30.3 PG (ref 27–31)
MCHC RBC AUTO-ENTMCNC: 32.2 G/DL (ref 33–37)
MCV RBC AUTO: 94.3 FL (ref 80–94)
MONOCYTES ABSOLUTE: 0.8 K/UL (ref 0–0.9)
MONOCYTES RELATIVE PERCENT: 9.1 % (ref 0–10)
NEUTROPHILS ABSOLUTE: 5.5 K/UL (ref 1.5–7.5)
NEUTROPHILS RELATIVE PERCENT: 64.8 % (ref 50–65)
NITRITE, URINE: NEGATIVE
PARATHYROID HORMONE INTACT: 113.6 PG/ML (ref 15–65)
PDW BLD-RTO: 14.5 % (ref 11.5–14.5)
PH UA: 5 (ref 5–8)
PHOSPHORUS: 3.8 MG/DL (ref 2.5–4.5)
PLATELET # BLD: 167 K/UL (ref 130–400)
PMV BLD AUTO: 10.4 FL (ref 9.4–12.4)
POTASSIUM SERPL-SCNC: 4.5 MMOL/L (ref 3.5–5)
PROTEIN PROTEIN: 8 MG/DL (ref 15–45)
PROTEIN UA: NEGATIVE MG/DL
RBC # BLD: 5.77 M/UL (ref 4.7–6.1)
SODIUM BLD-SCNC: 136 MMOL/L (ref 136–145)
SPECIFIC GRAVITY UA: 1.03 (ref 1–1.03)
TOTAL PROTEIN: 6.8 G/DL (ref 6.6–8.7)
URIC ACID, SERUM: 7.5 MG/DL (ref 3.4–7)
UROBILINOGEN, URINE: 0.2 E.U./DL
VITAMIN D 25-HYDROXY: 38.8 NG/ML
WBC # BLD: 8.5 K/UL (ref 4.8–10.8)

## 2021-11-11 ENCOUNTER — TELEPHONE (OUTPATIENT)
Dept: CARDIOLOGY CLINIC | Age: 66
End: 2021-11-11

## 2021-11-11 NOTE — TELEPHONE ENCOUNTER
No Answer; Unable to contact pt to bubba new cardiac appt    NP; Chandler Rashid Referral; CHF    Please ask these questions and place in appt notes or send me a TE    !; Have you teto vaccinated? If so, which one? 2. Haver you had any cardiac testing in the last 5 years outside of [facility]? If so, where? 3. Have you had  cardiologist within the last 5 years?      If so, who?

## 2021-11-11 NOTE — TELEPHONE ENCOUNTER
Called and spoke with patient, nic rhodes scheduled for 11/18/2021 at 1000 with arrival of 0800. Patient is to be NPO after midnight. Patient instructed to arrive through front entrance of hospital and make immediate left. Patient advised they can have one person with them but they both must wear a mask. Patient advised may take morning medications with sip of water. Also advised patient must have COVID testing completed on 11/15/2021 anywhere from 900 am - 1200 pm at [facility]. Advised patient that they will be able to proceed with procedure as long as test results are negative. Patient made aware that if testing is not resulted evening prior to procedure that they may have to be rescheduled and possibly retested. Given instructions on where to go and to self quarantine between testing and procedure. Patient does not have IV dye allergy. Patient verbally understood.

## 2021-11-12 LAB — TACROLIMUS BLOOD: 9.2 NG/ML

## 2021-11-15 ENCOUNTER — OFFICE VISIT (OUTPATIENT)
Age: 66
End: 2021-11-15

## 2021-11-15 DIAGNOSIS — Z11.59 SCREENING FOR VIRAL DISEASE: Primary | ICD-10-CM

## 2021-11-15 LAB — SARS-COV-2, PCR: NOT DETECTED

## 2021-11-15 PROCEDURE — 99999 PR OFFICE/OUTPT VISIT,PROCEDURE ONLY: CPT | Performed by: NURSE PRACTITIONER

## 2021-11-18 ENCOUNTER — ANESTHESIA EVENT (OUTPATIENT)
Dept: CARDIAC CATH/INVASIVE PROCEDURES | Age: 66
DRG: 274 | End: 2021-11-18
Payer: MEDICARE

## 2021-11-18 ENCOUNTER — ANESTHESIA (OUTPATIENT)
Dept: CARDIAC CATH/INVASIVE PROCEDURES | Age: 66
DRG: 274 | End: 2021-11-18
Payer: MEDICARE

## 2021-11-18 ENCOUNTER — HOSPITAL ENCOUNTER (INPATIENT)
Dept: CARDIAC CATH/INVASIVE PROCEDURES | Age: 66
LOS: 1 days | Discharge: HOME | DRG: 274 | End: 2021-11-19
Attending: INTERNAL MEDICINE | Admitting: INTERNAL MEDICINE
Payer: MEDICARE

## 2021-11-18 VITALS — SYSTOLIC BLOOD PRESSURE: 132 MMHG | OXYGEN SATURATION: 96 % | TEMPERATURE: 95.5 F | DIASTOLIC BLOOD PRESSURE: 62 MMHG

## 2021-11-18 PROBLEM — Z95.818 PRESENCE OF WATCHMAN LEFT ATRIAL APPENDAGE CLOSURE DEVICE: Status: ACTIVE | Noted: 2021-11-18

## 2021-11-18 LAB
ABO/RH: NORMAL
ALBUMIN SERPL-MCNC: 4.3 G/DL (ref 3.5–5.2)
ALP BLD-CCNC: 64 U/L (ref 40–130)
ALT SERPL-CCNC: 25 U/L (ref 5–41)
ANION GAP SERPL CALCULATED.3IONS-SCNC: 13 MMOL/L (ref 7–19)
ANTIBODY SCREEN: NORMAL
AST SERPL-CCNC: 21 U/L (ref 5–40)
BILIRUB SERPL-MCNC: 1.6 MG/DL (ref 0.2–1.2)
BUN BLDV-MCNC: 20 MG/DL (ref 8–23)
CALCIUM SERPL-MCNC: 9.5 MG/DL (ref 8.8–10.2)
CHLORIDE BLD-SCNC: 106 MMOL/L (ref 98–111)
CO2: 20 MMOL/L (ref 22–29)
CREAT SERPL-MCNC: 1.1 MG/DL (ref 0.5–1.2)
GFR AFRICAN AMERICAN: >59
GFR NON-AFRICAN AMERICAN: >60
GLUCOSE BLD-MCNC: 153 MG/DL (ref 70–99)
GLUCOSE BLD-MCNC: 175 MG/DL (ref 74–109)
GLUCOSE BLD-MCNC: 245 MG/DL (ref 70–99)
HCT VFR BLD CALC: 44.3 % (ref 42–52)
HCT VFR BLD CALC: 52.5 % (ref 42–52)
HEMOGLOBIN: 13.8 G/DL (ref 14–18)
HEMOGLOBIN: 16.6 G/DL (ref 14–18)
INR BLD: 1.03 (ref 0.88–1.18)
LV EF: 43 %
LVEF MODALITY: NORMAL
MCH RBC QN AUTO: 29.7 PG (ref 27–31)
MCH RBC QN AUTO: 30.3 PG (ref 27–31)
MCHC RBC AUTO-ENTMCNC: 31.2 G/DL (ref 33–37)
MCHC RBC AUTO-ENTMCNC: 31.6 G/DL (ref 33–37)
MCV RBC AUTO: 94.1 FL (ref 80–94)
MCV RBC AUTO: 97.1 FL (ref 80–94)
PDW BLD-RTO: 14.8 % (ref 11.5–14.5)
PDW BLD-RTO: 14.9 % (ref 11.5–14.5)
PERFORMED ON: ABNORMAL
PERFORMED ON: ABNORMAL
PLATELET # BLD: 154 K/UL (ref 130–400)
PLATELET # BLD: 160 K/UL (ref 130–400)
PMV BLD AUTO: 10.1 FL (ref 9.4–12.4)
PMV BLD AUTO: 10.3 FL (ref 9.4–12.4)
POC ACT LR: 340 SEC
POC ACT LR: 343 SEC
POTASSIUM SERPL-SCNC: 4.4 MMOL/L (ref 3.5–5)
PROTHROMBIN TIME: 13.7 SEC (ref 12–14.6)
RBC # BLD: 4.56 M/UL (ref 4.7–6.1)
RBC # BLD: 5.58 M/UL (ref 4.7–6.1)
SODIUM BLD-SCNC: 139 MMOL/L (ref 136–145)
TOTAL PROTEIN: 6.9 G/DL (ref 6.6–8.7)
WBC # BLD: 13.6 K/UL (ref 4.8–10.8)
WBC # BLD: 8.5 K/UL (ref 4.8–10.8)

## 2021-11-18 PROCEDURE — 92960 CARDIOVERSION ELECTRIC EXT: CPT | Performed by: INTERNAL MEDICINE

## 2021-11-18 PROCEDURE — 36415 COLL VENOUS BLD VENIPUNCTURE: CPT

## 2021-11-18 PROCEDURE — C1893 INTRO/SHEATH, FIXED,NON-PEEL: HCPCS

## 2021-11-18 PROCEDURE — 2580000003 HC RX 258: Performed by: NURSE PRACTITIONER

## 2021-11-18 PROCEDURE — 86901 BLOOD TYPING SEROLOGIC RH(D): CPT

## 2021-11-18 PROCEDURE — 6370000000 HC RX 637 (ALT 250 FOR IP): Performed by: NURSE PRACTITIONER

## 2021-11-18 PROCEDURE — C1894 INTRO/SHEATH, NON-LASER: HCPCS

## 2021-11-18 PROCEDURE — 6370000000 HC RX 637 (ALT 250 FOR IP): Performed by: INTERNAL MEDICINE

## 2021-11-18 PROCEDURE — 6360000002 HC RX W HCPCS

## 2021-11-18 PROCEDURE — 82947 ASSAY GLUCOSE BLOOD QUANT: CPT

## 2021-11-18 PROCEDURE — B210YZZ FLUOROSCOPY OF SINGLE CORONARY ARTERY USING OTHER CONTRAST: ICD-10-PCS | Performed by: INTERNAL MEDICINE

## 2021-11-18 PROCEDURE — B24BZZ4 ULTRASONOGRAPHY OF HEART WITH AORTA, TRANSESOPHAGEAL: ICD-10-PCS | Performed by: INTERNAL MEDICINE

## 2021-11-18 PROCEDURE — 92960 CARDIOVERSION ELECTRIC EXT: CPT

## 2021-11-18 PROCEDURE — 02L73DK OCCLUSION OF LEFT ATRIAL APPENDAGE WITH INTRALUMINAL DEVICE, PERCUTANEOUS APPROACH: ICD-10-PCS | Performed by: INTERNAL MEDICINE

## 2021-11-18 PROCEDURE — 93325 DOPPLER ECHO COLOR FLOW MAPG: CPT

## 2021-11-18 PROCEDURE — 6360000004 HC RX CONTRAST MEDICATION: Performed by: INTERNAL MEDICINE

## 2021-11-18 PROCEDURE — 7100000000 HC PACU RECOVERY - FIRST 15 MIN

## 2021-11-18 PROCEDURE — 3700000000 HC ANESTHESIA ATTENDED CARE

## 2021-11-18 PROCEDURE — C1889 IMPLANT/INSERT DEVICE, NOC: HCPCS

## 2021-11-18 PROCEDURE — 6360000002 HC RX W HCPCS: Performed by: NURSE ANESTHETIST, CERTIFIED REGISTERED

## 2021-11-18 PROCEDURE — 33340 PERQ CLSR TCAT L ATR APNDGE: CPT | Performed by: INTERNAL MEDICINE

## 2021-11-18 PROCEDURE — 93005 ELECTROCARDIOGRAM TRACING: CPT | Performed by: INTERNAL MEDICINE

## 2021-11-18 PROCEDURE — 85027 COMPLETE CBC AUTOMATED: CPT

## 2021-11-18 PROCEDURE — 2500000003 HC RX 250 WO HCPCS: Performed by: ANESTHESIOLOGY

## 2021-11-18 PROCEDURE — 33340 PERQ CLSR TCAT L ATR APNDGE: CPT

## 2021-11-18 PROCEDURE — 93355 ECHO TRANSESOPHAGEAL (TEE): CPT

## 2021-11-18 PROCEDURE — 2580000003 HC RX 258: Performed by: NURSE ANESTHETIST, CERTIFIED REGISTERED

## 2021-11-18 PROCEDURE — 93321 DOPPLER ECHO F-UP/LMTD STD: CPT

## 2021-11-18 PROCEDURE — C1769 GUIDE WIRE: HCPCS

## 2021-11-18 PROCEDURE — 5A2204Z RESTORATION OF CARDIAC RHYTHM, SINGLE: ICD-10-PCS | Performed by: INTERNAL MEDICINE

## 2021-11-18 PROCEDURE — 6360000002 HC RX W HCPCS: Performed by: NURSE PRACTITIONER

## 2021-11-18 PROCEDURE — 3700000001 HC ADD 15 MINUTES (ANESTHESIA)

## 2021-11-18 PROCEDURE — 86850 RBC ANTIBODY SCREEN: CPT

## 2021-11-18 PROCEDURE — 80053 COMPREHEN METABOLIC PANEL: CPT

## 2021-11-18 PROCEDURE — 86900 BLOOD TYPING SEROLOGIC ABO: CPT

## 2021-11-18 PROCEDURE — 2140000000 HC CCU INTERMEDIATE R&B

## 2021-11-18 PROCEDURE — 2709999900 HC NON-CHARGEABLE SUPPLY

## 2021-11-18 PROCEDURE — 85347 COAGULATION TIME ACTIVATED: CPT

## 2021-11-18 PROCEDURE — 2500000003 HC RX 250 WO HCPCS: Performed by: NURSE ANESTHETIST, CERTIFIED REGISTERED

## 2021-11-18 PROCEDURE — 7100000001 HC PACU RECOVERY - ADDTL 15 MIN

## 2021-11-18 PROCEDURE — 85610 PROTHROMBIN TIME: CPT

## 2021-11-18 RX ORDER — ROCURONIUM BROMIDE 10 MG/ML
INJECTION, SOLUTION INTRAVENOUS PRN
Status: DISCONTINUED | OUTPATIENT
Start: 2021-11-18 | End: 2021-11-18 | Stop reason: SDUPTHER

## 2021-11-18 RX ORDER — SUCCINYLCHOLINE CHLORIDE 20 MG/ML
INJECTION INTRAMUSCULAR; INTRAVENOUS PRN
Status: DISCONTINUED | OUTPATIENT
Start: 2021-11-18 | End: 2021-11-18 | Stop reason: SDUPTHER

## 2021-11-18 RX ORDER — SODIUM CHLORIDE 9 MG/ML
25 INJECTION, SOLUTION INTRAVENOUS PRN
Status: DISCONTINUED | OUTPATIENT
Start: 2021-11-18 | End: 2021-11-18 | Stop reason: SDUPTHER

## 2021-11-18 RX ORDER — TAMSULOSIN HYDROCHLORIDE 0.4 MG/1
0.4 CAPSULE ORAL DAILY
Status: DISCONTINUED | OUTPATIENT
Start: 2021-11-18 | End: 2021-11-19 | Stop reason: HOSPADM

## 2021-11-18 RX ORDER — SODIUM BICARBONATE 650 MG/1
325 TABLET ORAL 2 TIMES DAILY
Status: DISCONTINUED | OUTPATIENT
Start: 2021-11-18 | End: 2021-11-19 | Stop reason: HOSPADM

## 2021-11-18 RX ORDER — NICOTINE POLACRILEX 4 MG
15 LOZENGE BUCCAL PRN
Status: DISCONTINUED | OUTPATIENT
Start: 2021-11-18 | End: 2021-11-19 | Stop reason: HOSPADM

## 2021-11-18 RX ORDER — CEFEPIME HYDROCHLORIDE 2 G/1
INJECTION, POWDER, FOR SOLUTION INTRAVENOUS PRN
Status: DISCONTINUED | OUTPATIENT
Start: 2021-11-18 | End: 2021-11-18

## 2021-11-18 RX ORDER — SODIUM CHLORIDE 0.9 % (FLUSH) 0.9 %
10 SYRINGE (ML) INJECTION PRN
Status: DISCONTINUED | OUTPATIENT
Start: 2021-11-18 | End: 2021-11-18 | Stop reason: SDUPTHER

## 2021-11-18 RX ORDER — SODIUM CHLORIDE 9 MG/ML
INJECTION, SOLUTION INTRAVENOUS CONTINUOUS
Status: DISCONTINUED | OUTPATIENT
Start: 2021-11-18 | End: 2021-11-19 | Stop reason: HOSPADM

## 2021-11-18 RX ORDER — SODIUM CHLORIDE 9 MG/ML
INJECTION, SOLUTION INTRAVENOUS CONTINUOUS PRN
Status: DISCONTINUED | OUTPATIENT
Start: 2021-11-18 | End: 2021-11-18 | Stop reason: SDUPTHER

## 2021-11-18 RX ORDER — CEFAZOLIN SODIUM 1 G/3ML
INJECTION, POWDER, FOR SOLUTION INTRAMUSCULAR; INTRAVENOUS PRN
Status: DISCONTINUED | OUTPATIENT
Start: 2021-11-18 | End: 2021-11-18 | Stop reason: SDUPTHER

## 2021-11-18 RX ORDER — ONDANSETRON 2 MG/ML
4 INJECTION INTRAMUSCULAR; INTRAVENOUS
Status: ACTIVE | OUTPATIENT
Start: 2021-11-18 | End: 2021-11-18

## 2021-11-18 RX ORDER — FENTANYL CITRATE 50 UG/ML
50 INJECTION, SOLUTION INTRAMUSCULAR; INTRAVENOUS EVERY 5 MIN PRN
Status: DISCONTINUED | OUTPATIENT
Start: 2021-11-18 | End: 2021-11-19 | Stop reason: HOSPADM

## 2021-11-18 RX ORDER — PROTAMINE SULFATE 10 MG/ML
INJECTION, SOLUTION INTRAVENOUS PRN
Status: DISCONTINUED | OUTPATIENT
Start: 2021-11-18 | End: 2021-11-18 | Stop reason: SDUPTHER

## 2021-11-18 RX ORDER — HYDROMORPHONE HYDROCHLORIDE 1 MG/ML
0.25 INJECTION, SOLUTION INTRAMUSCULAR; INTRAVENOUS; SUBCUTANEOUS EVERY 5 MIN PRN
Status: DISCONTINUED | OUTPATIENT
Start: 2021-11-18 | End: 2021-11-19 | Stop reason: HOSPADM

## 2021-11-18 RX ORDER — DEXAMETHASONE SODIUM PHOSPHATE 10 MG/ML
INJECTION, SOLUTION INTRAMUSCULAR; INTRAVENOUS PRN
Status: DISCONTINUED | OUTPATIENT
Start: 2021-11-18 | End: 2021-11-18 | Stop reason: SDUPTHER

## 2021-11-18 RX ORDER — SODIUM CHLORIDE 0.9 % (FLUSH) 0.9 %
5-40 SYRINGE (ML) INJECTION EVERY 12 HOURS SCHEDULED
Status: DISCONTINUED | OUTPATIENT
Start: 2021-11-18 | End: 2021-11-18 | Stop reason: SDUPTHER

## 2021-11-18 RX ORDER — MYCOPHENOLATE MOFETIL 250 MG/1
500 CAPSULE ORAL 2 TIMES DAILY
Status: DISCONTINUED | OUTPATIENT
Start: 2021-11-18 | End: 2021-11-19 | Stop reason: HOSPADM

## 2021-11-18 RX ORDER — SODIUM CHLORIDE 9 MG/ML
25 INJECTION, SOLUTION INTRAVENOUS PRN
Status: DISCONTINUED | OUTPATIENT
Start: 2021-11-18 | End: 2021-11-19 | Stop reason: HOSPADM

## 2021-11-18 RX ORDER — PROPRANOLOL HYDROCHLORIDE 40 MG/1
40 TABLET ORAL 2 TIMES DAILY
Status: DISCONTINUED | OUTPATIENT
Start: 2021-11-18 | End: 2021-11-19 | Stop reason: HOSPADM

## 2021-11-18 RX ORDER — HEPARIN SODIUM 1000 [USP'U]/ML
INJECTION, SOLUTION INTRAVENOUS; SUBCUTANEOUS PRN
Status: DISCONTINUED | OUTPATIENT
Start: 2021-11-18 | End: 2021-11-18 | Stop reason: SDUPTHER

## 2021-11-18 RX ORDER — SODIUM CHLORIDE 0.9 % (FLUSH) 0.9 %
10 SYRINGE (ML) INJECTION EVERY 12 HOURS SCHEDULED
Status: DISCONTINUED | OUTPATIENT
Start: 2021-11-18 | End: 2021-11-18 | Stop reason: SDUPTHER

## 2021-11-18 RX ORDER — SODIUM CHLORIDE 0.9 % (FLUSH) 0.9 %
5-40 SYRINGE (ML) INJECTION PRN
Status: DISCONTINUED | OUTPATIENT
Start: 2021-11-18 | End: 2021-11-18 | Stop reason: SDUPTHER

## 2021-11-18 RX ORDER — DEXTROSE MONOHYDRATE 50 MG/ML
100 INJECTION, SOLUTION INTRAVENOUS PRN
Status: DISCONTINUED | OUTPATIENT
Start: 2021-11-18 | End: 2021-11-19 | Stop reason: HOSPADM

## 2021-11-18 RX ORDER — SODIUM CHLORIDE, SODIUM LACTATE, POTASSIUM CHLORIDE, CALCIUM CHLORIDE 600; 310; 30; 20 MG/100ML; MG/100ML; MG/100ML; MG/100ML
INJECTION, SOLUTION INTRAVENOUS CONTINUOUS
Status: DISCONTINUED | OUTPATIENT
Start: 2021-11-18 | End: 2021-11-19 | Stop reason: HOSPADM

## 2021-11-18 RX ORDER — ONDANSETRON 2 MG/ML
INJECTION INTRAMUSCULAR; INTRAVENOUS PRN
Status: DISCONTINUED | OUTPATIENT
Start: 2021-11-18 | End: 2021-11-18 | Stop reason: SDUPTHER

## 2021-11-18 RX ORDER — LIDOCAINE HYDROCHLORIDE 10 MG/ML
INJECTION, SOLUTION EPIDURAL; INFILTRATION; INTRACAUDAL; PERINEURAL PRN
Status: DISCONTINUED | OUTPATIENT
Start: 2021-11-18 | End: 2021-11-18 | Stop reason: SDUPTHER

## 2021-11-18 RX ORDER — OMEGA-3 FATTY ACIDS CAP DELAYED RELEASE 1000 MG 1000 MG
1200 CAPSULE DELAYED RELEASE ORAL 2 TIMES DAILY
Status: DISCONTINUED | OUTPATIENT
Start: 2021-11-18 | End: 2021-11-18 | Stop reason: RX

## 2021-11-18 RX ORDER — MIDAZOLAM HYDROCHLORIDE 1 MG/ML
INJECTION INTRAMUSCULAR; INTRAVENOUS PRN
Status: DISCONTINUED | OUTPATIENT
Start: 2021-11-18 | End: 2021-11-18 | Stop reason: SDUPTHER

## 2021-11-18 RX ORDER — EPHEDRINE SULFATE 50 MG/ML
INJECTION, SOLUTION INTRAVENOUS PRN
Status: DISCONTINUED | OUTPATIENT
Start: 2021-11-18 | End: 2021-11-18 | Stop reason: SDUPTHER

## 2021-11-18 RX ORDER — SODIUM CHLORIDE 9 MG/ML
INJECTION, SOLUTION INTRAVENOUS CONTINUOUS
Status: DISCONTINUED | OUTPATIENT
Start: 2021-11-18 | End: 2021-11-18 | Stop reason: SDUPTHER

## 2021-11-18 RX ORDER — HYDROMORPHONE HYDROCHLORIDE 1 MG/ML
0.5 INJECTION, SOLUTION INTRAMUSCULAR; INTRAVENOUS; SUBCUTANEOUS EVERY 5 MIN PRN
Status: DISCONTINUED | OUTPATIENT
Start: 2021-11-18 | End: 2021-11-19 | Stop reason: HOSPADM

## 2021-11-18 RX ORDER — PROPOFOL 10 MG/ML
INJECTION, EMULSION INTRAVENOUS PRN
Status: DISCONTINUED | OUTPATIENT
Start: 2021-11-18 | End: 2021-11-18 | Stop reason: SDUPTHER

## 2021-11-18 RX ORDER — SODIUM CHLORIDE 0.9 % (FLUSH) 0.9 %
5-40 SYRINGE (ML) INJECTION PRN
Status: DISCONTINUED | OUTPATIENT
Start: 2021-11-18 | End: 2021-11-19 | Stop reason: HOSPADM

## 2021-11-18 RX ORDER — DEXTROSE MONOHYDRATE 25 G/50ML
12.5 INJECTION, SOLUTION INTRAVENOUS PRN
Status: DISCONTINUED | OUTPATIENT
Start: 2021-11-18 | End: 2021-11-19 | Stop reason: HOSPADM

## 2021-11-18 RX ORDER — ACETAMINOPHEN 325 MG/1
650 TABLET ORAL EVERY 4 HOURS PRN
Status: DISCONTINUED | OUTPATIENT
Start: 2021-11-18 | End: 2021-11-19 | Stop reason: HOSPADM

## 2021-11-18 RX ORDER — FENTANYL CITRATE 50 UG/ML
INJECTION, SOLUTION INTRAMUSCULAR; INTRAVENOUS PRN
Status: DISCONTINUED | OUTPATIENT
Start: 2021-11-18 | End: 2021-11-18 | Stop reason: SDUPTHER

## 2021-11-18 RX ORDER — SODIUM CHLORIDE 0.9 % (FLUSH) 0.9 %
5-40 SYRINGE (ML) INJECTION EVERY 12 HOURS SCHEDULED
Status: DISCONTINUED | OUTPATIENT
Start: 2021-11-18 | End: 2021-11-19 | Stop reason: HOSPADM

## 2021-11-18 RX ADMIN — DEXAMETHASONE SODIUM PHOSPHATE 10 MG: 10 INJECTION, SOLUTION INTRAMUSCULAR; INTRAVENOUS at 11:00

## 2021-11-18 RX ADMIN — IOPAMIDOL 50 ML: 612 INJECTION, SOLUTION INTRAVENOUS at 13:06

## 2021-11-18 RX ADMIN — BENZOCAINE 6 MG-MENTHOL 10 MG LOZENGES 1 LOZENGE: at 23:41

## 2021-11-18 RX ADMIN — PROTAMINE SULFATE 40 MG: 10 INJECTION, SOLUTION INTRAVENOUS at 12:38

## 2021-11-18 RX ADMIN — EPHEDRINE SULFATE 10 MG: 50 INJECTION INTRAMUSCULAR; INTRAVENOUS; SUBCUTANEOUS at 11:26

## 2021-11-18 RX ADMIN — PHENYLEPHRINE HYDROCHLORIDE 100 MCG: 10 INJECTION INTRAVENOUS at 11:18

## 2021-11-18 RX ADMIN — SODIUM CHLORIDE, PRESERVATIVE FREE 10 ML: 5 INJECTION INTRAVENOUS at 21:21

## 2021-11-18 RX ADMIN — ONDANSETRON HYDROCHLORIDE 4 MG: 2 SOLUTION INTRAMUSCULAR; INTRAVENOUS at 12:39

## 2021-11-18 RX ADMIN — PHENYLEPHRINE HYDROCHLORIDE 100 MCG: 10 INJECTION INTRAVENOUS at 11:08

## 2021-11-18 RX ADMIN — INSULIN LISPRO 1 UNITS: 100 INJECTION, SOLUTION INTRAVENOUS; SUBCUTANEOUS at 17:13

## 2021-11-18 RX ADMIN — INSULIN LISPRO 1 UNITS: 100 INJECTION, SOLUTION INTRAVENOUS; SUBCUTANEOUS at 21:22

## 2021-11-18 RX ADMIN — PROPRANOLOL HYDROCHLORIDE 40 MG: 40 TABLET ORAL at 17:11

## 2021-11-18 RX ADMIN — EPHEDRINE SULFATE 10 MG: 50 INJECTION INTRAMUSCULAR; INTRAVENOUS; SUBCUTANEOUS at 11:42

## 2021-11-18 RX ADMIN — PHENYLEPHRINE HYDROCHLORIDE 100 MCG: 10 INJECTION INTRAVENOUS at 11:20

## 2021-11-18 RX ADMIN — SODIUM CHLORIDE: 9 INJECTION, SOLUTION INTRAVENOUS at 08:29

## 2021-11-18 RX ADMIN — FENTANYL CITRATE 50 MCG: 50 INJECTION, SOLUTION INTRAMUSCULAR; INTRAVENOUS at 11:38

## 2021-11-18 RX ADMIN — PHENYLEPHRINE HYDROCHLORIDE 100 MCG: 10 INJECTION INTRAVENOUS at 11:10

## 2021-11-18 RX ADMIN — CEFAZOLIN 2000 MG: 1 INJECTION, POWDER, FOR SOLUTION INTRAMUSCULAR; INTRAVENOUS; PARENTERAL at 10:52

## 2021-11-18 RX ADMIN — SODIUM CHLORIDE: 9 INJECTION, SOLUTION INTRAVENOUS at 11:27

## 2021-11-18 RX ADMIN — FENTANYL CITRATE 50 MCG: 50 INJECTION, SOLUTION INTRAMUSCULAR; INTRAVENOUS at 12:39

## 2021-11-18 RX ADMIN — ROCURONIUM BROMIDE 50 MG: 10 INJECTION, SOLUTION INTRAVENOUS at 11:00

## 2021-11-18 RX ADMIN — FAMOTIDINE 20 MG: 10 INJECTION, SOLUTION INTRAVENOUS at 09:30

## 2021-11-18 RX ADMIN — FENTANYL CITRATE 50 MCG: 50 INJECTION, SOLUTION INTRAMUSCULAR; INTRAVENOUS at 10:43

## 2021-11-18 RX ADMIN — MIDAZOLAM 2 MG: 1 INJECTION INTRAMUSCULAR; INTRAVENOUS at 10:36

## 2021-11-18 RX ADMIN — LIDOCAINE HYDROCHLORIDE 50 MG: 10 INJECTION, SOLUTION EPIDURAL; INFILTRATION; INTRACAUDAL; PERINEURAL at 10:36

## 2021-11-18 RX ADMIN — MYCOPHENOLATE MOFETIL 500 MG: 250 CAPSULE ORAL at 21:21

## 2021-11-18 RX ADMIN — SODIUM CHLORIDE: 9 INJECTION, SOLUTION INTRAVENOUS at 10:28

## 2021-11-18 RX ADMIN — SODIUM BICARBONATE 325 MG: 650 TABLET ORAL at 17:11

## 2021-11-18 RX ADMIN — SUCCINYLCHOLINE CHLORIDE 100 MG: 20 INJECTION, SOLUTION INTRAMUSCULAR; INTRAVENOUS at 10:43

## 2021-11-18 RX ADMIN — FENTANYL CITRATE 100 MCG: 50 INJECTION, SOLUTION INTRAMUSCULAR; INTRAVENOUS at 10:36

## 2021-11-18 RX ADMIN — ROCURONIUM BROMIDE 10 MG: 10 INJECTION, SOLUTION INTRAVENOUS at 12:06

## 2021-11-18 RX ADMIN — PROPOFOL 180 MG: 10 INJECTION, EMULSION INTRAVENOUS at 10:36

## 2021-11-18 RX ADMIN — TAMSULOSIN HYDROCHLORIDE 0.4 MG: 0.4 CAPSULE ORAL at 17:12

## 2021-11-18 RX ADMIN — EPHEDRINE SULFATE 10 MG: 50 INJECTION INTRAMUSCULAR; INTRAVENOUS; SUBCUTANEOUS at 11:22

## 2021-11-18 RX ADMIN — EPHEDRINE SULFATE 10 MG: 50 INJECTION INTRAMUSCULAR; INTRAVENOUS; SUBCUTANEOUS at 12:33

## 2021-11-18 RX ADMIN — SUGAMMADEX 240 MG: 100 INJECTION, SOLUTION INTRAVENOUS at 12:46

## 2021-11-18 RX ADMIN — HEPARIN SODIUM 12000 UNITS: 1000 INJECTION, SOLUTION INTRAVENOUS; SUBCUTANEOUS at 11:09

## 2021-11-18 RX ADMIN — PHENYLEPHRINE HYDROCHLORIDE 100 MCG: 10 INJECTION INTRAVENOUS at 12:36

## 2021-11-18 ASSESSMENT — LIFESTYLE VARIABLES: SMOKING_STATUS: 0

## 2021-11-18 ASSESSMENT — ENCOUNTER SYMPTOMS: SHORTNESS OF BREATH: 1

## 2021-11-18 ASSESSMENT — PAIN SCALES - GENERAL
PAINLEVEL_OUTOF10: 0

## 2021-11-18 NOTE — ANESTHESIA POSTPROCEDURE EVALUATION
Department of Anesthesiology  Postprocedure Note    Patient: Bonilla Salinas  MRN: [id number]  YOB: 1955  Date of evaluation: 11/18/2021  Time:  1:03 PM     Procedure Summary     Date: 11/18/21 Room / Location: [facility] CATH LAB    Anesthesia Start: 2530 Anesthesia Stop: 1076    Procedure: CATH LAB WITH ANESTHESIA Diagnosis:       Paroxysmal atrial fibrillation      Presence of Watchman left atrial appendage closure device    Scheduled Providers:  Responsible Provider: CHRIS Gomez CRNA    Anesthesia Type: general ASA Status: 3          Anesthesia Type: general    Harsha Phase I:      Harsha Phase II:      Last vitals: Reviewed and per EMR flowsheets. Anesthesia Post Evaluation    Patient location during evaluation: PACU  Patient participation: complete - patient participated  Level of consciousness: awake  Pain score: 0  Airway patency: patent  Nausea & Vomiting: no vomiting and no nausea  Complications: no  Cardiovascular status: blood pressure returned to baseline  Respiratory status: acceptable and nasal cannula  Hydration status: stable  Comments: Report given to    Americo Schaffer RN. VSS.  Adequate oxygen exchange on O2 NC.

## 2021-11-18 NOTE — H&P
[address] [facility] [address]  [address]  Phone: [phone]  Fax: [phone]    OFFICE VISIT:  2021    Salvador Mireles - : 1955    Reason For Visit:  Dejah Johnston is a 74 y.o. male who is here discussion regarding Watchman procedure    History of paroxysmal atrial fibrillation, hypertension, diabetes and kidney disease status post kidney transplant  His Eliquis has been discontinued due to previous GI bleeding 2019. Was seen in July by CHRIS Garcia. Recurrent atrial fibrillation. Recommended going back on Eliquis as EGD 1/10/2020 showed healed ulcer from previous November. No recurrent bleeding. Eliquis 5 mg was restarted. Information was relayed to gastroenterologist    He started the Eliquis on that Wed. On the following  he had 2 stools with bright red blood and he stopped th Eliquis and the bleeding resolved and he has been in afib but off the Eliquis    He is currently off any anticoagulations, he is worried about his stroke risk and he was referred to me to discuss the option of left atrial appendage closure device using watchman      Subjective  Dejah Johnston denies exertional chest pain  Has some mild ADAMS  No resting shortness of breath, orthopnea, paroxysmal nocturnal dyspnea, syncope, presyncope, arrhythmia, edema and fatigue. The patient denies numbness or weakness to suggest cerebrovascular accident or transient ischemic attack. Kesha Canchola MD is PCP and follows labs .   Debbie Germán has the following history as recorded in [facility]:    Patient Active Problem List    Diagnosis Date Noted    Presence of Watchman left atrial appendage closure device 2021    LVH (left ventricular hypertrophy) 2020    History of renal transplant 2019    UGI bleed 2019    PSVT (paroxysmal supraventricular tachycardia) ([facility] 2019    Chronic anticoagulation 2019    Orthostatic dizziness 2019    Daytime somnolence 2018  PAF (paroxysmal atrial fibrillation) ([facility] 04/09/2018    Fatigue 04/09/2018    History of tachycardia 04/09/2018    Immunosuppression (HCC)     Atrial fibrillation with RVR ([address] 02/26/2018    Infection of AV graft for dialysis ([facility] 02/23/2018    Cellulitis of right upper extremity     Cellulitis 02/22/2018    Chest pressure 07/17/2016    Diabetes mellitus ([facility] 07/17/2016    Ex-cigarette smoker 07/17/2016    Essential hypertension     Chest pain      Past Medical History:   Diagnosis Date    Arthritis     Atrial fibrillation ([address]     Bleeding ulcer 11/2019    Cancer ([facility]     skin    CHF (congestive heart failure) ([facility]     Diabetes [facility]     Ex-cigarette smoker 7/17/2016    History of blood transfusion     History of gastric ulcer 2009    Hyperlipidemia     Hypertension     hx.  10 years ago    Kidney disease, chronic, end stage on dialysis [facility]) 2006 2007, dialysis for 3 years, then had a kidney transplant,    Obese     Peritoneal dialysis status ([facility]     past hx    Prolonged emergence from general anesthesia     Vision problem     wears glasses     Past Surgical History:   Procedure Laterality Date    CHOLECYSTECTOMY      COLONOSCOPY  06/23/2017    Dr Kendra Echavarria: Diverticulosis, internal hemorrhoids, 5yr recall    COLONOSCOPY  01/05/2012    Dr Jodeane Fleischer, 5 yr recall    DIALYSIS FISTULA CREATION  [place] 2007    left arm radial cephalic    HERNIA REPAIR      umbilical hernia repair    KIDNEY TRANSPLANT      2/18/2010 in [address] [place] [address] Right 2/23/2018    AV FISTULA GRAFT REMOVAL performed by Mark Cummings MD at [facility]      TONSILLECTOMY      TUNNELED VENOUS CATHETER PLACEMENT  multiple    UPPER GASTROINTESTINAL ENDOSCOPY N/A 11/4/2019    Dr Delroy Dowling (Dr Kendra Echavarria pt)1.2 cm superficial gastric ulcer in the antrum with surrounding moderate gastritis and oozing of blood    UPPER GASTROINTESTINAL ENDOSCOPY  10/05/2009    Dr Latoya Scherer w/resolution of ulcer, lenin +    UPPER GASTROINTESTINAL ENDOSCOPY  07/10/2009    Dr Stephanie Walker ulceration w/pigmented base on posterior wall of the body, small erosions, active gastritis    UPPER GASTROINTESTINAL ENDOSCOPY N/A 1/10/2020    Dr Carrol Vargas hiatal hernia, healed previous antral ulcer-Gastritis    VASCULAR SURGERY [place]     av loop graft     Family History   Problem Relation Age of Onset    Kidney Disease Father     Heart Disease Father     Colon Cancer Neg Hx     Colon Polyps Neg Hx      Social History     Tobacco Use    Smoking status: Former Smoker     Packs/day: 2.00     Years: 4.00     Pack years: 8.00     Quit date: 1973     Years since quittin.7    Smokeless tobacco: Never Used   Substance Use Topics    Alcohol use: No      Current Facility-Administered Medications   Medication Dose Route Frequency Provider Last Rate Last Admin    0.9 % sodium chloride infusion   IntraVENous Continuous Gideon Reyes APRN - CNP        sodium chloride flush 0.9 % injection 5-40 mL  5-40 mL IntraVENous 2 times per day Calos Taylor APRN - CNP        sodium chloride flush 0.9 % injection 5-40 mL  5-40 mL IntraVENous PRN Gideon Reyes APRN - CNP        0.9 % sodium chloride infusion  25 mL IntraVENous PRN Gideon Reyes APRN - CNP        0.9 % sodium chloride infusion   IntraVENous Continuous Gideon Loaiza APRN - CNP 75 mL/hr at 21 0829 New Bag at 21 0829    0.9 % sodium chloride infusion  25 mL IntraVENous PRN Gideon Reyes APRN - CNP        sodium chloride flush 0.9 % injection 5-40 mL  5-40 mL IntraVENous 2 times per day Calos Taylor APRN - CNP        sodium chloride flush 0.9 % injection 5-40 mL  5-40 mL IntraVENous PRN Gideon Reyes APRN - CNP        HYDROmorphone HCl PF (DILAUDID) injection 0.25 mg  0.25 mg IntraVENous Q5 Min PRN John Ty, MD        HYDROmorphone HCl PF (DILAUDID) injection 0.5 mg  0.5 mg IntraVENous Q5 Min PRN China Scott MD        fentaNYL (SUBLIMAZE) injection 50 mcg  50 mcg IntraVENous Q5 Min PRN China Scott MD        [facility]etron [facility]) injection 4 mg  4 mg IntraVENous Once PRN China Scott MD        lactated ringers infusion   IntraVENous Continuous China Scott MD        sodium chloride flush 0.9 % injection 10 mL  10 mL IntraVENous 2 times per day China Scott MD        sodium chloride flush 0.9 % injection 10 mL  10 mL IntraVENous PRN China Scott MD        0.9 % sodium chloride infusion  25 mL IntraVENous PRN China Scott MD         Allergies: Patient has no known allergies. Review of Systems  Constitutional - no significant activity change, appetite change, or unexpected weight change. No fever, chills or diaphoresis. No fatigue. HEENT - no significant rhinorrhea or epistaxis. No tinnitus or significant hearing loss. Eyes - no sudden vision change or amaurosis. Respiratory - no significant wheezing, stridor, apnea or cough. No dyspnea on exertion or shortness of breath. Cardiovascular - no exertional chest pain, orthopnea or PND. No sensation of arrhythmia or slow heart rate. No claudication or leg edema. Gastrointestinal - no abdominal swelling or pain. No blood in stool. No severe constipation, diarrhea, nausea, or vomiting. Genitourinary - no difficulty urinating, dysuria, frequency, or urgency. No flank pain or hematuria. Musculoskeletal - no back pain, gait disturbance, or myalgia. Skin - no color change or rash. No pallor. No new surgical incision. Neurologic - no speech difficulty, facial asymmetry or lateralizing weakness. No seizures, presyncope, syncope, or significant dizziness. Hematologic - no easy bruising or excessive bleeding. Psychiatric - no severe anxiety or insomnia. No confusion.    All other review of systems are negative. Objective  Vital Signs - BP (!) 137/94   Pulse 99   Temp 97.4 °F (36.3 °C) (Temporal)   Resp 25   Ht 6' 1\" (1.854 m)   Wt 265 lb (120.2 kg)   SpO2 98%   BMI 34.96 kg/m²   General - Susan Corado is alert, cooperative, and pleasant. Well groomed. No acute distress. Body habitus is obese. HEENT - The head is normocephalic. No circumoral cyanosis. Dentition is normal.   EYES -  No Xanthelasma, no arcus senilis, no conjunctival hemorrhages or discharge. Neck - Supple, without increased jugular venous pressures. No carotid bruits. No mass. Respiratory - Lungs are clear bilaterally. No wheezes or rales. Normal effort without use of accessory muscles. Cardiovascular - Heart has irregular rhythm and rate. No murmurs, rubs or gallops. + pedal pulses and no varicosities. Abdominal -  Soft, nontender, nondistended. Bowel sounds are intact. Extremities - No clubbing, cyanosis, or  edema. Musculoskeletal -  No clubbing . No Osler's nodes. Gait normal .  No kyphosis or scoliosis. Skin -  no statis ulcers or dermatitis. Neurological - No focal signs are identified. Oriented to person, place and time. Psychiatric -  Appropriate affect and mood. Assessment:     Diagnosis Orders   1. Longstanding persistent atrial fibrillation       Data:  BP Readings from Last 3 Encounters:   11/18/21 (!) 137/94   10/12/21 114/72   04/28/21 126/82    Pulse Readings from Last 3 Encounters:   11/18/21 99   10/12/21 101   04/28/21 116        Wt Readings from Last 3 Encounters:   11/18/21 265 lb (120.2 kg)   10/12/21 263 lb (119.3 kg)   04/28/21 261 lb (118.4 kg)     EKG shows atrial fibrillation with a rate of 83. Left axis-anterior fascicular block  No change from previous EKG  Cherry scan 3/2/2018 was negative for myocardial ischemia    2D echo at that time showed normal LV size and function with moderate concentric LVH.   Mild MR, mildly thickened aortic valve leaflets with preserved mobility. Mild TR with RVSP elevated at 45 mmHg. Developed bright red stools 5 days after starting Eliquis. He stopped the Eliquis and has not been on it since. He has had no strokelike symptoms. Previously bleeding had been from upper GI tract. He is not had any dark coffee-ground-like stools. Suspicious for possibly hemorrhoids although he is never been diagnosed  Unfortunately at this time he is concerned about restarting any anticoagulation. [id number] Score for Atrial Fibrillation Stroke Risk   Risk   Factors  Component Value   C CHF No 0   H HTN Yes 1   A2 Age >= 76 No,  (75 y.o.) 0   D DM Yes 1   S2 Prior Stroke/TIA No 0   V Vascular Disease No 0   A Age 74-69 Yes,  (75 y.o.) 1   Sc Sex male 0    [id number]  Score  3   Score last updated 8/6/20 4:28 PM CDT    Dr Kari Fair is  Nephrologist at [address] [place] [address] fibrillation with chads 2 VasC Score of 3  It seems that the patient tried anticoagulants and failed due to multiple GI bleeding episodes, he is here to discuss the option of watchman implant as an alternative to long-term anticoagulation    Today we discussed the procedure in details, I showed him a model and the video of the procedure, we discussed the benefits alternatives and risks, we will need to start with transesophageal echo to rule out any left it appendage clot and also assess the left ear appendage size to make sure watchman can be done    He agreed to proceed with LINDA next      Fabiola Corral MD, [facility] [place] [address] Cardiologist, Endovascular Specialist   Medical Director, [place] [id number]    EMR dragon/transcription disclaimer: Much of this encounter note is electronic transcription/translation of spoken language to printed tach. Electronic translation of spoken language may be erroneous, or at times, nonsensical words or phrases may be inadvertently transcribed.  Although, I have reviewed the note for such errors, some may still exist.      Addendum November 18, 2021     Risks, benefits, alternatives of LINDA guided watchman implant discussed with the patient and full informed consent obtained.   Acceptable Mallampati score  Consent for general anesthesia  ASA 3      Yola De La Cruz MD, [facility] [place] [address] Cardiologist, Endovascular Specialist   Medical Director, Structural Heart Program   [facility]

## 2021-11-18 NOTE — OP NOTE
Operative Note      Patient: Justice Guerrero  YOB: 1955  MRN: [id number]    Date of Procedure: 11/18/2021    ANESTHESIA: General     PRE-OP DIAGNOSIS: Atrial Fibrillation   POST-OP DIAGNOSIS: Normal sinus rhythm status post DC cardioversion and watchman implant     PROCEDURE:   1- Watchman Flex left atrial appendage occlusion device placement  2- Successful cardioversion x1 to normal sinus rhythm after exclusion of JOCELINE clots with LINDA     INDICATION FOR PROCEDURE:  Atrial Fibrillation & Stroke Prevention:  the patient is a 74years old male who was independently evaluated by a Nurse practitioner who recommended Watchman device placement to reduce risk of stroke/systemic embolism and deemed unsuitable for long-term oral anticoagulation. CHADS2 - VASc:  3  HAS- BLED score: 3  Candidacy for long-term oral anticoagulation is poor due:  Recurrent GI bleeding  The entire procedure of left atrial appendage occlusion with Watchman device was discussed with the patient and family in detail. All the questions were answered to their satisfaction. I quoted 1-2 percent risk of any major complications and 1 in 566 risk of device embolization. COMPLICATIONS: none   ESTIMATED BLOOD LOSS:  Less than 200 cc  CONTRAST: 50 cc  FLUOROSCOPY TIME: 20 min  JOCELINE MEASUREMENTS: The widest diameter ostium 27 mm     FINDINGS:   No clot in the JOCELINE     DESCRIPTION OF PROCEDURE:               After the consent, the patient was brought to the hybrid Cath operating room in the fasting and non-sedated state. Underwent induction of general anesthesia, and was prepped and draped in the usual sterile fashion. Dr. Obdulia Monroe placed the LINDA probe, and assisted throughout the procedure with LINDA imaging.     Patient developed atrial fibrillation with rapid ventricular response RVR, with heart rate in 140-150 beats per minutes, after checking his LINDA and confirming there is no clot in the appendage, the DC cardioversion was performed with 1 shock, it was successful and the patient rhythm turned to normal sinus rhythm with heart rate of 60 bpm, watchman procedure continued as planned. The right common femoral vein was accessed using US scan and a modified Seldinger technique. A 16-St Helenian hemostatic sheath was then placed for access under fluoroscopy guidance. Eric Maxim used for Transseptal under LINDA guidance                  Upon crossing the septum, a bolus of heparin was given with goal ACT's of >300. An Balyis wire was placed in the JOCELINE. A pigtail catheter was placed through the watchman access sheath, and directed into the JOCELINE. Left atrial pressure was recorded, next contrast injection and angiography was performed. The Anterior guiding catheter was positioned into the JOCELINE, and after ensuring appropriate positioning of the guiding sheath, a 31 mm Watchman Flex device was deployed after multiple attempts and also changing the delivery catheter into the JOCELINE under fluoroscopy with excellent results. Position was stable. Anchoring was ensured with a tug test. Compression was  10-30 % of the original Watchman Flex size. Seal was evaluated with LINDA sweep, showing no significant leak. After confirmation of the above PASS criteria and review with the Watchman implant team, the device was released. Device position remained stable with LINDA and Fluoroscopy. Catheters and sheaths were removed, and the 8 figure suture was used to obtain hemostasis at the access site. The patient will be extubated and transferred to PACU for short recovery then to CV floor for overnight hospital stay. CONCLUSION:    Successful percutaneous closure of Left ATRIAL APPENDAGE using 31 mm WATCHMAN Flex DEVICE with no immediate complications. PLAN:  1.  The patient will be monitored overnight on telemetry floor  2.  2D echo will be obtained in the morning to check for any pericardial effusion  3. The patient will begin short-term maintenance therapy with  small dose Eliquis 2.5 mg twice daily for 6 weeks  4. LINDA after 6 weeks prior to complete discontinuation of oral anti-coagulant Therapy.          Murtis Oppenheim, MD, [facility]  Interventional Cardiologist, Endovascular Specialist   Medical Director, Structural Heart Program   [facility]    Electronically signed by Murtis Oppenheim, MD on 11/18/2021 at 12:47 PM

## 2021-11-18 NOTE — ANESTHESIA PRE PROCEDURE
Department of Anesthesiology  Preprocedure Note       Name:  Wendie Agosto   Age:  74 y.o.  :  1955                                          MRN:  [id number]         Date:  2021      Surgeon: * Surgery not found *    Procedure:     Medications prior to admission:   Prior to Admission medications    Medication Sig Start Date End Date Taking? Authorizing Provider   sodium bicarbonate 325 MG tablet Take 1 tablet by mouth 2 times daily 10/12/21 10/12/22 Yes Angela Byrne MD   empagliflozin (JARDIANCE) 25 MG tablet TAKE 1 TABLET BY MOUTH EVERY DAY 10/12/21  Yes Angela Byrne MD   propranolol (INDERAL) 40 MG tablet TAKE 1 TABLET BY MOUTH TWICE A DAY BEFORE MEALS 21  Yes Angela Byrne MD   blood glucose monitor strips Test one time a day & as needed for symptoms of irregular blood glucose.  21  Yes Angela Byrne MD   [facility]) 0.4 MG capsule Take 1 capsule by mouth daily 21  Yes Angela Byrne MD   Omega-3 Fatty Acids (FISH OIL) 1000 MG CPDR Take 1,200 mg by mouth 2 times daily    Yes Historical Provider, MD   Tacrolimus (ENVARSUS XR) 1 MG TB24 Take 2 mg by mouth every morning Indications: on an empty stomach    Yes Historical Provider, MD   mycophenolate (CELLCEPT) 250 MG capsule Take 500 mg by mouth 2 times daily   Yes Historical Provider, MD   Lancets MISC Use to check blood sugar daily Dx E11.9 17  Yes Graciela Castelan DO   glucose blood VI test strips (ACCU-CHEK ANANT) strip Contour strips,check daily E11.9 17  Yes Graciela Castelan DO   metFORMIN (GLUCOPHAGE) 1000 MG tablet TAKE 1 TABLET BY MOUTH TWICE A DAY WITH MEALS 21   Angela Byrne MD   vitamin D (ERGOCALCIFEROL) 1.25 MG (14554 UT) CAPS capsule TAKE 1 CAPSULE BY MOUTH ONE TIME PER WEEK  Patient taking differently: Take 50,000 Units by mouth every 30 days Pt takes  once a month 10/6/20   Angela Byrne MD       Current medications:    Current Facility-Administered Medications   Medication Dose Route Frequency Provider Last Rate Last Admin    0.9 % sodium chloride infusion   IntraVENous Continuous Sharlotte LUCAS Reyes, APRN - CNP        sodium chloride flush 0.9 % injection 5-40 mL  5-40 mL IntraVENous 2 times per day Narciso Serna, APRN - CNP        sodium chloride flush 0.9 % injection 5-40 mL  5-40 mL IntraVENous PRN Sharlotte LUCAS Reyes, APRN - CNP        0.9 % sodium chloride infusion  25 mL IntraVENous PRN Sharlotte A Eric, APRN - CNP        0.9 % sodium chloride infusion   IntraVENous Continuous Sharlotte Yemi Batres, APRN - CNP 75 mL/hr at 11/18/21 0829 New Bag at 11/18/21 0829    0.9 % sodium chloride infusion  25 mL IntraVENous PRN Sharlotte A Eric, APRN - CNP        sodium chloride flush 0.9 % injection 5-40 mL  5-40 mL IntraVENous 2 times per day Narciso Serna, APRN - CNP        sodium chloride flush 0.9 % injection 5-40 mL  5-40 mL IntraVENous PRN Josuete LUCAS Reyes, APRN - CNP           Allergies:  No Known Allergies    Problem List:    Patient Active Problem List   Diagnosis Code    Essential hypertension I10    Chest pressure R07.89    Diabetes mellitus ([facility] E11.9    Ex-cigarette smoker Z87.891    Chest pain R07.9    Cellulitis L03.90    Infection of AV graft for dialysis ([facility] T82. 7XXA    Cellulitis of right upper extremity L03. 113    Atrial fibrillation with RVR ([facility]) I48.91    Immunosuppression ([facility]) D84.9    Daytime somnolence R40.0    PAF (paroxysmal atrial fibrillation) ([facility]) I48.0    Fatigue R53.83    History of tachycardia Z87.898    Chronic anticoagulation Z79.01    Orthostatic dizziness R42    PSVT (paroxysmal supraventricular tachycardia) ([facility]) I47.1    UGI bleed K92.2    History of renal transplant Z94.0    LVH (left ventricular hypertrophy) I51.7       Past Medical History:        Diagnosis Date    Arthritis     Atrial fibrillation ([facility]     Bleeding ulcer 11/2019    Cancer ([facility]     skin    CHF (congestive heart failure) ([facility]     Diabetes mellitus ([facility]     Ex-cigarette smoker 2016    History of blood transfusion     History of gastric ulcer     Hyperlipidemia     Hypertension     hx.  10 years ago    Kidney disease, chronic, end stage on dialysis [facility]) 2006, dialysis for 3 years, then had a kidney transplant,    Obese     Peritoneal dialysis status ([facility]     past hx    Prolonged emergence from general anesthesia     Vision problem     wears glasses       Past Surgical History:        Procedure Laterality Date    CHOLECYSTECTOMY      COLONOSCOPY  2017    Dr Welford Aschoff: Diverticulosis, internal hemorrhoids, 5yr recall    COLONOSCOPY  2012    Dr Geneva Baumgarten, 5 yr recall    DIALYSIS FISTULA CREATION  [place]     left arm radial cephalic    HERNIA REPAIR      umbilical hernia repair    KIDNEY TRANSPLANT      [address] 2018    AV FISTULA GRAFT REMOVAL performed by Geovanna Churchill MD at [place]      TONSILLECTOMY      TUNNELED VENOUS CATHETER PLACEMENT  multiple    UPPER GASTROINTESTINAL ENDOSCOPY N/A 2019    Dr Lamonte Guerrero (Dr Welford Aschoff pt)1.2 cm superficial gastric ulcer in the antrum with surrounding moderate gastritis and oozing of blood    UPPER GASTROINTESTINAL ENDOSCOPY  10/05/2009    Dr Florencia Seip w/resolution of ulcer, lenin +    UPPER GASTROINTESTINAL ENDOSCOPY  07/10/2009    Dr Sam Exon ulceration w/pigmented base on posterior wall of the body, small erosions, active gastritis    UPPER GASTROINTESTINAL ENDOSCOPY N/A 1/10/2020    Dr Rios Oven hiatal hernia, healed previous antral ulcer-Gastritis    VASCULAR SURGERY Right [place]     av loop graft       Social History:    Social History     Tobacco Use    Smoking status: Former Smoker     Packs/day: 2.00     Years: 4.00     Pack years: 8.00     Quit date: 1973     Years since quittin.7    Smokeless tobacco: Never Used   Substance Use Topics    Alcohol use: No                                Counseling given: No      Vital Signs (Current):   Vitals:    11/18/21 0815   BP: (!) 137/94   Pulse: 102   Resp: 25   Temp: 97.4 °F (36.3 °C)   TempSrc: Temporal   SpO2: 98%   Weight: 265 lb (120.2 kg)   Height: 6' 1\" (1.854 m)                                              BP Readings from Last 3 Encounters:   11/18/21 (!) 137/94   10/12/21 114/72   04/28/21 126/82       NPO Status: Time of last liquid consumption: 2100                        Time of last solid consumption: 2100                        Date of last liquid consumption: 11/17/21                        Date of last solid food consumption: 11/17/21    BMI:   Wt Readings from Last 3 Encounters:   11/18/21 265 lb (120.2 kg)   10/12/21 263 lb (119.3 kg)   04/28/21 261 lb (118.4 kg)     Body mass index is 34.96 kg/m². CBC:   Lab Results   Component Value Date    WBC 8.5 11/18/2021    RBC 5.58 11/18/2021    HGB 16.6 11/18/2021    HCT 52.5 11/18/2021    MCV 94.1 11/18/2021    RDW 14.8 11/18/2021     11/18/2021       CMP:   Lab Results   Component Value Date     11/18/2021    K 4.4 11/18/2021    K 4.2 11/03/2019     11/18/2021    CO2 20 11/18/2021    BUN 20 11/18/2021    CREATININE 1.1 11/18/2021    GFRAA >59 11/18/2021    LABGLOM >60 11/18/2021    GLUCOSE 175 11/18/2021    PROT 6.9 11/18/2021    CALCIUM 9.5 11/18/2021    BILITOT 1.6 11/18/2021    ALKPHOS 64 11/18/2021    AST 21 11/18/2021    ALT 25 11/18/2021       POC Tests: No results for input(s): POCGLU, POCNA, POCK, POCCL, POCBUN, POCHEMO, POCHCT in the last 72 hours.     Coags:   Lab Results   Component Value Date    PROTIME 13.7 11/18/2021    INR 1.03 11/18/2021    APTT 28.0 11/01/2019       HCG (If Applicable): No results found for: PREGTESTUR, PREGSERUM, HCG, HCGQUANT     ABGs: No results found for: PHART, PO2ART, LQE4SBI, JPP6EAA, BEART, [id number]     Type & Screen (If Applicable):  No results found for: [place]    Drug/Infectious Status (If Applicable):  No results found for: HIV, HEPCAB    COVID-19 Screening (If Applicable):   Lab Results   Component Value Date    COVID19 Not Detected 11/15/2021           Anesthesia Evaluation  Patient summary reviewed no history of anesthetic complications:   Airway: Mallampati: III  TM distance: >3 FB   Neck ROM: full  Mouth opening: > = 3 FB Dental: normal exam         Pulmonary:normal exam  breath sounds clear to auscultation  (+) shortness of breath: chronic and no interval change,      (-) asthma, recent URI, sleep apnea and not a current smoker          Patient did not smoke on day of surgery. Cardiovascular:  Exercise tolerance: poor (<4 METS), Limited by SOB, but denies chest pain  (+) hypertension:, dysrhythmias: atrial fibrillation, CHF (2006):,     (-) pacemaker, past MI, CABG/stent and  angina    ECG reviewed  Rhythm: irregular  Rate: normal           Beta Blocker:  Dose within 24 Hrs         Neuro/Psych:      (-) seizures, TIA and CVA            ROS comment: Tremors, takes propranolol GI/Hepatic/Renal:   (+) GERD: well controlled, renal disease (Kidney transplant 2010 due to HTN, no issues since):, morbid obesity     (-) liver disease       Endo/Other:    (+) DiabetesType II DM, , .    (-) hypothyroidism, hyperthyroidism               Abdominal:   (+) obese,           Vascular: Other Findings:           Anesthesia Plan      general     ASA 3     (Preop famotidine)  Induction: intravenous. MIPS: Postoperative opioids intended and Prophylactic antiemetics administered. Anesthetic plan and risks discussed with patient. Use of blood products discussed with patient whom consented to blood products.                    Faustino Sanchez MD   11/18/2021

## 2021-11-19 VITALS
TEMPERATURE: 96.5 F | OXYGEN SATURATION: 96 % | BODY MASS INDEX: 35.12 KG/M2 | RESPIRATION RATE: 18 BRPM | WEIGHT: 265 LBS | HEIGHT: 73 IN | DIASTOLIC BLOOD PRESSURE: 66 MMHG | SYSTOLIC BLOOD PRESSURE: 130 MMHG | HEART RATE: 67 BPM

## 2021-11-19 LAB
LV EF: 58 %
LVEF MODALITY: NORMAL

## 2021-11-19 PROCEDURE — 93308 TTE F-UP OR LMTD: CPT

## 2021-11-19 PROCEDURE — 6370000000 HC RX 637 (ALT 250 FOR IP): Performed by: NURSE PRACTITIONER

## 2021-11-19 PROCEDURE — 2580000003 HC RX 258: Performed by: NURSE PRACTITIONER

## 2021-11-19 PROCEDURE — 6360000002 HC RX W HCPCS: Performed by: NURSE PRACTITIONER

## 2021-11-19 PROCEDURE — 99239 HOSP IP/OBS DSCHRG MGMT >30: CPT | Performed by: INTERNAL MEDICINE

## 2021-11-19 RX ADMIN — PROPRANOLOL HYDROCHLORIDE 40 MG: 40 TABLET ORAL at 09:36

## 2021-11-19 RX ADMIN — SODIUM BICARBONATE 325 MG: 650 TABLET ORAL at 09:36

## 2021-11-19 RX ADMIN — MYCOPHENOLATE MOFETIL 500 MG: 250 CAPSULE ORAL at 09:36

## 2021-11-19 RX ADMIN — SODIUM CHLORIDE, PRESERVATIVE FREE 10 ML: 5 INJECTION INTRAVENOUS at 09:38

## 2021-11-19 RX ADMIN — INSULIN LISPRO 2 UNITS: 100 INJECTION, SOLUTION INTRAVENOUS; SUBCUTANEOUS at 09:37

## 2021-11-19 ASSESSMENT — PAIN SCALES - GENERAL
PAINLEVEL_OUTOF10: 0
PAINLEVEL_OUTOF10: 0

## 2021-11-19 NOTE — PROGRESS NOTES
4 Eyes Skin Assessment    Wendie Agosto is being assessed upon: Admission    I agree that Omar Jimenez, RN, along with *** (either 2 RN's or 1 LPN and 1 RN) have performed a thorough Head to Toe Skin Assessment on the patient. ALL assessment sites listed below have been assessed. Areas assessed by both nurses:     [x]   Head, Face, and Ears   [x]   Shoulders, Back, and Chest  [x]   Arms, Elbows, and Hands   [x]   Coccyx, Sacrum, and Ischium  [x]   Legs, Feet, and Heels    Does the Patient have Skin Breakdown?  No    [place] Prevention initiated: NA  Wound Care Orders initiated: NA    [facility] nurse consulted for Pressure Injury (Stage 3,4, Unstageable, DTI, NWPT, and Complex wounds) and New or Established Ostomies: No        Primary Nurse eSignature: Arabella Cantu RN on 11/18/2021 at 7:35 PM      Co-Signer eSignature: {Esignature:[id number]}

## 2021-11-19 NOTE — PROGRESS NOTES
Jose Eduardo Courser arrived to room # [id number]. Presented with: Watchmen  Mental Status: Patient is oriented, alert, coherent, logical, thought processes intact and able to concentrate and follow conversation. Vitals:    11/18/21 1745   BP: (!) 139/96   Pulse: 73   Resp: 18   Temp: 97.3 °F (36.3 °C)   SpO2: 99%     Patient safety contract and falls prevention contract reviewed with patient Yes. Oriented Patient and Family to room. Call light within reach. Yes.   Needs, issues or concerns expressed at this time: no.      Electronically signed by Harvest Cranker, RN on 11/18/2021 at 7:34 PM

## 2021-11-21 LAB
EKG P AXIS: NORMAL DEGREES
EKG P-R INTERVAL: NORMAL MS
EKG Q-T INTERVAL: 336 MS
EKG QRS DURATION: 102 MS
EKG QTC CALCULATION (BAZETT): 402 MS
EKG T AXIS: 63 DEGREES

## 2021-11-21 PROCEDURE — 93010 ELECTROCARDIOGRAM REPORT: CPT | Performed by: INTERNAL MEDICINE

## 2021-11-22 ENCOUNTER — OFFICE VISIT (OUTPATIENT)
Dept: FAMILY MEDICINE CLINIC | Age: 66
End: 2021-11-22
Payer: MEDICARE

## 2021-11-22 VITALS
HEIGHT: 73 IN | WEIGHT: 266 LBS | TEMPERATURE: 97.3 F | HEART RATE: 116 BPM | OXYGEN SATURATION: 96 % | DIASTOLIC BLOOD PRESSURE: 70 MMHG | SYSTOLIC BLOOD PRESSURE: 134 MMHG | BODY MASS INDEX: 35.25 KG/M2

## 2021-11-22 DIAGNOSIS — J34.89 POSTERIOR RHINORRHEA: ICD-10-CM

## 2021-11-22 DIAGNOSIS — Z95.818 PRESENCE OF WATCHMAN LEFT ATRIAL APPENDAGE CLOSURE DEVICE: Primary | ICD-10-CM

## 2021-11-22 DIAGNOSIS — I48.11 LONGSTANDING PERSISTENT ATRIAL FIBRILLATION (HCC): ICD-10-CM

## 2021-11-22 PROCEDURE — 1111F DSCHRG MED/CURRENT MED MERGE: CPT | Performed by: FAMILY MEDICINE

## 2021-11-22 PROCEDURE — 99495 TRANSJ CARE MGMT MOD F2F 14D: CPT | Performed by: FAMILY MEDICINE

## 2021-11-22 RX ORDER — FLUTICASONE PROPIONATE 50 MCG
2 SPRAY, SUSPENSION (ML) NASAL DAILY
Qty: 16 G | Refills: 0 | Status: SHIPPED | OUTPATIENT
Start: 2021-11-22 | End: 2022-06-21

## 2021-11-22 NOTE — PROGRESS NOTES
Post-Discharge Transitional Care Management Services or Hospital Follow Up      Monique Mireles   YOB: 1955    Date of Office Visit:  11/22/2021  Date of Hospital Admission: 11/18/21  Date of Hospital Discharge: 11/19/21  Readmission Risk Score(high >=14%.  Medium >=10%):Readmission Risk Score: 10.8 ( )      Care management risk score Rising risk (score 2-5) and Complex Care (Scores >=6): 5     Non face to face  following discharge, date last encounter closed (first attempt may have been earlier): *No documented post hospital discharge outreach found in the last 14 days *No documented post hospital discharge outreach found in the last 14 days    Call initiated 2 business days of discharge: *No response recorded in the last 14 days     Patient Active Problem List   Diagnosis    Essential hypertension    Chest pressure    Diabetes mellitus ([address]    Ex-cigarette smoker    Chest pain    Cellulitis    Infection of AV graft for dialysis ([address]    Cellulitis of right upper extremity    Atrial fibrillation with RVR ([address]    Immunosuppression ([address]    Daytime somnolence    PAF (paroxysmal atrial fibrillation) ([facility])    Fatigue    History of tachycardia    Chronic anticoagulation    Orthostatic dizziness    PSVT (paroxysmal supraventricular tachycardia) ([address]    UGI bleed    History of renal transplant    LVH (left ventricular hypertrophy)    Presence of Watchman left atrial appendage closure device    Longstanding persistent atrial fibrillation ([address]       No Known Allergies    Medications listed as ordered at the time of discharge from hospital  Reviewed and reconciled medication list    Medications marked \"taking\" at this time  Outpatient Medications Marked as Taking for the 11/22/21 encounter (Office Visit) with Mickey Willingham MD   Medication Sig Dispense Refill    fluticasone (FLONASE) 50 MCG/ACT nasal spray 2 sprays by Each Nostril route daily 16 g 0    apixaban (ELIQUIS) 2.5 MG TABS tablet Take 1 tablet by mouth 2 times daily 60 tablet 0    sodium bicarbonate 325 MG tablet Take 1 tablet by mouth 2 times daily 60 tablet 5    empagliflozin (JARDIANCE) 25 MG tablet TAKE 1 TABLET BY MOUTH EVERY DAY 90 tablet 1    propranolol (INDERAL) 40 MG tablet TAKE 1 TABLET BY MOUTH TWICE A DAY BEFORE MEALS 180 tablet 1    blood glucose monitor strips Test one time a day & as needed for symptoms of irregular blood glucose. 100 strip 5    metFORMIN (GLUCOPHAGE) 1000 MG tablet TAKE 1 TABLET BY MOUTH TWICE A DAY WITH MEALS 180 tablet 3    tamsulosin (FLOMAX) 0.4 MG capsule Take 1 capsule by mouth daily 90 capsule 3    vitamin D (ERGOCALCIFEROL) 1.25 MG (60611 UT) CAPS capsule TAKE 1 CAPSULE BY MOUTH ONE TIME PER WEEK (Patient taking differently: Take 50,000 Units by mouth every 30 days Pt takes 1st Wednesday once a month) 12 capsule 1    Omega-3 Fatty Acids (FISH OIL) 1000 MG CPDR Take 1,200 mg by mouth 2 times daily       Tacrolimus (ENVARSUS XR) 1 MG TB24 Take 2 mg by mouth every morning Indications: on an empty stomach       mycophenolate (CELLCEPT) 250 MG capsule Take 500 mg by mouth 2 times daily      Lancets MISC Use to check blood sugar daily Dx E11.9 100 each 0    glucose blood VI test strips (ACCU-CHEK ANANT) strip Contour strips,check daily E11.9 100 each 0        Medications patient taking as of now reconciled against medications ordered at time of hospital discharge: Yes    Chief Complaint   Patient presents with    Follow-Up from [facility]; Watchman device with [employer]     Fatigue     \"just feel worn out after getting home\"       HPI    Inpatient course: Discharge summary reviewed- see chart. Interval history/Current status:   Patient reports that he underwent placement of a watchman device on Thursday.   He states that he is still little bit fatigued and a little sore specially with a sore throat but states that throat lozenges are helping and he is otherwise doing well and feeling well and feels like everything is moving in the right direction. He does have a longstanding history of A. fib and pursue the watchman's in efforts of in that he has need for longstanding anticoagulation. He states that he does have follow-up with cardiology for determination on his ability to be able to discontinue his anticoagulation after the watchman is reevaluated. Review of Systems   Constitutional: Positive for fatigue. Negative for activity change, appetite change and fever. HENT: Positive for postnasal drip and rhinorrhea. Negative for congestion. Eyes: Negative for pain and discharge. Respiratory: Negative for cough and shortness of breath. Cardiovascular: Negative for chest pain and palpitations. Gastrointestinal: Negative for abdominal pain, constipation, diarrhea, nausea and vomiting. Endocrine: Negative for cold intolerance and heat intolerance. Genitourinary: Negative for dysuria and hematuria. Musculoskeletal: Negative for gait problem and neck pain. Skin: Negative for rash and wound. Neurological: Negative for syncope and weakness. Hematological: Negative for adenopathy. Does not bruise/bleed easily. Psychiatric/Behavioral: Negative for dysphoric mood and sleep disturbance. The patient is not nervous/anxious. Vitals:    11/22/21 0943   BP: 134/70   Site: Right Upper Arm   Position: Sitting   Cuff Size: Large Adult   Pulse: 116   Temp: 97.3 °F (36.3 °C)   TempSrc: Temporal   SpO2: 96%   Weight: 266 lb (120.7 kg)   Height: 6' 1\" (1.854 m)     Body mass index is 35.09 kg/m². Wt Readings from Last 3 Encounters:   11/22/21 266 lb (120.7 kg)   11/18/21 265 lb (120.2 kg)   10/12/21 263 lb (119.3 kg)     BP Readings from Last 3 Encounters:   11/22/21 134/70   11/19/21 130/66   11/18/21 132/62       Physical Exam  Vitals and nursing note reviewed. Constitutional:       General: He is not in acute distress. Appearance: He is well-developed.  He is not diaphoretic. HENT:      Head: Normocephalic and atraumatic. Right Ear: External ear normal.      Left Ear: External ear normal.      Mouth/Throat:      Comments: Mask in place  Eyes:      General: No scleral icterus. Right eye: No discharge. Left eye: No discharge. Conjunctiva/sclera: Conjunctivae normal.   Neck:      Thyroid: No thyromegaly. Trachea: No tracheal deviation. Cardiovascular:      Rate and Rhythm: Normal rate. Rhythm irregularly irregular. Heart sounds: Normal heart sounds. No friction rub. No gallop. Pulmonary:      Effort: Pulmonary effort is normal. No respiratory distress. Breath sounds: Normal breath sounds. No wheezing or rales. Abdominal:      General: Bowel sounds are normal. There is no distension. Palpations: Abdomen is soft. Tenderness: There is no abdominal tenderness. Musculoskeletal:         General: No deformity (No gross deformities of upper or lower extremities). Cervical back: Neck supple. Right lower leg: No edema. Left lower leg: No edema. Lymphadenopathy:      Cervical: No cervical adenopathy. Skin:     General: Skin is warm and dry. Findings: No erythema or rash. Neurological:      Mental Status: He is alert and oriented to person, place, and time. Cranial Nerves: No cranial nerve deficit. Psychiatric:         Behavior: Behavior normal.         Thought Content: Thought content normal.             Assessment/Plan:  1. Presence of Watchman left atrial appendage closure device  Stressed importance of maintaining follow-up with cardiology. We will continue to monitor with assistance as course dictates. - [facility] DISCHARGE MEDS RECONCILED W/ CURRENT OUTPATIENT MED LIST    2. Longstanding persistent atrial fibrillation ([facility]  Stressed importance of being compliant with his anticoagulation until confirmed that he come off of the medicine by cardiology.   - [place] DISCHARGE MEDS RECONCILED W/ CURRENT OUTPATIENT MED LIST    3.  Posterior rhinorrhea  - fluticasone (FLONASE) 50 MCG/ACT nasal spray; 2 sprays by Each Nostril route daily  Dispense: 16 g; Refill: 0        Medical Decision Making: moderate complexity

## 2021-11-22 NOTE — DISCHARGE SUMMARY
Discharge Summary    Javier Gomez  :  1955  MRN:  [id number]    Admit date:  2021  Discharge date:  2021    Admitting Physician:  Fabiola Corral MD    Advance Directive: Prior    Consults: none    Primary Care Physician:  Julián Christianson MD    Discharge Diagnoses: Active Problems:    Presence of Watchman left atrial appendage closure device    Longstanding persistent atrial fibrillation (HCC)  Resolved Problems:    * No resolved hospital problems.  *      Problem List:   Patient Active Problem List    Diagnosis Date Noted    Presence of Watchman left atrial appendage closure device 2021     Priority: High    Longstanding persistent atrial fibrillation (HCC)     LVH (left ventricular hypertrophy) 2020    History of renal transplant 2019    UGI bleed 2019    PSVT (paroxysmal supraventricular tachycardia) ([address] 2019    Chronic anticoagulation 2019     Overview Note:     Eliquis      Orthostatic dizziness 2019    Daytime somnolence 2018    PAF (paroxysmal atrial fibrillation) ([address] 2018    Fatigue 2018    History of tachycardia 2018    Immunosuppression (HCC)     Atrial fibrillation with RVR ([address] 2018    Infection of AV graft for dialysis ([address] 2018    Cellulitis of right upper extremity     Cellulitis 2018    Chest pressure 2016    Diabetes mellitus ([address] 2016    Ex-cigarette smoker 2016    Essential hypertension     Chest pain        Cardiology Specific Data:  Specialty Problems        Cardiology Problems    Chest pain        Essential hypertension        Infection of AV graft for dialysis [facility])        Atrial fibrillation with RVR (HCC)        PAF (paroxysmal atrial fibrillation) (HCC)        PSVT (paroxysmal supraventricular tachycardia) (HCC)        LVH (left ventricular hypertrophy)        Longstanding persistent atrial fibrillation (HCC)              Significant Diagnostic Studies:   Echocardiogram transesophageal    Result Date: 11/18/2021  Transesophageal Echocardiography Report (LINDA)   Demographics   Patient Name  Elana Song  Date of Study         11/18/2021   MRN           [id number]       Gender                Male   Date of Birth 1955   Room Number           MHL-0720   Age           74 year(s)   Height:       73 inches    Referring Physician   Kenji Colin MD   Weight:       265 pounds   Sonographer   BSA:          2.43 m^2     Interpreting          Rosemarie Naqvi                             Physician   BMI:          34.96 kg/m^2  Procedure Type of Study   LINDA procedure:ECHOCARDIOGRAM TRANSESOPHAGEAL. Conclusions   Summary  Normal left ventricular size with preserved LV function and an estimated  ejection fraction of approximately 40-45%. Severely dilated left atrium. No evidence of thrombus within left atrium  and JOCELINE. No evidence of significant inter-atrial communications by color  flow Doppler only. Moderate to severe centrally directed mitral regurgitation (multiple  jets). No evidence of significant pericardial effusion is noted. S/p successful deployment of 31 mm WATCHMAN FLEX left atrial appendage  closure device. No significant lacie-device leak. Iatrogenic ASD s/p trans-septal puncture. No evidence of significant pericardial effusion. Signature   ----------------------------------------------------------------  Electronically signed by Adam KeanrsInterpreting physician)  on 11/18/2021 03:19 PM  ----------------------------------------------------------------   Findings   Mitral Valve  Mildly thickened mitral valve with normal leaflet mobility. No evidence of  mitral valve stenosis. Moderate to severe centrally directed mitral  regurgitation (multiple jets). Aortic Valve  Aortic valve appears to be tricuspid. Trivial aortic regurgitation. No stenosis is noted.    Tricuspid Valve  Tricuspid valve is structurally normal.  No evidence of tricuspid regurgitation. Pulmonic Valve  The pulmonic valve was not well visualized. No pulmonic regurgitation present. Left Atrium  Severely dilated left atrium. No evidence of thrombus within left atrium and JOCELINE. No evidence of significant inter-atrial communications by color flow  Doppler only. Left Ventricle  Normal left ventricular size with preserved LV function and an estimated  ejection fraction of approximately 40-45%. No evidence of left ventricular mass or thrombus noted. Right Atrium  Moderate right atrial dimension with no evidence of thrombus or mass  noted. Right Ventricle  Normal right ventricular size with preserved RV function. Pericardial Effusion  No evidence of significant pericardial effusion is noted. Pleural Effusion  No evidence of pleural effusion. Miscellaneous  Aortic root is within normal limits. M-Mode Measurements (cm)     LVOT: 2.2 cm      Echo Complete    Result Date: 11/19/2021  Transthoracic Echocardiography Report (TTE)  Demographics   Patient Name  Lindalee Severs  Date of Study         11/19/2021   MRN           [id number]       Gender                Male   Date of Birth 1955   Room Number           [id number]   Age           74 year(s)   Height:       73 inches    Referring Physician   Beltran Virk MD   Weight:       265 pounds   Sonographer           Fatuma Frances RDCS   BSA:          2.43 m^2     Interpreting          Aviva Covert                             Physician   BMI:          34.96 kg/m^2  Procedure Type of Study   TTE procedure:ECHO NO CONTRAST WITH DOP/COLR. Study Location: Echo Lab Technical Quality: Adequate visualization Patient Status: Inpatient Indications:S/P Watchman procedure. Conclusions   Summary  Limited 2D ECHO  Post-op day 1 s/p 31 mm Watchman FLEX JOCELINE closure device placement. Normal left ventricular size with preserved LV function and an estimated  ejection fraction of approximately 55-60%.   Normal right ventricular size with sinus rhythm after exclusion of JOCELINE clots with LINDA     INDICATION FOR PROCEDURE:  Atrial Fibrillation & Stroke Prevention:  the patient is a 75 years old male who was independently evaluated by a Nurse practitioner who recommended Watchman device placement to reduce risk of stroke/systemic embolism and deemed unsuitable for long-term oral anticoagulation.      CHADS2 - VASc:  3  HAS- BLED score: 3  Candidacy for long-term oral anticoagulation is poor due:  Recurrent GI bleeding  The entire procedure of left atrial appendage occlusion with Watchman device was discussed with the patient and family in detail. All the questions were answered to their satisfaction. I quoted 1-2 percent risk of any major complications and 1 in 796 risk of device embolization.     COMPLICATIONS: none   ESTIMATED BLOOD LOSS:  Less than 200 cc  CONTRAST: 50 cc  FLUOROSCOPY TIME: 20 min  JOCELINE MEASUREMENTS: The widest diameter ostium 27 mm     FINDINGS:   No clot in the JOCELINE     DESCRIPTION OF PROCEDURE:               After the consent, the patient was brought to the hybrid Cath operating room in the fasting and non-sedated state.  Underwent induction of general anesthesia, and was prepped and draped in the usual sterile fashion. Dr. Pretty Hollowayers the LINDA probe, and assisted throughout the procedure with LINDA imaging.                 The right common femoral vein was accessed using US scan and a modified Seldinger technique. A 16-Faroese hemostatic sheath was then placed for access under fluoroscopy guidance.                 Colby VeraCross used for Transseptal under LINDA guidance                  Upon crossing the septum, a bolus of heparin was given with goal ACT's of >300.                An Balyis wire was placed in the JOCELINE. A pigtail catheter was placed through the watchman access sheath, and directed into the JOCELINE. Left atrial pressure was recorded, next contrast injection and angiography was performed.  The Anterior guiding catheter was positioned into the JOCELINE, and after ensuring appropriate positioning of the guiding sheath, a 31 mm Watchman Flex device was deployed after multiple attempts and also changing the delivery catheter into the JOCELINE under fluoroscopy with excellent results.                 Position was stable. Anchoring was ensured with a tug test. Compression was  10-30 % of the original Watchman Flex size.  Seal was evaluated with LINDA sweep, showing no significant leak.                 After confirmation of the above PASS criteria and review with the Watchman implant team, the device was released. Device position remained stable with LINDA and Fluoroscopy.     Catheters and sheaths were removed, and the 8 figure suture was used to obtain hemostasis at the access site.    The patient will be extubated and transferred to PACU for short recovery then to CV floor for overnight hospital stay.     CONCLUSION:    Successful percutaneous closure of Left ATRIAL APPENDAGE using 31 mm WATCHMAN Flex DEVICE with no immediate complications.         PLAN:  1. The patient will be monitored overnight on telemetry floor  2.  2D echo will be obtained in the morning to check for any pericardial effusion  3. The patient will begin short-term maintenance therapy with  small dose Eliquis 2.5 mg twice daily for 6 weeks  4. LINDA after 6 weeks prior to complete discontinuation of oral anti-coagulant Therapy.         Arabella Bennett MD, [place] [address] Cardiologist, Endovascular Specialist   Medical Director, [place] [address]     Electronically signed by Arabella Bennett MD on 11/18/2021 at 12:47 PM  ---------------------      Physical Exam:    Vital Signs: /66   Pulse 67   Temp 96.5 °F (35.8 °C) (Temporal)   Resp 18   Ht 6' 1\" (1.854 m)   Wt 265 lb (120.2 kg)   SpO2 96%   BMI 34.96 kg/m²     Physical Exam  Vitals reviewed. Constitutional:       Appearance: Normal appearance.    HENT: by mouth 2 times daily Historical Med      Tacrolimus (ENVARSUS XR) 1 MG TB24 Take 2 mg by mouth every morning Indications: on an empty stomach Historical Med      mycophenolate (CELLCEPT) 250 MG capsule Take 500 mg by mouth 2 times dailyHistorical Med      Lancets MISC Disp-100 each, R-0, NormalUse to check blood sugar daily Dx E11.9      !! glucose blood VI test strips (ACCU-CHEK ANANT) strip Disp-100 each, R-0, NormalContour strips,check daily E11.9       ! ! - Potential duplicate medications found. Please discuss with provider. Discharge Instructions:   Julian Navarro MD  [address] [place] [address]. [facility] Payam Griffiths will call with LINDA appointment    Aleisha Palacios MD  [address] [place] [address]  [phone]    On 11/22/2021  9:30am , Hospital follow-up        Take medications as directed. Resume activity as tolerated.     Diet: Cardiac diet     35 minutes spent completing this discharge evaluation, medication reconciliation and documentation    Disposition: Patient is medically stable and will be discharged *    Electronically signed by Julian Navarro MD on 11/22/2021 at 9:46 AM      Julian Navarro MD, [facility]  Interventional Cardiologist, Endovascular Specialist   Medical Director, [place]

## 2021-12-15 ENCOUNTER — TELEPHONE (OUTPATIENT)
Dept: CARDIOLOGY CLINIC | Age: 66
End: 2021-12-15

## 2021-12-15 NOTE — TELEPHONE ENCOUNTER
Called and spoke with patient, have LINDA scheduled for 12/30/21 at 1100 with arrival of 900. Patient is to be NPO after midnight. Patient instructed to arrive through front entrance of hospital and make immediate left. Patient advised they can have one person with them but they both must wear a mask. Patient advised may take morning medications with sip of water. Also advised patient must have COVID testing completed on 12/29/21 anywhere from 900 am - 1200 pm at [facility]. Advised patient that they will be able to proceed with procedure as long as test results are negative. Patient made aware that if testing is not resulted evening prior to procedure that they may have to be rescheduled and possibly retested. Given instructions on where to go and to self quarantine between testing and procedure. Patient does not have IV dye allergy. Patient verbally understood.

## 2021-12-20 ENCOUNTER — NURSE TRIAGE (OUTPATIENT)
Dept: OTHER | Facility: CLINIC | Age: 66
End: 2021-12-20

## 2021-12-20 ASSESSMENT — ENCOUNTER SYMPTOMS
NAUSEA: 0
SHORTNESS OF BREATH: 0
EYE DISCHARGE: 0
RHINORRHEA: 1
COUGH: 0
EYE PAIN: 0
ABDOMINAL PAIN: 0
CONSTIPATION: 0
VOMITING: 0
DIARRHEA: 0
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
## 2021-12-21 ENCOUNTER — OFFICE VISIT (OUTPATIENT)
Dept: FAMILY MEDICINE CLINIC | Age: 66
End: 2021-12-21
Payer: MEDICARE

## 2021-12-21 VITALS
HEIGHT: 73 IN | TEMPERATURE: 97.7 F | SYSTOLIC BLOOD PRESSURE: 132 MMHG | WEIGHT: 263 LBS | DIASTOLIC BLOOD PRESSURE: 76 MMHG | HEART RATE: 84 BPM | BODY MASS INDEX: 34.85 KG/M2 | OXYGEN SATURATION: 96 %

## 2021-12-21 DIAGNOSIS — D84.9 IMMUNOSUPPRESSION (HCC): ICD-10-CM

## 2021-12-21 DIAGNOSIS — R60.0 BILATERAL LOWER EXTREMITY EDEMA: Primary | ICD-10-CM

## 2021-12-21 PROCEDURE — 99213 OFFICE O/P EST LOW 20 MIN: CPT | Performed by: FAMILY MEDICINE

## 2021-12-21 RX ORDER — FUROSEMIDE 20 MG/1
20 TABLET ORAL DAILY
Qty: 30 TABLET | Refills: 2 | Status: SHIPPED | OUTPATIENT
Start: 2021-12-21 | End: 2022-06-21

## 2021-12-21 NOTE — PATIENT INSTRUCTIONS
Patient Education        Fatigue: Care Instructions  Your Care Instructions     Fatigue is a feeling of tiredness, exhaustion, or lack of energy. You may feel fatigue because of too much or not enough activity. It can also come from stress, lack of sleep, boredom, and poor diet. Many medical problems, such as viral infections, can cause fatigue. Emotional problems, especially depression, are often the cause of fatigue. Fatigue is most often a symptom of another problem. Treatment for fatigue depends on the cause. For example, if you have fatigue because you have a certain health problem, treating this problem also treats your fatigue. If depression or anxiety is the cause, treatment may help. Follow-up care is a key part of your treatment and safety. Be sure to make and go to all appointments, and call your doctor if you are having problems. It's also a good idea to know your test results and keep a list of the medicines you take. How can you care for yourself at home? · Get regular exercise. But don't overdo it. Go back and forth between rest and exercise. · Get plenty of rest.  · Eat a healthy diet. Do not skip meals, especially breakfast.  · Reduce your use of caffeine, tobacco, and alcohol. Caffeine is most often found in coffee, tea, cola drinks, and chocolate. · Limit medicines that can cause fatigue. This includes tranquilizers and cold and allergy medicines. When should you call for help? Watch closely for changes in your health, and be sure to contact your doctor if:    · You have new symptoms such as fever or a rash.     · Your fatigue gets worse.     · You have been feeling down, depressed, or hopeless. Or you may have lost interest in things that you usually enjoy.     · You are not getting better as expected. Where can you learn more? Go to https://flora.Upstart. org and sign in to your [employer] account.  Enter L165 in the Xsigo box to learn more about \"Fatigue: Care Instructions. \"     If you do not have an account, please click on the \"Sign Up Now\" link. Current as of: July 1, 2021               Content Version: 13.0  © 3927-4766 Healthwise, Incorporated. Care instructions adapted under license by [facility] ([facility]). If you have questions about a medical condition or this instruction, always ask your healthcare professional. [address] any warranty or liability for your use of this information.
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
## 2021-12-28 ASSESSMENT — ENCOUNTER SYMPTOMS
VOMITING: 0
ABDOMINAL PAIN: 0
DIARRHEA: 0
EYE DISCHARGE: 0
CONSTIPATION: 0
EYE PAIN: 0
SHORTNESS OF BREATH: 0
NAUSEA: 0
COUGH: 0
RHINORRHEA: 0

## 2021-12-28 NOTE — TELEPHONE ENCOUNTER
Called and spoke with patient to r/s appt due to Dr. Merari Cisneros being out sick.  Have r/s'd procedure for 1/12/2022 at 900

## 2021-12-28 NOTE — PATIENT INSTRUCTIONS
Patient Education        Leg and Ankle Edema: Care Instructions  Your Care Instructions  Swelling in the legs, ankles, and feet is called edema. It is common after you sit or stand for a while. Long plane flights or car rides often cause swelling in the legs and feet. You may also have swelling if you have to stand for long periods of time at your job. Problems with the veins in the legs (varicose veins) and changes in hormones can also cause swelling. Sometimes the swelling in the ankles and feet is caused by a more serious problem, such as heart failure, infection, blood clots, or liver or kidney disease. Follow-up care is a key part of your treatment and safety. Be sure to make and go to all appointments, and call your doctor if you are having problems. It's also a good idea to know your test results and keep a list of the medicines you take. How can you care for yourself at home? · If your doctor gave you medicine, take it as prescribed. Call your doctor if you think you are having a problem with your medicine. · Whenever you are resting, raise your legs up. Try to keep the swollen area higher than the level of your heart. · Take breaks from standing or sitting in one position. ? Walk around to increase the blood flow in your lower legs. ? Move your feet and ankles often while you stand, or tighten and relax your leg muscles. · Wear support stockings. Put them on in the morning, before swelling gets worse. · Eat a balanced diet. Lose weight if you need to. · Limit the amount of salt (sodium) in your diet. Salt holds fluid in the body and may increase swelling. When should you call for help? Call 911 anytime you think you may need emergency care. For example, call if:    · You have symptoms of a blood clot in your lung (called a pulmonary embolism). These may include:  ? Sudden chest pain. ? Trouble breathing. ? Coughing up blood.    Call your doctor now or seek immediate medical care if:    · You have signs of a blood clot, such as:  ? Pain in your calf, back of the knee, thigh, or groin. ? Redness and swelling in your leg or groin.     · You have symptoms of infection, such as:  ? Increased pain, swelling, warmth, or redness. ? Red streaks or pus. ? A fever. Watch closely for changes in your health, and be sure to contact your doctor if:    · Your swelling is getting worse.     · You have new or worsening pain in your legs.     · You do not get better as expected. Where can you learn more? Go to [email or web address]://[email or web address] Responseeb.MinuteKey. org and sign in to your GRAYL account. Enter U627 in the [employer] box to learn more about \"Leg and Ankle Edema: Care Instructions. \"     If you do not have an account, please click on the \"Sign Up Now\" link. Current as of: July 1, 2021               Content Version: 13.1  © 2006-2021 Healthwise, [facility]. Care instructions adapted under license by [facility] ([facility]). If you have questions about a medical condition or this instruction, always ask your healthcare professional. [place] [address] any warranty or liability for your use of this information.

## 2021-12-30 RX ORDER — PROPRANOLOL HYDROCHLORIDE 40 MG/1
TABLET ORAL
Qty: 180 TABLET | Refills: 1 | Status: SHIPPED | OUTPATIENT
Start: 2021-12-30 | End: 2022-07-07

## 2022-01-06 DIAGNOSIS — E55.9 VITAMIN D DEFICIENCY: ICD-10-CM

## 2022-01-10 RX ORDER — ERGOCALCIFEROL 1.25 MG/1
CAPSULE ORAL
Qty: 12 CAPSULE | Refills: 1 | Status: SHIPPED | OUTPATIENT
Start: 2022-01-10

## 2022-01-10 NOTE — TELEPHONE ENCOUNTER
Last OV 12/21/2021  Next OV Visit date not found      Requested Prescriptions     Pending Prescriptions Disp Refills    vitamin D (ERGOCALCIFEROL) 1.25 MG (79293 UT) CAPS capsule [Pharmacy Med Name: VITAMIN D2 1.25MG(50,000 UNIT)] 12 capsule 1     Sig: TAKE 1 CAPSULE BY MOUTH ONE TIME PER WEEK

## 2022-01-11 ENCOUNTER — TELEPHONE (OUTPATIENT)
Dept: CARDIOLOGY CLINIC | Age: 67
End: 2022-01-11

## 2022-01-11 RX ORDER — SODIUM CHLORIDE 0.9 % (FLUSH) 0.9 %
5-40 SYRINGE (ML) INJECTION EVERY 12 HOURS SCHEDULED
Status: CANCELLED | OUTPATIENT
Start: 2022-01-12

## 2022-01-11 RX ORDER — SODIUM CHLORIDE 9 MG/ML
INJECTION, SOLUTION INTRAVENOUS CONTINUOUS
Status: CANCELLED | OUTPATIENT
Start: 2022-01-12

## 2022-01-11 RX ORDER — SODIUM CHLORIDE 9 MG/ML
25 INJECTION, SOLUTION INTRAVENOUS PRN
Status: CANCELLED | OUTPATIENT
Start: 2022-01-12

## 2022-01-11 RX ORDER — SODIUM CHLORIDE 0.9 % (FLUSH) 0.9 %
5-40 SYRINGE (ML) INJECTION PRN
Status: CANCELLED | OUTPATIENT
Start: 2022-01-12

## 2022-01-11 NOTE — TELEPHONE ENCOUNTER
Received voicemail from patient stating he would not be able to make procedure appointment on 1/12/2022.

## 2022-01-12 ENCOUNTER — HOSPITAL ENCOUNTER (OUTPATIENT)
Dept: CARDIAC CATH/INVASIVE PROCEDURES | Age: 67
Discharge: HOME | End: 2022-01-12

## 2022-04-05 DIAGNOSIS — E11.65 TYPE 2 DIABETES MELLITUS WITH HYPERGLYCEMIA, WITHOUT LONG-TERM CURRENT USE OF INSULIN (HCC): ICD-10-CM

## 2022-04-05 DIAGNOSIS — R39.14 BENIGN PROSTATIC HYPERPLASIA WITH INCOMPLETE BLADDER EMPTYING: ICD-10-CM

## 2022-04-05 DIAGNOSIS — N40.1 BENIGN PROSTATIC HYPERPLASIA WITH INCOMPLETE BLADDER EMPTYING: ICD-10-CM

## 2022-04-05 RX ORDER — TAMSULOSIN HYDROCHLORIDE 0.4 MG/1
CAPSULE ORAL
Qty: 90 CAPSULE | Refills: 3 | Status: SHIPPED | OUTPATIENT
Start: 2022-04-05 | End: 2022-10-13 | Stop reason: SDUPTHER

## 2022-05-06 ENCOUNTER — TELEPHONE (OUTPATIENT)
Dept: GASTROENTEROLOGY | Facility: CLINIC | Age: 67
End: 2022-05-06

## 2022-05-06 NOTE — TELEPHONE ENCOUNTER
Patient received a colon recall letter from Dr. Cabrera.  In 2019 he had a colonoscopy done at [facility] due to a GI bleed and does not need one.

## 2022-05-09 RX ORDER — SODIUM BICARBONATE 325 MG/1
TABLET ORAL
Qty: 60 TABLET | Refills: 5 | Status: SHIPPED | OUTPATIENT
Start: 2022-05-09

## 2022-05-09 NOTE — TELEPHONE ENCOUNTER
CALLED NUMBER IN CHART TO VERIFY WHERE HE HAD HIS COLONOSCOPY AND NUMBER HAS BEEN DISCONNECTED. IT IS THE SAME NUMBER AS HIS EMERGENCY CONTACT.   I CHECKED IN CARE EVERYWHERE AND COULD NOT FIND A COLONOSCOPY FROM [facility] IN 2019.

## 2022-06-17 LAB
ALBUMIN SERPL-MCNC: 4.1 G/DL (ref 3.5–5.2)
ANION GAP SERPL CALCULATED.3IONS-SCNC: 14 MMOL/L (ref 7–19)
BUN BLDV-MCNC: 17 MG/DL (ref 8–23)
CALCIUM SERPL-MCNC: 9.5 MG/DL (ref 8.8–10.2)
CHLORIDE BLD-SCNC: 101 MMOL/L (ref 98–111)
CO2: 18 MMOL/L (ref 22–29)
CREAT SERPL-MCNC: 1.1 MG/DL (ref 0.5–1.2)
GFR AFRICAN AMERICAN: >59
GFR NON-AFRICAN AMERICAN: >60
GLUCOSE BLD-MCNC: 137 MG/DL (ref 74–109)
PHOSPHORUS: 3.2 MG/DL (ref 2.5–4.5)
POTASSIUM SERPL-SCNC: 4.7 MMOL/L (ref 3.5–5)
SODIUM BLD-SCNC: 133 MMOL/L (ref 136–145)

## 2022-06-19 LAB — TACROLIMUS BLOOD: 8.5 NG/ML

## 2022-06-21 ENCOUNTER — OFFICE VISIT (OUTPATIENT)
Dept: FAMILY MEDICINE CLINIC | Age: 67
End: 2022-06-21
Payer: MEDICARE

## 2022-06-21 VITALS
HEART RATE: 109 BPM | WEIGHT: 265.2 LBS | TEMPERATURE: 97 F | HEIGHT: 73 IN | BODY MASS INDEX: 35.15 KG/M2 | DIASTOLIC BLOOD PRESSURE: 82 MMHG | RESPIRATION RATE: 20 BRPM | OXYGEN SATURATION: 97 % | SYSTOLIC BLOOD PRESSURE: 118 MMHG

## 2022-06-21 DIAGNOSIS — Z20.822 EXPOSURE TO COVID-19 VIRUS: ICD-10-CM

## 2022-06-21 DIAGNOSIS — J06.9 VIRAL URI: Primary | ICD-10-CM

## 2022-06-21 LAB — SARS-COV-2, PCR: NOT DETECTED

## 2022-06-21 PROCEDURE — 1123F ACP DISCUSS/DSCN MKR DOCD: CPT | Performed by: CLINICAL NURSE SPECIALIST

## 2022-06-21 PROCEDURE — 99213 OFFICE O/P EST LOW 20 MIN: CPT | Performed by: CLINICAL NURSE SPECIALIST

## 2022-06-21 ASSESSMENT — ENCOUNTER SYMPTOMS
RHINORRHEA: 1
SINUS PRESSURE: 0
WHEEZING: 0
EYE PAIN: 0
CHEST TIGHTNESS: 0
SORE THROAT: 1
EYE DISCHARGE: 0
NAUSEA: 0
VOMITING: 0
COUGH: 1
SHORTNESS OF BREATH: 0
FACIAL SWELLING: 0

## 2022-06-21 ASSESSMENT — PATIENT HEALTH QUESTIONNAIRE - PHQ9
SUM OF ALL RESPONSES TO PHQ QUESTIONS 1-9: 0
SUM OF ALL RESPONSES TO PHQ9 QUESTIONS 1 & 2: 0
1. LITTLE INTEREST OR PLEASURE IN DOING THINGS: 0
2. FEELING DOWN, DEPRESSED OR HOPELESS: 0

## 2022-06-21 NOTE — PROGRESS NOTES
SUBJECTIVE:  Bentley Horowitz is a 74 y.o. who presents today for Concern For COVID-19, Cough, and Pharyngitis      HPI    Mr Anika Ray presents today with several days of a scratchy throat, chronic post nasal drainage and slight cough. He had + COVID exposure 2 days ago at [employer]. No treatment this week. No fever or chills. No shortness of breath. Has chronic skin lesions, cancer/SK to face. Needing to see dermatology again. They are bothersome. Past Medical History:   Diagnosis Date    Arthritis     Atrial fibrillation ([address]     Bleeding ulcer 11/2019    Cancer ([address]     skin    CHF (congestive heart failure) ([address]     Diabetes mellitus ([address]     Ex-cigarette smoker 7/17/2016    History of blood transfusion     History of gastric ulcer 2009    Hyperlipidemia     Hypertension     hx.  10 years ago    Kidney disease, chronic, end stage on dialysis [facility]) 2006 2007, dialysis for 3 years, then had a kidney transplant,    Obese     Peritoneal dialysis status ([address]     past hx    Prolonged emergence from general anesthesia     Vision problem     wears glasses     Past Surgical History:   Procedure Laterality Date    CHOLECYSTECTOMY      COLONOSCOPY  06/23/2017    Dr Robert Rodriguez: Diverticulosis, internal hemorrhoids, 5yr recall    COLONOSCOPY  01/05/2012    Dr Lupe Montez, 5 yr recall    DIALYSIS FISTULA CREATION  [place] 2007    left arm radial cephalic    HERNIA REPAIR      umbilical hernia repair    KIDNEY TRANSPLANT      2/18/2010 in [address] [place] [address] Right 2/23/2018    AV FISTULA GRAFT REMOVAL performed by Jazz Patricio MD at [place]      TONSILLECTOMY      TUNNELED VENOUS CATHETER PLACEMENT  multiple    UPPER GASTROINTESTINAL ENDOSCOPY N/A 11/4/2019    Dr Ricardo Holter (Dr Robert Rodriguez pt)1.2 cm superficial gastric ulcer in the antrum with surrounding moderate gastritis and oozing of blood    UPPER GASTROINTESTINAL ENDOSCOPY  10/05/2009    Dr Dejan Dubose w/resolution of ulcer, lenin +    UPPER GASTROINTESTINAL ENDOSCOPY  07/10/2009    Dr Hannah Goldberg ulceration w/pigmented base on posterior wall of the body, small erosions, active gastritis    UPPER GASTROINTESTINAL ENDOSCOPY N/A 1/10/2020    Dr Ashlyn Lyons hiatal hernia, healed previous antral ulcer-Gastritis    VASCULAR SURGERY Right [place]     av loop graft     Family History   Problem Relation Age of Onset    Kidney Disease Father     Heart Disease Father     Colon Cancer Neg Hx     Colon Polyps Neg Hx      Social History     Tobacco Use    Smoking status: Former Smoker     Packs/day: 2.00     Years: 4.00     Pack years: 8.00     Quit date: 1973     Years since quittin.3    Smokeless tobacco: Never Used   Substance Use Topics    Alcohol use: No     Current Outpatient Medications   Medication Sig Dispense Refill    sodium bicarbonate 325 MG tablet TAKE 1 TABLET BY MOUTH TWICE A DAY 60 tablet 5    tamsulosin (FLOMAX) 0.4 MG capsule TAKE 1 CAPSULE BY MOUTH EVERY DAY 90 capsule 3    metFORMIN (GLUCOPHAGE) 1000 MG tablet TAKE 1 TABLET BY MOUTH TWICE A DAY WITH MEALS 180 tablet 3    vitamin D (ERGOCALCIFEROL) 1.25 MG (06990 UT) CAPS capsule TAKE 1 CAPSULE BY MOUTH ONE TIME PER WEEK 12 capsule 1    propranolol (INDERAL) 40 MG tablet TAKE 1 TABLET BY MOUTH TWICE A DAY BEFORE MEALS 180 tablet 1    empagliflozin (JARDIANCE) 25 MG tablet TAKE 1 TABLET BY MOUTH EVERY DAY 90 tablet 1    blood glucose monitor strips Test one time a day & as needed for symptoms of irregular blood glucose.  100 strip 5    Omega-3 Fatty Acids (FISH OIL) 1000 MG CPDR Take 1,200 mg by mouth 2 times daily       Tacrolimus (ENVARSUS XR) 1 MG TB24 Take 2 mg by mouth every morning Indications: on an empty stomach       mycophenolate (CELLCEPT) 250 MG capsule Take 500 mg by mouth 2 times daily      Lancets MISC Use to check blood sugar daily Dx E11.9 100 each 0    glucose blood VI test strips (ACCU-CHEK ANANT) strip Contour strips,check daily E11.9 100 each 0     No current facility-administered medications for this visit. No Known Allergies    Review of Systems   Constitutional: Negative for appetite change, chills, fatigue and fever. HENT: Positive for postnasal drip, rhinorrhea and sore throat (scratchy). Negative for congestion, ear discharge, ear pain, facial swelling, hearing loss and sinus pressure. Eyes: Negative for pain, discharge and visual disturbance. Respiratory: Positive for cough. Negative for chest tightness, shortness of breath and wheezing. Cardiovascular: Negative for chest pain. Gastrointestinal: Negative for nausea and vomiting. Genitourinary: Negative for dysuria, frequency and urgency. Musculoskeletal: Negative for arthralgias and myalgias. Neurological: Negative for weakness, light-headedness and headaches. OBJECTIVE:  /82   Pulse (!) 109   Temp 97 °F (36.1 °C) (Temporal)   Resp 20   Ht 6' 1\" (1.854 m)   Wt 265 lb 3.2 oz (120.3 kg)   SpO2 97%   BMI 34.99 kg/m²    Physical Exam  Vitals reviewed. Constitutional:       Appearance: He is well-developed. HENT:      Head: Normocephalic and atraumatic. Mouth/Throat:      Pharynx: No oropharyngeal exudate. Eyes:      General:         Right eye: No discharge. Left eye: No discharge. Conjunctiva/sclera: Conjunctivae normal.      Pupils: Pupils are equal, round, and reactive to light. Cardiovascular:      Rate and Rhythm: Normal rate. Rhythm irregular. Pulmonary:      Effort: Pulmonary effort is normal. No respiratory distress. Breath sounds: Normal breath sounds. No wheezing or rales. Musculoskeletal:         General: Normal range of motion. Cervical back: Neck supple. Lymphadenopathy:      Cervical: No cervical adenopathy. Skin:     General: Skin is warm and dry. Findings: No rash.    Neurological:      General: No focal deficit present. Mental Status: He is alert and oriented to person, place, and time. ASSESSMENT/PLAN:  1. Viral URI  Mild. Suspect COVID-19 or other. Recommend otc treatments for mild symptoms like tylenol, mucinex, zyrtec, flonase  If worsening symptoms to call  Isolate until results are back  - COVID-19    2. Exposure to COVID-19 virus  - COVID-19      Gave information on local dermatology offices. Return if symptoms worsen or fail to improve.

## 2022-07-07 DIAGNOSIS — E11.9 TYPE 2 DIABETES MELLITUS WITHOUT COMPLICATION, WITHOUT LONG-TERM CURRENT USE OF INSULIN (HCC): ICD-10-CM

## 2022-07-07 RX ORDER — EMPAGLIFLOZIN 25 MG/1
TABLET, FILM COATED ORAL
Qty: 90 TABLET | Refills: 0 | Status: SHIPPED | OUTPATIENT
Start: 2022-07-07 | End: 2022-08-18

## 2022-07-07 RX ORDER — PROPRANOLOL HYDROCHLORIDE 40 MG/1
TABLET ORAL
Qty: 180 TABLET | Refills: 0 | Status: SHIPPED | OUTPATIENT
Start: 2022-07-07 | End: 2022-08-18

## 2022-08-18 DIAGNOSIS — E11.9 TYPE 2 DIABETES MELLITUS WITHOUT COMPLICATION, WITHOUT LONG-TERM CURRENT USE OF INSULIN (HCC): ICD-10-CM

## 2022-08-18 RX ORDER — PROPRANOLOL HYDROCHLORIDE 40 MG/1
TABLET ORAL
Qty: 180 TABLET | Refills: 0 | Status: SHIPPED | OUTPATIENT
Start: 2022-08-18 | End: 2022-09-21

## 2022-08-18 RX ORDER — EMPAGLIFLOZIN 25 MG/1
TABLET, FILM COATED ORAL
Qty: 90 TABLET | Refills: 0 | Status: SHIPPED | OUTPATIENT
Start: 2022-08-18 | End: 2022-11-02

## 2022-09-07 DIAGNOSIS — E11.65 TYPE 2 DIABETES MELLITUS WITH HYPERGLYCEMIA, WITHOUT LONG-TERM CURRENT USE OF INSULIN (HCC): ICD-10-CM

## 2022-09-07 DIAGNOSIS — E11.22 TYPE 2 DIABETES MELLITUS WITH CHRONIC KIDNEY DISEASE, WITHOUT LONG-TERM CURRENT USE OF INSULIN, UNSPECIFIED CKD STAGE (HCC): ICD-10-CM

## 2022-09-07 RX ORDER — CALCIUM CITRATE/VITAMIN D3 200MG-6.25
TABLET ORAL
Qty: 100 STRIP | Refills: 5 | Status: SHIPPED | OUTPATIENT
Start: 2022-09-07 | End: 2022-09-08 | Stop reason: SDUPTHER

## 2022-09-08 DIAGNOSIS — E11.22 TYPE 2 DIABETES MELLITUS WITH CHRONIC KIDNEY DISEASE, WITHOUT LONG-TERM CURRENT USE OF INSULIN, UNSPECIFIED CKD STAGE (HCC): ICD-10-CM

## 2022-09-08 DIAGNOSIS — E11.65 TYPE 2 DIABETES MELLITUS WITH HYPERGLYCEMIA, WITHOUT LONG-TERM CURRENT USE OF INSULIN (HCC): ICD-10-CM

## 2022-09-08 RX ORDER — CALCIUM CITRATE/VITAMIN D3 200MG-6.25
TABLET ORAL
Qty: 100 STRIP | Refills: 5 | Status: SHIPPED | OUTPATIENT
Start: 2022-09-08

## 2022-09-20 DIAGNOSIS — E11.65 TYPE 2 DIABETES MELLITUS WITH HYPERGLYCEMIA, WITHOUT LONG-TERM CURRENT USE OF INSULIN (HCC): Primary | ICD-10-CM

## 2022-09-20 RX ORDER — LANCETS 30 GAUGE
1 EACH MISCELLANEOUS DAILY
Qty: 100 EACH | Refills: 5 | Status: SHIPPED | OUTPATIENT
Start: 2022-09-20

## 2022-09-20 RX ORDER — LANCETS 30 GAUGE
EACH MISCELLANEOUS
Qty: 100 EACH | Refills: 1 | Status: SHIPPED | OUTPATIENT
Start: 2022-09-20

## 2022-09-20 NOTE — TELEPHONE ENCOUNTER
Last OV 2022  Next OV Visit date not found      Requested Prescriptions     Pending Prescriptions Disp Refills    Lancets MISC 100 each 5     Si each by Does not apply route daily

## 2022-09-21 ENCOUNTER — TELEPHONE (OUTPATIENT)
Dept: CARDIOLOGY CLINIC | Age: 67
End: 2022-09-21

## 2022-09-21 ENCOUNTER — OFFICE VISIT (OUTPATIENT)
Dept: CARDIOLOGY CLINIC | Age: 67
End: 2022-09-21
Payer: MEDICARE

## 2022-09-21 VITALS
SYSTOLIC BLOOD PRESSURE: 128 MMHG | WEIGHT: 265 LBS | HEIGHT: 73 IN | DIASTOLIC BLOOD PRESSURE: 87 MMHG | BODY MASS INDEX: 35.12 KG/M2 | HEART RATE: 91 BPM

## 2022-09-21 DIAGNOSIS — Z95.818 PRESENCE OF WATCHMAN LEFT ATRIAL APPENDAGE CLOSURE DEVICE: ICD-10-CM

## 2022-09-21 DIAGNOSIS — L98.9 FACIAL LESION: ICD-10-CM

## 2022-09-21 DIAGNOSIS — I10 ESSENTIAL HYPERTENSION: ICD-10-CM

## 2022-09-21 DIAGNOSIS — I48.0 PAF (PAROXYSMAL ATRIAL FIBRILLATION) (HCC): Primary | ICD-10-CM

## 2022-09-21 PROCEDURE — 1123F ACP DISCUSS/DSCN MKR DOCD: CPT | Performed by: CLINICAL NURSE SPECIALIST

## 2022-09-21 PROCEDURE — 99214 OFFICE O/P EST MOD 30 MIN: CPT | Performed by: CLINICAL NURSE SPECIALIST

## 2022-09-21 RX ORDER — SODIUM BICARBONATE 650 MG/1
650 TABLET ORAL 2 TIMES DAILY
Status: ON HOLD | COMMUNITY
Start: 2022-09-06 | End: 2022-10-20 | Stop reason: ALTCHOICE

## 2022-09-21 RX ORDER — PROPRANOLOL HYDROCHLORIDE 80 MG/1
80 TABLET ORAL 2 TIMES DAILY
Qty: 180 TABLET | Refills: 3 | Status: SHIPPED | OUTPATIENT
Start: 2022-09-21

## 2022-09-21 RX ORDER — CALCITRIOL 0.25 UG/1
CAPSULE, LIQUID FILLED ORAL
COMMUNITY
Start: 2022-09-04

## 2022-09-21 NOTE — PROGRESS NOTES
[address] [facility]  [facility] [place]   Phone: [phone]  Fax: [phone]    OFFICE VISIT:  2022    Dona Desouza Mireles - : 1955    Reason For Visit:  Radha Hui is a 69 y.o. male who is here for Follow-up (No cardiac symptoms) and Atrial Fibrillation  History of hypertension, diabetes, CKD status post kidney transplant and atrial fibrillation which is now permanent. Due to recurrent bleeding patient was evaluated for left atrial closure device. 2021 Watchman left atrial closure device placed per Dr. Erick Brody  Patient did not come back for his 6-week LINDA for reevaluation of placement and seeding of watchman    He is followed by the [facility] kidney transplant team for his antirejection medications as well as locally by nephrology  He states that is all been doing well. He does notice his heart rate go up at times. He states he notices the arrhythmia most of the time. Heart rate usually stays around 90  Patient states he has been on propranolol for years but it was originally started for an essential tremor. Subjective  Radha Hui denies exertional chest pain, shortness of breath, orthopnea, paroxysmal nocturnal dyspnea, syncope, presyncope,  edema and fatigue. The patient denies numbness or weakness to suggest cerebrovascular accident or transient ischemic attack. Willy Blum MD is PCP .   Follows with DR Jackelin Espino at [facility] with transplant team  Sees CHRIS Oreilly here locally for his CKD    Mcik Fisher has the following history as recorded in [facility]:    Patient Active Problem List    Diagnosis Date Noted    Presence of Watchman left atrial appendage closure device 2021    Longstanding persistent atrial fibrillation ([facility]     LVH (left ventricular hypertrophy) 2020    History of renal transplant 2019    UGI bleed 2019    PSVT (paroxysmal supraventricular tachycardia) ([address] 2019    Chronic anticoagulation 2019    Orthostatic dizziness 01/07/2019    Daytime somnolence 04/09/2018    PAF (paroxysmal atrial fibrillation) ([address] 04/09/2018    Fatigue 04/09/2018    History of tachycardia 04/09/2018    Immunosuppression (HCC)     Atrial fibrillation with RVR ([address] 02/26/2018    Infection of AV graft for dialysis ([facility] 02/23/2018    Cellulitis of right upper extremity     Cellulitis 02/22/2018    Chest pressure 07/17/2016    Diabetes mellitus ([address] 07/17/2016    Ex-cigarette smoker 07/17/2016    Essential hypertension     Chest pain      Past Medical History:   Diagnosis Date    Arthritis     Atrial fibrillation ([address]     Bleeding ulcer 11/2019    Cancer ([facility]     skin    CHF (congestive heart failure) ([address]     Diabetes mellitus ([facility]     Ex-cigarette smoker 7/17/2016    History of blood transfusion     History of gastric ulcer 2009    Hyperlipidemia     Hypertension     hx.  10 years ago    Kidney disease, chronic, end stage on dialysis [facility]) 2006 2007, dialysis for 3 years, then had a kidney transplant,    Obese     Peritoneal dialysis status ([address]     past hx    Prolonged emergence from general anesthesia     Vision problem     wears glasses     Past Surgical History:   Procedure Laterality Date    CHOLECYSTECTOMY      COLONOSCOPY  06/23/2017    Dr Letitia Uribe: Diverticulosis, internal hemorrhoids, 5yr recall    COLONOSCOPY  01/05/2012    Dr Montana Art, 5 yr recall    DIALYSIS FISTULA CREATION  [place] 2007    left arm radial cephalic    HERNIA REPAIR      umbilical hernia repair    KIDNEY TRANSPLANT      2/18/2010 in [address] [place] [address] Right 2/23/2018    AV FISTULA GRAFT REMOVAL performed by Tod Garibay MD at [address]      TUNNELED VENOUS CATHETER PLACEMENT  multiple    UPPER GASTROINTESTINAL ENDOSCOPY N/A 11/4/2019    Dr Garcia Rm (Dr Letitia Uribe pt)1.2 cm superficial gastric ulcer in the antrum with surrounding moderate gastritis and oozing of blood    UPPER GASTROINTESTINAL ENDOSCOPY  10/05/2009    Dr Jazz Lawson w/resolution of ulcer, lenin +    UPPER GASTROINTESTINAL ENDOSCOPY  07/10/2009    Dr Mery Harris ulceration w/pigmented base on posterior wall of the body, small erosions, active gastritis    UPPER GASTROINTESTINAL ENDOSCOPY N/A 1/10/2020    Dr Dara Chow hiatal hernia, healed previous antral ulcer-Gastritis    VASCULAR SURGERY Right [place]     av loop graft     Family History   Problem Relation Age of Onset    Kidney Disease Father     Heart Disease Father     Colon Cancer Neg Hx     Colon Polyps Neg Hx      Social History     Tobacco Use    Smoking status: Former     Packs/day: 2.00     Years: 4.00     Pack years: 8.00     Types: Cigarettes     Quit date: 1973     Years since quittin.6    Smokeless tobacco: Never   Substance Use Topics    Alcohol use: No      Current Outpatient Medications   Medication Sig Dispense Refill    sodium bicarbonate 650 MG tablet Take 650 mg by mouth in the morning and at bedtime      calcitRIOL (ROCALTROL) 0.25 MCG capsule TAKE 1 CAPSULE BY MOUTH 3 TIMES A WEEK      propranolol (INDERAL) 80 MG tablet Take 1 tablet by mouth 2 times daily 180 tablet 3    Lancets MISC 1 each by Does not apply route daily 100 each 5    Lancets MISC Use to check blood sugar daily Dx E11.9 Patient has true metrix device 100 each 1    blood glucose test strips (TRUE METRIX BLOOD GLUCOSE TEST) strip TEST ONE TIME A DAY & AS NEEDED FOR SYMPTOMS OF IRREGULAR BLOOD GLUCOSE.  DX: E11.9 100 strip 5    JARDIANCE 25 MG tablet TAKE 1 TABLET BY MOUTH EVERY DAY 90 tablet 0    tamsulosin (FLOMAX) 0.4 MG capsule TAKE 1 CAPSULE BY MOUTH EVERY DAY 90 capsule 3    metFORMIN (GLUCOPHAGE) 1000 MG tablet TAKE 1 TABLET BY MOUTH TWICE A DAY WITH MEALS 180 tablet 3    vitamin D (ERGOCALCIFEROL) 1.25 MG (47636 UT) CAPS capsule TAKE 1 CAPSULE BY MOUTH ONE TIME PER WEEK (Patient taking differently: every 30 days) 12 capsule 1    blood glucose monitor strips Test one time a day & as needed for symptoms of irregular blood glucose. 100 strip 5    Omega-3 Fatty Acids (FISH OIL) 1000 MG CPDR Take 1,200 mg by mouth 2 times daily       Tacrolimus 1 MG TB24 Take 2 mg by mouth every morning Indications: on an empty stomach       mycophenolate (CELLCEPT) 250 MG capsule Take 500 mg by mouth 2 times daily      glucose blood VI test strips (ACCU-CHEK ANANT) strip Contour strips,check daily E11.9 100 each 0    sodium bicarbonate 325 MG tablet TAKE 1 TABLET BY MOUTH TWICE A DAY (Patient not taking: Reported on 9/21/2022) 60 tablet 5     No current facility-administered medications for this visit. Allergies: Patient has no known allergies. Review of Systems  Constitutional - no significant activity change, appetite change, or unexpected weight change. No fever, chills or diaphoresis. No fatigue. HEENT - no significant rhinorrhea or epistaxis. No tinnitus or significant hearing loss. Eyes - no sudden vision change or amaurosis. Respiratory - no significant wheezing, stridor, apnea or cough. No dyspnea on exertion or shortness of breath. Cardiovascular - no exertional chest pain, orthopnea or PND. + sensation of arrhythmia no slow heart rate. No claudication or leg edema. Gastrointestinal - no abdominal swelling or pain. No blood in stool. No severe constipation, diarrhea, nausea, or vomiting. Genitourinary - no difficulty urinating, dysuria, frequency, or urgency. No flank pain or hematuria. Musculoskeletal - no back pain, gait disturbance, or myalgia. Skin - no color change or rash. No pallor. No new surgical incision. Neurologic - no speech difficulty, facial asymmetry or lateralizing weakness. No seizures, presyncope, syncope, or significant dizziness. Hematologic - no easy bruising or excessive bleeding. Psychiatric - no severe anxiety or insomnia. No confusion. All other review of systems are negative.       Objective  Vital Signs - /87 Pulse 91   Ht 6' 1\" (1.854 m)   Wt 265 lb (120.2 kg)   BMI 34.96 kg/m²   General - John Huggins is alert, cooperative, and pleasant. Well groomed. No acute distress. Body habitus is obese. HEENT - The head is normocephalic. No circumoral cyanosis. Dentition is normal.   EYES -  No Xanthelasma, no arcus senilis, no conjunctival hemorrhages or discharge. Neck - Supple, without increased jugular venous pressures. No carotid bruits. No mass. Respiratory - Lungs are clear bilaterally. No wheezes or rales. Normal effort without use of accessory muscles. Cardiovascular - Heart has irregular rhythm and rate. No murmurs, rubs or gallops. + pedal pulses and no varicosities. Abdominal -  Soft, nontender, nondistended. Bowel sounds are intact. Extremities - No clubbing, cyanosis, or  edema. Musculoskeletal -  No clubbing . No Osler's nodes. Gait normal .  No kyphosis or scoliosis. Skin -  no statis ulcers or dermatitis. + Several red places on his face and neck. Quarter sized dark patch on left cheek-red and scabbed  Neurological - No focal signs are identified. Oriented to person, place and time. Psychiatric -  Appropriate affect and mood. Assessment:     Diagnosis Orders   1. PAF (paroxysmal atrial fibrillation) ([facility]        2. Essential hypertension        3. Presence of Watchman left atrial appendage closure device        4. Facial lesion  External Referral To Dermatology        Data:  BP Readings from Last 3 Encounters:   09/21/22 128/87   06/21/22 118/82   12/21/21 132/76    Pulse Readings from Last 3 Encounters:   09/21/22 91   06/21/22 (!) 109   12/21/21 84        Wt Readings from Last 3 Encounters:   09/21/22 265 lb (120.2 kg)   06/21/22 265 lb 3.2 oz (120.3 kg)   12/21/21 263 lb (119.3 kg)       Blood pressures controlled. Does not take any medication for antihypertensive management  Heart rate running in the 90s but often is elevated.   Originally was put on his propranolol for tremor management. He tolerates this well. We will uptitrate it to 80 mg twice a day to see if this will help with his heart rate controlled    Patient did not have his follow-up LINDA to evaluate placement of his watchman left atrial closure device. We will get him scheduled for that to confirm seated placement     Reviewed recent notes from [address] [place] [address] recent labs    Patient states he is followed with dermatology, Dr. Nhung Flores in the past.  He is awaiting appointment with Dr. Ruba Hackett in that office but has yet to hear. Will put in referral to facilitate this for him     States taking medications as prescribed  Stable cardiovascular status. No evidence of overt heart failure, angina     30 minutes were spent preparing, reviewing and seeing patient. All questions answered    Plan  Increase Inderal to 80 mg twice a day. This will help with fast heart rates  LINDA is planned to check placement of watchman device  Follow up  With Dr. Donte Vasques as planned if LINDA stable  Call with any questions or concerns  Follow up with Jayne Barone MD for non cardiac problems  Report any new problems  Cardiovascular Fitness-Exercise as tolerated. Strive for 30 minutes of exercise most days of the week. Cardiac / Healthy Diet  Continue current medications as directed  Continue plan of treatment  It is always recommended that you bring your medications bottles with you to each visit - this is for your safety! CHRIS Felton    EMR dragon/transcription disclaimer: Much of this encounter note is electronic transcription/translation of spoken language to printed tach. Electronic translation of spoken language may be erroneous, or at times, nonsensical words or phrases may be inadvertently transcribed.  Although, I have reviewed the note for such errors, some may still exist.

## 2022-10-13 ENCOUNTER — OFFICE VISIT (OUTPATIENT)
Dept: FAMILY MEDICINE CLINIC | Age: 67
End: 2022-10-13
Payer: MEDICARE

## 2022-10-13 VITALS
HEIGHT: 73 IN | SYSTOLIC BLOOD PRESSURE: 126 MMHG | RESPIRATION RATE: 20 BRPM | WEIGHT: 264.4 LBS | OXYGEN SATURATION: 96 % | TEMPERATURE: 97.4 F | HEART RATE: 99 BPM | DIASTOLIC BLOOD PRESSURE: 70 MMHG | BODY MASS INDEX: 35.04 KG/M2

## 2022-10-13 DIAGNOSIS — E11.9 TYPE 2 DIABETES MELLITUS WITHOUT COMPLICATION, WITHOUT LONG-TERM CURRENT USE OF INSULIN (HCC): ICD-10-CM

## 2022-10-13 DIAGNOSIS — N41.0 ACUTE PROSTATITIS: ICD-10-CM

## 2022-10-13 DIAGNOSIS — R39.14 BENIGN PROSTATIC HYPERPLASIA WITH INCOMPLETE BLADDER EMPTYING: ICD-10-CM

## 2022-10-13 DIAGNOSIS — I10 PRIMARY HYPERTENSION: ICD-10-CM

## 2022-10-13 DIAGNOSIS — I48.0 PAF (PAROXYSMAL ATRIAL FIBRILLATION) (HCC): ICD-10-CM

## 2022-10-13 DIAGNOSIS — R30.0 DYSURIA: Primary | ICD-10-CM

## 2022-10-13 DIAGNOSIS — R33.9 URINARY RETENTION: ICD-10-CM

## 2022-10-13 DIAGNOSIS — R30.0 DYSURIA: ICD-10-CM

## 2022-10-13 DIAGNOSIS — N40.1 BENIGN PROSTATIC HYPERPLASIA WITH INCOMPLETE BLADDER EMPTYING: ICD-10-CM

## 2022-10-13 LAB
ALBUMIN SERPL-MCNC: 4.2 G/DL (ref 3.5–5.2)
ALP BLD-CCNC: 68 U/L (ref 40–130)
ALT SERPL-CCNC: 13 U/L (ref 5–41)
ANION GAP SERPL CALCULATED.3IONS-SCNC: 15 MMOL/L (ref 7–19)
AST SERPL-CCNC: 19 U/L (ref 5–40)
BASOPHILS ABSOLUTE: 0.1 K/UL (ref 0–0.2)
BASOPHILS RELATIVE PERCENT: 0.9 % (ref 0–1)
BILIRUB SERPL-MCNC: 2 MG/DL (ref 0.2–1.2)
BILIRUBIN URINE: NEGATIVE
BLOOD, URINE: NEGATIVE
BUN BLDV-MCNC: 20 MG/DL (ref 8–23)
CALCIUM SERPL-MCNC: 10 MG/DL (ref 8.8–10.2)
CHLORIDE BLD-SCNC: 101 MMOL/L (ref 98–111)
CLARITY: CLEAR
CO2: 20 MMOL/L (ref 22–29)
COLOR: YELLOW
CREAT SERPL-MCNC: 1.2 MG/DL (ref 0.5–1.2)
EOSINOPHILS ABSOLUTE: 0.5 K/UL (ref 0–0.6)
EOSINOPHILS RELATIVE PERCENT: 6.1 % (ref 0–5)
GFR AFRICAN AMERICAN: >59
GFR NON-AFRICAN AMERICAN: 60
GLUCOSE BLD-MCNC: 126 MG/DL (ref 74–109)
GLUCOSE URINE: =>1000 MG/DL
HBA1C MFR BLD: 7.9 % (ref 4–6)
HCT VFR BLD CALC: 55.8 % (ref 42–52)
HEMOGLOBIN: 17.1 G/DL (ref 14–18)
IMMATURE GRANULOCYTES #: 0.1 K/UL
KETONES, URINE: ABNORMAL MG/DL
LEUKOCYTE ESTERASE, URINE: NEGATIVE
LYMPHOCYTES ABSOLUTE: 1.3 K/UL (ref 1.1–4.5)
LYMPHOCYTES RELATIVE PERCENT: 15.4 % (ref 20–40)
MCH RBC QN AUTO: 29.9 PG (ref 27–31)
MCHC RBC AUTO-ENTMCNC: 30.6 G/DL (ref 33–37)
MCV RBC AUTO: 97.7 FL (ref 80–94)
MONOCYTES ABSOLUTE: 0.8 K/UL (ref 0–0.9)
MONOCYTES RELATIVE PERCENT: 9.5 % (ref 0–10)
NEUTROPHILS ABSOLUTE: 5.8 K/UL (ref 1.5–7.5)
NEUTROPHILS RELATIVE PERCENT: 67.3 % (ref 50–65)
NITRITE, URINE: NEGATIVE
PDW BLD-RTO: 14.7 % (ref 11.5–14.5)
PH UA: 5 (ref 5–8)
PLATELET # BLD: 171 K/UL (ref 130–400)
PMV BLD AUTO: 10.8 FL (ref 9.4–12.4)
POTASSIUM SERPL-SCNC: 4.8 MMOL/L (ref 3.5–5)
PROTEIN UA: NEGATIVE MG/DL
RBC # BLD: 5.71 M/UL (ref 4.7–6.1)
SODIUM BLD-SCNC: 136 MMOL/L (ref 136–145)
SPECIFIC GRAVITY UA: 1.03 (ref 1–1.03)
TOTAL PROTEIN: 7.1 G/DL (ref 6.6–8.7)
UROBILINOGEN, URINE: 0.2 E.U./DL
WBC # BLD: 8.6 K/UL (ref 4.8–10.8)

## 2022-10-13 PROCEDURE — 3078F DIAST BP <80 MM HG: CPT | Performed by: FAMILY MEDICINE

## 2022-10-13 PROCEDURE — 3051F HG A1C>EQUAL 7.0%<8.0%: CPT | Performed by: FAMILY MEDICINE

## 2022-10-13 PROCEDURE — 1123F ACP DISCUSS/DSCN MKR DOCD: CPT | Performed by: FAMILY MEDICINE

## 2022-10-13 PROCEDURE — 99214 OFFICE O/P EST MOD 30 MIN: CPT | Performed by: FAMILY MEDICINE

## 2022-10-13 PROCEDURE — 3074F SYST BP LT 130 MM HG: CPT | Performed by: FAMILY MEDICINE

## 2022-10-13 RX ORDER — TAMSULOSIN HYDROCHLORIDE 0.4 MG/1
0.8 CAPSULE ORAL DAILY
Qty: 60 CAPSULE | Refills: 0 | Status: SHIPPED | OUTPATIENT
Start: 2022-10-13

## 2022-10-13 RX ORDER — SULFAMETHOXAZOLE AND TRIMETHOPRIM 800; 160 MG/1; MG/1
1 TABLET ORAL 2 TIMES DAILY
Qty: 28 TABLET | Refills: 0 | Status: SHIPPED | OUTPATIENT
Start: 2022-10-13 | End: 2022-10-27

## 2022-10-13 SDOH — ECONOMIC STABILITY: FOOD INSECURITY: WITHIN THE PAST 12 MONTHS, THE FOOD YOU BOUGHT JUST DIDN'T LAST AND YOU DIDN'T HAVE MONEY TO GET MORE.: NEVER TRUE

## 2022-10-13 SDOH — ECONOMIC STABILITY: FOOD INSECURITY: WITHIN THE PAST 12 MONTHS, YOU WORRIED THAT YOUR FOOD WOULD RUN OUT BEFORE YOU GOT MONEY TO BUY MORE.: NEVER TRUE

## 2022-10-13 ASSESSMENT — SOCIAL DETERMINANTS OF HEALTH (SDOH): HOW HARD IS IT FOR YOU TO PAY FOR THE VERY BASICS LIKE FOOD, HOUSING, MEDICAL CARE, AND HEATING?: NOT HARD AT ALL

## 2022-10-13 NOTE — PROGRESS NOTES
100s.  He has been tolerating his medication without any issues. Denies any other recent changes and is trying to be compliant with his diabetic diet. Patient has arterial hypertension that is doing well with his current medication. he denies any issues with use of his  medicine. he denies any symptoms associated with abnormal blood pressures. he is attempting to avoid excess salt intake. He is continue to follow with cardiology for his paroxysmal atrial fibrillation and has had a watchman placed and appears to be doing well at this time without any new problems or recent changes. Review of Systems   Constitutional:  Negative for activity change, appetite change, fatigue and fever. HENT:  Negative for congestion and rhinorrhea. Eyes:  Negative for pain and discharge. Respiratory:  Negative for cough and shortness of breath. Cardiovascular:  Negative for chest pain and palpitations. Gastrointestinal:  Negative for abdominal pain, constipation, diarrhea, nausea and vomiting. Endocrine: Negative for cold intolerance and heat intolerance. Genitourinary:  Positive for difficulty urinating, dysuria, frequency and urgency. Negative for hematuria. Musculoskeletal:  Negative for gait problem and myalgias. Skin:  Negative for rash and wound. Neurological:  Negative for syncope and weakness. Hematological:  Negative for adenopathy. Does not bruise/bleed easily. Psychiatric/Behavioral:  Negative for dysphoric mood. The patient is not nervous/anxious.        No Known Allergies    Outpatient Medications Prior to Visit   Medication Sig Dispense Refill    calcitRIOL (ROCALTROL) 0.25 MCG capsule TAKE 1 CAPSULE BY MOUTH 3 TIMES A WEEK      propranolol (INDERAL) 80 MG tablet Take 1 tablet by mouth 2 times daily 180 tablet 3    sodium bicarbonate 650 MG tablet Take 650 mg by mouth in the morning and at bedtime      Lancets MISC 1 each by Does not apply route daily 100 each 5    Lancets MISC Use to check blood sugar daily Dx E11.9 Patient has true metrix device 100 each 1    blood glucose test strips (TRUE METRIX BLOOD GLUCOSE TEST) strip TEST ONE TIME A DAY & AS NEEDED FOR SYMPTOMS OF IRREGULAR BLOOD GLUCOSE. DX: E11.9 100 strip 5    JARDIANCE 25 MG tablet TAKE 1 TABLET BY MOUTH EVERY DAY 90 tablet 0    sodium bicarbonate 325 MG tablet TAKE 1 TABLET BY MOUTH TWICE A DAY (Patient taking differently: Take 650 mg by mouth 2 times daily) 60 tablet 5    metFORMIN (GLUCOPHAGE) 1000 MG tablet TAKE 1 TABLET BY MOUTH TWICE A DAY WITH MEALS 180 tablet 3    vitamin D (ERGOCALCIFEROL) 1.25 MG (17559 UT) CAPS capsule TAKE 1 CAPSULE BY MOUTH ONE TIME PER WEEK (Patient taking differently: every 30 days) 12 capsule 1    blood glucose monitor strips Test one time a day & as needed for symptoms of irregular blood glucose. 100 strip 5    Omega-3 Fatty Acids (FISH OIL) 1000 MG CPDR Take 1,000 mg by mouth 2 times daily      Tacrolimus 1 MG TB24 Take 2 mg by mouth every morning Indications: on an empty stomach       mycophenolate (CELLCEPT) 250 MG capsule Take 500 mg by mouth 2 times daily      glucose blood VI test strips (ACCU-CHEK ANANT) strip Contour strips,check daily E11.9 100 each 0    tamsulosin (FLOMAX) 0.4 MG capsule TAKE 1 CAPSULE BY MOUTH EVERY DAY 90 capsule 3     No facility-administered medications prior to visit. Past Medical History:   Diagnosis Date    Arthritis     Atrial fibrillation ([facility]     Bleeding ulcer 11/2019    Cancer ([facility]     skin    CHF (congestive heart failure) ([facility]     Diabetes mellitus ([facility]     Ex-cigarette smoker 7/17/2016    History of blood transfusion     History of gastric ulcer 2009    Hyperlipidemia     Hypertension     hx.  10 years ago    Kidney disease, chronic, end stage on dialysis [facility]) 2006 2007, dialysis for 3 years, then had a kidney transplant,    Obese     Peritoneal dialysis status ([facility]     past hx    Prolonged emergence from general anesthesia     Vision problem wears glasses        Social History     Tobacco Use    Smoking status: Former     Packs/day: 2.00     Years: 4.00     Pack years: 8.00     Types: Cigarettes     Quit date: 1973     Years since quittin.7    Smokeless tobacco: Never   Substance Use Topics    Alcohol use: No        Past Surgical History:   Procedure Laterality Date    CHOLECYSTECTOMY      COLONOSCOPY  2017    Dr Eli Haro: Diverticulosis, internal hemorrhoids, 5yr recall    COLONOSCOPY  2012    Dr Cooper Fine, 5 yr recall    DIALYSIS FISTULA CREATION  [place] 2007    left arm radial cephalic    HERNIA REPAIR      umbilical hernia repair    KIDNEY TRANSPLANT      2010 in [address] [place] [address] Right 2018    AV FISTULA GRAFT REMOVAL performed by Paige Hines MD at [address]      TUNNELED VENOUS CATHETER PLACEMENT  multiple    UPPER GASTROINTESTINAL ENDOSCOPY N/A 2019    Dr Ankit Pedersen (Dr Eli Haro pt)1.2 cm superficial gastric ulcer in the antrum with surrounding moderate gastritis and oozing of blood    UPPER GASTROINTESTINAL ENDOSCOPY  10/05/2009    Dr Mily Chen w/resolution of ulcer, lenin +    UPPER GASTROINTESTINAL ENDOSCOPY  07/10/2009    Dr Pascual Capthillayr ulceration w/pigmented base on posterior wall of the body, small erosions, active gastritis    UPPER GASTROINTESTINAL ENDOSCOPY N/A 1/10/2020    Dr Gonsalez Sever hiatal hernia, healed previous antral ulcer-Gastritis    VASCULAR SURGERY Right [place]     av loop graft       Family History   Problem Relation Age of Onset    Kidney Disease Father     Heart Disease Father     Colon Cancer Neg Hx     Colon Polyps Neg Hx        Objective       /70 (Site: Left Upper Arm, Position: Sitting, Cuff Size: Medium Adult)   Pulse 99   Temp 97.4 °F (36.3 °C) (Infrared)   Resp 20   Ht 6' 1\" (1.854 m)   Wt 264 lb 6.4 oz (119.9 kg)   SpO2 96%   BMI 34.88 kg/m²   Physical Exam  Vitals and nursing note reviewed. Constitutional:       General: He is not in acute distress. Appearance: Normal appearance. He is well-developed. He is not diaphoretic. HENT:      Head: Normocephalic and atraumatic. Right Ear: External ear normal.      Left Ear: External ear normal.      Nose: Nose normal.      Mouth/Throat:      Mouth: Mucous membranes are moist.      Pharynx: Oropharynx is clear. No oropharyngeal exudate. Eyes:      General: No scleral icterus. Right eye: No discharge. Left eye: No discharge. Conjunctiva/sclera: Conjunctivae normal.   Neck:      Trachea: No tracheal deviation. Cardiovascular:      Rate and Rhythm: Normal rate and regular rhythm. Heart sounds: Normal heart sounds. No murmur heard. No friction rub. No gallop. Pulmonary:      Effort: Pulmonary effort is normal. No respiratory distress. Breath sounds: Normal breath sounds. No wheezing or rales. Abdominal:      General: Bowel sounds are normal. There is no distension. Palpations: Abdomen is soft. Tenderness: There is no abdominal tenderness. There is no right CVA tenderness or left CVA tenderness. Musculoskeletal:         General: No deformity (No gross deformities of upper or lower extremities) or signs of injury. Normal range of motion. Cervical back: Normal range of motion and neck supple. No muscular tenderness. Right lower leg: No edema. Left lower leg: No edema. Lymphadenopathy:      Cervical: No cervical adenopathy. Skin:     General: Skin is warm and dry. Findings: No erythema or rash. Neurological:      Mental Status: He is alert and oriented to person, place, and time. Cranial Nerves: No cranial nerve deficit. Psychiatric:         Mood and Affect: Mood normal.         Behavior: Behavior normal.         Thought Content:  Thought content normal.         Data Reviewed and Summarized       Labs: Imaging/Testing:        Jaimie Matamoros MD

## 2022-10-18 ENCOUNTER — TELEPHONE (OUTPATIENT)
Dept: PRIMARY CARE CLINIC | Age: 67
End: 2022-10-18

## 2022-10-20 ENCOUNTER — ANESTHESIA (OUTPATIENT)
Dept: CARDIAC CATH/INVASIVE PROCEDURES | Age: 67
End: 2022-10-20
Payer: MEDICARE

## 2022-10-20 ENCOUNTER — ANESTHESIA EVENT (OUTPATIENT)
Dept: CARDIAC CATH/INVASIVE PROCEDURES | Age: 67
End: 2022-10-20
Payer: MEDICARE

## 2022-10-20 ENCOUNTER — HOSPITAL ENCOUNTER (OUTPATIENT)
Dept: CARDIAC CATH/INVASIVE PROCEDURES | Age: 67
Discharge: HOME | End: 2022-10-20
Attending: INTERNAL MEDICINE | Admitting: INTERNAL MEDICINE
Payer: MEDICARE

## 2022-10-20 VITALS
SYSTOLIC BLOOD PRESSURE: 135 MMHG | BODY MASS INDEX: 34.99 KG/M2 | HEART RATE: 83 BPM | WEIGHT: 264 LBS | OXYGEN SATURATION: 98 % | DIASTOLIC BLOOD PRESSURE: 83 MMHG | RESPIRATION RATE: 14 BRPM | HEIGHT: 73 IN | TEMPERATURE: 96.9 F

## 2022-10-20 LAB
LV EF: 53 %
LVEF MODALITY: NORMAL

## 2022-10-20 PROCEDURE — 93312 ECHO TRANSESOPHAGEAL: CPT | Performed by: INTERNAL MEDICINE

## 2022-10-20 PROCEDURE — 93320 DOPPLER ECHO COMPLETE: CPT | Performed by: INTERNAL MEDICINE

## 2022-10-20 PROCEDURE — 6360000002 HC RX W HCPCS: Performed by: NURSE ANESTHETIST, CERTIFIED REGISTERED

## 2022-10-20 PROCEDURE — 6370000000 HC RX 637 (ALT 250 FOR IP)

## 2022-10-20 PROCEDURE — 3700000001 HC ADD 15 MINUTES (ANESTHESIA)

## 2022-10-20 PROCEDURE — 3700000000 HC ANESTHESIA ATTENDED CARE

## 2022-10-20 PROCEDURE — 2500000003 HC RX 250 WO HCPCS: Performed by: NURSE ANESTHETIST, CERTIFIED REGISTERED

## 2022-10-20 PROCEDURE — 2580000003 HC RX 258: Performed by: INTERNAL MEDICINE

## 2022-10-20 PROCEDURE — 93312 ECHO TRANSESOPHAGEAL: CPT

## 2022-10-20 PROCEDURE — 93325 DOPPLER ECHO COLOR FLOW MAPG: CPT | Performed by: INTERNAL MEDICINE

## 2022-10-20 PROCEDURE — 93321 DOPPLER ECHO F-UP/LMTD STD: CPT

## 2022-10-20 PROCEDURE — 93325 DOPPLER ECHO COLOR FLOW MAPG: CPT

## 2022-10-20 RX ORDER — LIDOCAINE HYDROCHLORIDE 10 MG/ML
INJECTION, SOLUTION EPIDURAL; INFILTRATION; INTRACAUDAL; PERINEURAL PRN
Status: DISCONTINUED | OUTPATIENT
Start: 2022-10-20 | End: 2022-10-20 | Stop reason: SDUPTHER

## 2022-10-20 RX ORDER — PROPOFOL 10 MG/ML
INJECTION, EMULSION INTRAVENOUS PRN
Status: DISCONTINUED | OUTPATIENT
Start: 2022-10-20 | End: 2022-10-20 | Stop reason: SDUPTHER

## 2022-10-20 RX ORDER — SODIUM CHLORIDE 9 MG/ML
INJECTION, SOLUTION INTRAVENOUS CONTINUOUS
Status: DISCONTINUED | OUTPATIENT
Start: 2022-10-20 | End: 2022-10-20 | Stop reason: HOSPADM

## 2022-10-20 RX ADMIN — LIDOCAINE HYDROCHLORIDE 50 MG: 10 INJECTION, SOLUTION EPIDURAL; INFILTRATION; INTRACAUDAL; PERINEURAL at 08:32

## 2022-10-20 RX ADMIN — SODIUM CHLORIDE: 9 INJECTION, SOLUTION INTRAVENOUS at 06:51

## 2022-10-20 RX ADMIN — PROPOFOL 250 MG: 10 INJECTION, EMULSION INTRAVENOUS at 08:32

## 2022-10-20 RX ADMIN — SODIUM CHLORIDE: 9 INJECTION, SOLUTION INTRAVENOUS at 09:00

## 2022-10-20 ASSESSMENT — ENCOUNTER SYMPTOMS
ABDOMINAL DISTENTION: 0
DIARRHEA: 0
WHEEZING: 0
SHORTNESS OF BREATH: 1
VOMITING: 0
SHORTNESS OF BREATH: 0
COUGH: 0
BLOOD IN STOOL: 0
ABDOMINAL PAIN: 0
BACK PAIN: 0

## 2022-10-20 ASSESSMENT — LIFESTYLE VARIABLES: SMOKING_STATUS: 0

## 2022-10-20 NOTE — DISCHARGE INSTRUCTIONS
Transesophageal Echocardiogram: What to Expect at [facility]  A transesophageal echocardiogram is a test to help your doctor look at the inside of your heart. A small device called a transducer directs sound waves toward your heart. The sound waves make a picture of the heart's valves and chambers. Before the test, your throat was sprayed with medicine to numb it. Your throat may be sore for a few days. You may have had a sedative to help you relax. You may be unsteady after having sedation. It can take a few hours for the medicine's effects to wear off. Common side effects include nausea, vomiting, and feeling sleepy or tired. This care sheet gives you a general idea about how long it will take for you to recover. But each person recovers at a different pace. Follow the steps below to feel better as quickly as possible. How can you care for yourself at home? Activity    If a sedative was used, your doctor will tell you when it is safe for you to do your normal activities. For your safety, do not drive or operate any machinery that could be dangerous. Wait until the medicine wears off and you can think clearly and react easily. Diet    Do not eat or drink until the numbness in your throat wears off. When the numbness is gone, you can eat your normal diet. Throat lozenges and warm saltwater gargles can help relieve throat soreness. Throat lozenges can be used by people age 3 or older. And most people can gargle at age 6 and older. Do not drink alcohol for 24 hours. Follow-up care is a key part of your treatment and safety. Be sure to make and go to all appointments, and call your doctor if you are having problems. It's also a good idea to know your test results and keep a list of the medicines you take. When should you call for help? Call 911 anytime you think you may need emergency care. For example, call if:    Your stools are maroon or very bloody.      You vomit blood or what looks like coffee grounds. Call your doctor now or seek immediate medical care if:    You have pain in your chest, belly, or back. You have new or worse trouble swallowing. You have trouble breathing. Watch closely for changes in your health, and be sure to contact your doctor if you have any problems. Current as of: January 10, 2022               Content Version: 13.4  © 2006-2022 [employer]. Care instructions adapted under license by [facility] ([facility]). If you have questions about a medical condition or this instruction, always ask your healthcare professional. [place] [address] any warranty or liability for your use of this information.

## 2022-10-20 NOTE — PROGRESS NOTES
Transesophageal echocardiogram preliminary note:    Left atrial appendage watchman closure device well-seated. No leak noted appendage appears well occluded. No thrombus in left atrium. Moderate mitral regurgitation. Mild aortic regurgitation. No significant intra-atrial septal defect with negative bubble study. Possible minimal color flow appreciated across septum. RV appears mildly dilated and mildly hypokinetic. Bilateral atria appear dilated. LVEF estimated around 50%. Plan: Continue current therapy. Remains off anticoagulation.

## 2022-10-20 NOTE — ANESTHESIA PRE PROCEDURE
Department of Anesthesiology  Preprocedure Note       Name:  Al Mccray   Age:  69 y.o.  :  1955                                          MRN:  [id number]         Date:  10/20/2022      Surgeon: * Surgery not found *    Procedure:     Medications prior to admission:   Prior to Admission medications    Medication Sig Start Date End Date Taking? Authorizing Provider   sulfamethoxazole-trimethoprim (BACTRIM DS;SEPTRA DS) 800-160 MG per tablet Take 1 tablet by mouth 2 times daily for 14 days 10/13/22 10/27/22  King Oviedo MD   tamsulosin [facility]) 0.4 MG capsule Take 2 capsules by mouth daily 10/13/22   King Oviedo MD   calcitRIOL (ROCALTROL) 0.25 MCG capsule TAKE 1 CAPSULE BY MOUTH 3 TIMES A WEEK 22   Historical Provider, MD   propranolol (INDERAL) 80 MG tablet Take 1 tablet by mouth 2 times daily 22   Apple Richard APRELYSE   Lancets MISC 1 each by Does not apply route daily 22   King Oviedo MD   Lancets MISC Use to check blood sugar daily Dx E11.9 Patient has true metrix device 22   King Oviedo MD   blood glucose test strips (TRUE METRIX BLOOD GLUCOSE TEST) strip TEST ONE TIME A DAY & AS NEEDED FOR SYMPTOMS OF IRREGULAR BLOOD GLUCOSE. DX: E11.9 22   King Oviedo MD   JARDIANCE 25 MG tablet TAKE 1 TABLET BY MOUTH EVERY DAY 22   King Oviedo MD   sodium bicarbonate 325 MG tablet TAKE 1 TABLET BY MOUTH TWICE A DAY  Patient taking differently: Take 650 mg by mouth 2 times daily 22   King Oviedo MD   metFORMIN (GLUCOPHAGE) 1000 MG tablet TAKE 1 TABLET BY MOUTH TWICE A DAY WITH MEALS 22   King Oviedo MD   vitamin D (ERGOCALCIFEROL) 1.25 MG (75046 UT) CAPS capsule TAKE 1 CAPSULE BY MOUTH ONE TIME PER WEEK  Patient taking differently: every 30 days 1/10/22   King Oviedo MD   blood glucose monitor strips Test one time a day & as needed for symptoms of irregular blood glucose.  21   King Oviedo MD   Omega-3 Fatty Acids (FISH OIL) 1000 MG CPDR Take 1,000 mg by mouth 2 times daily smoker 2016    History of blood transfusion     History of gastric ulcer     Hyperlipidemia     Hypertension     hx.  10 years ago    Kidney disease, chronic, end stage on dialysis [facility]) 2006, dialysis for 3 years, then had a kidney transplant,    Obese     Peritoneal dialysis status ([address]     past hx    Prolonged emergence from general anesthesia     Vision problem     wears glasses       Past Surgical History:        Procedure Laterality Date    CHOLECYSTECTOMY      COLONOSCOPY  2017    Dr Annette Anaya: Diverticulosis, internal hemorrhoids, 5yr recall    COLONOSCOPY  2012    Dr Eligio Browne, 5 yr recall    DIALYSIS FISTULA CREATION  [place] 2007    left arm radial cephalic    HERNIA REPAIR      umbilical hernia repair    KIDNEY TRANSPLANT      2010 in [address] [place] [address] Road Right 2018    AV FISTULA GRAFT REMOVAL performed by [place] Lopez [place] at [place]      TONSILLECTOMY      TUNNELED VENOUS CATHETER PLACEMENT  multiple    UPPER GASTROINTESTINAL ENDOSCOPY N/A 2019    Dr Harley Selby (Dr Annette Anaya pt)1.2 cm superficial gastric ulcer in the antrum with surrounding moderate gastritis and oozing of blood    UPPER GASTROINTESTINAL ENDOSCOPY  10/05/2009    Dr Debbie Johnston w/resolution of ulcer, lenin +    UPPER GASTROINTESTINAL ENDOSCOPY  07/10/2009    Dr Kanika Arreola ulceration w/pigmented base on posterior wall of the body, small erosions, active gastritis    UPPER GASTROINTESTINAL ENDOSCOPY N/A 1/10/2020    Dr Keven Antoine hiatal hernia, healed previous antral ulcer-Gastritis    VASCULAR SURGERY [place]     av loop graft       Social History:    Social History     Tobacco Use    Smoking status: Former     Packs/day: 2.00     Years: 4.00     Pack years: 8.00     Types: Cigarettes     Quit date: 1973     Years since quittin.6    Smokeless tobacco: Never   Substance Use Topics    Alcohol use: No                                Counseling given: Not Answered      Vital Signs (Current): There were no vitals filed for this visit. BP Readings from Last 3 Encounters:   10/20/22 117/77   10/13/22 126/70   09/21/22 128/87       NPO Status:                                                                                 BMI:   Wt Readings from Last 3 Encounters:   10/20/22 264 lb (119.7 kg)   10/13/22 264 lb 6.4 oz (119.9 kg)   09/21/22 265 lb (120.2 kg)     There is no height or weight on file to calculate BMI.    CBC:   Lab Results   Component Value Date/Time    WBC 8.6 10/13/2022 12:52 PM    RBC 5.71 10/13/2022 12:52 PM    HGB 17.1 10/13/2022 12:52 PM    HCT 55.8 10/13/2022 12:52 PM    MCV 97.7 10/13/2022 12:52 PM    RDW 14.7 10/13/2022 12:52 PM     10/13/2022 12:52 PM       CMP:   Lab Results   Component Value Date/Time     10/13/2022 12:52 PM    K 4.8 10/13/2022 12:52 PM    K 4.2 11/03/2019 12:00 AM     10/13/2022 12:52 PM    CO2 20 10/13/2022 12:52 PM    BUN 20 10/13/2022 12:52 PM    CREATININE 1.2 10/13/2022 12:52 PM    GFRAA >59 10/13/2022 12:52 PM    LABGLOM 60 10/13/2022 12:52 PM    GLUCOSE 126 10/13/2022 12:52 PM    PROT 7.1 10/13/2022 12:52 PM    CALCIUM 10.0 10/13/2022 12:52 PM    BILITOT 2.0 10/13/2022 12:52 PM    ALKPHOS 68 10/13/2022 12:52 PM    AST 19 10/13/2022 12:52 PM    ALT 13 10/13/2022 12:52 PM       POC Tests: No results for input(s): POCGLU, POCNA, POCK, POCCL, POCBUN, POCHEMO, POCHCT in the last 72 hours.     Coags:   Lab Results   Component Value Date/Time    PROTIME 13.7 11/18/2021 08:25 AM    INR 1.03 11/18/2021 08:25 AM    APTT 28.0 11/01/2019 08:11 PM       HCG (If Applicable): No results found for: PREGTESTUR, PREGSERUM, HCG, HCGQUANT     ABGs: No results found for: PHART, PO2ART, RND8WCA, PKV3IDJ, BEART, A8RSHHGY     Type & Screen (If Applicable):  No results found for: LABABO, [address]    Drug/Infectious Status (If Applicable):  No results found for: HIV, HEPCAB    COVID-19 Screening (If Applicable):   Lab Results   Component Value Date/Time    COVID19 Not Detected 06/21/2022 10:25 AM           Anesthesia Evaluation  Patient summary reviewed no history of anesthetic complications:   Airway: Mallampati: III  TM distance: >3 FB   Neck ROM: full  Mouth opening: > = 3 FB   Dental: normal exam         Pulmonary:normal exam  breath sounds clear to auscultation  (+) shortness of breath: chronic and no interval change,      (-) asthma, recent URI, sleep apnea and not a current smoker          Patient did not smoke on day of surgery. Cardiovascular:  Exercise tolerance: poor (<4 METS), Limited by SOB, but denies chest pain  (+) hypertension:, dysrhythmias: atrial fibrillation, CHF (2006):,     (-) pacemaker, past MI, CABG/stent and  angina    ECG reviewed  Rhythm: irregular  Rate: normal           Beta Blocker:  Dose within 24 Hrs         Neuro/Psych:      (-) seizures, TIA and CVA            ROS comment: Tremors, takes propranolol GI/Hepatic/Renal:   (+) GERD: well controlled, renal disease (Kidney transplant 2010 due to HTN, no issues since):, morbid obesity     (-) liver disease       Endo/Other:    (+) DiabetesType II DM, using insulin, .    (-) hypothyroidism, hyperthyroidism               Abdominal:   (+) obese,           Vascular: Other Findings:             Anesthesia Plan      TIVA and MAC     ASA 3       Induction: intravenous. Anesthetic plan and risks discussed with patient. Plan discussed with CRNA.                     CHRIS Niño - SANTO   10/20/2022

## 2022-10-20 NOTE — PROCEDURES
Patient Name: Rossy Mcgrath    Medical Record Number: [id number]  Date: 10/20/2022     Performing Cardiologist: Dr Supriya Swanson  Procedure Start Time: 2934     Procedure End Time: 4897       LINDA Procedure Note    Indications:  Per MD    Consent: The patient provided verbal consent for this procedure. The patient was brought to [facility] Room: 11. Time Out Completed: All team members present. Correct patient, correct procedure, correct procedure site, and correct position verified. Allergies reviewed. Pertinent diagnostic results reviewed. Pre-Medication: See Anesthesia Record    Procedure:  Rossy Mcgrath was turned on left side. A bite block was inserted to facilitate passage of the LINDA probe. The probe was inserted without difficulty and images acquired. Saline Bubble study was performed. Vital signs monitored throughout procedure See Anesthesia Record. Probe and bite block removed post procedure. The patient tolerated the procedure well. Complications: None    Post procedure: Patient taken to  [facility] Holding Room # 3  in stable condition and report given.

## 2022-10-20 NOTE — PROGRESS NOTES
Patient out of bed with RN at bedside and ambulated to bathroom and back. Patient tolerated activity without difficulty.  Pt now redressing with son assist.

## 2022-10-25 ENCOUNTER — TELEPHONE (OUTPATIENT)
Dept: FAMILY MEDICINE CLINIC | Age: 67
End: 2022-10-25

## 2022-10-31 ASSESSMENT — ENCOUNTER SYMPTOMS
SHORTNESS OF BREATH: 0
VOMITING: 0
RHINORRHEA: 0
EYE DISCHARGE: 0
NAUSEA: 0
EYE PAIN: 0
ABDOMINAL PAIN: 0
DIARRHEA: 0
COUGH: 0
CONSTIPATION: 0

## 2022-11-01 DIAGNOSIS — E11.9 TYPE 2 DIABETES MELLITUS WITHOUT COMPLICATION, WITHOUT LONG-TERM CURRENT USE OF INSULIN (HCC): ICD-10-CM

## 2022-11-02 RX ORDER — EMPAGLIFLOZIN 25 MG/1
TABLET, FILM COATED ORAL
Qty: 90 TABLET | Refills: 0 | Status: SHIPPED | OUTPATIENT
Start: 2022-11-02

## 2022-11-11 ENCOUNTER — HOSPITAL ENCOUNTER (OUTPATIENT)
Dept: MRI IMAGING | Age: 67
Discharge: HOME | End: 2022-11-11
Payer: MEDICARE

## 2022-11-11 DIAGNOSIS — H50.21 HYPERTROPIA OF RIGHT EYE: ICD-10-CM

## 2022-11-11 PROCEDURE — 70553 MRI BRAIN STEM W/O & W/DYE: CPT

## 2022-11-11 PROCEDURE — A9577 INJ MULTIHANCE: HCPCS | Performed by: OPHTHALMOLOGY

## 2022-11-11 PROCEDURE — 6360000004 HC RX CONTRAST MEDICATION: Performed by: OPHTHALMOLOGY

## 2022-11-11 RX ADMIN — GADOBENATE DIMEGLUMINE 20 ML: 529 INJECTION, SOLUTION INTRAVENOUS at 17:27

## 2022-11-29 ENCOUNTER — OFFICE VISIT (OUTPATIENT)
Dept: FAMILY MEDICINE CLINIC | Age: 67
End: 2022-11-29
Payer: MEDICARE

## 2022-11-29 VITALS
RESPIRATION RATE: 16 BRPM | HEIGHT: 73 IN | WEIGHT: 257.6 LBS | DIASTOLIC BLOOD PRESSURE: 64 MMHG | TEMPERATURE: 97.3 F | HEART RATE: 88 BPM | SYSTOLIC BLOOD PRESSURE: 110 MMHG | OXYGEN SATURATION: 97 % | BODY MASS INDEX: 34.14 KG/M2

## 2022-11-29 DIAGNOSIS — R39.14 BENIGN PROSTATIC HYPERPLASIA WITH INCOMPLETE BLADDER EMPTYING: ICD-10-CM

## 2022-11-29 DIAGNOSIS — N40.1 BENIGN PROSTATIC HYPERPLASIA WITH INCOMPLETE BLADDER EMPTYING: ICD-10-CM

## 2022-11-29 DIAGNOSIS — J20.9 ACUTE BRONCHITIS, UNSPECIFIED ORGANISM: ICD-10-CM

## 2022-11-29 DIAGNOSIS — L98.9 SKIN LESION OF RIGHT ARM: ICD-10-CM

## 2022-11-29 DIAGNOSIS — J98.01 ACUTE BRONCHOSPASM: ICD-10-CM

## 2022-11-29 DIAGNOSIS — L98.9 SKIN LESION OF HAND: Primary | ICD-10-CM

## 2022-11-29 DIAGNOSIS — E11.65 TYPE 2 DIABETES MELLITUS WITH HYPERGLYCEMIA, WITHOUT LONG-TERM CURRENT USE OF INSULIN (HCC): ICD-10-CM

## 2022-11-29 DIAGNOSIS — L98.9 SKIN LESION OF LEFT ARM: ICD-10-CM

## 2022-11-29 PROCEDURE — 3074F SYST BP LT 130 MM HG: CPT | Performed by: FAMILY MEDICINE

## 2022-11-29 PROCEDURE — 1123F ACP DISCUSS/DSCN MKR DOCD: CPT | Performed by: FAMILY MEDICINE

## 2022-11-29 PROCEDURE — 3051F HG A1C>EQUAL 7.0%<8.0%: CPT | Performed by: FAMILY MEDICINE

## 2022-11-29 PROCEDURE — 3078F DIAST BP <80 MM HG: CPT | Performed by: FAMILY MEDICINE

## 2022-11-29 PROCEDURE — 99214 OFFICE O/P EST MOD 30 MIN: CPT | Performed by: FAMILY MEDICINE

## 2022-11-29 RX ORDER — CEFDINIR 300 MG/1
300 CAPSULE ORAL 2 TIMES DAILY
Qty: 20 CAPSULE | Refills: 0 | Status: SHIPPED | OUTPATIENT
Start: 2022-11-29 | End: 2022-12-09

## 2022-11-29 RX ORDER — METHYLPREDNISOLONE 4 MG/1
TABLET ORAL
Qty: 1 KIT | Refills: 0 | Status: SHIPPED | OUTPATIENT
Start: 2022-11-29 | End: 2022-12-05

## 2022-11-29 RX ORDER — ALBUTEROL SULFATE 90 UG/1
2 AEROSOL, METERED RESPIRATORY (INHALATION) EVERY 6 HOURS PRN
Qty: 18 G | Refills: 3 | Status: SHIPPED | OUTPATIENT
Start: 2022-11-29

## 2022-11-29 ASSESSMENT — ENCOUNTER SYMPTOMS
NAUSEA: 0
SHORTNESS OF BREATH: 0
DIARRHEA: 0
EYE DISCHARGE: 0
ABDOMINAL PAIN: 0
CONSTIPATION: 0
RHINORRHEA: 1
VOMITING: 0
SORE THROAT: 0
COUGH: 1
BACK PAIN: 0
EYE PAIN: 0

## 2022-11-29 NOTE — PROGRESS NOTES
fail to improve, for next scheduled follow up with PCP. Subjective       HPI   Patient reports that he has a growth on the dorsal surface of his left hand that has been there for a little while but is gotten quite a bit bigger over the past couple months and is interested in seeing Alejandro Keene and taking off for further evaluated. He does have a skin lesion on his face as well but is scheduled to see Dr. Lokesh Rosario for the area on his face. Patient does report that his been having some cough and congestion with fatigue and an on and off fever for the past 3 weeks. He states that the fever has essentially resolved and has not had any fever recently but has been having the cough and congestion still going on. He has taken some Tylenol for fever and has been taking some vitamin C but otherwise not been taking thing or do anything in efforts to improve his symptoms. He denies any specific known sick contacts. Patient has a history of type 2 diabetes and is doing well with his current medication. he denies any issues with the use of the medicine. he denies any new symptoms associated with his diabetes. he is attempting to be diligent with his diet and compliant with his medication. He does have a history of BPH and is doing well with his current use of Flomax. Denies any problems with medicine or any recent changes to his urinary symptoms. Review of Systems   Constitutional:  Positive for fever. Negative for activity change, appetite change and fatigue. HENT:  Positive for congestion and rhinorrhea. Negative for sore throat. Eyes:  Negative for pain and discharge. Respiratory:  Positive for cough. Negative for shortness of breath. Cardiovascular:  Negative for chest pain and palpitations. Gastrointestinal:  Negative for abdominal pain, constipation, diarrhea, nausea and vomiting. Endocrine: Negative for cold intolerance and heat intolerance.    Genitourinary:  Negative for dysuria and hematuria. Musculoskeletal:  Negative for back pain, gait problem, myalgias and neck pain. Skin:  Negative for rash and wound. No Known Allergies    Outpatient Medications Prior to Visit   Medication Sig Dispense Refill    JARDIANCE 25 MG tablet TAKE 1 TABLET BY MOUTH EVERY DAY 90 tablet 0    tamsulosin (FLOMAX) 0.4 MG capsule Take 2 capsules by mouth daily 60 capsule 0    calcitRIOL (ROCALTROL) 0.25 MCG capsule TAKE 1 CAPSULE BY MOUTH 3 TIMES A WEEK      propranolol (INDERAL) 80 MG tablet Take 1 tablet by mouth 2 times daily 180 tablet 3    Lancets MISC 1 each by Does not apply route daily 100 each 5    blood glucose test strips (TRUE METRIX BLOOD GLUCOSE TEST) strip TEST ONE TIME A DAY & AS NEEDED FOR SYMPTOMS OF IRREGULAR BLOOD GLUCOSE. DX: E11.9 100 strip 5    sodium bicarbonate 325 MG tablet TAKE 1 TABLET BY MOUTH TWICE A DAY (Patient taking differently: Take 650 mg by mouth 2 times daily) 60 tablet 5    metFORMIN (GLUCOPHAGE) 1000 MG tablet TAKE 1 TABLET BY MOUTH TWICE A DAY WITH MEALS 180 tablet 3    vitamin D (ERGOCALCIFEROL) 1.25 MG (26792 UT) CAPS capsule TAKE 1 CAPSULE BY MOUTH ONE TIME PER WEEK (Patient taking differently: every 30 days) 12 capsule 1    blood glucose monitor strips Test one time a day & as needed for symptoms of irregular blood glucose. 100 strip 5    Omega-3 Fatty Acids (FISH OIL) 1000 MG CPDR Take 1,000 mg by mouth 2 times daily      Tacrolimus 1 MG TB24 Take 2 mg by mouth every morning Indications: on an empty stomach       mycophenolate (CELLCEPT) 250 MG capsule Take 500 mg by mouth 2 times daily      glucose blood VI test strips (ACCU-CHEK ANANT) strip Contour strips,check daily E11.9 100 each 0    Lancets MISC Use to check blood sugar daily Dx E11.9 Patient has true metrix device 100 each 1     No facility-administered medications prior to visit.         Past Medical History:   Diagnosis Date    Arthritis     Atrial fibrillation ([facility]     Bleeding ulcer 11/2019    Cancer [facility])     skin    CHF (congestive heart failure) ([facility]     Diabetes mellitus ([facility]     Ex-cigarette smoker 2016    History of blood transfusion     History of gastric ulcer     Hyperlipidemia     Hypertension     hx.  10 years ago    Kidney disease, chronic, end stage on dialysis [facility]) 2006, dialysis for 3 years, then had a kidney transplant,    Obese     Peritoneal dialysis status ([facility]     past hx    Prolonged emergence from general anesthesia     Vision problem     wears glasses        Social History     Tobacco Use    Smoking status: Former     Packs/day: 2.00     Years: 4.00     Pack years: 8.00     Types: Cigarettes     Quit date: 1973     Years since quittin.8    Smokeless tobacco: Never   Substance Use Topics    Alcohol use: No        Past Surgical History:   Procedure Laterality Date    CHOLECYSTECTOMY      COLONOSCOPY  2017    Dr Lexi Smyth: Diverticulosis, internal hemorrhoids, 5yr recall    COLONOSCOPY  2012    Dr Christina Steele, 5 yr recall    DIALYSIS FISTULA CREATION  [place] 2007    left arm radial cephalic    HERNIA REPAIR      umbilical hernia repair    KIDNEY TRANSPLANT      2010 in [address] [place] [address] Right 2018    AV FISTULA GRAFT REMOVAL performed by Moises Antoine MD at [address]      TUNNELED VENOUS CATHETER PLACEMENT  multiple    UPPER GASTROINTESTINAL ENDOSCOPY N/A 2019    Dr Dory Mcgraw (Dr Lexi Smyth pt)1.2 cm superficial gastric ulcer in the antrum with surrounding moderate gastritis and oozing of blood    UPPER GASTROINTESTINAL ENDOSCOPY  10/05/2009    Dr Luc Rodarte w/resolution of ulcer, lenin +    UPPER GASTROINTESTINAL ENDOSCOPY  07/10/2009    Dr Lizeth Sharma ulceration w/pigmented base on posterior wall of the body, small erosions, active gastritis    UPPER GASTROINTESTINAL ENDOSCOPY N/A 1/10/2020    Dr Milton Anderson hiatal hernia, healed previous antral ulcer-Gastritis    VASCULAR SURGERY Right Sod 2008    av loop graft       Family History   Problem Relation Age of Onset    Kidney Disease Father     Heart Disease Father     Colon Cancer Neg Hx     Colon Polyps Neg Hx        Objective       /64 (Site: Right Upper Arm, Position: Sitting, Cuff Size: Medium Adult)   Pulse 88   Temp 97.3 °F (36.3 °C) (Infrared)   Resp 16   Ht 6' 1\" (1.854 m)   Wt 257 lb 9.6 oz (116.8 kg)   SpO2 97%   BMI 33.99 kg/m²   Physical Exam  Vitals and nursing note reviewed. Constitutional:       Appearance: He is well-developed. HENT:      Head: Normocephalic and atraumatic. Right Ear: Hearing, tympanic membrane, ear canal and external ear normal.      Left Ear: Hearing, tympanic membrane, ear canal and external ear normal.      Nose: Congestion and rhinorrhea present. Mouth/Throat:      Mouth: Mucous membranes are moist.      Pharynx: No oropharyngeal exudate or posterior oropharyngeal erythema. Eyes:      General: No scleral icterus. Right eye: No discharge. Left eye: No discharge. Conjunctiva/sclera: Conjunctivae normal.      Pupils: Pupils are equal, round, and reactive to light. Neck:      Trachea: No tracheal deviation. Cardiovascular:      Rate and Rhythm: Normal rate and regular rhythm. Heart sounds: Normal heart sounds. No murmur heard. No friction rub. No gallop. Pulmonary:      Effort: Pulmonary effort is normal. No respiratory distress. Breath sounds: Wheezing present. No rales. Comments: Upper airway coarse breath sounds. Abdominal:      General: Bowel sounds are normal. There is no distension. Palpations: Abdomen is soft. Tenderness: There is no abdominal tenderness. Musculoskeletal:         General: No deformity ( no gross deformities of upper or lower extremities bilaterally). Normal range of motion. Cervical back: Normal range of motion and neck supple.       Right lower leg: No edema. Left lower leg: No edema. Lymphadenopathy:      Cervical: No cervical adenopathy. Skin:     General: Skin is warm and dry. Findings: No erythema or rash. Comments: Patient is noted to have a ping-pong ball size fungating lesion on the dorsal surface of his left hand just proximal to his third and fourth metacarpophalangeal joints. He does also have a lesion on his right forearm that is just as big but is not as indurated. There are 2 smaller skin lesions on the left forearm as well. Neurological:      Mental Status: He is alert and oriented to person, place, and time. Cranial Nerves: No cranial nerve deficit. Motor: No abnormal muscle tone. Psychiatric:         Behavior: Behavior normal.         Thought Content:  Thought content normal.         Data Reviewed and Summarized       Labs:     Imaging/Testing:        Nish Randall MD

## 2022-12-01 ENCOUNTER — OFFICE VISIT (OUTPATIENT)
Dept: CARDIOLOGY CLINIC | Age: 67
End: 2022-12-01
Payer: MEDICARE

## 2022-12-01 VITALS
BODY MASS INDEX: 34.06 KG/M2 | DIASTOLIC BLOOD PRESSURE: 88 MMHG | HEART RATE: 84 BPM | HEIGHT: 73 IN | WEIGHT: 257 LBS | SYSTOLIC BLOOD PRESSURE: 124 MMHG | OXYGEN SATURATION: 97 %

## 2022-12-01 DIAGNOSIS — I48.0 PAF (PAROXYSMAL ATRIAL FIBRILLATION) (HCC): Primary | ICD-10-CM

## 2022-12-01 DIAGNOSIS — I10 ESSENTIAL HYPERTENSION: ICD-10-CM

## 2022-12-01 DIAGNOSIS — Z95.818 PRESENCE OF WATCHMAN LEFT ATRIAL APPENDAGE CLOSURE DEVICE: ICD-10-CM

## 2022-12-01 PROCEDURE — 3078F DIAST BP <80 MM HG: CPT | Performed by: CLINICAL NURSE SPECIALIST

## 2022-12-01 PROCEDURE — 1123F ACP DISCUSS/DSCN MKR DOCD: CPT | Performed by: CLINICAL NURSE SPECIALIST

## 2022-12-01 PROCEDURE — 3074F SYST BP LT 130 MM HG: CPT | Performed by: CLINICAL NURSE SPECIALIST

## 2022-12-01 PROCEDURE — 99214 OFFICE O/P EST MOD 30 MIN: CPT | Performed by: CLINICAL NURSE SPECIALIST

## 2022-12-01 NOTE — PROGRESS NOTES
[facility]  [facility] [place]   Phone: [phone]  Fax: [phone]    OFFICE VISIT:  2022    Caseyyulissa Savage Mireles - : 1955    Reason For Visit:  Kris Pham is a 69 y.o. male who is here for Follow-up ([facility]   LINDA  pt has soa) and Atrial Fibrillation    History of hypertension, diabetes, CKD status post kidney transplant and atrial fibrillation which is now permanent. Due to recurrent bleeding patient was evaluated for left atrial closure device. 2021 Watchman left atrial closure device placed per Dr. Antione Ruiz    Had LINDA 10/20/2022. Left atrial appendage occluder/watchman device was well-seated with no leak around device. Currently had the flu 3 weeks ago and still not feeling well. Cough in the AM    Fevers resolved and was given antibiotics and inhalers this week- has not started them. Subjective  Kris Pham denies exertional chest pain. Unchanged shortness of breath,  Has some occasional paroxysmal nocturnal dyspnea but no swelling  No syncope, presyncope, arrhythmia, edema and fatigue. The patient denies numbness or weakness to suggest cerebrovascular accident or transient ischemic attack. Irma Blake MD is PCP and follows labs .   Folowing with [facility] kidney transplant team and locally [facility], CHRIS Zamarripa has the following history as recorded in [facility]:    Patient Active Problem List    Diagnosis Date Noted    Presence of Watchman left atrial appendage closure device 2021    Longstanding persistent atrial fibrillation ([address]     LVH (left ventricular hypertrophy) 2020    History of renal transplant 2019    UGI bleed 2019    PSVT (paroxysmal supraventricular tachycardia) ([address] 2019    Chronic anticoagulation 2019    Orthostatic dizziness 2019    Daytime somnolence 2018    PAF (paroxysmal atrial fibrillation) ([address] 2018    Fatigue 2018    History of tachycardia 2018 Immunosuppression ([address]     Atrial fibrillation with RVR ([address] 02/26/2018    Infection of AV graft for dialysis ([address] 02/23/2018    Cellulitis of right upper extremity     Cellulitis 02/22/2018    Chest pressure 07/17/2016    Diabetes mellitus ([address] 07/17/2016    Ex-cigarette smoker 07/17/2016    Essential hypertension     Chest pain      Past Medical History:   Diagnosis Date    Arthritis     Atrial fibrillation ([address]     Bleeding ulcer 11/2019    Cancer ([address]     skin    CHF (congestive heart failure) ([address]     Diabetes mellitus ([address]     Ex-cigarette smoker 7/17/2016    History of blood transfusion     History of gastric ulcer 2009    Hyperlipidemia     Hypertension     hx.  10 years ago    Kidney disease, chronic, end stage on dialysis [facility]) 2006 2007, dialysis for 3 years, then had a kidney transplant,    Obese     Peritoneal dialysis status ([address]     past hx    Prolonged emergence from general anesthesia     Vision problem     wears glasses     Past Surgical History:   Procedure Laterality Date    CHOLECYSTECTOMY      COLONOSCOPY  06/23/2017    Dr Haja Nicole: Diverticulosis, internal hemorrhoids, 5yr recall    COLONOSCOPY  01/05/2012    Dr Georgia Anton, 5 yr recall    DIALYSIS FISTULA CREATION  [place] 2007    left arm radial cephalic    HERNIA REPAIR      umbilical hernia repair    KIDNEY TRANSPLANT      2/18/2010 in [address] [place] [address] Right 2/23/2018    AV FISTULA GRAFT REMOVAL performed by Shellie Bailon MD at [address]      TUNNELED VENOUS CATHETER PLACEMENT  multiple    UPPER GASTROINTESTINAL ENDOSCOPY N/A 11/4/2019    Dr Geovanny Perez (Dr Haja Nicole pt)1.2 cm superficial gastric ulcer in the antrum with surrounding moderate gastritis and oozing of blood    UPPER GASTROINTESTINAL ENDOSCOPY  10/05/2009    Dr Curtis Stevenson w/resolution of ulcer, lenin +    UPPER GASTROINTESTINAL ENDOSCOPY  07/10/2009    Dr Vineet Atkinson ulceration w/pigmented base on posterior wall of the body, small erosions, active gastritis    UPPER GASTROINTESTINAL ENDOSCOPY N/A 1/10/2020    Dr Gabriela Schuler hiatal hernia, healed previous antral ulcer-Gastritis    VASCULAR SURGERY Right [place]     av loop graft     Family History   Problem Relation Age of Onset    Kidney Disease Father     Heart Disease Father     Colon Cancer Neg Hx     Colon Polyps Neg Hx      Social History     Tobacco Use    Smoking status: Former     Packs/day: 2.00     Years: 4.00     Pack years: 8.00     Types: Cigarettes     Quit date: 1973     Years since quittin.8    Smokeless tobacco: Never   Substance Use Topics    Alcohol use: No      Current Outpatient Medications   Medication Sig Dispense Refill    cefdinir (OMNICEF) 300 MG capsule Take 1 capsule by mouth 2 times daily for 10 days 20 capsule 0    methylPREDNISolone (MEDROL DOSEPACK) 4 MG tablet Take by mouth. 1 kit 0    JARDIANCE 25 MG tablet TAKE 1 TABLET BY MOUTH EVERY DAY 90 tablet 0    tamsulosin (FLOMAX) 0.4 MG capsule Take 2 capsules by mouth daily 60 capsule 0    calcitRIOL (ROCALTROL) 0.25 MCG capsule TAKE 1 CAPSULE BY MOUTH 3 TIMES A WEEK      propranolol (INDERAL) 80 MG tablet Take 1 tablet by mouth 2 times daily 180 tablet 3    Lancets MISC 1 each by Does not apply route daily 100 each 5    blood glucose test strips (TRUE METRIX BLOOD GLUCOSE TEST) strip TEST ONE TIME A DAY & AS NEEDED FOR SYMPTOMS OF IRREGULAR BLOOD GLUCOSE.  DX: E11.9 100 strip 5    sodium bicarbonate 325 MG tablet TAKE 1 TABLET BY MOUTH TWICE A DAY (Patient taking differently: Take 650 mg by mouth 2 times daily) 60 tablet 5    metFORMIN (GLUCOPHAGE) 1000 MG tablet TAKE 1 TABLET BY MOUTH TWICE A DAY WITH MEALS 180 tablet 3    vitamin D (ERGOCALCIFEROL) 1.25 MG (43625 UT) CAPS capsule TAKE 1 CAPSULE BY MOUTH ONE TIME PER WEEK (Patient taking differently: every 30 days) 12 capsule 1    blood glucose monitor strips Test one time a day & as needed for symptoms of irregular blood glucose. 100 strip 5    Omega-3 Fatty Acids (FISH OIL) 1000 MG CPDR Take 1,000 mg by mouth 2 times daily      Tacrolimus 1 MG TB24 Take 2 mg by mouth every morning Indications: on an empty stomach       mycophenolate (CELLCEPT) 250 MG capsule Take 500 mg by mouth 2 times daily      glucose blood VI test strips (ACCU-CHEK ANANT) strip Contour strips,check daily E11.9 100 each 0    albuterol sulfate HFA (PROVENTIL HFA) 108 (90 Base) MCG/ACT inhaler Inhale 2 puffs into the lungs every 6 hours as needed for Wheezing (Patient not taking: Reported on 12/1/2022) 18 g 3     No current facility-administered medications for this visit. Allergies: Patient has no known allergies. Review of Systems  Constitutional - no significant activity change, appetite change, or unexpected weight change. No fever, chills or diaphoresis. No fatigue. HEENT - no significant rhinorrhea or epistaxis. No tinnitus or significant hearing loss. Eyes - no sudden vision change or amaurosis. Respiratory - no significant wheezing, stridor, apnea or cough. + dyspnea on exertion   + shortness of breath. Cardiovascular - no exertional chest pain, orthopnea or PND. No sensation of arrhythmia or slow heart rate. No claudication or leg edema. Gastrointestinal - no abdominal swelling or pain. No blood in stool. No severe constipation, diarrhea, nausea, or vomiting. Genitourinary - no difficulty urinating, dysuria, frequency, or urgency. No flank pain or hematuria. Musculoskeletal - no back pain, gait disturbance, or myalgia. Skin - no color change. No pallor. No new surgical incision. Warts/lesions on hand, arms and rash on face- seeing Derm   Neurologic - no speech difficulty, facial asymmetry or lateralizing weakness. No seizures, presyncope, syncope, or significant dizziness. Hematologic - no easy bruising or excessive bleeding. Psychiatric - no severe anxiety or insomnia.   No confusion. All other review of systems are negative. Objective  Vital Signs - /88   Pulse 84   Ht 6' 1\" (1.854 m)   Wt 257 lb (116.6 kg)   SpO2 97%   BMI 33.91 kg/m²   General - Ranjan Au is alert, cooperative, and pleasant. Well groomed. No acute distress. Body habitus is obese. HEENT - The head is normocephalic. No circumoral cyanosis. Dentition is normal.   EYES -  No Xanthelasma, no arcus senilis, no conjunctival hemorrhages or discharge. Neck - Supple, without increased jugular venous pressures. No carotid bruits. No mass. Respiratory - Lungs are clear bilaterally. No wheezes or rales. Normal effort without use of accessory muscles. Cardiovascular - Heart has irregular rhythm and rate. No murmurs, rubs or gallops. + pedal pulses and no varicosities. Abdominal -  Soft, nontender, nondistended. Bowel sounds are intact. Extremities - No clubbing, cyanosis, or  edema. Musculoskeletal -  No clubbing . No Osler's nodes. Gait normal .  No kyphosis or scoliosis. Skin -   left hand has dark red/purple golf ball sized bleeding wart, right forearm marble size red scaly wart, left cheek red with scaly lesions around   no statis ulcers or dermatitis. Neurological - No focal signs are identified. Oriented to person, place and time. Psychiatric -  Appropriate affect and mood. Assessment:     Diagnosis Orders   1. PAF (paroxysmal atrial fibrillation) ([address]        2. Essential hypertension        3. Presence of Watchman left atrial appendage closure device          Data:  BP Readings from Last 3 Encounters:   12/01/22 124/88   11/29/22 110/64   10/20/22 135/83    Pulse Readings from Last 3 Encounters:   12/01/22 84   11/29/22 88   10/20/22 83        Wt Readings from Last 3 Encounters:   12/01/22 257 lb (116.6 kg)   11/29/22 257 lb 9.6 oz (116.8 kg)   10/20/22 264 lb (119.7 kg)     Chronic atrial fibrillation with good rate control.   Recent LINDA to confirm placement and good seal of watchman JOCELINE occlusion device  Therefore, patient is no longer on anticoagulation  Heart rate and blood pressure well controlled-on beta-blocker     Reviewed PCP recent notes   Reviewed recent labs    Discussed importance of using inhalers and antibiotics as prescribed by PCP   Keep follow up with Derm to address bleeding lesions        LINDA 10/20/22   LV is normal in size with borderline normal LV systolic function. LV   ejection fraction estimated at 50 to 55%. RV appears moderately dilated with mildly reduced RV systolic function. Left atrium is moderately dilated. No mass or thrombus in left atrium. Left atrial appendage occluder (watchman device) visualized and is   well-seated. No leak around device with no color Doppler flow noted within   appendage. Interatrial septum is mostly intact with a minimal coronary jet visualized   on some views. Bubble study fails to demonstrate any significant   right-to-left shunting. Right atrium appears severely dilated. No mass or thrombus in right   atrium. Mitral valve with normal leaflet mobility. Moderate mitral regurgitation   (ERO 0.18 cm^2, vena contractor 0.51 cm,PISA radius 0.7). No stenosis. Moderate tricuspid regurgitation. Aortic valve is trileaflet with mild leaflet thickening. Eccentric mild   aortic regurgitation. No stenosis. Pulmonary valve appears unremarkable. No significant pericardial effusion. Mild atheromatous changes in descending thoracic aorta.   ----------------------------------------------   Electronically signed by Babak Powers MD(Interpreting physician)   on 10/20/2022 02:35 PM      Stable cardiovascular status. No evidence of overt heart failure, angina or dysrhythmia. 30 minutes were spent preparing, reviewing and seeing patient. All questions answered    Plan    Use inhaler and take antibiotics as planned.    Maintain good blood pressure control-goal<130/80 at rest  Maintain good cholesterol control LDL goal<70 with arterial disease  If you are diabetic work to keep/obtain hemoglobin A1c< 7    Follow up in May with Dr. Kandi Stern  Call with any questions or concerns  Follow up with Milly Gonzalez MD for non cardiac problems  Report any new problems  Cardiovascular Fitness-Exercise as tolerated. Strive for 30 minutes of exercise most days of the week. Cardiac / Healthy Diet- Avoid processed high fat foods, maintain low sodium/salt   Continue current medications as directed  Continue plan of treatment  It is always recommended that you bring your medications bottles with you to each visit - this is for your safety! CHRIS Stewart    EMR dragon/transcription disclaimer: Much of this encounter note is electronic transcription/translation of spoken language to printed tach. Electronic translation of spoken language may be erroneous, or at times, nonsensical words or phrases may be inadvertently transcribed.  Although, I have reviewed the note for such errors, some may still exist.

## 2022-12-01 NOTE — PATIENT INSTRUCTIONS
Use inhaler and take antibiotics as planned. Maintain good blood pressure control-goal<130/80 at rest  Maintain good cholesterol control LDL goal<70 with arterial disease  If you are diabetic work to keep/obtain hemoglobin A1c< 7    Follow up in May with Dr. Tanya Curtis  Call with any questions or concerns  Follow up with Maria Luisa Mishra MD for non cardiac problems  Report any new problems  Cardiovascular Fitness-Exercise as tolerated. Strive for 30 minutes of exercise most days of the week. Cardiac / Healthy Diet- Avoid processed high fat foods, maintain low sodium/salt   Continue current medications as directed  Continue plan of treatment  It is always recommended that you bring your medications bottles with you to each visit - this is for your safety!

## 2022-12-09 ENCOUNTER — PROCEDURE VISIT (OUTPATIENT)
Dept: SURGERY | Age: 67
End: 2022-12-09
Payer: MEDICARE

## 2022-12-09 VITALS
WEIGHT: 255 LBS | HEIGHT: 73 IN | OXYGEN SATURATION: 98 % | TEMPERATURE: 97.4 F | BODY MASS INDEX: 33.8 KG/M2 | HEART RATE: 82 BPM

## 2022-12-09 DIAGNOSIS — C44.92 SCC (SQUAMOUS CELL CARCINOMA): Primary | ICD-10-CM

## 2022-12-09 PROCEDURE — 99212 OFFICE O/P EST SF 10 MIN: CPT | Performed by: SURGERY

## 2022-12-09 PROCEDURE — 1123F ACP DISCUSS/DSCN MKR DOCD: CPT | Performed by: SURGERY

## 2022-12-09 RX ORDER — SODIUM CHLORIDE 0.9 % (FLUSH) 0.9 %
5-40 SYRINGE (ML) INJECTION PRN
OUTPATIENT
Start: 2022-12-09

## 2022-12-09 RX ORDER — SODIUM CHLORIDE 0.9 % (FLUSH) 0.9 %
5-40 SYRINGE (ML) INJECTION EVERY 12 HOURS SCHEDULED
OUTPATIENT
Start: 2022-12-09

## 2022-12-09 RX ORDER — SODIUM CHLORIDE 9 MG/ML
INJECTION, SOLUTION INTRAVENOUS PRN
OUTPATIENT
Start: 2022-12-09

## 2022-12-09 ASSESSMENT — ENCOUNTER SYMPTOMS
COUGH: 0
FACIAL SWELLING: 0
ABDOMINAL PAIN: 0
CONSTIPATION: 0
NAUSEA: 0
WHEEZING: 0
SHORTNESS OF BREATH: 0
EYE PAIN: 0
EYE REDNESS: 0
CHEST TIGHTNESS: 0
EYE DISCHARGE: 0
DIARRHEA: 0

## 2022-12-09 NOTE — PROGRESS NOTES
SUBJECTIVE:  Mr. Jordyn Fong is a 69 y.o. male who presents today with 3 lesions of concern. States he has had been present for many years. 1 lesion on the dorsum of his left hand is raised and does cause some discomfort and drainage. Painful lesion of the right medial arm. Also notes a large draining lesion on the right posterior arm. Denies history of heart attack or stroke. Past Medical History:   Diagnosis Date    Arthritis     Atrial fibrillation ([address]     Bleeding ulcer 11/2019    Cancer ([address]     skin    CHF (congestive heart failure) ([address]     Diabetes mellitus ([facility]     Ex-cigarette smoker 7/17/2016    History of blood transfusion     History of gastric ulcer 2009    Hyperlipidemia     Hypertension     hx.  10 years ago    Kidney disease, chronic, end stage on dialysis [facility]) 2006 2007, dialysis for 3 years, then had a kidney transplant,    Obese     Peritoneal dialysis status ([address]     past hx    Prolonged emergence from general anesthesia     Vision problem     wears glasses     Past Surgical History:   Procedure Laterality Date    CHOLECYSTECTOMY      COLONOSCOPY  06/23/2017    Dr Jannette Olivas: Diverticulosis, internal hemorrhoids, 5yr recall    COLONOSCOPY  01/05/2012    Dr Oriana Olsen, 5 yr recall    DIALYSIS FISTULA CREATION  [place] 2007    left arm radial cephalic    HERNIA REPAIR      umbilical hernia repair    KIDNEY TRANSPLANT      2/18/2010 in [address] [place] [address] Right 2/23/2018    AV FISTULA GRAFT REMOVAL performed by Pia Navarro MD at [address]      TUNNELED VENOUS CATHETER PLACEMENT  multiple    UPPER GASTROINTESTINAL ENDOSCOPY N/A 11/4/2019    Dr Yamilex Leigh (Dr Jannette Olivas pt)1.2 cm superficial gastric ulcer in the antrum with surrounding moderate gastritis and oozing of blood    UPPER GASTROINTESTINAL ENDOSCOPY  10/05/2009    Dr Jewel Ramires w/resolution of ulcer, lenin +    UPPER GASTROINTESTINAL ENDOSCOPY  07/10/2009    Dr Vines Catching ulceration w/pigmented base on posterior wall of the body, small erosions, active gastritis    UPPER GASTROINTESTINAL ENDOSCOPY N/A 1/10/2020    Dr Brice Grew hiatal hernia, healed previous antral ulcer-Gastritis    VASCULAR SURGERY Right [place] 2008    av loop graft     Current Outpatient Medications   Medication Sig Dispense Refill    cefdinir (OMNICEF) 300 MG capsule Take 1 capsule by mouth 2 times daily for 10 days 20 capsule 0    albuterol sulfate HFA (PROVENTIL HFA) 108 (90 Base) MCG/ACT inhaler Inhale 2 puffs into the lungs every 6 hours as needed for Wheezing 18 g 3    JARDIANCE 25 MG tablet TAKE 1 TABLET BY MOUTH EVERY DAY 90 tablet 0    tamsulosin (FLOMAX) 0.4 MG capsule Take 2 capsules by mouth daily 60 capsule 0    calcitRIOL (ROCALTROL) 0.25 MCG capsule TAKE 1 CAPSULE BY MOUTH 3 TIMES A WEEK      propranolol (INDERAL) 80 MG tablet Take 1 tablet by mouth 2 times daily 180 tablet 3    blood glucose test strips (TRUE METRIX BLOOD GLUCOSE TEST) strip TEST ONE TIME A DAY & AS NEEDED FOR SYMPTOMS OF IRREGULAR BLOOD GLUCOSE. DX: E11.9 100 strip 5    sodium bicarbonate 325 MG tablet TAKE 1 TABLET BY MOUTH TWICE A DAY (Patient taking differently: Take 650 mg by mouth 2 times daily) 60 tablet 5    metFORMIN (GLUCOPHAGE) 1000 MG tablet TAKE 1 TABLET BY MOUTH TWICE A DAY WITH MEALS 180 tablet 3    blood glucose monitor strips Test one time a day & as needed for symptoms of irregular blood glucose.  100 strip 5    Omega-3 Fatty Acids (FISH OIL) 1000 MG CPDR Take 1,000 mg by mouth 2 times daily      mycophenolate (CELLCEPT) 250 MG capsule Take 500 mg by mouth 2 times daily      glucose blood VI test strips (ACCU-CHEK ANANT) strip Contour strips,check daily E11.9 100 each 0    Lancets MISC 1 each by Does not apply route daily 100 each 5    vitamin D (ERGOCALCIFEROL) 1.25 MG (67503 UT) CAPS capsule TAKE 1 CAPSULE BY MOUTH ONE TIME PER WEEK (Patient taking differently: every 30 days) 12 capsule 1     No current facility-administered medications for this visit. Allergies: Patient has no known allergies. Family History   Problem Relation Age of Onset    Kidney Disease Father     Heart Disease Father     Colon Cancer Neg Hx     Colon Polyps Neg Hx        Social History     Tobacco Use    Smoking status: Former     Packs/day: 2.00     Years: 4.00     Pack years: 8.00     Types: Cigarettes     Quit date: 1973     Years since quittin.8    Smokeless tobacco: Never   Substance Use Topics    Alcohol use: No       Review of Systems   Constitutional:  Positive for fatigue. Negative for chills, diaphoresis and fever. HENT:  Negative for ear discharge, ear pain, facial swelling and nosebleeds. Eyes:  Negative for pain, discharge and redness. Respiratory:  Negative for cough, chest tightness, shortness of breath and wheezing. Cardiovascular:  Negative for chest pain and palpitations. Gastrointestinal:  Negative for abdominal pain, constipation, diarrhea and nausea. Genitourinary:  Negative for difficulty urinating, flank pain and hematuria. Musculoskeletal:  Positive for arthralgias and myalgias. Negative for neck pain and neck stiffness. Skin:  Positive for wound. Neurological:  Negative for dizziness, numbness and headaches. Psychiatric/Behavioral:  Negative for agitation, behavioral problems and self-injury. OBJECTIVE:  Pulse 82   Temp 97.4 °F (36.3 °C) (Temporal)   Ht 6' 1\" (1.854 m)   Wt 255 lb (115.7 kg)   SpO2 98%   BMI 33.64 kg/m²   Physical Exam  Vitals reviewed. Constitutional:       Appearance: Normal appearance. HENT:      Head: Normocephalic and atraumatic. Right Ear: External ear normal.      Left Ear: External ear normal.      Nose: Nose normal.   Eyes:      Extraocular Movements: Extraocular movements intact. Pulmonary:      Effort: Pulmonary effort is normal. No respiratory distress.    Abdominal:      General: There is no distension. Palpations: Abdomen is soft. Tenderness: There is no abdominal tenderness. There is no guarding or rebound. Musculoskeletal:         General: Normal range of motion. Cervical back: Normal range of motion. Skin:     General: Skin is warm and dry. Neurological:      General: No focal deficit present. Mental Status: He is alert and oriented to person, place, and time. Psychiatric:         Mood and Affect: Mood normal.         Behavior: Behavior normal.         Thought Content: Thought content normal.       ASSESSMENT:   Diagnosis Orders   1. SCC (squamous cell carcinoma)            PLAN:  No orders of the defined types were placed in this encounter. No orders of the defined types were placed in this encounter. I had a long discussion with the patient about excision of the areas. Left hand lesion will likely not be able to be closed without a graft or flap. Patient is not interested in plastic surgery at this time. He is happy with excision with follow-up wound care. The risks, benefits, and alternatives were discussed with the pt. He is willing to accept the risks and proceed with a excision of left hand, left arm and right arm lesions. . The surgical risks included but not limited to bleeding, infection, perforation, recurrence, risk of needing further surgery, etc. The anesthetic risks included heart attacks, strokes, pneumonia, phlebitis, etc.  He is willing to accept all risks and proceed with surgery. No guarantees have been given. No follow-ups on file.     Alin Higgins DO 12/9/2022 11:33 AM

## 2022-12-09 NOTE — LETTER
SCHEDULING INSTRUCTIONS:     Patient: Clark Boyce  : 1955    Hospital: Hospital    Admitting Physician:  Dr. Emmanuel Chairez    Diagnosis: Left hand, Right arm and left arm lesions    Procedure: Excision of left hand and right arm lesions    ALLOW ADDITIONAL 30 MINS FOR TURNOVER EXCEPT FOR PHYSICIAN'S BOUNCE DAY    Anesthesia: MAC/Local    Admission:Outpatient     Date: 2022    Book for 1.5 hours      [unfilled]     NOT TO BE USED OUTSIDE OF THE OFFICE

## 2022-12-09 NOTE — H&P (VIEW-ONLY)
SUBJECTIVE:  Mr. Shahrzad Savage is a 69 y.o. male who presents today with 3 lesions of concern. States he has had been present for many years. 1 lesion on the dorsum of his left hand is raised and does cause some discomfort and drainage. Painful lesion of the right medial arm. Also notes a large draining lesion on the right posterior arm. Denies history of heart attack or stroke. Past Medical History:   Diagnosis Date    Arthritis     Atrial fibrillation ([address]     Bleeding ulcer 11/2019    Cancer ([address]     skin    CHF (congestive heart failure) ([address]     Diabetes mellitus ([address]     Ex-cigarette smoker 7/17/2016    History of blood transfusion     History of gastric ulcer 2009    Hyperlipidemia     Hypertension     hx.  10 years ago    Kidney disease, chronic, end stage on dialysis [facility]) 2006 2007, dialysis for 3 years, then had a kidney transplant,    Obese     Peritoneal dialysis status ([address]     past hx    Prolonged emergence from general anesthesia     Vision problem     wears glasses     Past Surgical History:   Procedure Laterality Date    CHOLECYSTECTOMY      COLONOSCOPY  06/23/2017    Dr Phyllis Serra: Diverticulosis, internal hemorrhoids, 5yr recall    COLONOSCOPY  01/05/2012    Dr Stefanie Parker, 5 yr recall    DIALYSIS FISTULA CREATION  [place] 2007    left arm radial cephalic    HERNIA REPAIR      umbilical hernia repair    KIDNEY TRANSPLANT      2/18/2010 in [address] [place] [address] Right 2/23/2018    AV FISTULA GRAFT REMOVAL performed by Shayna Braga MD at [address]      TUNNELED VENOUS CATHETER PLACEMENT  multiple    UPPER GASTROINTESTINAL ENDOSCOPY N/A 11/4/2019    Dr Clyde Franklin (Dr Phyllis Serra pt)1.2 cm superficial gastric ulcer in the antrum with surrounding moderate gastritis and oozing of blood    UPPER GASTROINTESTINAL ENDOSCOPY  10/05/2009    Dr Aaliyah Hand w/resolution of ulcer, lenin +    UPPER GASTROINTESTINAL ENDOSCOPY  07/10/2009    Dr Konstantin Oden ulceration w/pigmented base on posterior wall of the body, small erosions, active gastritis    UPPER GASTROINTESTINAL ENDOSCOPY N/A 1/10/2020    Dr Josef Gerardo hiatal hernia, healed previous antral ulcer-Gastritis    VASCULAR SURGERY Right [place] 2008    av loop graft     Current Outpatient Medications   Medication Sig Dispense Refill    cefdinir (OMNICEF) 300 MG capsule Take 1 capsule by mouth 2 times daily for 10 days 20 capsule 0    albuterol sulfate HFA (PROVENTIL HFA) 108 (90 Base) MCG/ACT inhaler Inhale 2 puffs into the lungs every 6 hours as needed for Wheezing 18 g 3    JARDIANCE 25 MG tablet TAKE 1 TABLET BY MOUTH EVERY DAY 90 tablet 0    tamsulosin (FLOMAX) 0.4 MG capsule Take 2 capsules by mouth daily 60 capsule 0    calcitRIOL (ROCALTROL) 0.25 MCG capsule TAKE 1 CAPSULE BY MOUTH 3 TIMES A WEEK      propranolol (INDERAL) 80 MG tablet Take 1 tablet by mouth 2 times daily 180 tablet 3    blood glucose test strips (TRUE METRIX BLOOD GLUCOSE TEST) strip TEST ONE TIME A DAY & AS NEEDED FOR SYMPTOMS OF IRREGULAR BLOOD GLUCOSE. DX: E11.9 100 strip 5    sodium bicarbonate 325 MG tablet TAKE 1 TABLET BY MOUTH TWICE A DAY (Patient taking differently: Take 650 mg by mouth 2 times daily) 60 tablet 5    metFORMIN (GLUCOPHAGE) 1000 MG tablet TAKE 1 TABLET BY MOUTH TWICE A DAY WITH MEALS 180 tablet 3    blood glucose monitor strips Test one time a day & as needed for symptoms of irregular blood glucose.  100 strip 5    Omega-3 Fatty Acids (FISH OIL) 1000 MG CPDR Take 1,000 mg by mouth 2 times daily      mycophenolate (CELLCEPT) 250 MG capsule Take 500 mg by mouth 2 times daily      glucose blood VI test strips (ACCU-CHEK ANANT) strip Contour strips,check daily E11.9 100 each 0    Lancets MISC 1 each by Does not apply route daily 100 each 5    vitamin D (ERGOCALCIFEROL) 1.25 MG (61142 UT) CAPS capsule TAKE 1 CAPSULE BY MOUTH ONE TIME PER WEEK (Patient taking differently: every 30 days) 12 capsule 1     No current facility-administered medications for this visit. Allergies: Patient has no known allergies. Family History   Problem Relation Age of Onset    Kidney Disease Father     Heart Disease Father     Colon Cancer Neg Hx     Colon Polyps Neg Hx        Social History     Tobacco Use    Smoking status: Former     Packs/day: 2.00     Years: 4.00     Pack years: 8.00     Types: Cigarettes     Quit date: 1973     Years since quittin.8    Smokeless tobacco: Never   Substance Use Topics    Alcohol use: No       Review of Systems   Constitutional:  Positive for fatigue. Negative for chills, diaphoresis and fever. HENT:  Negative for ear discharge, ear pain, facial swelling and nosebleeds. Eyes:  Negative for pain, discharge and redness. Respiratory:  Negative for cough, chest tightness, shortness of breath and wheezing. Cardiovascular:  Negative for chest pain and palpitations. Gastrointestinal:  Negative for abdominal pain, constipation, diarrhea and nausea. Genitourinary:  Negative for difficulty urinating, flank pain and hematuria. Musculoskeletal:  Positive for arthralgias and myalgias. Negative for neck pain and neck stiffness. Skin:  Positive for wound. Neurological:  Negative for dizziness, numbness and headaches. Psychiatric/Behavioral:  Negative for agitation, behavioral problems and self-injury. OBJECTIVE:  Pulse 82   Temp 97.4 °F (36.3 °C) (Temporal)   Ht 6' 1\" (1.854 m)   Wt 255 lb (115.7 kg)   SpO2 98%   BMI 33.64 kg/m²   Physical Exam  Vitals reviewed. Constitutional:       Appearance: Normal appearance. HENT:      Head: Normocephalic and atraumatic. Right Ear: External ear normal.      Left Ear: External ear normal.      Nose: Nose normal.   Eyes:      Extraocular Movements: Extraocular movements intact. Pulmonary:      Effort: Pulmonary effort is normal. No respiratory distress.    Abdominal:      General: There is no distension. Palpations: Abdomen is soft. Tenderness: There is no abdominal tenderness. There is no guarding or rebound. Musculoskeletal:         General: Normal range of motion. Cervical back: Normal range of motion. Skin:     General: Skin is warm and dry. Neurological:      General: No focal deficit present. Mental Status: He is alert and oriented to person, place, and time. Psychiatric:         Mood and Affect: Mood normal.         Behavior: Behavior normal.         Thought Content: Thought content normal.       ASSESSMENT:   Diagnosis Orders   1. SCC (squamous cell carcinoma)            PLAN:  No orders of the defined types were placed in this encounter. No orders of the defined types were placed in this encounter. The risks, benefits, and alternatives were discussed with the pt. He is willing to accept the risks and proceed with a excision of left hand, left arm and right arm lesions. . The surgical risks included but not limited to bleeding, infection, perforation, recurrence, risk of needing further surgery, etc. The anesthetic risks included heart attacks, strokes, pneumonia, phlebitis, etc.  He is willing to accept all risks and proceed with surgery. No guarantees have been given. No follow-ups on file.     Selvin Leo DO 12/9/2022 11:33 AM

## 2022-12-09 NOTE — H&P
SUBJECTIVE:  Mr. Salma Alvarado is a 69 y.o. male who presents today with 3 lesions of concern. States he has had been present for many years. 1 lesion on the dorsum of his left hand is raised and does cause some discomfort and drainage. Painful lesion of the right medial arm. Also notes a large draining lesion on the right posterior arm. Denies history of heart attack or stroke. Past Medical History:   Diagnosis Date    Arthritis     Atrial fibrillation ([address]     Bleeding ulcer 11/2019    Cancer ([address]     skin    CHF (congestive heart failure) ([facility]     Diabetes mellitus ([facility]     Ex-cigarette smoker 7/17/2016    History of blood transfusion     History of gastric ulcer 2009    Hyperlipidemia     Hypertension     hx.  10 years ago    Kidney disease, chronic, end stage on dialysis [facility]) 2006 2007, dialysis for 3 years, then had a kidney transplant,    Obese     Peritoneal dialysis status ([facility]     past hx    Prolonged emergence from general anesthesia     Vision problem     wears glasses     Past Surgical History:   Procedure Laterality Date    CHOLECYSTECTOMY      COLONOSCOPY  06/23/2017    Dr Porfirio Eddy: Diverticulosis, internal hemorrhoids, 5yr recall    COLONOSCOPY  01/05/2012    Dr Deyanira Cope, 5 yr recall    DIALYSIS FISTULA CREATION  [place] 2007    left arm radial cephalic    HERNIA REPAIR      umbilical hernia repair    KIDNEY TRANSPLANT      2/18/2010 in [address] [place] [address] Right 2/23/2018    AV FISTULA GRAFT REMOVAL performed by Elizabeth Chawla MD at [address]      TUNNELED VENOUS CATHETER PLACEMENT  multiple    UPPER GASTROINTESTINAL ENDOSCOPY N/A 11/4/2019    Dr Sandy Veolz (Dr Porfirio Eddy pt)1.2 cm superficial gastric ulcer in the antrum with surrounding moderate gastritis and oozing of blood    UPPER GASTROINTESTINAL ENDOSCOPY  10/05/2009    Dr David Velasquez w/resolution of ulcer, lenin +    UPPER GASTROINTESTINAL ENDOSCOPY  07/10/2009    Dr Miriam Yoon ulceration w/pigmented base on posterior wall of the body, small erosions, active gastritis    UPPER GASTROINTESTINAL ENDOSCOPY N/A 1/10/2020    Dr Williams Serrato hiatal hernia, healed previous antral ulcer-Gastritis    VASCULAR SURGERY Right [place] 2008    av loop graft     Current Outpatient Medications   Medication Sig Dispense Refill    cefdinir (OMNICEF) 300 MG capsule Take 1 capsule by mouth 2 times daily for 10 days 20 capsule 0    albuterol sulfate HFA (PROVENTIL HFA) 108 (90 Base) MCG/ACT inhaler Inhale 2 puffs into the lungs every 6 hours as needed for Wheezing 18 g 3    JARDIANCE 25 MG tablet TAKE 1 TABLET BY MOUTH EVERY DAY 90 tablet 0    tamsulosin (FLOMAX) 0.4 MG capsule Take 2 capsules by mouth daily 60 capsule 0    calcitRIOL (ROCALTROL) 0.25 MCG capsule TAKE 1 CAPSULE BY MOUTH 3 TIMES A WEEK      propranolol (INDERAL) 80 MG tablet Take 1 tablet by mouth 2 times daily 180 tablet 3    blood glucose test strips (TRUE METRIX BLOOD GLUCOSE TEST) strip TEST ONE TIME A DAY & AS NEEDED FOR SYMPTOMS OF IRREGULAR BLOOD GLUCOSE. DX: E11.9 100 strip 5    sodium bicarbonate 325 MG tablet TAKE 1 TABLET BY MOUTH TWICE A DAY (Patient taking differently: Take 650 mg by mouth 2 times daily) 60 tablet 5    metFORMIN (GLUCOPHAGE) 1000 MG tablet TAKE 1 TABLET BY MOUTH TWICE A DAY WITH MEALS 180 tablet 3    blood glucose monitor strips Test one time a day & as needed for symptoms of irregular blood glucose.  100 strip 5    Omega-3 Fatty Acids (FISH OIL) 1000 MG CPDR Take 1,000 mg by mouth 2 times daily      mycophenolate (CELLCEPT) 250 MG capsule Take 500 mg by mouth 2 times daily      glucose blood VI test strips (ACCU-CHEK ANANT) strip Contour strips,check daily E11.9 100 each 0    Lancets MISC 1 each by Does not apply route daily 100 each 5    vitamin D (ERGOCALCIFEROL) 1.25 MG (66884 UT) CAPS capsule TAKE 1 CAPSULE BY MOUTH ONE TIME PER WEEK (Patient taking differently: every 30 days) 12 capsule 1     No current facility-administered medications for this visit. Allergies: Patient has no known allergies. Family History   Problem Relation Age of Onset    Kidney Disease Father     Heart Disease Father     Colon Cancer Neg Hx     Colon Polyps Neg Hx        Social History     Tobacco Use    Smoking status: Former     Packs/day: 2.00     Years: 4.00     Pack years: 8.00     Types: Cigarettes     Quit date: 1973     Years since quittin.8    Smokeless tobacco: Never   Substance Use Topics    Alcohol use: No       Review of Systems   Constitutional:  Positive for fatigue. Negative for chills, diaphoresis and fever. HENT:  Negative for ear discharge, ear pain, facial swelling and nosebleeds. Eyes:  Negative for pain, discharge and redness. Respiratory:  Negative for cough, chest tightness, shortness of breath and wheezing. Cardiovascular:  Negative for chest pain and palpitations. Gastrointestinal:  Negative for abdominal pain, constipation, diarrhea and nausea. Genitourinary:  Negative for difficulty urinating, flank pain and hematuria. Musculoskeletal:  Positive for arthralgias and myalgias. Negative for neck pain and neck stiffness. Skin:  Positive for wound. Neurological:  Negative for dizziness, numbness and headaches. Psychiatric/Behavioral:  Negative for agitation, behavioral problems and self-injury. OBJECTIVE:  Pulse 82   Temp 97.4 °F (36.3 °C) (Temporal)   Ht 6' 1\" (1.854 m)   Wt 255 lb (115.7 kg)   SpO2 98%   BMI 33.64 kg/m²   Physical Exam  Vitals reviewed. Constitutional:       Appearance: Normal appearance. HENT:      Head: Normocephalic and atraumatic. Right Ear: External ear normal.      Left Ear: External ear normal.      Nose: Nose normal.   Eyes:      Extraocular Movements: Extraocular movements intact. Pulmonary:      Effort: Pulmonary effort is normal. No respiratory distress.    Abdominal:      General: There is no distension. Palpations: Abdomen is soft. Tenderness: There is no abdominal tenderness. There is no guarding or rebound. Musculoskeletal:         General: Normal range of motion. Cervical back: Normal range of motion. Skin:     General: Skin is warm and dry. Neurological:      General: No focal deficit present. Mental Status: He is alert and oriented to person, place, and time. Psychiatric:         Mood and Affect: Mood normal.         Behavior: Behavior normal.         Thought Content: Thought content normal.       ASSESSMENT:   Diagnosis Orders   1. SCC (squamous cell carcinoma)            PLAN:  No orders of the defined types were placed in this encounter. No orders of the defined types were placed in this encounter. The risks, benefits, and alternatives were discussed with the pt. He is willing to accept the risks and proceed with a excision of left hand, left arm and right arm lesions. . The surgical risks included but not limited to bleeding, infection, perforation, recurrence, risk of needing further surgery, etc. The anesthetic risks included heart attacks, strokes, pneumonia, phlebitis, etc.  He is willing to accept all risks and proceed with surgery. No guarantees have been given. No follow-ups on file.     Rajendra Jacobo DO 12/9/2022 11:33 AM

## 2022-12-30 ENCOUNTER — HOSPITAL ENCOUNTER (OUTPATIENT)
Dept: PREADMISSION TESTING | Age: 67
Discharge: HOME | End: 2023-01-03

## 2022-12-30 VITALS — BODY MASS INDEX: 33.64 KG/M2 | WEIGHT: 255 LBS

## 2022-12-30 NOTE — DISCHARGE INSTRUCTIONS
The day before surgery you will receive a phone call from the surgery nurse to let you know what time to arrive on the day of surgery. This call will usually be between 2-4 PM. If you do not receive a phone call by 4 PM the day before your surgery please call [phone] and let them know you have not received an arrival time. If   your surgery is on Monday, your call will be on the Friday before your Monday surgery. MEDICATION INSTRUCTIONS PRIOR TO YOUR SURGERY    Night before surgery:      ________Do not take Metformin (you will not be eating or drinking after midnight)      The morning of surgery: You can take all your usual prescribed medications with a small sip of water. DO NOT TAKE ANY DIABETIC MEDICATIONS the morning of your surgery. DO NOT TAKE ANY SUPPLEMENTS or over the counter medications the morning of  surgery. Chlorhexidine Gluconate 4% Solution    Patient should shower with this soap the night before surgery and the morning of surgery) washing from the neck down (avoiding contact with genitalia). DO NOT [facility] YOUR HAIR OR FACE WITH THIS SOAP. When washing with this soap, apply enough to suds up the body thoroughly, turn the water away from your body and allow the soap suds to remain on the body for 2 full minutes, then rinse body completely. After using this soap on the body, please do not apply powders or lotions to your body. PREOPERATIVE GUIDELINES WHEN RECEIVING ANESTHESIA    Do not eat or drink anything after midnight, the night before your surgery. This is extremely important for your safety. Take a bath (or shower) the night before your surgery and you may brush your teeth the morning of your surgery. You will be scheduled to arrive at the hospital 2 hours before your surgery, or follow your surgeon's instructions. Dress comfortably. Wear loose clothing that will be easy to remove and comfortable for your trip home.     You may wear eyeglasses or contacts but bring your cases with you as they must be remove before your surgery. Hearing aids and dentures will need to be removed before your surgery. Do not wear any jewelry, including body jewelry. All jewelry will need to be removed prior to your surgery. Do not wear fingernail polish or make-up. It is best not to bring any valuables with you. If you are to stay in the hospital overnight, bring your robe, slippers and personal toiletries that you may need. POSTOPERATIVE GUIDELINES AFTER RECEIVING ANESTHESIA    If you are to go home after your surgery, you will need a responsible adult to drive you home. You will not be able to take public transportation after your discharge from the Operative Care Unit unless you are accompanied by a        responsible adult. On returning home, be sure to follow your physician's orders regarding diet, activity and medications. Remember, surgery with general anesthesia or sedation may leave you sleepy, very tired and with a decreased appetite for 12 to 24 hours. If you develop any post-surgical complications or problems, call your surgeon or [facility] ([phone]). [address] [place] [address] for Surgery Patients-Revised 6-    Visitors for surgery patients are essential for the patient's emotional well-being and care       post operatively. 2.   Visitor Expectations and Limitations          3. One visitor allowed with patients in the preop/postop rooms. 4.  A second visitor may sit in the waiting area. 5.  No children under 13 allowed in the pre-post op areas unless they are the patient. 6.  Two people may be with an underage surgical/procedural patient in preop/postop        room. 7.  If you are admitted to the hospital post operatively, there are NO RESTRICTIONS on       the floor at this time.       8.  If you are admitted to ICU postoperatively, you may have one visitor in the room from        7A-7P. A second visitor may sit in the ICU waiting room. No overnight visitors in         ICU waiting room.

## 2023-01-05 ENCOUNTER — ANESTHESIA (OUTPATIENT)
Dept: OPERATING ROOM | Age: 68
End: 2023-01-05
Payer: MEDICARE

## 2023-01-05 ENCOUNTER — HOSPITAL ENCOUNTER (OUTPATIENT)
Age: 68
Setting detail: OUTPATIENT SURGERY
Discharge: HOME | End: 2023-01-05
Attending: SURGERY | Admitting: SURGERY
Payer: MEDICARE

## 2023-01-05 ENCOUNTER — ANESTHESIA EVENT (OUTPATIENT)
Dept: OPERATING ROOM | Age: 68
End: 2023-01-05
Payer: MEDICARE

## 2023-01-05 VITALS
WEIGHT: 255 LBS | BODY MASS INDEX: 33.8 KG/M2 | HEIGHT: 73 IN | HEART RATE: 88 BPM | TEMPERATURE: 96.8 F | DIASTOLIC BLOOD PRESSURE: 79 MMHG | SYSTOLIC BLOOD PRESSURE: 155 MMHG | OXYGEN SATURATION: 93 % | RESPIRATION RATE: 16 BRPM

## 2023-01-05 DIAGNOSIS — L98.9 ARM LESION: ICD-10-CM

## 2023-01-05 DIAGNOSIS — L98.9 HAND LESION: ICD-10-CM

## 2023-01-05 LAB
EKG P AXIS: NORMAL DEGREES
EKG P-R INTERVAL: NORMAL MS
EKG Q-T INTERVAL: 374 MS
EKG QRS DURATION: 106 MS
EKG QTC CALCULATION (BAZETT): 419 MS
EKG T AXIS: 39 DEGREES
GLUCOSE BLD-MCNC: 174 MG/DL (ref 70–99)
PERFORMED ON: ABNORMAL

## 2023-01-05 PROCEDURE — 3700000001 HC ADD 15 MINUTES (ANESTHESIA): Performed by: SURGERY

## 2023-01-05 PROCEDURE — 11602 EXC TR-EXT MAL+MARG 1.1-2 CM: CPT | Performed by: SURGERY

## 2023-01-05 PROCEDURE — 7100000011 HC PHASE II RECOVERY - ADDTL 15 MIN: Performed by: SURGERY

## 2023-01-05 PROCEDURE — 7100000000 HC PACU RECOVERY - FIRST 15 MIN: Performed by: SURGERY

## 2023-01-05 PROCEDURE — 3700000000 HC ANESTHESIA ATTENDED CARE: Performed by: SURGERY

## 2023-01-05 PROCEDURE — 6360000002 HC RX W HCPCS: Performed by: SURGERY

## 2023-01-05 PROCEDURE — 7100000001 HC PACU RECOVERY - ADDTL 15 MIN: Performed by: SURGERY

## 2023-01-05 PROCEDURE — 3600000013 HC SURGERY LEVEL 3 ADDTL 15MIN: Performed by: SURGERY

## 2023-01-05 PROCEDURE — 6370000000 HC RX 637 (ALT 250 FOR IP): Performed by: SURGERY

## 2023-01-05 PROCEDURE — 82947 ASSAY GLUCOSE BLOOD QUANT: CPT

## 2023-01-05 PROCEDURE — 7100000010 HC PHASE II RECOVERY - FIRST 15 MIN: Performed by: SURGERY

## 2023-01-05 PROCEDURE — 2580000003 HC RX 258: Performed by: SURGERY

## 2023-01-05 PROCEDURE — 2500000003 HC RX 250 WO HCPCS: Performed by: SURGERY

## 2023-01-05 PROCEDURE — 2709999900 HC NON-CHARGEABLE SUPPLY: Performed by: SURGERY

## 2023-01-05 PROCEDURE — 11401 EXC TR-EXT B9+MARG 0.6-1 CM: CPT | Performed by: SURGERY

## 2023-01-05 PROCEDURE — 3600000003 HC SURGERY LEVEL 3 BASE: Performed by: SURGERY

## 2023-01-05 PROCEDURE — 2580000003 HC RX 258: Performed by: ANESTHESIOLOGY

## 2023-01-05 PROCEDURE — 11623 EXC S/N/H/F/G MAL+MRG 2.1-3: CPT | Performed by: SURGERY

## 2023-01-05 PROCEDURE — 88305 TISSUE EXAM BY PATHOLOGIST: CPT

## 2023-01-05 PROCEDURE — 6360000002 HC RX W HCPCS

## 2023-01-05 RX ORDER — HYDROMORPHONE HYDROCHLORIDE 1 MG/ML
0.5 INJECTION, SOLUTION INTRAMUSCULAR; INTRAVENOUS; SUBCUTANEOUS EVERY 5 MIN PRN
Status: DISCONTINUED | OUTPATIENT
Start: 2023-01-05 | End: 2023-01-05 | Stop reason: HOSPADM

## 2023-01-05 RX ORDER — GINSENG 100 MG
CAPSULE ORAL PRN
Status: DISCONTINUED | OUTPATIENT
Start: 2023-01-05 | End: 2023-01-05 | Stop reason: ALTCHOICE

## 2023-01-05 RX ORDER — ONDANSETRON 2 MG/ML
INJECTION INTRAMUSCULAR; INTRAVENOUS PRN
Status: DISCONTINUED | OUTPATIENT
Start: 2023-01-05 | End: 2023-01-05 | Stop reason: SDUPTHER

## 2023-01-05 RX ORDER — HYDROCODONE BITARTRATE AND ACETAMINOPHEN 5; 325 MG/1; MG/1
1 TABLET ORAL EVERY 4 HOURS PRN
Qty: 18 TABLET | Refills: 0 | Status: SHIPPED | OUTPATIENT
Start: 2023-01-05 | End: 2023-01-08

## 2023-01-05 RX ORDER — SODIUM CHLORIDE, SODIUM LACTATE, POTASSIUM CHLORIDE, CALCIUM CHLORIDE 600; 310; 30; 20 MG/100ML; MG/100ML; MG/100ML; MG/100ML
INJECTION, SOLUTION INTRAVENOUS CONTINUOUS
Status: DISCONTINUED | OUTPATIENT
Start: 2023-01-05 | End: 2023-01-05 | Stop reason: HOSPADM

## 2023-01-05 RX ORDER — SODIUM CHLORIDE 0.9 % (FLUSH) 0.9 %
5-40 SYRINGE (ML) INJECTION PRN
Status: DISCONTINUED | OUTPATIENT
Start: 2023-01-05 | End: 2023-01-05 | Stop reason: HOSPADM

## 2023-01-05 RX ORDER — BISMUTH TRIBROMOPH/PETROLATUM 5"X9"
BANDAGE TOPICAL PRN
Status: DISCONTINUED | OUTPATIENT
Start: 2023-01-05 | End: 2023-01-05 | Stop reason: ALTCHOICE

## 2023-01-05 RX ORDER — SODIUM CHLORIDE 9 MG/ML
INJECTION, SOLUTION INTRAVENOUS PRN
Status: DISCONTINUED | OUTPATIENT
Start: 2023-01-05 | End: 2023-01-05 | Stop reason: HOSPADM

## 2023-01-05 RX ORDER — SODIUM CHLORIDE 0.9 % (FLUSH) 0.9 %
5-40 SYRINGE (ML) INJECTION EVERY 12 HOURS SCHEDULED
Status: DISCONTINUED | OUTPATIENT
Start: 2023-01-05 | End: 2023-01-05 | Stop reason: HOSPADM

## 2023-01-05 RX ORDER — LIDOCAINE HYDROCHLORIDE 10 MG/ML
INJECTION, SOLUTION EPIDURAL; INFILTRATION; INTRACAUDAL; PERINEURAL PRN
Status: DISCONTINUED | OUTPATIENT
Start: 2023-01-05 | End: 2023-01-05 | Stop reason: ALTCHOICE

## 2023-01-05 RX ORDER — FENTANYL CITRATE 50 UG/ML
INJECTION, SOLUTION INTRAMUSCULAR; INTRAVENOUS PRN
Status: DISCONTINUED | OUTPATIENT
Start: 2023-01-05 | End: 2023-01-05 | Stop reason: SDUPTHER

## 2023-01-05 RX ORDER — DEXAMETHASONE SODIUM PHOSPHATE 10 MG/ML
INJECTION, SOLUTION INTRAMUSCULAR; INTRAVENOUS PRN
Status: DISCONTINUED | OUTPATIENT
Start: 2023-01-05 | End: 2023-01-05 | Stop reason: SDUPTHER

## 2023-01-05 RX ORDER — PROPOFOL 10 MG/ML
INJECTION, EMULSION INTRAVENOUS PRN
Status: DISCONTINUED | OUTPATIENT
Start: 2023-01-05 | End: 2023-01-05 | Stop reason: SDUPTHER

## 2023-01-05 RX ORDER — DIPHENHYDRAMINE HYDROCHLORIDE 50 MG/ML
12.5 INJECTION INTRAMUSCULAR; INTRAVENOUS
Status: DISCONTINUED | OUTPATIENT
Start: 2023-01-05 | End: 2023-01-05 | Stop reason: HOSPADM

## 2023-01-05 RX ORDER — BUPIVACAINE HYDROCHLORIDE AND EPINEPHRINE 2.5; 5 MG/ML; UG/ML
INJECTION, SOLUTION INFILTRATION; PERINEURAL PRN
Status: DISCONTINUED | OUTPATIENT
Start: 2023-01-05 | End: 2023-01-05 | Stop reason: ALTCHOICE

## 2023-01-05 RX ORDER — HYDROMORPHONE HYDROCHLORIDE 1 MG/ML
0.25 INJECTION, SOLUTION INTRAMUSCULAR; INTRAVENOUS; SUBCUTANEOUS EVERY 5 MIN PRN
Status: DISCONTINUED | OUTPATIENT
Start: 2023-01-05 | End: 2023-01-05 | Stop reason: HOSPADM

## 2023-01-05 RX ORDER — MEPERIDINE HYDROCHLORIDE 25 MG/ML
12.5 INJECTION INTRAMUSCULAR; INTRAVENOUS; SUBCUTANEOUS EVERY 5 MIN PRN
Status: DISCONTINUED | OUTPATIENT
Start: 2023-01-05 | End: 2023-01-05 | Stop reason: HOSPADM

## 2023-01-05 RX ORDER — METOCLOPRAMIDE HYDROCHLORIDE 5 MG/ML
10 INJECTION INTRAMUSCULAR; INTRAVENOUS
Status: DISCONTINUED | OUTPATIENT
Start: 2023-01-05 | End: 2023-01-05 | Stop reason: HOSPADM

## 2023-01-05 RX ORDER — MIDAZOLAM HYDROCHLORIDE 1 MG/ML
INJECTION INTRAMUSCULAR; INTRAVENOUS PRN
Status: DISCONTINUED | OUTPATIENT
Start: 2023-01-05 | End: 2023-01-05 | Stop reason: SDUPTHER

## 2023-01-05 RX ADMIN — SODIUM CHLORIDE, SODIUM LACTATE, POTASSIUM CHLORIDE, AND CALCIUM CHLORIDE: 600; 310; 30; 20 INJECTION, SOLUTION INTRAVENOUS at 07:34

## 2023-01-05 RX ADMIN — CEFAZOLIN 2000 MG: 2 INJECTION, POWDER, FOR SOLUTION INTRAMUSCULAR; INTRAVENOUS at 07:48

## 2023-01-05 RX ADMIN — MIDAZOLAM 2 MG: 1 INJECTION INTRAMUSCULAR; INTRAVENOUS at 07:32

## 2023-01-05 RX ADMIN — PHENYLEPHRINE HYDROCHLORIDE 100 MCG: 10 INJECTION INTRAVENOUS at 08:27

## 2023-01-05 RX ADMIN — ONDANSETRON 4 MG: 2 INJECTION INTRAMUSCULAR; INTRAVENOUS at 07:48

## 2023-01-05 RX ADMIN — FENTANYL CITRATE 50 MCG: 50 INJECTION, SOLUTION INTRAMUSCULAR; INTRAVENOUS at 07:41

## 2023-01-05 RX ADMIN — FENTANYL CITRATE 50 MCG: 50 INJECTION, SOLUTION INTRAMUSCULAR; INTRAVENOUS at 07:47

## 2023-01-05 RX ADMIN — PROPOFOL 50 MG: 10 INJECTION, EMULSION INTRAVENOUS at 07:45

## 2023-01-05 RX ADMIN — PROPOFOL 150 MG: 10 INJECTION, EMULSION INTRAVENOUS at 07:41

## 2023-01-05 RX ADMIN — DEXAMETHASONE SODIUM PHOSPHATE 10 MG: 10 INJECTION, SOLUTION INTRAMUSCULAR; INTRAVENOUS at 07:48

## 2023-01-05 ASSESSMENT — LIFESTYLE VARIABLES: SMOKING_STATUS: 0

## 2023-01-05 ASSESSMENT — ENCOUNTER SYMPTOMS: SHORTNESS OF BREATH: 1

## 2023-01-05 ASSESSMENT — PAIN - FUNCTIONAL ASSESSMENT: PAIN_FUNCTIONAL_ASSESSMENT: NONE - DENIES PAIN

## 2023-01-05 NOTE — ANESTHESIA PRE PROCEDURE
Department of Anesthesiology  Preprocedure Note       Name:  Sandra Guardado   Age:  69 y.o.  :  1955                                          MRN:  [id number]         Date:  2023      Surgeon: Rosita West):  [facility],     Procedure:     Medications prior to admission:   Prior to Admission medications    Medication Sig Start Date End Date Taking? Authorizing Provider   tacrolimus ER (ENVARSUS XR) 1 MG TB24 tablet Take 2 mg by mouth daily    Historical Provider, MD   albuterol sulfate HFA (PROVENTIL HFA) 108 (90 Base) MCG/ACT inhaler Inhale 2 puffs into the lungs every 6 hours as needed for Wheezing 22   Kavon Adorno MD   JARDIANCE 25 MG tablet TAKE 1 TABLET BY MOUTH EVERY DAY  Patient taking differently: Take 25 mg by mouth daily TAKE 1 TABLET BY MOUTH EVERY DAY 22   Kavon Adorno MD   tamsulosin [facility]) 0.4 MG capsule Take 2 capsules by mouth daily 10/13/22   Kavon Adorno MD   calcitRIOL (ROCALTROL) 0.25 MCG capsule Take 0.25 mcg by mouth every other day 22   Historical Provider, MD   propranolol (INDERAL) 80 MG tablet Take 1 tablet by mouth 2 times daily 22   CHRIS Gaspar   Lancets MISC 1 each by Does not apply route daily 22   Kavon Adorno MD   blood glucose test strips (TRUE METRIX BLOOD GLUCOSE TEST) strip TEST ONE TIME A DAY & AS NEEDED FOR SYMPTOMS OF IRREGULAR BLOOD GLUCOSE.  DX: E11.9 22   Kavon Adorno MD   sodium bicarbonate 325 MG tablet TAKE 1 TABLET BY MOUTH TWICE A DAY  Patient taking differently: Take 650 mg by mouth 2 times daily 22   Kavon Adorno MD   metFORMIN (GLUCOPHAGE) 1000 MG tablet TAKE 1 TABLET BY MOUTH TWICE A DAY WITH MEALS  Patient taking differently: Take 1,000 mg by mouth 2 times daily (with meals) 22   Kavon Adorno MD   vitamin D (ERGOCALCIFEROL) 1.25 MG (05796 UT) CAPS capsule TAKE 1 CAPSULE BY MOUTH ONE TIME PER WEEK  Patient taking differently: Take 50,000 Units by mouth every 30 days 1/10/22   Kavon Adorno MD   blood glucose monitor strips Test one time a day & as needed for symptoms of irregular blood glucose. 4/28/21   Angela Byrne MD   Omega-3 Fatty Acids (FISH OIL) 1000 MG CPDR Take 1,000 mg by mouth 2 times daily    Historical Provider, MD   mycophenolate (CELLCEPT) 250 MG capsule Take 500 mg by mouth 2 times daily    Historical Provider, MD   glucose blood VI test strips (ACCU-CHEK ANANT) strip Contour strips,check daily E11.9 12/27/17   Graciela Castelan DO       Current medications:    No current facility-administered medications for this visit. No current outpatient medications on file. Facility-Administered Medications Ordered in Other Visits   Medication Dose Route Frequency Provider Last Rate Last Admin    sodium chloride flush 0.9 % injection 5-40 mL  5-40 mL IntraVENous 2 times per day Joel Gomez,         sodium chloride flush 0.9 % injection 5-40 mL  5-40 mL IntraVENous PRN Ham Matas, DO        0.9 % sodium chloride infusion   IntraVENous PRN Ham Matas, DO        ceFAZolin (ANCEF) 2,000 mg in sterile water 20 mL IV syringe  2,000 mg IntraVENous On Call to [address] [place], [place] [address]way 6, DO        lactated ringers infusion   IntraVENous Continuous Bo Gonzalez MD           Allergies:  No Known Allergies    Problem List:    Patient Active Problem List   Diagnosis Code    Essential hypertension I10    Chest pressure R07.89    Diabetes mellitus ([facility] E11.9    Ex-cigarette smoker Z87.891    Chest pain R07.9    Cellulitis L03.90    Infection of AV graft for dialysis ([facility] T82. 7XXA    Cellulitis of right upper extremity L03. 113    Atrial fibrillation with RVR ([facility]) I48.91    Immunosuppression ([facility]) D84.9    Daytime somnolence R40.0    PAF (paroxysmal atrial fibrillation) ([facility]) I48.0    Fatigue R53.83    History of tachycardia Z87.898    Chronic anticoagulation Z79.01    Orthostatic dizziness R42    PSVT (paroxysmal supraventricular tachycardia) ([facility]) I47.1    UGI bleed K92.2  History of renal transplant Z94.0    LVH (left ventricular hypertrophy) I51.7    Presence of Watchman left atrial appendage closure device Z95.818    Longstanding persistent atrial fibrillation (HCC) I48.11       Past Medical History:        Diagnosis Date    Arthritis     Atrial fibrillation ([facility]     Bleeding ulcer 11/2019    Cancer ([facility]     skin    CHF (congestive heart failure) ([facility]     Diabetes mellitus ([facility]     Ex-cigarette smoker 7/17/2016    History of blood transfusion     History of gastric ulcer 2009    Hyperlipidemia     Hypertension     hx.  10 years ago    Kidney disease, chronic, end stage on dialysis ([facility] 2006 2007, dialysis for 3 years, then had a kidney transplant,    Obese     Peritoneal dialysis status ([facility]     past hx    Prolonged emergence from general anesthesia     Vision problem     wears glasses       Past Surgical History:        Procedure Laterality Date    CHOLECYSTECTOMY      COLONOSCOPY  06/23/2017    Dr Anthony Acosta: Diverticulosis, internal hemorrhoids, 5yr recall    COLONOSCOPY  01/05/2012    Dr Lorri Hicks, 5 yr recall    DIALYSIS FISTULA CREATION  [facility] 2007    left arm radial cephalic    HERNIA REPAIR      umbilical hernia repair    KIDNEY TRANSPLANT      2/18/2010 in [address] [place] [address] REVJ/CLSR Right 2/23/2018    AV FISTULA GRAFT REMOVAL performed by Stan Wright MD at [place]      TONSILLECTOMY      TUNNELED VENOUS CATHETER PLACEMENT  multiple    UPPER GASTROINTESTINAL ENDOSCOPY N/A 11/4/2019    Dr Xiao Guillory (Dr Anthony Acosta pt)1.2 cm superficial gastric ulcer in the antrum with surrounding moderate gastritis and oozing of blood    UPPER GASTROINTESTINAL ENDOSCOPY  10/05/2009    Dr Angélica Mares w/resolution of ulcer, lenin +    UPPER GASTROINTESTINAL ENDOSCOPY  07/10/2009    Dr Abeilno Izquierdo ulceration w/pigmented base on posterior wall of the body, small erosions, active gastritis    UPPER GASTROINTESTINAL ENDOSCOPY N/A 1/10/2020    Dr Silas Dominguez hiatal hernia, healed previous antral ulcer-Gastritis    VASCULAR SURGERY Right [place] 2008    av loop graft       Social History:    Social History     Tobacco Use    Smoking status: Former     Packs/day: 2.00     Years: 4.00     Pack years: 8.00     Types: Cigarettes     Quit date: 1973     Years since quittin.9    Smokeless tobacco: Never   Substance Use Topics    Alcohol use: No                                Counseling given: Not Answered      Vital Signs (Current): There were no vitals filed for this visit.                                            BP Readings from Last 3 Encounters:   23 (!) 152/97   22 124/88   22 110/64       NPO Status:                                                                                 BMI:   Wt Readings from Last 3 Encounters:   23 255 lb (115.7 kg)   22 255 lb (115.7 kg)   22 255 lb (115.7 kg)     There is no height or weight on file to calculate BMI.    CBC:   Lab Results   Component Value Date/Time    WBC 8.6 10/13/2022 12:52 PM    RBC 5.71 10/13/2022 12:52 PM    HGB 17.1 10/13/2022 12:52 PM    HCT 55.8 10/13/2022 12:52 PM    MCV 97.7 10/13/2022 12:52 PM    RDW 14.7 10/13/2022 12:52 PM     10/13/2022 12:52 PM       CMP:   Lab Results   Component Value Date/Time     10/13/2022 12:52 PM    K 4.8 10/13/2022 12:52 PM    K 4.2 2019 12:00 AM     10/13/2022 12:52 PM    CO2 20 10/13/2022 12:52 PM    BUN 20 10/13/2022 12:52 PM    CREATININE 1.2 10/13/2022 12:52 PM    GFRAA >59 10/13/2022 12:52 PM    LABGLOM 60 10/13/2022 12:52 PM    GLUCOSE 126 10/13/2022 12:52 PM    PROT 7.1 10/13/2022 12:52 PM    CALCIUM 10.0 10/13/2022 12:52 PM    BILITOT 2.0 10/13/2022 12:52 PM    ALKPHOS 68 10/13/2022 12:52 PM    AST 19 10/13/2022 12:52 PM    ALT 13 10/13/2022 12:52 PM       POC Tests:   Recent Labs 01/05/23  0887   POCGLU 174*       Coags:   Lab Results   Component Value Date/Time    PROTIME 13.7 11/18/2021 08:25 AM    INR 1.03 11/18/2021 08:25 AM    APTT 28.0 11/01/2019 08:11 PM       HCG (If Applicable): No results found for: PREGTESTUR, PREGSERUM, HCG, HCGQUANT     ABGs: No results found for: PHART, PO2ART, EOJ0LEV, VMM2BRE, BEART, D2KODXTD     Type & Screen (If Applicable):  No results found for: LABABO, LABRH    Drug/Infectious Status (If Applicable):  No results found for: HIV, HEPCAB    COVID-19 Screening (If Applicable):   Lab Results   Component Value Date/Time    COVID19 Not Detected 06/21/2022 10:25 AM           Anesthesia Evaluation  Patient summary reviewed no history of anesthetic complications:   Airway: Mallampati: III  TM distance: >3 FB   Neck ROM: full  Mouth opening: > = 3 FB   Dental: normal exam         Pulmonary:normal exam  breath sounds clear to auscultation  (+) shortness of breath: chronic and no interval change,      (-) asthma, recent URI, sleep apnea and not a current smoker          Patient did not smoke on day of surgery. Cardiovascular:  Exercise tolerance: poor (<4 METS), Limited by SOB, but denies chest pain  (+) hypertension:, dysrhythmias: atrial fibrillation, CHF (2006):,     (-) pacemaker, past MI, CABG/stent and  angina    ECG reviewed  Rhythm: irregular  Rate: normal           Beta Blocker:  Dose within 24 Hrs      ROS comment: S/p watchman     Neuro/Psych:      (-) seizures, TIA and CVA            ROS comment: Tremors, takes propranolol GI/Hepatic/Renal:   (+) GERD: well controlled, renal disease (Kidney transplant 2010 due to HTN, no issues since):, morbid obesity     (-) liver disease       Endo/Other:    (+) DiabetesType II DM, using insulin, .    (-) hypothyroidism, hyperthyroidism               Abdominal:   (+) obese,           Vascular:           Other Findings:             Anesthesia Plan      MAC     ASA 3       Induction: intravenous. Anesthetic plan and risks discussed with patient. Plan discussed with CRNA.                     Yudith Smart MD   1/5/2023

## 2023-01-05 NOTE — DISCHARGE INSTRUCTIONS
Wound Care:    Left hand: Change wound daily starting the day after surgery. Apply a small amount of bacitracin to the wound base. Xeroform gauze. Then cover with gauze. Closed incisions: Keep them clean dry and covered. Okay to change dressings the day after surgery. No heavy lifting for 2 weeks. May resume normal diet. No driving while on pain medication. May shower 24 hours postoperatively. Do not soak in tub. No swimming. Allow glue on incision to fall off on its own.     Please call the office if you have:   - Fever or chills   - Difficulty with bowel movements   - Increased pain   - Nausea and/or vomiting   - Redness or drainage from the incision sites

## 2023-01-05 NOTE — OP NOTE
Operative Note      Patient: Liz Guzman  YOB: 1955  MRN: [id number]    Date of Procedure: 1/5/2023    Pre-Op Diagnosis:   Left hand lesion  Left arm lesions x2  Right posterior forearm lesion    Post-Op Diagnosis: Same       Procedure:  Excision of 2 left arm cutaneous horns measuring 1 cm in diameter  Excision of left dorsal hand lesion measuring 2.5 cm in diameter  Excision of right posterior forearm lesion measuring 2 cm x 3 cm    Surgeon(s):  Aung Gold DO    Assistant:   First Assistant: Jewel Altman RN    Anesthesia: Monitor Anesthesia Care    Estimated Blood Loss (mL): less than 50     Complications: None    Specimens:   ID Type Source Tests Collected by Time Destination   A : left arm lesion   upper Tissue Arm SURGICAL PATHOLOGY Aung Gold, DO 1/5/2023 0813    B : left arm lesion    middle Tissue Arm SURGICAL PATHOLOGY Aung Gold, DO 1/5/2023 1518    C : left hand lesion Tissue Hand SURGICAL PATHOLOGY Aung Gold, DO 1/5/2023 5128    D : right arm lesion Tissue Arm SURGICAL PATHOLOGY Aung Gold,  1/5/2023 0815          Detailed Description of Procedure:   Patient was identified in the preoperative holding suite. Consent was confirmed and placed on chart. Preoperative antibiotics administered at that time. Next the patient was taken to the operative suite and placed in supine position. LMA anesthesia then provided. The left arm and hand were then prepped and draped in the usual sterile fashion. In similar fashion the right arm and hand were prepped and draped in usual sterile fashion. Timeout performed and all were in agreement. Initially the 2 cutaneous horn appearing lesions of the left forearm were removed in an ellipse fashion measuring 2 x 6 cm. These were taken off the underlying subcutaneous tissue using cautery. Each wound was then approximated using 3-0 Vicryl in interrupted fashion.   Skin was then closed using 3-0 nylon in an interrupted fashion. Left hand lesion was then identified. A circular incision was created around the fungating lesion utilizing a 15 blade scalpel. This was taken off the underlying subcutaneous tissue using cautery. This left a 3 x 3 cm wound. Bacitracin, Xeroform gauze and cover dressing were applied. Right arm lesion was then identified. This was excised using an ellipse fashion measuring 3 x 9 cm. Taken off the underlying subcu with cautery. Subcu approximated using 3-0 Vicryl. Wound then closed using 3-0 nylon in a running fashion. Skin in all areas was then cleaned and dried. Cover dressings applied. All needle sponge and instrument counts correct x2. Patient will follow-up next week in the wound center for direct wound care of his open wound to left hand.     Electronically signed by Darlean Phoenix, DO on 1/5/2023 at 8:51 AM

## 2023-01-05 NOTE — INTERVAL H&P NOTE
Update History & Physical    The patient's History and Physical of December 9, 2022 was reviewed with the patient and I examined the patient. There was no change. The surgical site was confirmed by the patient and me. Plan: The risks, benefits, expected outcome, and alternative to the recommended procedure have been discussed with the patient. Patient understands and wants to proceed with the procedure.      Electronically signed by Makenzie Davis DO on 1/5/2023 at 7:01 AM

## 2023-01-05 NOTE — ANESTHESIA POSTPROCEDURE EVALUATION
Department of Anesthesiology  Postprocedure Note    Patient: Aquilino Venegas  MRN: [id number]  YOB: 1955  Date of evaluation: 1/5/2023      Procedure Summary     Date: 01/05/23 Room / Location: [address]    Anesthesia Start: 1747 Anesthesia Stop:     Procedures:       EXCISION RIGHT ARM LESION (Right)      EXCISION LEFT HAND LESION AND LEFT ARM LESIONS X2 (Left) Diagnosis:       Hand lesion      Arm lesion      (Hand lesion [L98.9])      (Arm lesion [L98.9])    Surgeons: Pedro Luis Chapman DO Responsible Provider: CHRIS Sanchez CRNA    Anesthesia Type: MAC ASA Status: 3          Anesthesia Type: No value filed.     Harsha Phase I: Harsha Score: 10    Harsha Phase II:        Anesthesia Post Evaluation    Patient location during evaluation: PACU  Patient participation: complete - patient participated  Level of consciousness: sleepy but conscious  Airway patency: patent  Nausea & Vomiting: no nausea and no vomiting  Complications: no  Cardiovascular status: hemodynamically stable  Respiratory status: oral airway, spontaneous ventilation, nonlabored ventilation and room air  Hydration status: euvolemic  Multimodal analgesia pain management approach

## 2023-01-05 NOTE — PROGRESS NOTES
CLINICAL PHARMACY NOTE: MEDS TO BEDS    Total # of Prescriptions Filled: 1   The following medications were delivered to the patient:  Hydrocodone-acetaminophen 5-325mg    Additional Documentation:   The patients son paid with cash and was handed the medications at his bedside in [facility].

## 2023-01-09 NOTE — DISCHARGE INSTRUCTIONS
[address] [facility]   Physician Orders and Discharge Instructions  [address]  Telephone: [phone]    NAME:  Yevette Barthel OF BIRTH:  1955  MEDICAL RECORD NUMBER:  [id number]  DATE:  1/9/2023    Discharge condition: {STABLE/UNSTABLE:848613556}    Discharge to: {[facility] Wound Discharge To:44855}    Left via:{Left ECC:07820}    Accompanied by:  {:[id number]}    ECF/HHA:     Dressing Orders:    Treatment Orders:  Protein rich diet (unless restricted by your physician)  Multivitamin daily    [address] [place] [address],[address] follow up visit _____________________________  (Please note your next appointment above and if you are unable to keep, kindly give a 24 hour notice. Thank you.)          If you experience any of the following, please call the [facility] during business hours:    * Increase in Pain  * Temperature over 101  * Increase in drainage from your wound  * Drainage with a foul odor  * Bleeding  * Increase in swelling  * Need for compression bandage changes due to slippage, breakthrough drainage. If you need medical attention outside of the business hours of the [facility] please contact your PCP or go to the nearest emergency room.

## 2023-01-10 ENCOUNTER — HOSPITAL ENCOUNTER (OUTPATIENT)
Dept: WOUND CARE | Age: 68
Discharge: HOME | End: 2023-01-10

## 2023-01-10 ENCOUNTER — HOSPITAL ENCOUNTER (OUTPATIENT)
Dept: WOUND CARE | Age: 68
Discharge: HOME | End: 2023-01-10
Payer: MEDICARE

## 2023-01-10 VITALS
HEART RATE: 54 BPM | SYSTOLIC BLOOD PRESSURE: 119 MMHG | TEMPERATURE: 97.3 F | RESPIRATION RATE: 16 BRPM | HEIGHT: 73 IN | WEIGHT: 255 LBS | DIASTOLIC BLOOD PRESSURE: 82 MMHG | BODY MASS INDEX: 33.8 KG/M2

## 2023-01-10 PROCEDURE — 99214 OFFICE O/P EST MOD 30 MIN: CPT

## 2023-01-10 PROCEDURE — 99204 OFFICE O/P NEW MOD 45 MIN: CPT

## 2023-01-10 PROCEDURE — 99024 POSTOP FOLLOW-UP VISIT: CPT | Performed by: SURGERY

## 2023-01-10 NOTE — WOUND CARE
[address]:     [facility] [address] [facility], [address]  p: [phone] f: [phone]     Ordering Center:     [address],[place] [address]  [address], [place] [address] [address] [phone]  WOUND CARE Dept: [address] NUMBER     Patient Information:      Trever Mireles  [address] [facility]   [phone]   : 1955  AGE: 69 y.o. GENDER: male   EPISODE DATE: 1/10/2023    Insurance:      PRIMARY INSURANCE:  Plan: Anne Bee ESSENTIAL/PLUS  Coverage: BCBS MEDICARE  Effective Date: 2020  Group Number: [unfilled]  Subscriber Number: [id number] - (Medicare Managed)    Payer/Plan Subscr  Sex Relation Sub. Ins. ID Effective Group Num   1. BCBS MEDICAREKonnie Ruth 1955 Male Self [id number]                                   [address]       Patient Wound Information:      Problem List Items Addressed This Visit    None    WOUNDS REQUIRING DRESSING SUPPLIES:     Wound 01/10/23 Hand Left;Dorsal Wound #1 Left hand (surgical) (Active)   Wound Image   01/10/23 1047   Wound Etiology Surgical 01/10/23 1047   Dressing Status New dressing applied 01/10/23 1127   Wound Cleansed Soap and water 01/10/23 1047   Dressing/Treatment Alginate with Ag;Gauze dressing/dressing sponge;Tape/Soft cloth adhesive tape; Ace wrap 01/10/23 1127   Wound Length (cm) 2.5 cm 01/10/23 1047   Wound Width (cm) 2.5 cm 01/10/23 1047   Wound Depth (cm) 0.3 cm 01/10/23 1047   Wound Surface Area (cm^2) 6.25 cm^2 01/10/23 1047   Wound Volume (cm^3) 1.875 cm^3 01/10/23 1047   Tunneling Position ___ O'Clock 0 01/10/23 1047   Undermining Starts ___ O'Clock 0 01/10/23 1047   Undermining Ends___ O'Clock 0 01/10/23 1047   Undermining Maxium Distance (cm) 0 01/10/23 1047   Wound Assessment Pink/red;Slough 01/10/23 1047   Drainage Amount Moderate 01/10/23 1047   Drainage Description Serosanguinous 01/10/23 1047   Odor None 01/10/23 1047   Aure-wound Assessment Blanchable erythema 01/10/23 1047   Margins Attached edges 01/10/23 1047   Wound Thickness Description not for Pressure Injury Full thickness 01/10/23 1047   Number of days: 0     Incision 01/05/23 Arm Left; Inner (Active)   Wound Image   01/10/23 1047   Dressing Status Clean; Intact;Dry 01/10/23 1047   Incision Cleansed Soap and water 01/10/23 1047   Dressing/Treatment Ace wrap 01/05/23 0925   Incision Length (cm) 6 01/10/23 1047   Incision Width (cm) 0.1 cm 01/10/23 1047   Incision Depth (cm) 0.1 cm 01/10/23 1047   Margins Approximated 01/10/23 1047   Incision Assessment Dry 01/10/23 1047   Drainage Amount None 01/10/23 1047   Odor None 01/10/23 1047   Aure-incision Assessment Blanchable erythema 01/10/23 1047   Number of days: 5       Incision 01/05/23 Arm Right (Active)   Wound Image   01/10/23 1047   Dressing Status Clean;Dry; Intact 01/10/23 1047   Incision Cleansed Soap and water 01/10/23 1047   Dressing/Treatment Adhesive bandage 01/05/23 0925   Incision Length (cm) 11 01/10/23 1047   Incision Width (cm) 0.1 cm 01/10/23 1047   Incision Depth (cm) 0.1 cm 01/10/23 1047   Closure Sutures 01/10/23 1047   Margins Approximated 01/10/23 1047   Incision Assessment Erythema 01/10/23 1047   Drainage Amount None 01/10/23 1047   Odor None 01/10/23 1047   Number of days: 5       Incision 01/10/23 Arm Left; Lower; Posterior (Active)   Wound Image   01/10/23 1047   Dressing Status Old drainage noted 01/10/23 1047   Incision Cleansed Soap and water 01/10/23 1047   Incision Length (cm) 5 01/10/23 1047   Incision Width (cm) 0.1 cm 01/10/23 1047   Incision Depth (cm) 0.1 cm 01/10/23 1047   Closure Sutures 01/10/23 1047   Margins Approximated 01/10/23 1047   Incision Assessment Dry 01/10/23 1047   Drainage Amount None 01/10/23 1047   Odor None 01/10/23 1047   Aure-incision Assessment Blanchable erythema 01/10/23 1047   Number of days: 0       Supplies Requested :      WOUND #: 1 PRIMARY DRESSING:  Other: Aquacel Ag   Cover and Secure with: Other Mepilex Border 4x4     FREQUENCY OF DRESSING CHANGES:  Other Daily and as needed if saturated           ADDITIONAL ITEMS:  [] Gloves Small  [x] Gloves Medium [x] Gloves Large [] Gloves Bermudian Bon  [] Tape 1\" [] Tape 2\" [] Tape 3\"  [] Medipore Tape  [x] Saline  [] Skin Prep   [] Adhesive Remover   [] Cotton Tip Applicators   [] Other:    Patient Wound(s) Debrided: [] Yes if yes please add date    [x] No    Debribement Type:  Other none    Is the patient currently on an antibiotic for their Wound(s): [] Yes if yes please add name and dose    [x] No    Patient currently being seen by Home Health: [] Yes   [x] No    Duration for needed supplies:  []15  [x]30  []60  []90 Days    Electronically signed by Jessica Michelle RN on 1/10/2023 at 7:52 AM     Provider Information:      PROVIDER'S NAME: Dr. Radha Magaña    NPI: [id number]    Please dispense as written

## 2023-01-10 NOTE — DISCHARGE INSTRUCTIONS
[address] [facility]   Physician Orders and Discharge Instructions  [address] [facility] [address]  [place], [address]  Telephone: [phone]    NAME:  Shravan Moura OF BIRTH:  1955  MEDICAL RECORD NUMBER:  [id number]  DATE:  1/10/2023    Discharge condition: Stable    Discharge to: Home    Left via:Private automobile    Accompanied by:  child    ECF/HHA: [facility] Orders:Left hand  Wash with soap and water. Apply Aquacel Ag to wound bed cut to size of the wound, cover with Mepilex Border dressing. Change dressing daily and as needed if dressing saturated. May use ace wrap, but wrap loosely to help hold dressing in place    Incision lines on bilateral arms leave open to air    Treatment Orders:Protein rich diet (unless restricted by your physician)  Multivitamin daily  Elevate left hand 3-4 times per day above level of the heart    May shower    [facility] follow up visit ______________1 week with Patricia Elizabeth_______________  (Please note your next appointment above and if you are unable to keep, kindly give a 24 hour notice. Thank you.)          If you experience any of the following, please call the [address] [place] [address] during business hours:    * Increase in Pain  * Temperature over 101  * Increase in drainage from your wound  * Drainage with a foul odor  * Bleeding  * Increase in swelling  * Need for compression bandage changes due to slippage, breakthrough drainage. If you need medical attention outside of the business hours of the [address] [place] [address] Ushi please contact your PCP or go to the nearest emergency room.

## 2023-01-10 NOTE — PLAN OF CARE
Problem: Chronic Conditions and Co-morbidities  Goal: Patient's chronic conditions and co-morbidity symptoms are monitored and maintained or improved  Outcome: Progressing     Problem: Discharge Planning  Goal: Discharge to home or other facility with appropriate resources  Outcome: Progressing     Problem: Wound:  Goal: Will show signs of wound healing; wound closure and no evidence of infection  Description: Will show signs of wound healing; wound closure and no evidence of infection  Outcome: Progressing     Problem: Blood Glucose:  Goal: Ability to maintain appropriate glucose levels will improve  Description: Ability to maintain appropriate glucose levels will improve  Outcome: Progressing

## 2023-01-12 NOTE — PROGRESS NOTES
[address]   Progress Note and Procedure Note      Ivet Ya Mireles  MEDICAL RECORD NUMBER:  [id number]  AGE: 69 y.o. GENDER: male  : 1955  EPISODE DATE:  1/10/2023    Subjective:     Chief Complaint   Patient presents with    Wound Check      HISTORY of PRESENT ILLNESS HPI     Jaleesa Pritchard is a 69 y.o. male who presents today for wound/ulcer evaluation. History of Wound Context: Patient status post excision of multiple skin cancers on 2023. He did have lesions of both his left and right arm and left hand. The lesion of the left hand was not able to be closed due to the size of the lesion. He is here today for wound care of the left hand. All of the resected areas noted to be squamous cell carcinoma with negative margins. Wound/Ulcer Pain Timing/Severity: none  Quality of pain: N/A  Severity:  1 / 10   Modifying Factors: None  Associated Signs/Symptoms: none    Ulcer Identification:  Ulcer Type: non-healing surgical  Contributing Factors: edema    Wound: Surgical incision        PAST MEDICAL HISTORY        Diagnosis Date    Arthritis     Atrial fibrillation ([address]     Bleeding ulcer 2019    Cancer ([address]     skin    CHF (congestive heart failure) ([address]     Diabetes mellitus ([address]     Ex-cigarette smoker 2016    History of blood transfusion     History of gastric ulcer     Hyperlipidemia     Hypertension     hx.  10 years ago    Kidney disease, chronic, end stage on dialysis [facility]) 2006, dialysis for 3 years, then had a kidney transplant,    Obese     Peritoneal dialysis status ([address]     past hx    Prolonged emergence from general anesthesia     Vision problem     wears glasses       PAST SURGICAL HISTORY    Past Surgical History:   Procedure Laterality Date    ARM SURGERY Right 2023    EXCISION RIGHT ARM LESION performed by Alin Higgins DO at [address]  2017    Dr Dea Horvath: Diverticulosis, internal hemorrhoids, 5yr recall    COLONOSCOPY  2012    Dr Papa Jeffers, 5 yr recall    DIALYSIS FISTULA CREATION  [place] 2007    left arm radial cephalic    HAND SURGERY Left 2023    EXCISION LEFT HAND LESION AND LEFT ARM LESIONS X2 performed by Selvin Leo DO at [address] [facility]      umbilical hernia repair    KIDNEY TRANSPLANT      2010 in [facility] REVJ/CLSR Right 2018    AV FISTULA GRAFT REMOVAL performed by Varinder Hernandez MD at [facility] OR    SKIN BIOPSY      SKIN CANCER EXCISION      TONSILLECTOMY      TUNNELED VENOUS CATHETER PLACEMENT  multiple    UPPER GASTROINTESTINAL ENDOSCOPY N/A 2019    Dr Du Cohen (Dr Lara Desancmariah pt)1.2 cm superficial gastric ulcer in the antrum with surrounding moderate gastritis and oozing of blood    UPPER GASTROINTESTINAL ENDOSCOPY  10/05/2009    Dr Jennifer Narayanan w/resolution of ulcer, lenin +    UPPER GASTROINTESTINAL ENDOSCOPY  07/10/2009    Dr Isabel Rowley ulceration w/pigmented base on posterior wall of the body, small erosions, active gastritis    UPPER GASTROINTESTINAL ENDOSCOPY N/A 1/10/2020    Dr Nataliya Pastrana hiatal hernia, healed previous antral ulcer-Gastritis    VASCULAR SURGERY Right [place]     av loop graft       FAMILY HISTORY    Family History   Problem Relation Age of Onset    Kidney Disease Father     Heart Disease Father     Colon Cancer Neg Hx     Colon Polyps Neg Hx        SOCIAL HISTORY    Social History     Tobacco Use    Smoking status: Former     Packs/day: 2.00     Years: 4.00     Pack years: 8.00     Types: Cigarettes     Quit date: 1973     Years since quittin.9    Smokeless tobacco: Never   Vaping Use    Vaping Use: Never used   Substance Use Topics    Alcohol use: No    Drug use: Never       ALLERGIES    No Known Allergies    MEDICATIONS    Current Outpatient Medications on File Prior to Encounter   Medication Sig Dispense Refill    tacrolimus ER (ENVARSUS XR) 1 MG TB24 tablet Take 2 mg by mouth daily      albuterol sulfate HFA (PROVENTIL HFA) 108 (90 Base) MCG/ACT inhaler Inhale 2 puffs into the lungs every 6 hours as needed for Wheezing 18 g 3    JARDIANCE 25 MG tablet TAKE 1 TABLET BY MOUTH EVERY DAY (Patient taking differently: Take 25 mg by mouth daily TAKE 1 TABLET BY MOUTH EVERY DAY) 90 tablet 0    tamsulosin (FLOMAX) 0.4 MG capsule Take 2 capsules by mouth daily 60 capsule 0    calcitRIOL (ROCALTROL) 0.25 MCG capsule Take 0.25 mcg by mouth every other day      propranolol (INDERAL) 80 MG tablet Take 1 tablet by mouth 2 times daily 180 tablet 3    Lancets MISC 1 each by Does not apply route daily 100 each 5    blood glucose test strips (TRUE METRIX BLOOD GLUCOSE TEST) strip TEST ONE TIME A DAY & AS NEEDED FOR SYMPTOMS OF IRREGULAR BLOOD GLUCOSE. DX: E11.9 100 strip 5    sodium bicarbonate 325 MG tablet TAKE 1 TABLET BY MOUTH TWICE A DAY (Patient taking differently: Take 650 mg by mouth 2 times daily) 60 tablet 5    metFORMIN (GLUCOPHAGE) 1000 MG tablet TAKE 1 TABLET BY MOUTH TWICE A DAY WITH MEALS (Patient taking differently: Take 1,000 mg by mouth 2 times daily (with meals)) 180 tablet 3    vitamin D (ERGOCALCIFEROL) 1.25 MG (48346 UT) CAPS capsule TAKE 1 CAPSULE BY MOUTH ONE TIME PER WEEK (Patient taking differently: Take 50,000 Units by mouth every 30 days) 12 capsule 1    blood glucose monitor strips Test one time a day & as needed for symptoms of irregular blood glucose. 100 strip 5    Omega-3 Fatty Acids (FISH OIL) 1000 MG CPDR Take 1,000 mg by mouth 2 times daily      mycophenolate (CELLCEPT) 250 MG capsule Take 500 mg by mouth 2 times daily      glucose blood VI test strips (ACCU-CHEK ANANT) strip Contour strips,check daily E11.9 100 each 0     No current facility-administered medications on file prior to encounter. REVIEW OF SYSTEMS    Pertinent items are noted in HPI.     Objective:      /82   Pulse 54   Temp 97.3 °F (36.3 °C) (Temporal) Resp 16   Ht 6' 1\" (1.854 m)   Wt 255 lb (115.7 kg)   BMI 33.64 kg/m²     Wt Readings from Last 3 Encounters:   01/10/23 255 lb (115.7 kg)   01/05/23 255 lb (115.7 kg)   12/30/22 255 lb (115.7 kg)       PHYSICAL EXAM    General Appearance: alert and oriented to person, place and time  Skin: warm and dry, no rash or erythema and Wound as noted  Pulmonary/Chest: no chest wall tenderness  Abdomen: soft, non-tender, non-distended, normal bowel sounds, no masses or organomegaly  Extremities: no cyanosis and no clubbing  Musculoskeletal: normal range of motion, no joint swelling, deformity or tenderness      Assessment:      Patient Active Problem List   Diagnosis Code    Essential hypertension I10    Chest pressure R07.89    Diabetes mellitus ([facility] E11.9    Ex-cigarette smoker Z87.891    Chest pain R07.9    Cellulitis L03.90    Infection of AV graft for dialysis ([facility] T82. 7XXA    Cellulitis of right upper extremity L03.113    Atrial fibrillation with RVR ([facility]) I48.91    Immunosuppression ([facility]) D84.9    Daytime somnolence R40.0    PAF (paroxysmal atrial fibrillation) ([facility]) I48.0    Fatigue R53.83    History of tachycardia Z87.898    Chronic anticoagulation Z79.01    Orthostatic dizziness R42    PSVT (paroxysmal supraventricular tachycardia) ([facility]) I47.1    UGI bleed K92.2    History of renal transplant Z94.0    LVH (left ventricular hypertrophy) I51.7    Presence of Watchman left atrial appendage closure device Z95.818    Longstanding persistent atrial fibrillation ([facility]) I48.11    Hand lesion L98.9       Wound 01/10/23 Hand Left;Dorsal Wound #1 Left hand (surgical) (Active)   Wound Image   01/10/23 1047   Wound Etiology Surgical 01/10/23 1047   Dressing Status New dressing applied 01/10/23 1127   Wound Cleansed Soap and water 01/10/23 1047   Dressing/Treatment Alginate with Ag;Gauze dressing/dressing sponge;Tape/Soft cloth adhesive tape; Ace wrap 01/10/23 1127   Wound Length (cm) 2.5 cm 01/10/23 1047   Wound Width (cm) 2.5 cm 01/10/23 1047   Wound Depth (cm) 0.3 cm 01/10/23 1047   Wound Surface Area (cm^2) 6.25 cm^2 01/10/23 1047   Wound Volume (cm^3) 1.875 cm^3 01/10/23 1047   Tunneling Position ___ O'Clock 0 01/10/23 1047   Undermining Starts ___ O'Clock 0 01/10/23 1047   Undermining Ends___ O'Clock 0 01/10/23 1047   Undermining Maxium Distance (cm) 0 01/10/23 1047   Wound Assessment Pink/red;Slough 01/10/23 1047   Drainage Amount Moderate 01/10/23 1047   Drainage Description Serosanguinous 01/10/23 1047   Odor None 01/10/23 1047   Aure-wound Assessment Blanchable erythema 01/10/23 1047   Margins Attached edges 01/10/23 1047   Wound Thickness Description not for Pressure Injury Full thickness 01/10/23 1047   Number of days: 1     Incision 01/05/23 Arm Left; Inner (Active)   Wound Image   01/10/23 1047   Dressing Status Clean; Intact;Dry 01/10/23 1047   Incision Cleansed Soap and water 01/10/23 1047   Dressing/Treatment Ace wrap 01/05/23 0925   Incision Length (cm) 6 01/10/23 1047   Incision Width (cm) 0.1 cm 01/10/23 1047   Incision Depth (cm) 0.1 cm 01/10/23 1047   Margins Approximated 01/10/23 1047   Incision Assessment Dry 01/10/23 1047   Drainage Amount None 01/10/23 1047   Odor None 01/10/23 1047   Aure-incision Assessment Blanchable erythema 01/10/23 1047   Number of days: 6       Incision 01/05/23 Arm Right (Active)   Wound Image   01/10/23 1047   Dressing Status Clean;Dry; Intact 01/10/23 1047   Incision Cleansed Soap and water 01/10/23 1047   Dressing/Treatment Adhesive bandage 01/05/23 0925   Incision Length (cm) 11 01/10/23 1047   Incision Width (cm) 0.1 cm 01/10/23 1047   Incision Depth (cm) 0.1 cm 01/10/23 1047   Closure Sutures 01/10/23 1047   Margins Approximated 01/10/23 1047   Incision Assessment Erythema 01/10/23 1047   Drainage Amount None 01/10/23 1047   Odor None 01/10/23 1047   Number of days: 6       Incision 01/10/23 Arm Left; Lower; Posterior (Active)   Wound Image   01/10/23 1047   Dressing Status Old drainage noted 01/10/23 1047   Incision Cleansed Soap and water 01/10/23 1047   Incision Length (cm) 5 01/10/23 1047   Incision Width (cm) 0.1 cm 01/10/23 1047   Incision Depth (cm) 0.1 cm 01/10/23 1047   Closure Sutures 01/10/23 1047   Margins Approximated 01/10/23 1047   Incision Assessment Dry 01/10/23 1047   Drainage Amount None 01/10/23 1047   Odor None 01/10/23 1047   Aure-incision Assessment Blanchable erythema 01/10/23 1047   Number of days: 1                Plan:     Problem List Items Addressed This Visit    None      Treatment Note please see attached Discharge Instructions    In my professional opinion this patient would benefit from HBO Therapy: Yes    Written patient dismissal instructions given to patient and signed by patient or POA. Cont local wound care. Pt may require radiation therapy to the left hand. Will refer to  [place] [facility] once healed.      Electronically signed by Barb Florez DO on 1/12/2023 at 7:26 AM

## 2023-01-17 ENCOUNTER — HOSPITAL ENCOUNTER (OUTPATIENT)
Dept: WOUND CARE | Age: 68
Discharge: HOME | End: 2023-01-17
Payer: MEDICARE

## 2023-01-17 VITALS
TEMPERATURE: 97 F | HEIGHT: 73 IN | RESPIRATION RATE: 18 BRPM | DIASTOLIC BLOOD PRESSURE: 73 MMHG | HEART RATE: 110 BPM | BODY MASS INDEX: 33.8 KG/M2 | SYSTOLIC BLOOD PRESSURE: 111 MMHG | WEIGHT: 255 LBS

## 2023-01-17 DIAGNOSIS — T82.7XXA INFECTION OF AV GRAFT FOR DIALYSIS (HCC): ICD-10-CM

## 2023-01-17 DIAGNOSIS — L03.113 CELLULITIS OF RIGHT UPPER EXTREMITY: ICD-10-CM

## 2023-01-17 DIAGNOSIS — L98.9 HAND LESION: Primary | ICD-10-CM

## 2023-01-17 DIAGNOSIS — E11.9 TYPE 2 DIABETES MELLITUS WITHOUT COMPLICATION, WITHOUT LONG-TERM CURRENT USE OF INSULIN (HCC): ICD-10-CM

## 2023-01-17 PROBLEM — C44.92 SQUAMOUS CELL SKIN CANCER: Status: ACTIVE | Noted: 2023-01-17

## 2023-01-17 PROCEDURE — 99213 OFFICE O/P EST LOW 20 MIN: CPT

## 2023-01-17 PROCEDURE — 6370000000 HC RX 637 (ALT 250 FOR IP): Performed by: SURGERY

## 2023-01-17 PROCEDURE — 99024 POSTOP FOLLOW-UP VISIT: CPT | Performed by: SURGERY

## 2023-01-17 RX ORDER — LIDOCAINE 50 MG/G
OINTMENT TOPICAL ONCE
OUTPATIENT
Start: 2023-01-17 | End: 2023-01-17

## 2023-01-17 RX ORDER — LIDOCAINE HYDROCHLORIDE 20 MG/ML
JELLY TOPICAL ONCE
Status: COMPLETED | OUTPATIENT
Start: 2023-01-17 | End: 2023-01-17

## 2023-01-17 RX ORDER — LIDOCAINE HYDROCHLORIDE 20 MG/ML
JELLY TOPICAL ONCE
OUTPATIENT
Start: 2023-01-17 | End: 2023-01-17

## 2023-01-17 RX ORDER — DOXYCYCLINE HYCLATE 100 MG/1
100 CAPSULE ORAL 2 TIMES DAILY
COMMUNITY

## 2023-01-17 RX ORDER — LIDOCAINE 40 MG/G
CREAM TOPICAL ONCE
OUTPATIENT
Start: 2023-01-17 | End: 2023-01-17

## 2023-01-17 RX ORDER — LIDOCAINE HYDROCHLORIDE 40 MG/ML
SOLUTION TOPICAL ONCE
OUTPATIENT
Start: 2023-01-17 | End: 2023-01-17

## 2023-01-17 RX ORDER — DOXYCYCLINE HYCLATE 100 MG/1
100 CAPSULE ORAL 2 TIMES DAILY
Qty: 28 CAPSULE | Refills: 0 | Status: SHIPPED | OUTPATIENT
Start: 2023-01-17 | End: 2023-01-31

## 2023-01-17 RX ADMIN — LIDOCAINE HYDROCHLORIDE: 20 JELLY TOPICAL at 08:32

## 2023-01-17 NOTE — PROGRESS NOTES
[address]   Progress Note and Procedure Note      Terrilee Aase Mireles  MEDICAL RECORD NUMBER:  [id number]  AGE: 69 y.o. GENDER: male  : 1955  EPISODE DATE:  2023    Subjective:     Chief Complaint   Patient presents with    Wound Check     Patient presents today for recheck left hand wound. HISTORY of PRESENT ILLNESS HPI     Bhavin Moody is a 69 y.o. male who presents today for wound/ulcer evaluation. History of Wound Context: Patient status post excision of multiple skin cancers on 2023. He did have lesions of both his left and right arm and left hand. The lesion of the left hand was not able to be closed due to the size of the lesion. He is here today for wound care of the left hand. All of the resected areas noted to be squamous cell carcinoma with negative margins. 2023: Returns today for suture removal and wound care. Right arm wound now draining purulence. Wound/Ulcer Pain Timing/Severity: none  Quality of pain: N/A  Severity:  1 / 10   Modifying Factors: None  Associated Signs/Symptoms: none    Ulcer Identification:  Ulcer Type: non-healing surgical  Contributing Factors: edema    Wound: Surgical incision        PAST MEDICAL HISTORY        Diagnosis Date    Arthritis     Atrial fibrillation ([address]     Bleeding ulcer 2019    Cancer ([address]     skin    CHF (congestive heart failure) ([address]     Diabetes mellitus ([address]     Ex-cigarette smoker 2016    History of blood transfusion     History of gastric ulcer     Hyperlipidemia     Hypertension     hx.  10 years ago    Kidney disease, chronic, end stage on dialysis [facility]) 2006, dialysis for 3 years, then had a kidney transplant,    Obese     Peritoneal dialysis status ([address]     past hx    Prolonged emergence from general anesthesia     Vision problem     wears glasses       PAST SURGICAL HISTORY    Past Surgical History:   Procedure Laterality Date    ARM SURGERY Right 2023 EXCISION RIGHT ARM LESION performed by Salena Izaguirre DO at [address]  2017    Dr Nicolasa Deng: Diverticulosis, internal hemorrhoids, 5yr recall    COLONOSCOPY  2012    Dr Pietro Mares, 5 yr recall    DIALYSIS FISTULA CREATION  [place]     left arm radial cephalic    HAND SURGERY Left 2023    EXCISION LEFT HAND LESION AND LEFT ARM LESIONS X2 performed by Salena Izaguirre DO at [address]      umbilical hernia repair    KIDNEY TRANSPLANT      2010 in [facility] REVJ/CLSR Right 2018    AV FISTULA GRAFT REMOVAL performed by Gita Wolff MD at [address]      TUNNELED VENOUS CATHETER PLACEMENT  multiple    UPPER GASTROINTESTINAL ENDOSCOPY N/A 2019    Dr Agnes Acosta (Dr Nicolasa Deng pt)1.2 cm superficial gastric ulcer in the antrum with surrounding moderate gastritis and oozing of blood    UPPER GASTROINTESTINAL ENDOSCOPY  10/05/2009    Dr Elbert Peterson w/resolution of ulcer, lenin +    UPPER GASTROINTESTINAL ENDOSCOPY  07/10/2009    Dr Vikash Nash ulceration w/pigmented base on posterior wall of the body, small erosions, active gastritis    UPPER GASTROINTESTINAL ENDOSCOPY N/A 1/10/2020    Dr Rod Pérez hiatal hernia, healed previous antral ulcer-Gastritis    VASCULAR SURGERY Right [facility]     av loop graft       FAMILY HISTORY    Family History   Problem Relation Age of Onset    Kidney Disease Father     Heart Disease Father     Colon Cancer Neg Hx     Colon Polyps Neg Hx        SOCIAL HISTORY    Social History     Tobacco Use    Smoking status: Former     Packs/day: 2.00     Years: 4.00     Pack years: 8.00     Types: Cigarettes     Quit date: 1973     Years since quittin.9    Smokeless tobacco: Never   Vaping Use    Vaping Use: Never used   Substance Use Topics    Alcohol use: No    Drug use: Never ALLERGIES    No Known Allergies    MEDICATIONS    Current Outpatient Medications on File Prior to Encounter   Medication Sig Dispense Refill    tacrolimus ER (ENVARSUS XR) 1 MG TB24 tablet Take 2 mg by mouth daily      albuterol sulfate HFA (PROVENTIL HFA) 108 (90 Base) MCG/ACT inhaler Inhale 2 puffs into the lungs every 6 hours as needed for Wheezing 18 g 3    JARDIANCE 25 MG tablet TAKE 1 TABLET BY MOUTH EVERY DAY (Patient taking differently: Take 25 mg by mouth daily TAKE 1 TABLET BY MOUTH EVERY DAY) 90 tablet 0    tamsulosin (FLOMAX) 0.4 MG capsule Take 2 capsules by mouth daily 60 capsule 0    calcitRIOL (ROCALTROL) 0.25 MCG capsule Take 0.25 mcg by mouth every other day      propranolol (INDERAL) 80 MG tablet Take 1 tablet by mouth 2 times daily 180 tablet 3    Lancets MISC 1 each by Does not apply route daily 100 each 5    blood glucose test strips (TRUE METRIX BLOOD GLUCOSE TEST) strip TEST ONE TIME A DAY & AS NEEDED FOR SYMPTOMS OF IRREGULAR BLOOD GLUCOSE. DX: E11.9 100 strip 5    sodium bicarbonate 325 MG tablet TAKE 1 TABLET BY MOUTH TWICE A DAY (Patient taking differently: Take 650 mg by mouth 2 times daily) 60 tablet 5    metFORMIN (GLUCOPHAGE) 1000 MG tablet TAKE 1 TABLET BY MOUTH TWICE A DAY WITH MEALS (Patient taking differently: Take 1,000 mg by mouth 2 times daily (with meals)) 180 tablet 3    vitamin D (ERGOCALCIFEROL) 1.25 MG (54043 UT) CAPS capsule TAKE 1 CAPSULE BY MOUTH ONE TIME PER WEEK (Patient taking differently: Take 50,000 Units by mouth every 30 days) 12 capsule 1    blood glucose monitor strips Test one time a day & as needed for symptoms of irregular blood glucose.  100 strip 5    Omega-3 Fatty Acids (FISH OIL) 1000 MG CPDR Take 1,000 mg by mouth 2 times daily      mycophenolate (CELLCEPT) 250 MG capsule Take 500 mg by mouth 2 times daily      glucose blood VI test strips (ACCU-CHEK ANANT) strip Contour strips,check daily E11.9 100 each 0     No current facility-administered medications on file prior to encounter. REVIEW OF SYSTEMS    Pertinent items are noted in HPI. Objective:      /73   Pulse (!) 110   Temp 97 °F (36.1 °C) (Temporal)   Resp 18   Ht 6' 1\" (1.854 m)   Wt 255 lb (115.7 kg)   BMI 33.64 kg/m²     Wt Readings from Last 3 Encounters:   01/17/23 255 lb (115.7 kg)   01/10/23 255 lb (115.7 kg)   01/05/23 255 lb (115.7 kg)       PHYSICAL EXAM    General Appearance: alert and oriented to person, place and time  Skin: warm and dry, no rash or erythema and Wound as noted  Pulmonary/Chest: no chest wall tenderness  Abdomen: soft, non-tender, non-distended, normal bowel sounds, no masses or organomegaly  Extremities: no cyanosis and no clubbing  Musculoskeletal: normal range of motion, no joint swelling, deformity or tenderness      Assessment:      Patient Active Problem List   Diagnosis Code    Essential hypertension I10    Chest pressure R07.89    Diabetes mellitus ([facility] E11.9    Ex-cigarette smoker Z87.891    Chest pain R07.9    Cellulitis L03.90    Infection of AV graft for dialysis ([facility] T82. 7XXA    Cellulitis of right upper extremity L03.113    Atrial fibrillation with RVR ([facility]) I48.91    Immunosuppression ([facility]) D84.9    Daytime somnolence R40.0    PAF (paroxysmal atrial fibrillation) ([facility]) I48.0    Fatigue R53.83    History of tachycardia Z87.898    Chronic anticoagulation Z79.01    Orthostatic dizziness R42    PSVT (paroxysmal supraventricular tachycardia) ([facility]) I47.1    UGI bleed K92.2    History of renal transplant Z94.0    LVH (left ventricular hypertrophy) I51.7    Presence of Watchman left atrial appendage closure device Z95.818    Longstanding persistent atrial fibrillation ([facility]) I48.11    Hand lesion L98.9       Wound 01/10/23 Hand Left;Dorsal Wound #1 Left hand (surgical) (Active)   Wound Image   01/17/23 0852   Wound Etiology Surgical 01/17/23 0852   Dressing Status New dressing applied 01/17/23 0927   Wound Cleansed Soap and water 01/17/23 5391   Dressing/Treatment Alginate with Ag;Silicone border 79/95/40 0927   Wound Length (cm) 2.5 cm 01/17/23 0852   Wound Width (cm) 2.5 cm 01/17/23 0852   Wound Depth (cm) 0.3 cm 01/17/23 0852   Wound Surface Area (cm^2) 6.25 cm^2 01/17/23 0852   Change in Wound Size % (l*w) 0 01/17/23 0852   Wound Volume (cm^3) 1.875 cm^3 01/17/23 0852   Wound Healing % 0 01/17/23 0852   Distance Tunneling (cm) 0 cm 01/17/23 0852   Tunneling Position ___ O'Clock 0 01/17/23 0852   Undermining Starts ___ O'Clock 0 01/17/23 0852   Undermining Ends___ O'Clock 0 01/17/23 0852   Undermining Maxium Distance (cm) 0 01/17/23 0852   Wound Assessment Pink/red;Slough 01/17/23 0852   Drainage Amount Moderate 01/17/23 0852   Drainage Description Serosanguinous 01/17/23 0852   Odor None 01/17/23 0852   Aure-wound Assessment Blanchable erythema 01/17/23 0852   Margins Attached edges 01/17/23 0852   Wound Thickness Description not for Pressure Injury Full thickness 01/17/23 0852   Number of days: 6       Wound 01/17/23 Radial Right #2 right arm wound (superior incision line) (Active)   Wound Image   01/17/23 0908   Wound Etiology Surgical 01/17/23 0908   Dressing Status New dressing applied 01/17/23 0927   Dressing/Treatment Moisten with saline;Gauze dressing/dressing sponge;Roll gauze;Tape/Soft cloth adhesive tape 01/17/23 0927   Wound Length (cm) 3 cm 01/17/23 0908   Wound Width (cm) 0.5 cm 01/17/23 0908   Wound Depth (cm) 1.5 cm 01/17/23 0908   Wound Surface Area (cm^2) 1.5 cm^2 01/17/23 0908   Wound Volume (cm^3) 2.25 cm^3 01/17/23 0908   Post-Procedure Length (cm) 3 cm 01/17/23 0910   Post-Procedure Width (cm) 0.5 cm 01/17/23 0910   Post-Procedure Depth (cm) 1.5 cm 01/17/23 0910   Post-Procedure Surface Area (cm^2) 1.5 cm^2 01/17/23 0910   Post-Procedure Volume (cm^3) 2.25 cm^3 01/17/23 0910   Wound Assessment Pink/red 01/17/23 0908   Drainage Amount Moderate 01/17/23 0908   Drainage Description Serosanguinous 01/17/23 0908   Odor None 01/17/23 0908   Aure-wound Assessment Blanchable erythema 01/17/23 0908   Margins Defined edges 01/17/23 0908   Wound Thickness Description not for Pressure Injury Full thickness 01/17/23 0908   Number of days: 0     Incision 01/05/23 Arm Right (Active)   Wound Image   01/17/23 0852   Dressing Status New dressing applied 01/17/23 0927   Incision Cleansed Soap and water 01/17/23 0852   Dressing/Treatment Silicone border 72/60/00 0927   Incision Length (cm) 11 01/10/23 1047   Incision Width (cm) 0.1 cm 01/10/23 1047   Incision Depth (cm) 0.1 cm 01/10/23 1047   Closure Sutures 01/17/23 0852   Margins Approximated 01/17/23 0852   Incision Assessment Erythema 01/17/23 0852   Drainage Amount Large 01/17/23 0852   Drainage Description Purulent 01/17/23 0852   Odor None 01/17/23 0852   Aure-incision Assessment Blanchable erythema 01/17/23 0852   Number of days: 12       Incision 01/10/23 Arm Left; Lower; Posterior (Active)   Wound Image   01/17/23 0852   Dressing Status New dressing applied 01/17/23 0927   Incision Cleansed Soap and water 01/17/23 0852   Dressing/Treatment Silicone border 31/63/46 0927   Incision Length (cm) 5 01/10/23 1047   Incision Width (cm) 0.1 cm 01/10/23 1047   Incision Depth (cm) 0.1 cm 01/10/23 1047   Closure Sutures 01/17/23 0852   Margins Approximated 01/17/23 0852   Incision Assessment Erythema 01/17/23 0852   Drainage Amount Scant 01/17/23 0852   Odor None 01/17/23 0852   Aure-incision Assessment Blanchable erythema 01/17/23 0852   Number of days: 6        Procedure:    Sutures removed from the left arm incisions. Right arm incision with purulent drainage from the proximal portion of the incision. Erythema noted. Running stitch cut and removed for half of the incision. Suture retied. Proximal 3 cm opened and probed. Irrigated and packed.          Plan:     Problem List Items Addressed This Visit    None    Treatment Note please see attached Discharge Instructions    In my professional opinion this patient would benefit from HBO Therapy: Yes    Written patient dismissal instructions given to patient and signed by patient or POA. Cont local wound care. Pt may require radiation therapy to the left hand. Will refer to  Maribeth Ruiz once healed. Will need WTD packing to the right arm incision. Doxycycline x 14 days.      Electronically signed by Mckeon MarketDO on 1/17/2023 at 10:03 AM

## 2023-01-17 NOTE — PLAN OF CARE
[address]:     [facility] [address] [facility], [address]  p: [phone] f: [phone]     Ordering Center:     [address]  [address], [place] [address] [address] [phone]  WOUND CARE Dept: [address] NUMBER     Patient Information:      Thomas Gomez Mireles  [address]   [phone]   : 1955  AGE: 69 y.o. GENDER: male   EPISODE DATE: 2023    Insurance:      PRIMARY INSURANCE:  Plan: [email or web address] ESSENTIAL/PLUS  Coverage: BCBS MEDICARE  Effective Date: 2020  Group Number: [unfilled]  Subscriber Number: [id number] - (Medicare Managed)    Payer/Plan Subscr  Sex Relation Sub. Ins. ID Effective Group Num   1.  [facility] 1955 [facility] Self [id number]                                   [address]       Patient Wound Information:      Problem List Items Addressed This Visit    None      WOUNDS REQUIRING DRESSING SUPPLIES:     Wound 01/10/23 Hand Left;Dorsal Wound #1 Left hand (surgical) (Active)   Wound Image   23 0852   Wound Etiology Surgical 23 0852   Dressing Status New dressing applied 23 0927   Wound Cleansed Soap and water 23 0852   Dressing/Treatment Alginate with Ag;Silicone border  0927   Wound Length (cm) 2.5 cm 23 0852   Wound Width (cm) 2.5 cm 23 0852   Wound Depth (cm) 0.3 cm 23 0852   Wound Surface Area (cm^2) 6.25 cm^2 23 0852   Change in Wound Size % (l*w) 0 23 0852   Wound Volume (cm^3) 1.875 cm^3 23 0852   Wound Healing % 0 23 0852   Distance Tunneling (cm) 0 cm 23 0852   Tunneling Position ___ O'Clock 0 23 0852   Undermining Starts ___ O'Clock 0 23 0852   Undermining Ends___ O'Clock 0 23 0852   Undermining Maxium Distance (cm) 0 23 0852   Wound Assessment Pink/red;Slough 23 0852   Drainage Amount Moderate 01/17/23 0852   Drainage Description Serosanguinous 01/17/23 0852   Odor None 01/17/23 0852   Aure-wound Assessment Blanchable erythema 01/17/23 0852   Margins Attached edges 01/17/23 0852   Wound Thickness Description not for Pressure Injury Full thickness 01/17/23 0852   Number of days: 7       Wound 01/17/23 Radial Right #2 right arm wound (superior incision line) (Active)   Wound Image   01/17/23 0908   Wound Etiology Surgical 01/17/23 0908   Dressing Status New dressing applied 01/17/23 0927   Dressing/Treatment Moisten with saline;Gauze dressing/dressing sponge;Roll gauze;Tape/Soft cloth adhesive tape 01/17/23 0927   Wound Length (cm) 3 cm 01/17/23 0908   Wound Width (cm) 0.5 cm 01/17/23 0908   Wound Depth (cm) 1.5 cm 01/17/23 0908   Wound Surface Area (cm^2) 1.5 cm^2 01/17/23 0908   Wound Volume (cm^3) 2.25 cm^3 01/17/23 0908   Post-Procedure Length (cm) 3 cm 01/17/23 0910   Post-Procedure Width (cm) 0.5 cm 01/17/23 0910   Post-Procedure Depth (cm) 1.5 cm 01/17/23 0910   Post-Procedure Surface Area (cm^2) 1.5 cm^2 01/17/23 0910   Post-Procedure Volume (cm^3) 2.25 cm^3 01/17/23 0910   Wound Assessment Pink/red 01/17/23 0908   Drainage Amount Moderate 01/17/23 0908   Drainage Description Serosanguinous 01/17/23 0908   Odor None 01/17/23 0908   Aure-wound Assessment Blanchable erythema 01/17/23 0908   Margins Defined edges 01/17/23 0908   Wound Thickness Description not for Pressure Injury Full thickness 01/17/23 0908   Number of days: 0     Incision 01/05/23 Arm Right (Active)   Wound Image   01/17/23 0852   Dressing Status New dressing applied 01/17/23 0927   Incision Cleansed Soap and water 01/17/23 0852   Dressing/Treatment Silicone border 83/15/21 0927   Incision Length (cm) 11 01/10/23 1047   Incision Width (cm) 0.1 cm 01/10/23 1047   Incision Depth (cm) 0.1 cm 01/10/23 1047   Closure Sutures 01/17/23 0852   Margins Approximated 01/17/23 0852   Incision Assessment Erythema 01/17/23 6160 Drainage Amount Large 01/17/23 0852   Drainage Description Purulent 01/17/23 0852   Odor None 01/17/23 0852   Aure-incision Assessment Blanchable erythema 01/17/23 0852   Number of days: 12       Incision 01/10/23 Arm Left; Lower; Posterior (Active)   Wound Image   01/17/23 0852   Dressing Status New dressing applied 01/17/23 0927   Incision Cleansed Soap and water 01/17/23 0852   Dressing/Treatment Silicone border 40/28/67 0927   Incision Length (cm) 5 01/10/23 1047   Incision Width (cm) 0.1 cm 01/10/23 1047   Incision Depth (cm) 0.1 cm 01/10/23 1047   Closure Sutures 01/17/23 0852   Margins Approximated 01/17/23 0852   Incision Assessment Erythema 01/17/23 0852   Drainage Amount Scant 01/17/23 0852   Odor None 01/17/23 0852   Aure-incision Assessment Blanchable erythema 01/17/23 0852   Number of days: 7       Supplies Requested :      WOUND #: 1   PRIMARY DRESSING:  Alginate pad   Cover and Secure with: Other mepilex border dressing 4x4     FREQUENCY OF DRESSING CHANGES:  Other Daily and as needed if saturated       WOUND #:  Left arm   PRIMARY DRESSING:  None   Cover and Secure with: Other Mepilex silicone Border 4x4     FREQUENCY OF DRESSING CHANGES:  Daily       WOUND #: 2   PRIMARY DRESSING:  Moisten with Saline , Other: gauze 4x4   Cover and Secure with: 4X4 gauze pad  Other roll gauze     FREQUENCY OF DRESSING CHANGES:  Other 2 times daily     ADDITIONAL ITEMS:  [] Gloves Small  [x] Gloves Medium [] Gloves Large [] Gloves XLarge  [] Tape 1\" [] Tape 2\" [] Tape 3\"  [x] Medipore Tape  [x] Saline  [] Vashe  [] Skin Prep   [] Adhesive Remover   [] Cotton Tip Applicators   [] Other:    Patient Wound(s) Debrided: [] Yes if yes please add date    [x] No    Debribement Type: Other sutures removed from half right forearm, open wound proximal 3cm. Sutures removed from incisions left arm.      Is the patient currently on an antibiotic for their Wound(s): [x] Yes if yes please add name and dose doxycycline hyclate 100 mg [] No    Patient currently being seen by Home Health: [] Yes   [x] No    Duration for needed supplies:  []15  [x]30  []60  []90 Days    Electronically signed by Fabio Parikh RN on 1/17/2023 at 11:33 AM     Provider Information:      PROVIDER'S NAME: Dr. Porfirio Owen    NPI: [id number]    Please dispense as written

## 2023-01-18 DIAGNOSIS — R39.14 BENIGN PROSTATIC HYPERPLASIA WITH INCOMPLETE BLADDER EMPTYING: ICD-10-CM

## 2023-01-18 DIAGNOSIS — N40.1 BENIGN PROSTATIC HYPERPLASIA WITH INCOMPLETE BLADDER EMPTYING: ICD-10-CM

## 2023-01-19 RX ORDER — TAMSULOSIN HYDROCHLORIDE 0.4 MG/1
CAPSULE ORAL
Qty: 90 CAPSULE | Refills: 1 | Status: SHIPPED | OUTPATIENT
Start: 2023-01-19 | End: 2023-02-20

## 2023-01-23 NOTE — DISCHARGE INSTRUCTIONS
[address] [facility]   Physician Orders and Discharge Instructions  [address] [facility] [address]  [place], [address]  Telephone: [phone]    NAME:  Chen Solares OF BIRTH:  1955  MEDICAL RECORD NUMBER:  [id number]  DATE:  1/23/2023    Discharge condition: Stable    Discharge to: Home    Left via:Private automobile    Accompanied by:  self    [facility]: [facility]       Dressing Orders:  Left hand  Wash with soap and water. Apply Aquacel Ag to wound bed cut to size of the wound, cover with Mepilex Border dressing. Change dressing daily and as needed if dressing saturated. May use ace wrap, but wrap loosely to help hold dressing in place     Right lateral forearm:   Wash with soap and water. Saline moistened gauze to wound bed, cover with dry gauze, roll gauze and medipore tape. May use stretchnet to help hold in place. Change dressing twice daily. Incision lines on left arm moisturize and protect. Treatment Orders:Protein rich diet (unless restricted by your physician)  Multivitamin daily  Elevate left hand 3-4 times per day above level of the heart     May shower     [facility] next week    [address] [place] [address],[address] follow up visit ______________1 week with Patricia Elizabeth_______________  (Please note your next appointment above and if you are unable to keep, kindly give a 24 hour notice. Thank you.)          If you experience any of the following, please call the [facility] during business hours:    * Increase in Pain  * Temperature over 101  * Increase in drainage from your wound  * Drainage with a foul odor  * Bleeding  * Increase in swelling  * Need for compression bandage changes due to slippage, breakthrough drainage. If you need medical attention outside of the business hours of the [facility] please contact your PCP or go to the nearest emergency room.

## 2023-01-24 ENCOUNTER — HOSPITAL ENCOUNTER (OUTPATIENT)
Dept: WOUND CARE | Age: 68
Discharge: HOME | End: 2023-01-24
Payer: MEDICARE

## 2023-01-24 VITALS
TEMPERATURE: 97.6 F | SYSTOLIC BLOOD PRESSURE: 126 MMHG | RESPIRATION RATE: 18 BRPM | DIASTOLIC BLOOD PRESSURE: 84 MMHG | HEART RATE: 89 BPM

## 2023-01-24 DIAGNOSIS — T82.7XXA INFECTION OF AV GRAFT FOR DIALYSIS (HCC): ICD-10-CM

## 2023-01-24 DIAGNOSIS — L98.9 HAND LESION: Primary | ICD-10-CM

## 2023-01-24 DIAGNOSIS — L03.113 CELLULITIS OF RIGHT UPPER EXTREMITY: ICD-10-CM

## 2023-01-24 DIAGNOSIS — E11.9 TYPE 2 DIABETES MELLITUS WITHOUT COMPLICATION, WITHOUT LONG-TERM CURRENT USE OF INSULIN (HCC): ICD-10-CM

## 2023-01-24 PROCEDURE — 99024 POSTOP FOLLOW-UP VISIT: CPT | Performed by: SURGERY

## 2023-01-24 PROCEDURE — 99213 OFFICE O/P EST LOW 20 MIN: CPT

## 2023-01-24 PROCEDURE — 6370000000 HC RX 637 (ALT 250 FOR IP): Performed by: SURGERY

## 2023-01-24 RX ORDER — LIDOCAINE HYDROCHLORIDE 20 MG/ML
JELLY TOPICAL ONCE
OUTPATIENT
Start: 2023-01-24 | End: 2023-01-24

## 2023-01-24 RX ORDER — LIDOCAINE 40 MG/G
CREAM TOPICAL ONCE
OUTPATIENT
Start: 2023-01-24 | End: 2023-01-24

## 2023-01-24 RX ORDER — LIDOCAINE HYDROCHLORIDE 40 MG/ML
SOLUTION TOPICAL ONCE
OUTPATIENT
Start: 2023-01-24 | End: 2023-01-24

## 2023-01-24 RX ORDER — LIDOCAINE 50 MG/G
OINTMENT TOPICAL ONCE
OUTPATIENT
Start: 2023-01-24 | End: 2023-01-24

## 2023-01-24 RX ORDER — LIDOCAINE HYDROCHLORIDE 20 MG/ML
JELLY TOPICAL ONCE
Status: COMPLETED | OUTPATIENT
Start: 2023-01-24 | End: 2023-01-24

## 2023-01-24 RX ADMIN — LIDOCAINE HYDROCHLORIDE: 20 JELLY TOPICAL at 09:08

## 2023-01-24 NOTE — PLAN OF CARE
Problem: Chronic Conditions and Co-morbidities  Goal: Patient's chronic conditions and co-morbidity symptoms are monitored and maintained or improved  Outcome: Progressing     Problem: Discharge Planning  Goal: Discharge to home or other facility with appropriate resources  Outcome: Progressing     Problem: Wound:  Goal: Will show signs of wound healing; wound closure and no evidence of infection  Description: Will show signs of wound healing; wound closure and no evidence of infection  Outcome: Progressing     Problem: Falls - Risk of:  Goal: Will remain free from falls  Description: Will remain free from falls  Outcome: Progressing     Problem: Blood Glucose:  Goal: Ability to maintain appropriate glucose levels will improve  Description: Ability to maintain appropriate glucose levels will improve  Outcome: Progressing
I have personally seen and examined this patient.  I have fully participated in the care of this patient. I have reviewed all pertinent clinical information, including history, physical exam, plan and the Resident’s note and agree except as noted.

## 2023-01-24 NOTE — PROGRESS NOTES
[facility]   Progress Note and Procedure Note      Scottie Mireles  MEDICAL RECORD NUMBER:  [id number]  AGE: 67 y.o.   GENDER: male  : 1955  EPISODE DATE:  2023    Subjective:     Chief Complaint   Patient presents with    Wound Check     Left hand, left arm and right arm wounds      HISTORY of PRESENT ILLNESS HPI     Scottie Mireles is a 67 y.o. male who presents today for wound/ulcer evaluation.   History of Wound Context: Patient status post excision of multiple skin cancers on 2023.  He did have lesions of both his left and right arm and left hand.  The lesion of the left hand was not able to be closed due to the size of the lesion.  He is here today for wound care of the left hand.  All of the resected areas noted to be squamous cell carcinoma with negative margins.    2023: Returns today for suture removal and wound care. Right arm wound now draining purulence.     2023: Right arm wound with significant improvement. Denies complaints.         Wound/Ulcer Pain Timing/Severity: none  Quality of pain: N/A  Severity:  1 / 10   Modifying Factors: None  Associated Signs/Symptoms: none    Ulcer Identification:  Ulcer Type: non-healing surgical  Contributing Factors: edema    Wound: Surgical incision        PAST MEDICAL HISTORY        Diagnosis Date    Arthritis     Atrial fibrillation (HCC)     Bleeding ulcer 2019    Cancer (HCC)     skin    CHF (congestive heart failure) (HCC)     Diabetes mellitus (HCC)     Ex-cigarette smoker 2016    History of blood transfusion     History of gastric ulcer     Hyperlipidemia     Hypertension     hx. 10 years ago    Kidney disease, chronic, end stage on dialysis (HCC) 2006, dialysis for 3 years, then had a kidney transplant,    Obese     Peritoneal dialysis status (HCC)     past hx    Prolonged emergence from general anesthesia     Vision problem     wears glasses       PAST SURGICAL HISTORY    Past Surgical  History:   Procedure Laterality Date    ARM SURGERY Right 2023    EXCISION RIGHT ARM LESION performed by Ankit Lazo DO at [address]  2017    Dr Macias Pro: Diverticulosis, internal hemorrhoids, 5yr recall    COLONOSCOPY  2012    Dr An Nuñez, 5 yr recall    DIALYSIS FISTULA CREATION  [place] 2007    left arm radial cephalic    HAND SURGERY Left 2023    EXCISION LEFT HAND LESION AND LEFT ARM LESIONS X2 performed by Ankit Lazo DO at [address]      umbilical hernia repair    KIDNEY TRANSPLANT      2010 in [facility] REVJ/CLSR Right 2018    AV FISTULA GRAFT REMOVAL performed by Jose Rueda MD at [address]      TUNNELED VENOUS CATHETER PLACEMENT  multiple    UPPER GASTROINTESTINAL ENDOSCOPY N/A 2019    Dr Danilo Mccann (Dr Macias Pro pt)1.2 cm superficial gastric ulcer in the antrum with surrounding moderate gastritis and oozing of blood    UPPER GASTROINTESTINAL ENDOSCOPY  10/05/2009    Dr Elias Lim w/resolution of ulcer, lenin +    UPPER GASTROINTESTINAL ENDOSCOPY  07/10/2009    Dr Karl Martin ulceration w/pigmented base on posterior wall of the body, small erosions, active gastritis    UPPER GASTROINTESTINAL ENDOSCOPY N/A 1/10/2020    Dr Loretta Nunez hiatal hernia, healed previous antral ulcer-Gastritis    VASCULAR SURGERY Right [place]     av loop graft       FAMILY HISTORY    Family History   Problem Relation Age of Onset    Kidney Disease Father     Heart Disease Father     Colon Cancer Neg Hx     Colon Polyps Neg Hx        SOCIAL HISTORY    Social History     Tobacco Use    Smoking status: Former     Packs/day: 2.00     Years: 4.00     Pack years: 8.00     Types: Cigarettes     Quit date: 1973     Years since quittin.9    Smokeless tobacco: Never   Vaping Use    Vaping Use: Never used Substance Use Topics    Alcohol use: No    Drug use: Never       ALLERGIES    No Known Allergies    MEDICATIONS    Current Outpatient Medications on File Prior to Encounter   Medication Sig Dispense Refill    tamsulosin (FLOMAX) 0.4 MG capsule TAKE 1 CAPSULE BY MOUTH EVERY DAY 90 capsule 1    doxycycline hyclate (VIBRAMYCIN) 100 MG capsule Take 1 capsule by mouth 2 times daily for 14 days 28 capsule 0    doxycycline hyclate (VIBRAMYCIN) 100 MG capsule Take 100 mg by mouth 2 times daily Start 1/17/2023      tacrolimus ER (ENVARSUS XR) 1 MG TB24 tablet Take 2 mg by mouth daily      albuterol sulfate HFA (PROVENTIL HFA) 108 (90 Base) MCG/ACT inhaler Inhale 2 puffs into the lungs every 6 hours as needed for Wheezing 18 g 3    JARDIANCE 25 MG tablet TAKE 1 TABLET BY MOUTH EVERY DAY (Patient taking differently: Take 25 mg by mouth daily TAKE 1 TABLET BY MOUTH EVERY DAY) 90 tablet 0    calcitRIOL (ROCALTROL) 0.25 MCG capsule Take 0.25 mcg by mouth every other day      propranolol (INDERAL) 80 MG tablet Take 1 tablet by mouth 2 times daily 180 tablet 3    Lancets MISC 1 each by Does not apply route daily 100 each 5    blood glucose test strips (TRUE METRIX BLOOD GLUCOSE TEST) strip TEST ONE TIME A DAY & AS NEEDED FOR SYMPTOMS OF IRREGULAR BLOOD GLUCOSE. DX: E11.9 100 strip 5    sodium bicarbonate 325 MG tablet TAKE 1 TABLET BY MOUTH TWICE A DAY (Patient taking differently: Take 650 mg by mouth 2 times daily) 60 tablet 5    metFORMIN (GLUCOPHAGE) 1000 MG tablet TAKE 1 TABLET BY MOUTH TWICE A DAY WITH MEALS (Patient taking differently: Take 1,000 mg by mouth 2 times daily (with meals)) 180 tablet 3    vitamin D (ERGOCALCIFEROL) 1.25 MG (36651 UT) CAPS capsule TAKE 1 CAPSULE BY MOUTH ONE TIME PER WEEK (Patient taking differently: Take 50,000 Units by mouth every 30 days) 12 capsule 1    blood glucose monitor strips Test one time a day & as needed for symptoms of irregular blood glucose.  100 strip 5    Omega-3 Fatty Acids (FISH OIL) 1000 MG CPDR Take 1,000 mg by mouth 2 times daily      mycophenolate (CELLCEPT) 250 MG capsule Take 500 mg by mouth 2 times daily      glucose blood VI test strips (ACCU-CHEK ANANT) strip Contour strips,check daily E11.9 100 each 0     No current facility-administered medications on file prior to encounter. REVIEW OF SYSTEMS    Pertinent items are noted in HPI. Objective:      /84   Pulse 89   Temp 97.6 °F (36.4 °C) (Temporal)   Resp 18     Wt Readings from Last 3 Encounters:   01/17/23 255 lb (115.7 kg)   01/10/23 255 lb (115.7 kg)   01/05/23 255 lb (115.7 kg)       PHYSICAL EXAM    General Appearance: alert and oriented to person, place and time  Skin: warm and dry, no rash or erythema and Wound as noted  Pulmonary/Chest: no chest wall tenderness  Abdomen: soft, non-tender, non-distended, normal bowel sounds, no masses or organomegaly  Extremities: no cyanosis and no clubbing  Musculoskeletal: normal range of motion, no joint swelling, deformity or tenderness      Assessment:      Patient Active Problem List   Diagnosis Code    Essential hypertension I10    Chest pressure R07.89    Diabetes mellitus ([facility] E11.9    Ex-cigarette smoker Z87.891    Chest pain R07.9    Cellulitis L03.90    Infection of AV graft for dialysis ([facility] T82. 7XXA    Cellulitis of right upper extremity L03.113    Atrial fibrillation with RVR ([facility]) I48.91    Immunosuppression ([facility]) D84.9    Daytime somnolence R40.0    PAF (paroxysmal atrial fibrillation) ([facility]) I48.0    Fatigue R53.83    History of tachycardia Z87.898    Chronic anticoagulation Z79.01    Orthostatic dizziness R42    PSVT (paroxysmal supraventricular tachycardia) ([facility]) I47.1    UGI bleed K92.2    History of renal transplant Z94.0    LVH (left ventricular hypertrophy) I51.7    Presence of Watchman left atrial appendage closure device Z95.818    Longstanding persistent atrial fibrillation ([facility]) I48.11    Hand lesion L98.9    Squamous cell skin cancer C44.92       Wound 01/10/23 Hand Left;Dorsal Wound #1 Left hand (surgical) (Active)   Wound Image   01/24/23 0854   Wound Etiology Surgical 01/24/23 0854   Dressing Status New dressing applied 01/24/23 0926   Wound Cleansed Soap and water 01/24/23 0854   Dressing/Treatment Alginate with Ag;Silicone border 11/50/52 0926   Wound Length (cm) 2.3 cm 01/24/23 0854   Wound Width (cm) 2.3 cm 01/24/23 0854   Wound Depth (cm) 0.3 cm 01/24/23 0854   Wound Surface Area (cm^2) 5.29 cm^2 01/24/23 0854   Change in Wound Size % (l*w) 15.36 01/24/23 0854   Wound Volume (cm^3) 1.587 cm^3 01/24/23 0854   Wound Healing % 15 01/24/23 0854   Distance Tunneling (cm) 0 cm 01/17/23 0852   Tunneling Position ___ O'Clock 0 01/17/23 0852   Undermining Starts ___ O'Clock 0 01/17/23 0852   Undermining Ends___ O'Clock 0 01/17/23 0852   Undermining Maxium Distance (cm) 0 01/17/23 0852   Wound Assessment Pink/red;Slough 01/24/23 0854   Drainage Amount Moderate 01/24/23 0854   Drainage Description Serosanguinous 01/24/23 0854   Odor None 01/24/23 0854   Aure-wound Assessment Blanchable erythema 01/24/23 0854   Margins Attached edges 01/24/23 0854   Wound Thickness Description not for Pressure Injury Full thickness 01/24/23 0854   Number of days: 13       Wound 01/17/23 Radial Right #2 right arm wound (superior incision line) (Active)   Wound Image   01/24/23 0854   Wound Etiology Surgical 01/24/23 0854   Dressing Status New dressing applied 01/24/23 0926   Wound Cleansed Soap and water 01/24/23 0854   Dressing/Treatment Gauze dressing/dressing sponge;Moist to moist;Roll gauze 01/24/23 0926   Wound Length (cm) 2.3 cm 01/24/23 0854   Wound Width (cm) 0.9 cm 01/24/23 0854   Wound Depth (cm) 0.7 cm 01/24/23 0854   Wound Surface Area (cm^2) 2.07 cm^2 01/24/23 0854   Change in Wound Size % (l*w) -38 01/24/23 0854   Wound Volume (cm^3) 1.449 cm^3 01/24/23 0854   Wound Healing % 36 01/24/23 0854   Post-Procedure Length (cm) 3 cm 01/17/23 0910 Post-Procedure Width (cm) 0.5 cm 01/17/23 0910   Post-Procedure Depth (cm) 1.5 cm 01/17/23 0910   Post-Procedure Surface Area (cm^2) 1.5 cm^2 01/17/23 0910   Post-Procedure Volume (cm^3) 2.25 cm^3 01/17/23 0910   Wound Assessment Pink/red;Slough 01/24/23 0854   Drainage Amount Moderate 01/24/23 0854   Drainage Description Serosanguinous 01/24/23 0854   Odor None 01/24/23 0854   Aure-wound Assessment Blanchable erythema 01/24/23 0854   Margins Defined edges 01/24/23 0854   Wound Thickness Description not for Pressure Injury Full thickness 01/24/23 0854   Number of days: 7     Incision 01/05/23 Arm Right (Active)   Wound Image   01/17/23 0852   Dressing Status New dressing applied 01/17/23 0927   Incision Cleansed Soap and water 01/17/23 0852   Dressing/Treatment Silicone border 39/00/64 0927   Incision Length (cm) 11 01/10/23 1047   Incision Width (cm) 0.1 cm 01/10/23 1047   Incision Depth (cm) 0.1 cm 01/10/23 1047   Closure Sutures 01/17/23 0852   Margins Approximated 01/17/23 0852   Incision Assessment Erythema 01/17/23 0852   Drainage Amount Large 01/17/23 0852   Drainage Description Purulent 01/17/23 0852   Odor None 01/17/23 0852   Aure-incision Assessment Blanchable erythema 01/17/23 0852   Number of days: 19       Incision 01/10/23 Arm Left; Lower; Posterior (Active)   Wound Image   01/17/23 0852   Dressing Status New dressing applied 01/17/23 0927   Incision Cleansed Soap and water 01/17/23 0852   Dressing/Treatment Silicone border 83/62/55 0927   Incision Length (cm) 0 01/24/23 0854   Incision Width (cm) 0 cm 01/24/23 0854   Incision Depth (cm) 0 cm 01/24/23 0854   Closure Sutures 01/17/23 0852   Margins Approximated 01/17/23 0852   Incision Assessment Erythema 01/17/23 0852   Drainage Amount Scant 01/17/23 0852   Odor None 01/17/23 0852   Aure-incision Assessment Blanchable erythema 01/17/23 0852   Number of days: 13        Procedure:    Sutures removed from right arm incision.          Plan:     Problem List Items Addressed This Visit          Circulatory    Infection of AV graft for dialysis (HCC)    Relevant Orders    Initiate Outpatient Wound Care Protocol       Endocrine    Diabetes mellitus (HCC)    Relevant Orders    Initiate Outpatient Wound Care Protocol       Other    Hand lesion - Primary    Relevant Orders    Initiate Outpatient Wound Care Protocol    Cellulitis of right upper extremity    Relevant Orders    Initiate Outpatient Wound Care Protocol       Treatment Note please see attached Discharge Instructions    In my professional opinion this patient would benefit from HBO Therapy: Yes    Written patient dismissal instructions given to patient and signed by patient or POA.         Cont local wound care. Pt may require radiation therapy to the left hand. Will refer to Rad Onc once healed. Will need WTD packing to the right arm incision.     Will request precert for skin substitute for left hand wound.     Electronically signed by Joel Gomez DO on 1/24/2023 at 10:25 AM

## 2023-01-30 NOTE — DISCHARGE INSTRUCTIONS
[address] [facility]   Physician Orders and Discharge Instructions  [address]  Telephone: [phone]    NAME:  Jose Roberto Myers OF BIRTH:  1955  MEDICAL RECORD NUMBER:  [id number]  DATE:  1/30/2023    Discharge condition: {STABLE/UNSTABLE:620470526}    Discharge to: {CHP Wound Discharge To:16097}    Left via:{Left FGR:51095}    Accompanied by:  {:[id number]}    ECF/HHA: [facility]       [facility] placement     Dressing Orders:  Left hand  Wash with soap and water. Apply Aquacel Ag to wound bed cut to size of the wound, cover with Mepilex Border dressing. Change dressing daily and as needed if dressing saturated. May use ace wrap, but wrap loosely to help hold dressing in place     Right lateral forearm:   Wash with soap and water. Saline moistened gauze to wound bed, cover with dry gauze, roll gauze and medipore tape. May use stretchnet to help hold in place. Change dressing twice daily. Incision lines on left arm moisturize and protect. Treatment Orders:Protein rich diet (unless restricted by your physician)  Multivitamin daily  Elevate left hand 3-4 times per day above level of the heart     May shower          [facility] follow up visit ______________1 week with Patricia Elizabeth_______________  (Please note your next appointment above and if you are unable to keep, kindly give a 24 hour notice. Thank you.)          If you experience any of the following, please call the [facility] during business hours:    * Increase in Pain  * Temperature over 101  * Increase in drainage from your wound  * Drainage with a foul odor  * Bleeding  * Increase in swelling  * Need for compression bandage changes due to slippage, breakthrough drainage. If you need medical attention outside of the business hours of the [facility] please contact your PCP or go to the nearest emergency room.

## 2023-01-31 ENCOUNTER — HOSPITAL ENCOUNTER (OUTPATIENT)
Dept: WOUND CARE | Age: 68
Discharge: HOME | End: 2023-01-31

## 2023-02-06 NOTE — DISCHARGE INSTRUCTIONS
[address]   Physician Orders and Discharge Instructions  [address]  Telephone: [phone]    NAME:  Hansel Cramer OF BIRTH:  1955  MEDICAL RECORD NUMBER:  [id number]  DATE: 2/7/23    Discharge condition: Stable    Discharge to: Home    Left via:Private automobile    Accompanied by:  self    ECF/HHA: Dionna Juno    NuShield placed today left hand         Dressing Orders:  Left hand  Copa, fluff guaze bolster dressing, secure with kerlix, Leave in place until next when seeing Dr. Brian Jenkins. Stretchnetting to help hold in place. Right lateral forearm:   Moisturize and protect    Incision lines on left arm moisturize and protect. Treatment Orders:Protein rich diet (unless restricted by your physician)  Multivitamin daily  Elevate left hand 3-4 times per day above level of the heart     May shower          [facility] follow up visit ______________1 week with Patricia Elizabeth_______________  (Please note your next appointment above and if you are unable to keep, kindly give a 24 hour notice. Thank you.)          If you experience any of the following, please call the [address] [place] [address] during business hours:    * Increase in Pain  * Temperature over 101  * Increase in drainage from your wound  * Drainage with a foul odor  * Bleeding  * Increase in swelling  * Need for compression bandage changes due to slippage, breakthrough drainage. If you need medical attention outside of the business hours of the [address] [place] [address] [employer] please contact your PCP or go to the nearest emergency room.

## 2023-02-07 ENCOUNTER — HOSPITAL ENCOUNTER (OUTPATIENT)
Dept: WOUND CARE | Age: 68
Discharge: HOME | End: 2023-02-07
Payer: MEDICARE

## 2023-02-07 VITALS
SYSTOLIC BLOOD PRESSURE: 121 MMHG | RESPIRATION RATE: 18 BRPM | TEMPERATURE: 97.4 F | HEART RATE: 82 BPM | DIASTOLIC BLOOD PRESSURE: 79 MMHG

## 2023-02-07 DIAGNOSIS — C44.92 SQUAMOUS CELL SKIN CANCER: ICD-10-CM

## 2023-02-07 DIAGNOSIS — E11.9 TYPE 2 DIABETES MELLITUS WITHOUT COMPLICATION, WITHOUT LONG-TERM CURRENT USE OF INSULIN (HCC): ICD-10-CM

## 2023-02-07 DIAGNOSIS — T82.7XXA INFECTION OF AV GRAFT FOR DIALYSIS (HCC): ICD-10-CM

## 2023-02-07 DIAGNOSIS — L98.492 HAND ULCERATION WITH FAT LAYER EXPOSED (HCC): ICD-10-CM

## 2023-02-07 DIAGNOSIS — L03.113 CELLULITIS OF RIGHT UPPER EXTREMITY: ICD-10-CM

## 2023-02-07 DIAGNOSIS — L98.9 HAND LESION: Primary | ICD-10-CM

## 2023-02-07 PROCEDURE — 15275 SKIN SUB GRAFT FACE/NK/HF/G: CPT | Performed by: NURSE PRACTITIONER

## 2023-02-07 PROCEDURE — 6370000000 HC RX 637 (ALT 250 FOR IP): Performed by: SURGERY

## 2023-02-07 PROCEDURE — C5275 LOW COST SKIN SUBSTITUTE APP: HCPCS

## 2023-02-07 RX ORDER — LIDOCAINE HYDROCHLORIDE 20 MG/ML
JELLY TOPICAL ONCE
Status: COMPLETED | OUTPATIENT
Start: 2023-02-07 | End: 2023-02-07

## 2023-02-07 RX ORDER — LIDOCAINE 40 MG/G
CREAM TOPICAL ONCE
OUTPATIENT
Start: 2023-02-07 | End: 2023-02-07

## 2023-02-07 RX ORDER — LIDOCAINE HYDROCHLORIDE 20 MG/ML
JELLY TOPICAL ONCE
OUTPATIENT
Start: 2023-02-07 | End: 2023-02-07

## 2023-02-07 RX ORDER — LIDOCAINE HYDROCHLORIDE 40 MG/ML
SOLUTION TOPICAL ONCE
OUTPATIENT
Start: 2023-02-07 | End: 2023-02-07

## 2023-02-07 RX ORDER — LIDOCAINE 50 MG/G
OINTMENT TOPICAL ONCE
OUTPATIENT
Start: 2023-02-07 | End: 2023-02-07

## 2023-02-07 RX ADMIN — LIDOCAINE HYDROCHLORIDE: 20 JELLY TOPICAL at 09:03

## 2023-02-07 NOTE — PROGRESS NOTES
[address]   Progress Note and Procedure Note      Hasmukh Mireles  MEDICAL RECORD NUMBER:  [id number]  AGE: 69 y.o. GENDER: male  : 1955  EPISODE DATE:  2023    Subjective:     Chief Complaint   Patient presents with    Wound Check     Left hand and right arm wounds      HISTORY of PRESENT ILLNESS HPI     Gena Tobar is a 69 y.o. male who presents today for wound/ulcer evaluation. History of Wound Context: left hand wound follow up/eval and treat    Ulcer Identification:  Ulcer Type: non-healing surgical  Contributing Factors: none    Wound: Surgical incision        PAST MEDICAL HISTORY        Diagnosis Date    Arthritis     Atrial fibrillation ([address]     Bleeding ulcer 2019    Cancer ([address]     skin    CHF (congestive heart failure) ([address]     Diabetes mellitus ([facility]     Ex-cigarette smoker 2016    History of blood transfusion     History of gastric ulcer     Hyperlipidemia     Hypertension     hx.  10 years ago    Kidney disease, chronic, end stage on dialysis [facility]) 2006, dialysis for 3 years, then had a kidney transplant,    Obese     Peritoneal dialysis status ([address]     past hx    Prolonged emergence from general anesthesia     Vision problem     wears glasses       PAST SURGICAL HISTORY    Past Surgical History:   Procedure Laterality Date    ARM SURGERY Right 2023    EXCISION RIGHT ARM LESION performed by Lia Elizabeth DO at [address]  2017    Dr Dylon Gutierrez: Diverticulosis, internal hemorrhoids, 5yr recall    COLONOSCOPY  2012    Dr Mariana Paula, 5 yr recall    DIALYSIS FISTULA CREATION  [place]     left arm radial cephalic    HAND SURGERY Left 2023    EXCISION LEFT HAND LESION AND LEFT ARM LESIONS X2 performed by Lia Elizabeth DO at [address] [facility]      umbilical hernia repair    KIDNEY TRANSPLANT      2010 in [facility] REVJ/CLSR Right 2018    AV FISTULA GRAFT REMOVAL performed by Nayla Hernandez MD at [facility] OR    SKIN BIOPSY      SKIN CANCER EXCISION      TONSILLECTOMY      TUNNELED VENOUS CATHETER PLACEMENT  multiple    UPPER GASTROINTESTINAL ENDOSCOPY N/A 2019    Dr Latonya Summers (Dr Az Samaniego pt)1.2 cm superficial gastric ulcer in the antrum with surrounding moderate gastritis and oozing of blood    UPPER GASTROINTESTINAL ENDOSCOPY  10/05/2009    Dr Boogie Hensley w/resolution of ulcer, lenin +    UPPER GASTROINTESTINAL ENDOSCOPY  07/10/2009    Dr Gonzalo Muñoz ulceration w/pigmented base on posterior wall of the body, small erosions, active gastritis    UPPER GASTROINTESTINAL ENDOSCOPY N/A 1/10/2020    Dr Jose Carlos Lees hiatal hernia, healed previous antral ulcer-Gastritis    VASCULAR SURGERY Right [place]     av loop graft       FAMILY HISTORY    Family History   Problem Relation Age of Onset    Kidney Disease Father     Heart Disease Father     Colon Cancer Neg Hx     Colon Polyps Neg Hx        SOCIAL HISTORY    Social History     Tobacco Use    Smoking status: Former     Packs/day: 2.00     Years: 4.00     Pack years: 8.00     Types: Cigarettes     Quit date: 1973     Years since quittin.0    Smokeless tobacco: Never   Vaping Use    Vaping Use: Never used   Substance Use Topics    Alcohol use: No    Drug use: Never       ALLERGIES    No Known Allergies    MEDICATIONS    Current Outpatient Medications on File Prior to Encounter   Medication Sig Dispense Refill    tamsulosin (FLOMAX) 0.4 MG capsule TAKE 1 CAPSULE BY MOUTH EVERY DAY 90 capsule 1    tacrolimus ER (ENVARSUS XR) 1 MG TB24 tablet Take 2 mg by mouth daily      albuterol sulfate HFA (PROVENTIL HFA) 108 (90 Base) MCG/ACT inhaler Inhale 2 puffs into the lungs every 6 hours as needed for Wheezing 18 g 3    JARDIANCE 25 MG tablet TAKE 1 TABLET BY MOUTH EVERY DAY (Patient taking differently: Take 25 mg by mouth daily TAKE 1 TABLET BY MOUTH EVERY DAY) 90 tablet 0    calcitRIOL (ROCALTROL) 0.25 MCG capsule Take 0.25 mcg by mouth every other day      propranolol (INDERAL) 80 MG tablet Take 1 tablet by mouth 2 times daily 180 tablet 3    Lancets MISC 1 each by Does not apply route daily 100 each 5    blood glucose test strips (TRUE METRIX BLOOD GLUCOSE TEST) strip TEST ONE TIME A DAY & AS NEEDED FOR SYMPTOMS OF IRREGULAR BLOOD GLUCOSE. DX: E11.9 100 strip 5    sodium bicarbonate 325 MG tablet TAKE 1 TABLET BY MOUTH TWICE A DAY (Patient taking differently: Take 650 mg by mouth 2 times daily) 60 tablet 5    metFORMIN (GLUCOPHAGE) 1000 MG tablet TAKE 1 TABLET BY MOUTH TWICE A DAY WITH MEALS (Patient taking differently: Take 1,000 mg by mouth 2 times daily (with meals)) 180 tablet 3    vitamin D (ERGOCALCIFEROL) 1.25 MG (59931 UT) CAPS capsule TAKE 1 CAPSULE BY MOUTH ONE TIME PER WEEK (Patient taking differently: Take 50,000 Units by mouth every 30 days) 12 capsule 1    blood glucose monitor strips Test one time a day & as needed for symptoms of irregular blood glucose. 100 strip 5    Omega-3 Fatty Acids (FISH OIL) 1000 MG CPDR Take 1,000 mg by mouth 2 times daily      mycophenolate (CELLCEPT) 250 MG capsule Take 500 mg by mouth 2 times daily      glucose blood VI test strips (ACCU-CHEK ANANT) strip Contour strips,check daily E11.9 100 each 0     No current facility-administered medications on file prior to encounter. REVIEW OF SYSTEMS    Pertinent items are noted in HPI.     Objective:      /79   Pulse 82   Temp 97.4 °F (36.3 °C) (Temporal)   Resp 18     Wt Readings from Last 3 Encounters:   01/17/23 255 lb (115.7 kg)   01/10/23 255 lb (115.7 kg)   01/05/23 255 lb (115.7 kg)       PHYSICAL EXAM    General Appearance: alert and oriented to person, place and time, well developed and well- nourished, in no acute distress  Skin: warm and dry, no rash or erythema  Head: normocephalic and atraumatic  Eyes: pupils equal, round, and reactive to light, extraocular eye movements intact, conjunctivae normal  ENT: tympanic membrane, external ear and ear canal normal bilaterally, nose without deformity, nasal mucosa and turbinates normal without polyps  Neck: supple and non-tender without mass, no thyromegaly or thyroid nodules, no cervical lymphadenopathy  Pulmonary/Chest: clear to auscultation bilaterally- no wheezes, rales or rhonchi, normal air movement, no respiratory distress  Extremities: no cyanosis, clubbing or edema  Musculoskeletal: normal range of motion, no joint swelling, deformity or tenderness  Neurologic: reflexes normal and symmetric, no cranial nerve deficit, gait, coordination and speech normal      Assessment:      Patient Active Problem List   Diagnosis Code    Essential hypertension I10    Chest pressure R07.89    Diabetes mellitus ([facility] E11.9    Ex-cigarette smoker Z87.891    Chest pain R07.9    Cellulitis L03.90    Infection of AV graft for dialysis ([facility] T82. 7XXA    Cellulitis of right upper extremity L03.113    Atrial fibrillation with RVR ([facility]) I48.91    Immunosuppression ([facility]) D84.9    Daytime somnolence R40.0    PAF (paroxysmal atrial fibrillation) ([facility]) I48.0    Fatigue R53.83    History of tachycardia Z87.898    Chronic anticoagulation Z79.01    Orthostatic dizziness R42    PSVT (paroxysmal supraventricular tachycardia) ([facility]) I47.1    UGI bleed K92.2    History of renal transplant Z94.0    LVH (left ventricular hypertrophy) I51.7    Presence of Watchman left atrial appendage closure device Z95.818    Longstanding persistent atrial fibrillation ([facility]) I48.11    Hand lesion L98.9    Squamous cell skin cancer C44.92    Hand ulceration with fat layer exposed ([facility] L98.492       Wound 01/10/23 Hand Left;Dorsal Wound #1 Left hand (surgical) (Active)   Wound Image   02/07/23 0855   Wound Etiology Surgical 02/07/23 0855   Dressing Status New dressing applied 02/07/23 0925   Wound Cleansed Soap and water 02/07/23 0855   Dressing/Treatment Gauze dressing/dressing sponge;Roll gauze;Tape/Soft cloth adhesive tape 02/07/23 0925   Wound Length (cm) 1.7 cm 02/07/23 0855   Wound Width (cm) 1.8 cm 02/07/23 0855   Wound Depth (cm) 0.1 cm 02/07/23 0855   Wound Surface Area (cm^2) 3.06 cm^2 02/07/23 0855   Change in Wound Size % (l*w) 51.04 02/07/23 0855   Wound Volume (cm^3) 0.306 cm^3 02/07/23 0855   Wound Healing % 84 02/07/23 0855   Post-Procedure Length (cm) 1.7 cm 02/07/23 0917   Post-Procedure Width (cm) 1.8 cm 02/07/23 0917   Post-Procedure Depth (cm) 0.1 cm 02/07/23 0917   Post-Procedure Surface Area (cm^2) 3.06 cm^2 02/07/23 0917   Post-Procedure Volume (cm^3) 0.306 cm^3 02/07/23 0917   Distance Tunneling (cm) 0 cm 01/17/23 0852   Tunneling Position ___ O'Clock 0 01/17/23 0852   Undermining Starts ___ O'Clock 0 01/17/23 0852   Undermining Ends___ O'Clock 0 01/17/23 0852   Undermining Maxium Distance (cm) 0 01/17/23 0852   Wound Assessment Pink/red;Slough 02/07/23 0855   Drainage Amount Moderate 02/07/23 0855   Drainage Description Serosanguinous 02/07/23 0855   Odor None 02/07/23 0855   Aure-wound Assessment Intact 02/07/23 0855   Margins Attached edges 02/07/23 0855   Wound Thickness Description not for Pressure Injury Full thickness 02/07/23 0855   Number of days: 27       Wound 01/17/23 Radial Right #2 right arm wound (superior incision line) (Active)   Wound Image   02/07/23 0855   Wound Etiology Surgical 02/07/23 0855   Dressing Status Dry; Intact 02/07/23 0855   Wound Cleansed Soap and water 02/07/23 0855   Dressing/Treatment Gauze dressing/dressing sponge;Moist to moist;Roll gauze 01/24/23 0926   Wound Length (cm) 0 cm 02/07/23 0855   Wound Width (cm) 0 cm 02/07/23 0855   Wound Depth (cm) 0 cm 02/07/23 0855   Wound Surface Area (cm^2) 0 cm^2 02/07/23 0855   Change in Wound Size % (l*w) 100 02/07/23 0855   Wound Volume (cm^3) 0 cm^3 02/07/23 0855   Wound Healing % 100 02/07/23 0855   Post-Procedure Length (cm) 3 cm 01/17/23 0910   Post-Procedure Width (cm) 0.5 cm 01/17/23 0910   Post-Procedure Depth (cm) 1.5 cm 01/17/23 0910   Post-Procedure Surface Area (cm^2) 1.5 cm^2 01/17/23 0910   Post-Procedure Volume (cm^3) 2.25 cm^3 01/17/23 0910   Wound Assessment Dry;Epithelialization 02/07/23 0855   Drainage Amount None 02/07/23 0855   Drainage Description Serosanguinous 01/24/23 0854   Odor None 02/07/23 0855   Aure-wound Assessment Intact 02/07/23 0855   Margins Defined edges 02/07/23 0855   Wound Thickness Description not for Pressure Injury Full thickness 01/24/23 0854   Number of days: 21     Incision 01/05/23 Arm Right (Active)   Wound Image   01/17/23 0852   Dressing Status New dressing applied 01/17/23 0927   Incision Cleansed Soap and water 01/17/23 0852   Dressing/Treatment Silicone border 74/80/87 0927   Incision Length (cm) 11 01/10/23 1047   Incision Width (cm) 0.1 cm 01/10/23 1047   Incision Depth (cm) 0.1 cm 01/10/23 1047   Closure Sutures 01/17/23 0852   Margins Approximated 01/17/23 0852   Incision Assessment Erythema 01/17/23 0852   Drainage Amount Large 01/17/23 0852   Drainage Description Purulent 01/17/23 0852   Odor None 01/17/23 0852   Aure-incision Assessment Blanchable erythema 01/17/23 0852   Number of days: 33       Incision 01/10/23 Arm Left; Lower; Posterior (Active)   Wound Image   01/17/23 0852   Dressing Status New dressing applied 01/17/23 0927   Incision Cleansed Soap and water 01/17/23 0852   Dressing/Treatment Silicone border 70/47/09 0927   Incision Length (cm) 0 01/24/23 0854   Incision Width (cm) 0 cm 01/24/23 0854   Incision Depth (cm) 0 cm 01/24/23 0854   Closure Sutures 01/17/23 0852   Margins Approximated 01/17/23 0852   Incision Assessment Erythema 01/17/23 0852   Drainage Amount Scant 01/17/23 0852   Odor None 01/17/23 0852   Aure-incision Assessment Blanchable erythema 01/17/23 0852   Number of days: 27        Procedure Note  Indications:  Based on my examination of this patient's wound(s)/ulcer(s) today, debridement is required to promote healing and evaluate the wound base. Performed by: CHRIS Nathan CNP    Consent obtained:  Yes    Time out taken:  Yes    Pain Control: Anesthetic  Anesthetic: 2% Lidocaine Gel Topical       Debridement:Non-excisional Debridement    Using curette the wound(s)/ulcer(s) was/were sharply debrided down through and including the removal of epidermis and dermis. Devitalized Tissue Debrided:  fibrin, biofilm, slough, and exudate    Pre Debridement Measurements:  Are located in the Wound/Ulcer Documentation Flow Sheet    Wound/Ulcer #: 1    Post Debridement Measurements:  Wound/Ulcer Descriptions are Pre Debridement except measurements:      Percent of Wound/Ulcer Debrided: 100%    Total Surface Area Debrided:  3.06 sq cm     Estimated Blood Loss:  Minimal    Hemostasis Achieved:  by pressure    Procedural Pain:  0  / 10     Post Procedural Pain:  0 / 10     Response to treatment:  Well tolerated by patient.          Diabetic/Pressure/Non Pressure Ulcers only:  Ulcer: N/A      Procedure:  Skin Substitute Application    Performed by: CHRIS Nathan CNP    Ulcer Type:non-healing surgical    Consent obtained: Yes    Time out taken: Yes    Product Utilized:         [] Apligraf   []44 sq/cm   []88 sq/cm    []132 sq/cm  []176  Sq/cm        [] Affinity   [] 1.5 cm x 1.5 cm   [] 2.5 cm x 2.5 cm              [x] NuShield    [] 1.6 cm disc   [] 2 cm x 3 cm   [] 2 cm x 4 cm  [x] 3 cm x 4 cm                 [] 4 cm  x 4 cm    [] 4 cm x 6 cm         [] PuraPly AM   []4 sq/cm   []8 sq/cm    []25 sq/cm   []54sq/cm                     Fenestrated: No    Mesher Utilized:  No    Instrument(s) scissors and forceps    Skin Substitute was Applied to Ulcer Number(s):    Ulcer #: 1      Wound 01/10/23 Hand Left;Dorsal Wound #1 Left hand (surgical) (Active)   Wound Image   02/07/23 0855   Wound Etiology Surgical 02/07/23 0855   Dressing Status New dressing applied 02/07/23 0970   Wound Cleansed Soap and water 02/07/23 0855   Dressing/Treatment Gauze dressing/dressing sponge;Roll gauze;Tape/Soft cloth adhesive tape 02/07/23 0925   Wound Length (cm) 1.7 cm 02/07/23 0855   Wound Width (cm) 1.8 cm 02/07/23 0855   Wound Depth (cm) 0.1 cm 02/07/23 0855   Wound Surface Area (cm^2) 3.06 cm^2 02/07/23 0855   Change in Wound Size % (l*w) 51.04 02/07/23 0855   Wound Volume (cm^3) 0.306 cm^3 02/07/23 0855   Wound Healing % 84 02/07/23 0855   Post-Procedure Length (cm) 1.7 cm 02/07/23 0917   Post-Procedure Width (cm) 1.8 cm 02/07/23 0917   Post-Procedure Depth (cm) 0.1 cm 02/07/23 0917   Post-Procedure Surface Area (cm^2) 3.06 cm^2 02/07/23 0917   Post-Procedure Volume (cm^3) 0.306 cm^3 02/07/23 0917   Distance Tunneling (cm) 0 cm 01/17/23 0852   Tunneling Position ___ O'Clock 0 01/17/23 0852   Undermining Starts ___ O'Clock 0 01/17/23 0852   Undermining Ends___ O'Clock 0 01/17/23 0852   Undermining Maxium Distance (cm) 0 01/17/23 0852   Wound Assessment Pink/red;Slough 02/07/23 0855   Drainage Amount Moderate 02/07/23 0855   Drainage Description Serosanguinous 02/07/23 0855   Odor None 02/07/23 0855   Aure-wound Assessment Intact 02/07/23 0855   Margins Attached edges 02/07/23 0855   Wound Thickness Description not for Pressure Injury Full thickness 02/07/23 0855   Number of days: 27       Wound 01/17/23 Radial Right #2 right arm wound (superior incision line) (Active)   Wound Image   02/07/23 0855   Wound Etiology Surgical 02/07/23 0855   Dressing Status Dry; Intact 02/07/23 0855   Wound Cleansed Soap and water 02/07/23 0855   Dressing/Treatment Gauze dressing/dressing sponge;Moist to moist;Roll gauze 01/24/23 0926   Wound Length (cm) 0 cm 02/07/23 0855   Wound Width (cm) 0 cm 02/07/23 0855   Wound Depth (cm) 0 cm 02/07/23 0855   Wound Surface Area (cm^2) 0 cm^2 02/07/23 0855   Change in Wound Size % (l*w) 100 02/07/23 0855   Wound Volume (cm^3) 0 cm^3 02/07/23 0855   Wound Healing % 100 02/07/23 0855   Post-Procedure Length (cm) 3 cm 01/17/23 0910   Post-Procedure Width (cm) 0.5 cm 01/17/23 0910   Post-Procedure Depth (cm) 1.5 cm 01/17/23 0910   Post-Procedure Surface Area (cm^2) 1.5 cm^2 01/17/23 0910   Post-Procedure Volume (cm^3) 2.25 cm^3 01/17/23 0910   Wound Assessment Dry;Epithelialization 02/07/23 0855   Drainage Amount None 02/07/23 0855   Drainage Description Serosanguinous 01/24/23 0854   Odor None 02/07/23 0855   Aure-wound Assessment Intact 02/07/23 0855   Margins Defined edges 02/07/23 0855   Wound Thickness Description not for Pressure Injury Full thickness 01/24/23 0854   Number of days: 21       Diabetic/Pressure/Non Pressure Ulcers:  Ulcer: N/A      Total Surface Area of Ulcer(s) Covered 3.06 sq/cm    Was the Product Layered  No     Amount of Product Applied 3.06 sq/cm     Amount of Product Wasted 8.94 sq/cm     Reason for Waste Wound Size smaller then product size      Surgically Fixated: No    Secured With: Steri Strips     Procedural Pain: 0/10     Post Procedural Pain: 0 / 10    Response to Treatment:  Well tolerated by patient.     Problem List Items Addressed This Visit          Circulatory    Infection of AV graft for dialysis [facility])    Relevant Orders    Initiate Outpatient Wound Care Protocol       Endocrine    Diabetes mellitus [facility])    Relevant Orders    Initiate Outpatient Wound Care Protocol       Other    Hand lesion - Primary    Relevant Orders    Initiate Outpatient Wound Care Protocol    Squamous cell skin cancer    * (Principal) Hand ulceration with fat layer exposed ([address]    Cellulitis of right upper extremity    Relevant Orders    Initiate Outpatient Wound Care Protocol         Plan:     Problem List Items Addressed This Visit          Circulatory    Infection of AV graft for dialysis [facility])    Relevant Orders    Initiate Outpatient Wound Care Protocol       Endocrine    Diabetes mellitus [facility])    Relevant Orders    Initiate Outpatient Wound Care Protocol       Other    Hand lesion - Primary Relevant Orders    Initiate Outpatient Wound Care Protocol    Squamous cell skin cancer    * (Principal) Hand ulceration with fat layer exposed ([address]    Cellulitis of right upper extremity    Relevant Orders    Initiate Outpatient Wound Care Protocol       Treatment Note please see attached Discharge Instructions    In my professional opinion this patient would benefit from HBO Therapy: No    Written patient dismissal instructions given to patient and signed by patient or POA. Mr. Prieto Even is healing well, I put a Nu Shield on his wound today. Follow up in 1 week.   Electronically signed by CHRIS Chu CNP on 2/7/2023 at 9:32 AM

## 2023-02-13 NOTE — DISCHARGE INSTRUCTIONS
[address] and [facility] Therapy   Physician Orders and Discharge Instructions  [address] [facility]  [place], [address]  Telephone: [phone]    NAME:  Asa Torres OF BIRTH:  1955  MEDICAL RECORD NUMBER:  [id number]  DATE:  2/14/23    Discharge condition: {STABLE/UNSTABLE:835565556}    Discharge to: {[facility] Wound Discharge To:14047}    Left via:{Left XVE:67193}    Accompanied by:  {[id number]}  [facility]F/[facility]: [facility]     NuShield placed today left hand           Dressing Orders:  Left hand cleanse with saline, pat dry, apply Aquacel Ag to wound, cover with Silicone Border dressing (such as Mepilex Border )      Stretch netting to help hold in place if needed. Right lateral forearm:   Moisturize and protect          Treatment Orders:Protein rich diet (unless restricted by your physician)  Multivitamin daily  Elevate left hand 3-4 times per day above level of the heart     May shower          [facility] follow up visit ______________1 week with Patricia Elizabeth_______________  (Please note your next appointment above and if you are unable to keep, kindly give a 24 hour notice. Thank you.)          If you experience any of the following, please call the [address] [place] [address] during business hours:    * Increase in Pain  * Temperature over 101  * Increase in drainage from your wound  * Drainage with a foul odor  * Bleeding  * Increase in swelling  * Need for compression bandage changes due to slippage, breakthrough drainage. If you need medical attention outside of the business hours of the [address] [place] [address] please contact your PCP or go to the nearest emergency room.

## 2023-02-14 ENCOUNTER — HOSPITAL ENCOUNTER (OUTPATIENT)
Dept: WOUND CARE | Age: 68
Discharge: HOME | End: 2023-02-14
Payer: MEDICARE

## 2023-02-14 VITALS
RESPIRATION RATE: 20 BRPM | TEMPERATURE: 97.6 F | SYSTOLIC BLOOD PRESSURE: 130 MMHG | HEART RATE: 95 BPM | DIASTOLIC BLOOD PRESSURE: 83 MMHG

## 2023-02-14 DIAGNOSIS — L98.9 HAND LESION: Primary | ICD-10-CM

## 2023-02-14 DIAGNOSIS — L03.113 CELLULITIS OF RIGHT UPPER EXTREMITY: ICD-10-CM

## 2023-02-14 DIAGNOSIS — E11.9 TYPE 2 DIABETES MELLITUS WITHOUT COMPLICATION, WITHOUT LONG-TERM CURRENT USE OF INSULIN (HCC): ICD-10-CM

## 2023-02-14 DIAGNOSIS — L98.492 HAND ULCERATION WITH FAT LAYER EXPOSED (HCC): ICD-10-CM

## 2023-02-14 DIAGNOSIS — T82.7XXA INFECTION OF AV GRAFT FOR DIALYSIS (HCC): ICD-10-CM

## 2023-02-14 PROCEDURE — 99213 OFFICE O/P EST LOW 20 MIN: CPT

## 2023-02-14 PROCEDURE — 99213 OFFICE O/P EST LOW 20 MIN: CPT | Performed by: SURGERY

## 2023-02-14 RX ORDER — LIDOCAINE HYDROCHLORIDE 20 MG/ML
JELLY TOPICAL ONCE
OUTPATIENT
Start: 2023-02-14 | End: 2023-02-14

## 2023-02-14 RX ORDER — LIDOCAINE 50 MG/G
OINTMENT TOPICAL ONCE
OUTPATIENT
Start: 2023-02-14 | End: 2023-02-14

## 2023-02-14 RX ORDER — LIDOCAINE 40 MG/G
CREAM TOPICAL ONCE
OUTPATIENT
Start: 2023-02-14 | End: 2023-02-14

## 2023-02-14 RX ORDER — LIDOCAINE HYDROCHLORIDE 40 MG/ML
SOLUTION TOPICAL ONCE
OUTPATIENT
Start: 2023-02-14 | End: 2023-02-14

## 2023-02-14 NOTE — PROGRESS NOTES
[address]   Progress Note and Procedure Note      Priscila Cotton Aline  MEDICAL RECORD NUMBER:  [id number]  AGE: 69 y.o. GENDER: male  : 1955  EPISODE DATE:  2023    Subjective:     Chief Complaint   Patient presents with    Wound Check     Patient presents for check of left hand wound      HISTORY of PRESENT ILLNESS HPI     Aquilino Venegas is a 69 y.o. male who presents today for wound/ulcer evaluation. History of Wound Context: Patient status post excision of multiple skin cancers on 2023. He did have lesions of both his left and right arm and left hand. The lesion of the left hand was not able to be closed due to the size of the lesion. He is here today for wound care of the left hand. All of the resected areas noted to be squamous cell carcinoma with negative margins. 2023: Returns today for suture removal and wound care. Right arm wound now draining purulence. 2023: Right arm wound with significant improvement. Denies complaints. 2023: NuShield placed last week. Doing well. Dressing removed. Wound/Ulcer Pain Timing/Severity: none  Quality of pain: N/A  Severity:  1 / 10   Modifying Factors: None  Associated Signs/Symptoms: none    Ulcer Identification:  Ulcer Type: non-healing surgical  Contributing Factors: edema    Wound: Surgical incision        PAST MEDICAL HISTORY        Diagnosis Date    Arthritis     Atrial fibrillation ([address]     Bleeding ulcer 2019    Cancer ([address]     skin    CHF (congestive heart failure) ([address]     Diabetes mellitus ([address]     Ex-cigarette smoker 2016    History of blood transfusion     History of gastric ulcer     Hyperlipidemia     Hypertension     hx.  10 years ago    Kidney disease, chronic, end stage on dialysis [facility]) 2006, dialysis for 3 years, then had a kidney transplant,    Obese     Peritoneal dialysis status ([address]     past hx    Prolonged emergence from general anesthesia Vision problem     wears glasses       PAST SURGICAL HISTORY    Past Surgical History:   Procedure Laterality Date    ARM SURGERY Right 1/5/2023    EXCISION RIGHT ARM LESION performed by Jay Jay Prieto DO at [address]  06/23/2017    Dr Sridhar Alexander: Diverticulosis, internal hemorrhoids, 5yr recall    COLONOSCOPY  01/05/2012    Dr Radha Vallecillo, 5 yr recall    DIALYSIS FISTULA CREATION  [place] 2007    left arm radial cephalic    HAND SURGERY Left 1/5/2023    EXCISION LEFT HAND LESION AND LEFT ARM LESIONS X2 performed by Jay Jay Prieto DO at [address]      umbilical hernia repair    KIDNEY TRANSPLANT      2/18/2010 in [facility] REVJ/CLSR Right 2/23/2018    AV FISTULA GRAFT REMOVAL performed by Gertha Paget, MD at [address]      TUNNELED VENOUS CATHETER PLACEMENT  multiple    UPPER GASTROINTESTINAL ENDOSCOPY N/A 11/4/2019    Dr Molly Kim (Dr Sridhar Alexander pt)1.2 cm superficial gastric ulcer in the antrum with surrounding moderate gastritis and oozing of blood    UPPER GASTROINTESTINAL ENDOSCOPY  10/05/2009    Dr Leonard Ortiz w/resolution of ulcer, lenin +    UPPER GASTROINTESTINAL ENDOSCOPY  07/10/2009    Dr Tobias Rubin ulceration w/pigmented base on posterior wall of the body, small erosions, active gastritis    UPPER GASTROINTESTINAL ENDOSCOPY N/A 1/10/2020    Dr Wesley Briseno hiatal hernia, healed previous antral ulcer-Gastritis    VASCULAR SURGERY Right [place] 2008    av loop graft       FAMILY HISTORY    Family History   Problem Relation Age of Onset    Kidney Disease Father     Heart Disease Father     Colon Cancer Neg Hx     Colon Polyps Neg Hx        SOCIAL HISTORY    Social History     Tobacco Use    Smoking status: Former     Packs/day: 2.00     Years: 4.00     Pack years: 8.00     Types: Cigarettes     Quit date: 2/19/1973     Years since quitting: 50.0    Smokeless tobacco: Never   Vaping Use    Vaping Use: Never used   Substance Use Topics    Alcohol use: No    Drug use: Never       ALLERGIES    No Known Allergies    MEDICATIONS    Current Outpatient Medications on File Prior to Encounter   Medication Sig Dispense Refill    tamsulosin (FLOMAX) 0.4 MG capsule TAKE 1 CAPSULE BY MOUTH EVERY DAY 90 capsule 1    tacrolimus ER (ENVARSUS XR) 1 MG TB24 tablet Take 2 mg by mouth daily      albuterol sulfate HFA (PROVENTIL HFA) 108 (90 Base) MCG/ACT inhaler Inhale 2 puffs into the lungs every 6 hours as needed for Wheezing 18 g 3    JARDIANCE 25 MG tablet TAKE 1 TABLET BY MOUTH EVERY DAY (Patient taking differently: Take 25 mg by mouth daily TAKE 1 TABLET BY MOUTH EVERY DAY) 90 tablet 0    calcitRIOL (ROCALTROL) 0.25 MCG capsule Take 0.25 mcg by mouth every other day      propranolol (INDERAL) 80 MG tablet Take 1 tablet by mouth 2 times daily 180 tablet 3    Lancets MISC 1 each by Does not apply route daily 100 each 5    blood glucose test strips (TRUE METRIX BLOOD GLUCOSE TEST) strip TEST ONE TIME A DAY & AS NEEDED FOR SYMPTOMS OF IRREGULAR BLOOD GLUCOSE. DX: E11.9 100 strip 5    sodium bicarbonate 325 MG tablet TAKE 1 TABLET BY MOUTH TWICE A DAY (Patient taking differently: Take 650 mg by mouth 2 times daily) 60 tablet 5    metFORMIN (GLUCOPHAGE) 1000 MG tablet TAKE 1 TABLET BY MOUTH TWICE A DAY WITH MEALS (Patient taking differently: Take 1,000 mg by mouth 2 times daily (with meals)) 180 tablet 3    vitamin D (ERGOCALCIFEROL) 1.25 MG (01025 UT) CAPS capsule TAKE 1 CAPSULE BY MOUTH ONE TIME PER WEEK (Patient taking differently: Take 50,000 Units by mouth every 30 days) 12 capsule 1    blood glucose monitor strips Test one time a day & as needed for symptoms of irregular blood glucose.  100 strip 5    Omega-3 Fatty Acids (FISH OIL) 1000 MG CPDR Take 1,000 mg by mouth 2 times daily      mycophenolate (CELLCEPT) 250 MG capsule Take 500 mg by mouth 2 times daily glucose blood VI test strips (ACCU-CHEK ANANT) strip Contour strips,check daily E11.9 100 each 0     No current facility-administered medications on file prior to encounter. REVIEW OF SYSTEMS    Pertinent items are noted in HPI. Objective:      /83   Pulse 95   Temp 97.6 °F (36.4 °C) (Temporal)   Resp 20     Wt Readings from Last 3 Encounters:   01/17/23 255 lb (115.7 kg)   01/10/23 255 lb (115.7 kg)   01/05/23 255 lb (115.7 kg)       PHYSICAL EXAM    General Appearance: alert and oriented to person, place and time  Skin: warm and dry, no rash or erythema and Wound as noted  Pulmonary/Chest: no chest wall tenderness  Abdomen: soft, non-tender, non-distended, normal bowel sounds, no masses or organomegaly  Extremities: no cyanosis and no clubbing  Musculoskeletal: normal range of motion, no joint swelling, deformity or tenderness      Assessment:      Patient Active Problem List   Diagnosis Code    Essential hypertension I10    Chest pressure R07.89    Diabetes mellitus ([facility] E11.9    Ex-cigarette smoker Z87.891    Chest pain R07.9    Cellulitis L03.90    Infection of AV graft for dialysis ([facility] T82. 7XXA    Cellulitis of right upper extremity L03.113    Atrial fibrillation with RVR ([facility]) I48.91    Immunosuppression ([facility]) D84.9    Daytime somnolence R40.0    PAF (paroxysmal atrial fibrillation) ([facility]) I48.0    Fatigue R53.83    History of tachycardia Z87.898    Chronic anticoagulation Z79.01    Orthostatic dizziness R42    PSVT (paroxysmal supraventricular tachycardia) ([facility]) I47.1    UGI bleed K92.2    History of renal transplant Z94.0    LVH (left ventricular hypertrophy) I51.7    Presence of Watchman left atrial appendage closure device Z95.818    Longstanding persistent atrial fibrillation ([facility]) I48.11    Hand lesion L98.9    Squamous cell skin cancer C44.92    Hand ulceration with fat layer exposed ([facility] L98.492       Wound 01/10/23 Hand Left;Dorsal Wound #1 Left hand (surgical) (Active)   Wound Image   02/14/23 0844   Wound Etiology Surgical 02/14/23 0844   Dressing Status New dressing applied 02/14/23 0920   Wound Cleansed Soap and water 02/14/23 0844   Dressing/Treatment Alginate with Ag;Silicone border 31/46/40 0920   Wound Length (cm) 0.7 cm 02/14/23 0844   Wound Width (cm) 0.5 cm 02/14/23 0844   Wound Depth (cm) 0.2 cm 02/14/23 0844   Wound Surface Area (cm^2) 0.35 cm^2 02/14/23 0844   Change in Wound Size % (l*w) 94.4 02/14/23 0844   Wound Volume (cm^3) 0.07 cm^3 02/14/23 0844   Wound Healing % 96 02/14/23 0844   Post-Procedure Length (cm) 1.7 cm 02/07/23 0917   Post-Procedure Width (cm) 1.8 cm 02/07/23 0917   Post-Procedure Depth (cm) 0.1 cm 02/07/23 0917   Post-Procedure Surface Area (cm^2) 3.06 cm^2 02/07/23 0917   Post-Procedure Volume (cm^3) 0.306 cm^3 02/07/23 0917   Distance Tunneling (cm) 0 cm 01/17/23 0852   Tunneling Position ___ O'Clock 0 01/17/23 0852   Undermining Starts ___ O'Clock 0 01/17/23 0852   Undermining Ends___ O'Clock 0 01/17/23 0852   Undermining Maxium Distance (cm) 0 01/17/23 0852   Wound Assessment Pink/red;Slough 02/14/23 0844   Drainage Amount Moderate 02/14/23 0844   Drainage Description Serosanguinous 02/14/23 0844   Odor None 02/14/23 0844   Aure-wound Assessment Intact 02/07/23 0855   Margins Attached edges 02/07/23 0855   Wound Thickness Description not for Pressure Injury Full thickness 02/07/23 0855   Number of days: 34                     Plan:     Problem List Items Addressed This Visit          Circulatory    Infection of AV graft for dialysis [facility])       Endocrine    Diabetes mellitus ([facility]       Other    Hand lesion - Primary    Hand ulceration with fat layer exposed ([facility]    Cellulitis of right upper extremity       Treatment Note please see attached Discharge Instructions    In my professional opinion this patient would benefit from HBO Therapy: Yes    Written patient dismissal instructions given to patient and signed by patient or POA.          Cont local wound care. Pt may require radiation therapy to the left hand. Will refer to  Maribeth Ruiz once healed. Skin sub placed last week. Will plan on a second application next week. Cont local wound care.     Electronically signed by Galina Zuluaga DO on 2/14/2023 at 10:21 AM

## 2023-02-20 DIAGNOSIS — R39.14 BENIGN PROSTATIC HYPERPLASIA WITH INCOMPLETE BLADDER EMPTYING: ICD-10-CM

## 2023-02-20 DIAGNOSIS — N40.1 BENIGN PROSTATIC HYPERPLASIA WITH INCOMPLETE BLADDER EMPTYING: ICD-10-CM

## 2023-02-20 RX ORDER — TAMSULOSIN HYDROCHLORIDE 0.4 MG/1
CAPSULE ORAL
Qty: 60 CAPSULE | Refills: 2 | Status: SHIPPED | OUTPATIENT
Start: 2023-02-20

## 2023-02-20 NOTE — DISCHARGE INSTRUCTIONS
[address] [facility]   Physician Orders and Discharge Instructions  [address]  Telephone: [phone]    NAME:  Juan Carlos King OF BIRTH:  1955  MEDICAL RECORD NUMBER:  [id number]  DATE:  2/21/23    Discharge condition: Stable    Discharge to: Home    Left via:Private automobile    Accompanied by:  self    ECF/H[facility]: [place]     NuShield placed left hand 2/21/23     Dressing Orders:  Left hand copa bolster dressing, kerlix, medipore tape. Stretch netting to help hold in place if needed. Leave dressing dry and intact until next visit 2/28/23     Right lateral forearm:   Moisturize and protect      Treatment Orders:Protein rich diet (unless restricted by your physician)  Multivitamin daily  Elevate left hand 3-4 times per day above level of the heart     May shower- do not get left hand wet        WCC follow up visit ______________1 week with Patricia Elizabeth_______________  (Please note your next appointment above and if you are unable to keep, kindly give a 24 hour notice. Thank you.)          If you experience any of the following, please call the [facility] [address] during business hours:    * Increase in Pain  * Temperature over 101  * Increase in drainage from your wound  * Drainage with a foul odor  * Bleeding  * Increase in swelling  * Need for compression bandage changes due to slippage, breakthrough drainage. If you need medical attention outside of the business hours of the [facility] [address] please contact your PCP or go to the nearest emergency room.

## 2023-02-21 ENCOUNTER — HOSPITAL ENCOUNTER (OUTPATIENT)
Dept: WOUND CARE | Age: 68
Discharge: HOME | End: 2023-02-21
Payer: MEDICARE

## 2023-02-21 VITALS
TEMPERATURE: 97.1 F | HEART RATE: 111 BPM | SYSTOLIC BLOOD PRESSURE: 132 MMHG | DIASTOLIC BLOOD PRESSURE: 78 MMHG | RESPIRATION RATE: 20 BRPM

## 2023-02-21 DIAGNOSIS — E11.9 TYPE 2 DIABETES MELLITUS WITHOUT COMPLICATION, WITHOUT LONG-TERM CURRENT USE OF INSULIN (HCC): ICD-10-CM

## 2023-02-21 DIAGNOSIS — L03.113 CELLULITIS OF RIGHT UPPER EXTREMITY: ICD-10-CM

## 2023-02-21 DIAGNOSIS — L98.492 HAND ULCERATION WITH FAT LAYER EXPOSED (HCC): ICD-10-CM

## 2023-02-21 DIAGNOSIS — T82.7XXA INFECTION OF AV GRAFT FOR DIALYSIS (HCC): ICD-10-CM

## 2023-02-21 DIAGNOSIS — L98.9 HAND LESION: Primary | ICD-10-CM

## 2023-02-21 PROCEDURE — 15275 SKIN SUB GRAFT FACE/NK/HF/G: CPT | Performed by: SURGERY

## 2023-02-21 PROCEDURE — C5275 LOW COST SKIN SUBSTITUTE APP: HCPCS

## 2023-02-21 PROCEDURE — 6370000000 HC RX 637 (ALT 250 FOR IP): Performed by: SURGERY

## 2023-02-21 RX ORDER — LIDOCAINE 50 MG/G
OINTMENT TOPICAL ONCE
OUTPATIENT
Start: 2023-02-21 | End: 2023-02-21

## 2023-02-21 RX ORDER — LIDOCAINE HYDROCHLORIDE 20 MG/ML
JELLY TOPICAL ONCE
OUTPATIENT
Start: 2023-02-21 | End: 2023-02-21

## 2023-02-21 RX ORDER — LIDOCAINE HYDROCHLORIDE 20 MG/ML
JELLY TOPICAL ONCE
Status: COMPLETED | OUTPATIENT
Start: 2023-02-21 | End: 2023-02-21

## 2023-02-21 RX ORDER — LIDOCAINE 40 MG/G
CREAM TOPICAL ONCE
OUTPATIENT
Start: 2023-02-21 | End: 2023-02-21

## 2023-02-21 RX ORDER — LIDOCAINE HYDROCHLORIDE 40 MG/ML
SOLUTION TOPICAL ONCE
OUTPATIENT
Start: 2023-02-21 | End: 2023-02-21

## 2023-02-21 RX ADMIN — LIDOCAINE HYDROCHLORIDE: 20 JELLY TOPICAL at 09:22

## 2023-02-21 NOTE — PROGRESS NOTES
[address]   Progress Note and Procedure Note      Williams Castellon Mireles  MEDICAL RECORD NUMBER:  [id number]  AGE: 69 y.o. GENDER: male  : 1955  EPISODE DATE:  2023    Subjective:     Chief Complaint   Patient presents with    Wound Check     Left hand wound      HISTORY of PRESENT ILLNESS HPI     Emi Maurice is a 69 y.o. male who presents today for wound/ulcer evaluation. History of Wound Context: Patient status post excision of multiple skin cancers on 2023. He did have lesions of both his left and right arm and left hand. The lesion of the left hand was not able to be closed due to the size of the lesion. He is here today for wound care of the left hand. All of the resected areas noted to be squamous cell carcinoma with negative margins. 2023: Returns today for suture removal and wound care. Right arm wound now draining purulence. 2023: Right arm wound with significant improvement. Denies complaints. 2023: NuShield placed last week. Doing well. Dressing removed.     2023: Wound doing well. Scab removed. Reapplication of NuShield. Wound/Ulcer Pain Timing/Severity: none  Quality of pain: N/A  Severity:  1 / 10   Modifying Factors: None  Associated Signs/Symptoms: none    Ulcer Identification:  Ulcer Type: non-healing surgical  Contributing Factors: edema    Wound: Surgical incision        PAST MEDICAL HISTORY        Diagnosis Date    Arthritis     Atrial fibrillation ([address]     Bleeding ulcer 2019    Cancer ([address]     skin    CHF (congestive heart failure) ([address]     Diabetes mellitus ([facility]     Ex-cigarette smoker 2016    History of blood transfusion     History of gastric ulcer     Hyperlipidemia     Hypertension     hx.  10 years ago    Kidney disease, chronic, end stage on dialysis [facility]) 2006, dialysis for 3 years, then had a kidney transplant,    Obese     Peritoneal dialysis status ([address]     past hx Prolonged emergence from general anesthesia     Vision problem     wears glasses       PAST SURGICAL HISTORY    Past Surgical History:   Procedure Laterality Date    ARM SURGERY Right 1/5/2023    EXCISION RIGHT ARM LESION performed by Makenzie Davis DO at [address]  06/23/2017    Dr Melanie Bishop: Diverticulosis, internal hemorrhoids, 5yr recall    COLONOSCOPY  01/05/2012    Dr Kyrie Poe, 5 yr recall    DIALYSIS FISTULA CREATION  [address]    left arm radial cephalic    HAND SURGERY Left 1/5/2023    EXCISION LEFT HAND LESION AND LEFT ARM LESIONS X2 performed by Makenzie Davis DO at [address]      umbilical hernia repair    KIDNEY TRANSPLANT      2/18/2010 in [facility] REVJ/CLSR Right 2/23/2018    AV FISTULA GRAFT REMOVAL performed by Keanu Horta MD at [address]      TUNNELED VENOUS CATHETER PLACEMENT  multiple    UPPER GASTROINTESTINAL ENDOSCOPY N/A 11/4/2019    Dr Viky Sprague (Dr Melanie Bishop pt)1.2 cm superficial gastric ulcer in the antrum with surrounding moderate gastritis and oozing of blood    UPPER GASTROINTESTINAL ENDOSCOPY  10/05/2009    Dr Chata Walker w/resolution of ulcer, lenin +    UPPER GASTROINTESTINAL ENDOSCOPY  07/10/2009    Dr Castro Blue ulceration w/pigmented base on posterior wall of the body, small erosions, active gastritis    UPPER GASTROINTESTINAL ENDOSCOPY N/A 1/10/2020    Dr Ashli Moctezuma hiatal hernia, healed previous antral ulcer-Gastritis    VASCULAR SURGERY Right [address]    av loop graft       FAMILY HISTORY    Family History   Problem Relation Age of Onset    Kidney Disease Father     Heart Disease Father     Colon Cancer Neg Hx     Colon Polyps Neg Hx        SOCIAL HISTORY    Social History     Tobacco Use    Smoking status: Former     Packs/day: 2.00     Years: 4.00     Pack years: 8.00     Types: Cigarettes Quit date: 1973     Years since quittin.0    Smokeless tobacco: Never   Vaping Use    Vaping Use: Never used   Substance Use Topics    Alcohol use: No    Drug use: Never       ALLERGIES    No Known Allergies    MEDICATIONS    Current Outpatient Medications on File Prior to Encounter   Medication Sig Dispense Refill    tamsulosin (FLOMAX) 0.4 MG capsule TAKE 2 CAPSULES BY MOUTH EVERY DAY 60 capsule 2    tacrolimus ER (ENVARSUS XR) 1 MG TB24 tablet Take 2 mg by mouth daily      albuterol sulfate HFA (PROVENTIL HFA) 108 (90 Base) MCG/ACT inhaler Inhale 2 puffs into the lungs every 6 hours as needed for Wheezing 18 g 3    JARDIANCE 25 MG tablet TAKE 1 TABLET BY MOUTH EVERY DAY (Patient taking differently: Take 25 mg by mouth daily TAKE 1 TABLET BY MOUTH EVERY DAY) 90 tablet 0    calcitRIOL (ROCALTROL) 0.25 MCG capsule Take 0.25 mcg by mouth every other day      propranolol (INDERAL) 80 MG tablet Take 1 tablet by mouth 2 times daily 180 tablet 3    Lancets MISC 1 each by Does not apply route daily 100 each 5    blood glucose test strips (TRUE METRIX BLOOD GLUCOSE TEST) strip TEST ONE TIME A DAY & AS NEEDED FOR SYMPTOMS OF IRREGULAR BLOOD GLUCOSE. DX: E11.9 100 strip 5    sodium bicarbonate 325 MG tablet TAKE 1 TABLET BY MOUTH TWICE A DAY (Patient taking differently: Take 650 mg by mouth 2 times daily) 60 tablet 5    metFORMIN (GLUCOPHAGE) 1000 MG tablet TAKE 1 TABLET BY MOUTH TWICE A DAY WITH MEALS (Patient taking differently: Take 1,000 mg by mouth 2 times daily (with meals)) 180 tablet 3    vitamin D (ERGOCALCIFEROL) 1.25 MG (59617 UT) CAPS capsule TAKE 1 CAPSULE BY MOUTH ONE TIME PER WEEK (Patient taking differently: Take 50,000 Units by mouth every 30 days) 12 capsule 1    blood glucose monitor strips Test one time a day & as needed for symptoms of irregular blood glucose.  100 strip 5    Omega-3 Fatty Acids (FISH OIL) 1000 MG CPDR Take 1,000 mg by mouth 2 times daily      mycophenolate (CELLCEPT) 250 MG capsule Take 500 mg by mouth 2 times daily      glucose blood VI test strips (ACCU-CHEK ANANT) strip Contour strips,check daily E11.9 100 each 0     No current facility-administered medications on file prior to encounter. REVIEW OF SYSTEMS    Pertinent items are noted in HPI. Objective:      /78   Pulse (!) 111   Temp 97.1 °F (36.2 °C) (Temporal)   Resp 20     Wt Readings from Last 3 Encounters:   01/17/23 255 lb (115.7 kg)   01/10/23 255 lb (115.7 kg)   01/05/23 255 lb (115.7 kg)       PHYSICAL EXAM    General Appearance: alert and oriented to person, place and time  Skin: warm and dry, no rash or erythema and Wound as noted  Pulmonary/Chest: no chest wall tenderness  Abdomen: soft, non-tender, non-distended, normal bowel sounds, no masses or organomegaly  Extremities: no cyanosis and no clubbing  Musculoskeletal: normal range of motion, no joint swelling, deformity or tenderness      Assessment:      Patient Active Problem List   Diagnosis Code    Essential hypertension I10    Chest pressure R07.89    Diabetes mellitus ([facility] E11.9    Ex-cigarette smoker Z87.891    Chest pain R07.9    Cellulitis L03.90    Infection of AV graft for dialysis ([facility] T82. 7XXA    Cellulitis of right upper extremity L03.113    Atrial fibrillation with RVR ([facility]) I48.91    Immunosuppression ([facility]) D84.9    Daytime somnolence R40.0    PAF (paroxysmal atrial fibrillation) ([facility]) I48.0    Fatigue R53.83    History of tachycardia Z87.898    Chronic anticoagulation Z79.01    Orthostatic dizziness R42    PSVT (paroxysmal supraventricular tachycardia) ([facility]) I47.1    UGI bleed K92.2    History of renal transplant Z94.0    LVH (left ventricular hypertrophy) I51.7    Presence of Watchman left atrial appendage closure device Z95.818    Longstanding persistent atrial fibrillation ([facility]) I48.11    Hand lesion L98.9    Squamous cell skin cancer C44.92    Hand ulceration with fat layer exposed ([facility] L98.492       Wound 01/10/23 Hand Left;Dorsal Wound #1 Left hand (surgical) (Active)   Wound Image   02/21/23 0915   Wound Etiology Surgical 02/21/23 0915   Dressing Status Old drainage noted 02/21/23 0915   Wound Cleansed Soap and water 02/21/23 0915   Dressing/Treatment Alginate with Ag;Silicone border 90/43/22 0920   Wound Length (cm) 1.7 cm 02/21/23 0915   Wound Width (cm) 1.8 cm 02/21/23 0915   Wound Depth (cm) 0.1 cm 02/21/23 0915   Wound Surface Area (cm^2) 3.06 cm^2 02/21/23 0915   Change in Wound Size % (l*w) 51.04 02/21/23 0915   Wound Volume (cm^3) 0.306 cm^3 02/21/23 0915   Wound Healing % 84 02/21/23 0915   Post-Procedure Length (cm) 1.7 cm 02/21/23 0940   Post-Procedure Width (cm) 1.8 cm 02/21/23 0940   Post-Procedure Depth (cm) 0.1 cm 02/21/23 0940   Post-Procedure Surface Area (cm^2) 3.06 cm^2 02/21/23 0940   Post-Procedure Volume (cm^3) 0.306 cm^3 02/21/23 0940   Distance Tunneling (cm) 0 cm 01/17/23 0852   Tunneling Position ___ O'Clock 0 01/17/23 0852   Undermining Starts ___ O'Clock 0 01/17/23 0852   Undermining Ends___ O'Clock 0 01/17/23 0852   Undermining Maxium Distance (cm) 0 01/17/23 0852   Wound Assessment Eschar dry 02/21/23 0915   Drainage Amount Scant 02/21/23 0915   Drainage Description Serosanguinous 02/21/23 0915   Odor None 02/21/23 0915   Aure-wound Assessment Intact 02/21/23 0915   Margins Defined edges 02/21/23 0915   Wound Thickness Description not for Pressure Injury Full thickness 02/21/23 0915   Number of days: 41             Procedure:  Skin Substitute Application    Performed by: Mariel Wills DO    Ulcer Type:non-healing surgical    Consent obtained: Yes    Time out taken: Yes    Product Utilized:         [] Apligraf   []44 sq/cm   []88 sq/cm    []132 sq/cm  []176  Sq/cm        [] Affinity   [] 1.5 cm x 1.5 cm   [] 2.5 cm x 2.5 cm              [x] NuShield    [] 1.6 cm disc   [] 2 cm x 3 cm   [] 2 cm x 4 cm  [x] 3 cm x 4 cm                 [] 4 cm  x 4 cm    [] 4 cm x 6 cm         [] PuraPly AM   []4 sq/cm   []8 sq/cm    []25 sq/cm   []54sq/cm                     Fenestrated: Yes    Mesher Utilized:  No    Instrument(s) #15 blade scalpel    Skin Substitute was Applied to Ulcer Number(s):    Ulcer #: 1      Wound 01/10/23 Hand Left;Dorsal Wound #1 Left hand (surgical) (Active)   Wound Image   02/21/23 0915   Wound Etiology Surgical 02/21/23 0915   Dressing Status Old drainage noted 02/21/23 0915   Wound Cleansed Soap and water 02/21/23 0915   Dressing/Treatment Alginate with Ag;Silicone border 65/45/53 0920   Wound Length (cm) 1.7 cm 02/21/23 0915   Wound Width (cm) 1.8 cm 02/21/23 0915   Wound Depth (cm) 0.1 cm 02/21/23 0915   Wound Surface Area (cm^2) 3.06 cm^2 02/21/23 0915   Change in Wound Size % (l*w) 51.04 02/21/23 0915   Wound Volume (cm^3) 0.306 cm^3 02/21/23 0915   Wound Healing % 84 02/21/23 0915   Post-Procedure Length (cm) 1.7 cm 02/21/23 0940   Post-Procedure Width (cm) 1.8 cm 02/21/23 0940   Post-Procedure Depth (cm) 0.1 cm 02/21/23 0940   Post-Procedure Surface Area (cm^2) 3.06 cm^2 02/21/23 0940   Post-Procedure Volume (cm^3) 0.306 cm^3 02/21/23 0940   Distance Tunneling (cm) 0 cm 01/17/23 0852   Tunneling Position ___ O'Clock 0 01/17/23 0852   Undermining Starts ___ O'Clock 0 01/17/23 0852   Undermining Ends___ O'Clock 0 01/17/23 0852   Undermining Maxium Distance (cm) 0 01/17/23 0852   Wound Assessment Eschar dry 02/21/23 0915   Drainage Amount Scant 02/21/23 0915   Drainage Description Serosanguinous 02/21/23 0915   Odor None 02/21/23 0915   Aure-wound Assessment Intact 02/21/23 0915   Margins Defined edges 02/21/23 0915   Wound Thickness Description not for Pressure Injury Full thickness 02/21/23 0915   Number of days: 41       Diabetic/Pressure/Non Pressure Ulcers:  Ulcer:  SCC      Total Surface Area of Ulcer(s) Covered 3.06 sq/cm    Was the Product Layered  Yes     Amount of Product Applied 6 sq/cm     Amount of Product Wasted 6 sq/cm     Reason for Waste Wound Size smaller then product size      Surgically Fixated: No    Secured With: Steri Strips and Mepitel     Procedural Pain: 0/10     Post Procedural Pain: 0 / 10    Response to Treatment:  Well tolerated by patient. Problem List Items Addressed This Visit          Circulatory    Infection of AV graft for dialysis [facility])    Relevant Orders    Initiate Outpatient Wound Care Protocol       Endocrine    Diabetes mellitus [facility])    Relevant Orders    Initiate Outpatient Wound Care Protocol       Other    Hand lesion - Primary    Relevant Orders    Initiate Outpatient Wound Care Protocol    Hand ulceration with fat layer exposed [facility])    Relevant Orders    Initiate Outpatient Wound Care Protocol    Cellulitis of right upper extremity    Relevant Orders    Initiate Outpatient Wound Care Protocol             Plan:     Problem List Items Addressed This Visit          Circulatory    Infection of AV graft for dialysis [facility])    Relevant Orders    Initiate Outpatient Wound Care Protocol       Endocrine    Diabetes mellitus [facility])    Relevant Orders    Initiate Outpatient Wound Care Protocol       Other    Hand lesion - Primary    Relevant Orders    Initiate Outpatient Wound Care Protocol    Hand ulceration with fat layer exposed [facility])    Relevant Orders    Initiate Outpatient Wound Care Protocol    Cellulitis of right upper extremity    Relevant Orders    Initiate Outpatient Wound Care Protocol       Treatment Note please see attached Discharge Instructions    In my professional opinion this patient would benefit from HBO Therapy: Yes    Written patient dismissal instructions given to patient and signed by patient or POA. Cont local wound care. Pt may require radiation therapy to the left hand. Will refer to [address] [place] [address] once healed. Skin sub applied today. Will reassess next week.      Electronically signed by Traci Redding DO on 2/21/2023 at 9:55 AM

## 2023-02-27 NOTE — PLAN OF CARE
NuShield Treatment Note    NAME:  Keila Simms  YOB: 1955  MEDICAL RECORD NUMBER:  [id number]  DATE:  2/7/23    Goal:  Patient will receive safe and proper application of skin substitute. Patient will comply with caring for dressing, offloading and reporting complications. Expiration date checked immediately prior to use. Package intact prior to use and no damage noted. Nushield was removed from protective sterile packaging by provider and applied to prepared ulcer bed. NuShield applied with notch to Top Upper Right Corner. NuShield was hydrated with sterile normal saline per provider. NuShield was applied to left hand and affixed with steri-strips by the provider. Applied nonadherent and copa, fluff guaze bolster (Secondary Dressing)   Coban or ace wrap was applied to secure graft and decrease edema. Patient/caregiver was instructed not to remove dressing and to keep it clean and dry. NuShield may be applied a total of 10 times per wound over a 12 week period. Additionally NuShield may only be used every 12 months per wound. Date of first application of NuShield for this current wound is February 7, 2023.       Application # 1 out of 10           Guidelines followed    Electronically signed by Lydia Judd RN on 2/27/2023 at 8:57 AM

## 2023-02-27 NOTE — PLAN OF CARE
NuShield Treatment Note    NAME:  Lex Aleman OF BIRTH:  1955  MEDICAL RECORD NUMBER:  [id number]  DATE:  2/21/23    Goal:  Patient will receive safe and proper application of skin substitute. Patient will comply with caring for dressing, offloading and reporting complications. Expiration date checked immediately prior to use. Package intact prior to use and no damage noted. Nushield was removed from protective sterile packaging by provider and applied to prepared ulcer bed. NuShield applied with notch to Top Upper Right Corner. NuShield was hydrated with sterile normal saline per provider. NuShield was applied to left hand and affixed with steri-strips by the provider. Applied nonadherent and copa, fluff gauze bolster (Secondary Dressing)   Coban or ace wrap was applied to secure graft and decrease edema. Patient/caregiver was instructed not to remove dressing and to keep it clean and dry. NuShield may be applied a total of 10 times per wound over a 12 week period. Additionally NuShield may only be used every 12 months per wound. Date of first application of NuShield for this current wound is February 21, 2023.       Application # 2 out of 10           Guidelines followed    Electronically signed by Asa Maldonado RN on 2/27/2023 at 9:01 AM

## 2023-02-27 NOTE — DISCHARGE INSTRUCTIONS
[address] [facility]   Physician Orders and Discharge Instructions  [address] [facility] [place]  Telephone: [phone]    NAME:  Doc Ravi OF BIRTH:  1955  MEDICAL RECORD NUMBER:  [id number]  DATE:  2/28/23    Discharge condition: Stable    Discharge to: Home    Left via:Private automobile    Accompanied by:  self    ECF/HHA: Randolph Santiago placed left hand 2/21/23    Dressing Orders:  Left hand cleanse with saline, pat dry, apply Promogram to wound,cover promogram with wound gel moisture, cover with Silicone Border dressing (such as Mepilex Border )      Stretch netting to help hold in place if needed. Right lateral forearm:   Moisturize and protect        Right lateral forearm:   Moisturize and protect      Treatment Orders:Protein rich diet (unless restricted by your physician)  Multivitamin daily  Elevate left hand 3-4 times per day above level of the heart     May shower- do not get left hand wet        WCC follow up visit ______________1 week with Patricia Elizabeth_______________  (Please note your next appointment above and if you are unable to keep, kindly give a 24 hour notice. Thank you.)          If you experience any of the following, please call the [facility] during business hours:    * Increase in Pain  * Temperature over 101  * Increase in drainage from your wound  * Drainage with a foul odor  * Bleeding  * Increase in swelling  * Need for compression bandage changes due to slippage, breakthrough drainage. If you need medical attention outside of the business hours of the [facility] please contact your PCP or go to the nearest emergency room.

## 2023-02-28 ENCOUNTER — HOSPITAL ENCOUNTER (OUTPATIENT)
Dept: WOUND CARE | Age: 68
Discharge: HOME | End: 2023-02-28
Payer: MEDICARE

## 2023-02-28 VITALS
SYSTOLIC BLOOD PRESSURE: 126 MMHG | HEART RATE: 77 BPM | TEMPERATURE: 97.7 F | RESPIRATION RATE: 18 BRPM | DIASTOLIC BLOOD PRESSURE: 74 MMHG

## 2023-02-28 DIAGNOSIS — L03.113 CELLULITIS OF RIGHT UPPER EXTREMITY: ICD-10-CM

## 2023-02-28 DIAGNOSIS — L98.9 HAND LESION: Primary | ICD-10-CM

## 2023-02-28 DIAGNOSIS — T82.7XXA INFECTION OF AV GRAFT FOR DIALYSIS (HCC): ICD-10-CM

## 2023-02-28 DIAGNOSIS — E11.9 TYPE 2 DIABETES MELLITUS WITHOUT COMPLICATION, WITHOUT LONG-TERM CURRENT USE OF INSULIN (HCC): ICD-10-CM

## 2023-02-28 DIAGNOSIS — L98.492 HAND ULCERATION WITH FAT LAYER EXPOSED (HCC): ICD-10-CM

## 2023-02-28 PROCEDURE — 99213 OFFICE O/P EST LOW 20 MIN: CPT

## 2023-02-28 PROCEDURE — 99212 OFFICE O/P EST SF 10 MIN: CPT | Performed by: SURGERY

## 2023-02-28 RX ORDER — LIDOCAINE HYDROCHLORIDE 20 MG/ML
JELLY TOPICAL ONCE
OUTPATIENT
Start: 2023-02-28 | End: 2023-02-28

## 2023-02-28 RX ORDER — MAGNESIUM OXIDE 400 MG/1
400 TABLET ORAL 2 TIMES DAILY
COMMUNITY

## 2023-02-28 RX ORDER — LIDOCAINE HYDROCHLORIDE 40 MG/ML
SOLUTION TOPICAL ONCE
OUTPATIENT
Start: 2023-02-28 | End: 2023-02-28

## 2023-02-28 RX ORDER — LIDOCAINE 40 MG/G
CREAM TOPICAL ONCE
OUTPATIENT
Start: 2023-02-28 | End: 2023-02-28

## 2023-02-28 RX ORDER — LIDOCAINE 50 MG/G
OINTMENT TOPICAL ONCE
OUTPATIENT
Start: 2023-02-28 | End: 2023-02-28

## 2023-03-01 NOTE — PROGRESS NOTES
[address]   Progress Note and Procedure Note      Toi Mireles  MEDICAL RECORD NUMBER:  [id number]  AGE: 69 y.o. GENDER: male  : 1955  EPISODE DATE:  2023    Subjective:     Chief Complaint   Patient presents with    Wound Check     Left hand wound      HISTORY of PRESENT ILLNESS HPI     Hunter Victoria is a 69 y.o. male who presents today for wound/ulcer evaluation. History of Wound Context: Patient status post excision of multiple skin cancers on 2023. He did have lesions of both his left and right arm and left hand. The lesion of the left hand was not able to be closed due to the size of the lesion. He is here today for wound care of the left hand. All of the resected areas noted to be squamous cell carcinoma with negative margins. 2023: Returns today for suture removal and wound care. Right arm wound now draining purulence. 2023: Right arm wound with significant improvement. Denies complaints. 2023: NuShield placed last week. Doing well. Dressing removed.     2023: Wound doing well. Scab removed. Reapplication of NuShield. 2023: Doing well. New shield showing improvement on the wound. Wound/Ulcer Pain Timing/Severity: none  Quality of pain: N/A  Severity:  1 / 10   Modifying Factors: None  Associated Signs/Symptoms: none    Ulcer Identification:  Ulcer Type: non-healing surgical  Contributing Factors: edema    Wound: Surgical incision        PAST MEDICAL HISTORY        Diagnosis Date    Arthritis     Atrial fibrillation ([address]     Bleeding ulcer 2019    Cancer ([address]     skin    CHF (congestive heart failure) ([address]     Diabetes mellitus ([facility] [address]     Ex-cigarette smoker 2016    History of blood transfusion     History of gastric ulcer     Hyperlipidemia     Hypertension     hx.  10 years ago    Kidney disease, chronic, end stage on dialysis [facility]) 2006, dialysis for 3 years, then had a kidney transplant,    Obese     Peritoneal dialysis status ([facility] [address]     past hx    Prolonged emergence from general anesthesia     Vision problem     wears glasses       PAST SURGICAL HISTORY    Past Surgical History:   Procedure Laterality Date    ARM SURGERY Right 1/5/2023    EXCISION RIGHT ARM LESION performed by Bryanna Gil DO at [address]  06/23/2017    Dr Berta Campos: Diverticulosis, internal hemorrhoids, 5yr recall    COLONOSCOPY  01/05/2012    Dr Mando Hendricks, 5 yr recall    DIALYSIS FISTULA CREATION  [place] 2007    left arm radial cephalic    HAND SURGERY Left 1/5/2023    EXCISION LEFT HAND LESION AND LEFT ARM LESIONS X2 performed by Bryanna Gil DO at [address]      umbilical hernia repair    KIDNEY TRANSPLANT      2/18/2010 in [facility] REVJ/CLSR Right 2/23/2018    AV FISTULA GRAFT REMOVAL performed by Lea Steiner MD at [address]      TUNNELED VENOUS CATHETER PLACEMENT  multiple    UPPER GASTROINTESTINAL ENDOSCOPY N/A 11/4/2019    Dr Aziza Arias (Dr Berta Campos pt)1.2 cm superficial gastric ulcer in the antrum with surrounding moderate gastritis and oozing of blood    UPPER GASTROINTESTINAL ENDOSCOPY  10/05/2009    Dr Kenneth Marroquin w/resolution of ulcer, lenin +    UPPER GASTROINTESTINAL ENDOSCOPY  07/10/2009    Dr Eunice Cevallos ulceration w/pigmented base on posterior wall of the body, small erosions, active gastritis    UPPER GASTROINTESTINAL ENDOSCOPY N/A 1/10/2020    Dr Hensley Patella hiatal hernia, healed previous antral ulcer-Gastritis    VASCULAR SURGERY Right [place] 2008    av loop graft       FAMILY HISTORY    Family History   Problem Relation Age of Onset    Kidney Disease Father     Heart Disease Father     Colon Cancer Neg Hx     Colon Polyps Neg Hx        SOCIAL HISTORY    Social History     Tobacco Use    Smoking status: Former Packs/day: 2.00     Years: 4.00     Pack years: 8.00     Types: Cigarettes     Quit date: 1973     Years since quittin.0    Smokeless tobacco: Never   Vaping Use    Vaping Use: Never used   Substance Use Topics    Alcohol use: No    Drug use: Never       ALLERGIES    No Known Allergies    MEDICATIONS    Current Outpatient Medications on File Prior to Encounter   Medication Sig Dispense Refill    magnesium oxide (MAG-OX) 400 MG tablet Take 400 mg by mouth 2 times daily      tamsulosin (FLOMAX) 0.4 MG capsule TAKE 2 CAPSULES BY MOUTH EVERY DAY 60 capsule 2    tacrolimus ER (ENVARSUS XR) 1 MG TB24 tablet Take 2 mg by mouth daily      albuterol sulfate HFA (PROVENTIL HFA) 108 (90 Base) MCG/ACT inhaler Inhale 2 puffs into the lungs every 6 hours as needed for Wheezing 18 g 3    JARDIANCE 25 MG tablet TAKE 1 TABLET BY MOUTH EVERY DAY (Patient taking differently: Take 25 mg by mouth daily TAKE 1 TABLET BY MOUTH EVERY DAY) 90 tablet 0    calcitRIOL (ROCALTROL) 0.25 MCG capsule Take 0.25 mcg by mouth every other day      propranolol (INDERAL) 80 MG tablet Take 1 tablet by mouth 2 times daily 180 tablet 3    Lancets MISC 1 each by Does not apply route daily 100 each 5    blood glucose test strips (TRUE METRIX BLOOD GLUCOSE TEST) strip TEST ONE TIME A DAY & AS NEEDED FOR SYMPTOMS OF IRREGULAR BLOOD GLUCOSE.  DX: E11.9 100 strip 5    sodium bicarbonate 325 MG tablet TAKE 1 TABLET BY MOUTH TWICE A DAY (Patient taking differently: Take 650 mg by mouth 3 times daily) 60 tablet 5    metFORMIN (GLUCOPHAGE) 1000 MG tablet TAKE 1 TABLET BY MOUTH TWICE A DAY WITH MEALS (Patient taking differently: Take 1,000 mg by mouth 2 times daily (with meals)) 180 tablet 3    vitamin D (ERGOCALCIFEROL) 1.25 MG (34329 UT) CAPS capsule TAKE 1 CAPSULE BY MOUTH ONE TIME PER WEEK (Patient taking differently: Take 50,000 Units by mouth every 30 days) 12 capsule 1    blood glucose monitor strips Test one time a day & as needed for symptoms of irregular blood glucose. 100 strip 5    Omega-3 Fatty Acids (FISH OIL) 1000 MG CPDR Take 1,000 mg by mouth 2 times daily      mycophenolate (CELLCEPT) 250 MG capsule Take 500 mg by mouth 2 times daily      glucose blood VI test strips (ACCU-CHEK ANANT) strip Contour strips,check daily E11.9 100 each 0     No current facility-administered medications on file prior to encounter. REVIEW OF SYSTEMS    Pertinent items are noted in HPI. Objective:      /74   Pulse 77   Temp 97.7 °F (36.5 °C) (Temporal)   Resp 18     Wt Readings from Last 3 Encounters:   01/17/23 255 lb (115.7 kg)   01/10/23 255 lb (115.7 kg)   01/05/23 255 lb (115.7 kg)       PHYSICAL EXAM    General Appearance: alert and oriented to person, place and time  Skin: warm and dry, no rash or erythema and Wound as noted  Pulmonary/Chest: no chest wall tenderness  Abdomen: soft, non-tender, non-distended, normal bowel sounds, no masses or organomegaly  Extremities: no cyanosis and no clubbing  Musculoskeletal: normal range of motion, no joint swelling, deformity or tenderness      Assessment:      Patient Active Problem List   Diagnosis Code    Essential hypertension I10    Chest pressure R07.89    Diabetes mellitus ([facility] [address] [address] E11.9    Ex-cigarette smoker Z87.891    Chest pain R07.9    Cellulitis L03.90    Infection of AV graft for dialysis ([facility] [address] T82. 7XXA    Cellulitis of right upper extremity L03.113    Atrial fibrillation with RVR ([facility]) I48.91    Immunosuppression ([facility]) D84.9    Daytime somnolence R40.0    PAF (paroxysmal atrial fibrillation) ([facility]) I48.0    Fatigue R53.83    History of tachycardia Z87.898    Chronic anticoagulation Z79.01    Orthostatic dizziness R42    PSVT (paroxysmal supraventricular tachycardia) ([facility]) I47.1    UGI bleed K92.2    History of renal transplant Z94.0    LVH (left ventricular hypertrophy) I51.7    Presence of Watchman left atrial appendage closure device Z95.818    Longstanding persistent atrial fibrillation ([facility]) I48.11    Hand lesion L98.9    Squamous cell skin cancer C44.92    Hand ulceration with fat layer exposed ([facility] [address] L98.492       Wound 01/10/23 Hand Left;Dorsal Wound #1 Left hand (surgical) (Active)   Wound Image   02/28/23 1138   Wound Etiology Surgical 02/28/23 1055   Dressing Status New dressing applied 02/28/23 1138   Wound Cleansed Not Cleansed 02/28/23 1055   Dressing/Treatment Collagen; Hydrating gel;Silicone border 63/81/58 1138   Wound Length (cm) 1.7 cm 02/21/23 0915   Wound Width (cm) 1.8 cm 02/21/23 0915   Wound Depth (cm) 0.1 cm 02/21/23 0915   Wound Surface Area (cm^2) 3.06 cm^2 02/21/23 0915   Change in Wound Size % (l*w) 51.04 02/21/23 0915   Wound Volume (cm^3) 0.306 cm^3 02/21/23 0915   Wound Healing % 84 02/21/23 0915   Post-Procedure Length (cm) 1.7 cm 02/21/23 0940   Post-Procedure Width (cm) 1.8 cm 02/21/23 0940   Post-Procedure Depth (cm) 0.1 cm 02/21/23 0940   Post-Procedure Surface Area (cm^2) 3.06 cm^2 02/21/23 0940   Post-Procedure Volume (cm^3) 0.306 cm^3 02/21/23 0940   Distance Tunneling (cm) 0 cm 01/17/23 0852   Tunneling Position ___ O'Clock 0 01/17/23 0852   Undermining Starts ___ O'Clock 0 01/17/23 0852   Undermining Ends___ O'Clock 0 01/17/23 0852   Undermining Maxium Distance (cm) 0 01/17/23 0852   Wound Assessment Dry 02/28/23 1055   Drainage Amount Scant 02/28/23 1055   Drainage Description Serosanguinous 02/28/23 1055   Odor None 02/28/23 1055   Aure-wound Assessment Intact 02/28/23 1055   Margins Defined edges 02/21/23 0915   Wound Thickness Description not for Pressure Injury Full thickness 02/21/23 0915   Number of days: 50                   Wound 01/10/23 Hand Left;Dorsal Wound #1 Left hand (surgical) (Active)   Wound Image   02/21/23 0915   Wound Etiology Surgical 02/21/23 0915   Dressing Status Old drainage noted 02/21/23 0915   Wound Cleansed Soap and water 02/21/23 0915   Dressing/Treatment Alginate with Ag;Silicone border 65/09/29 0920   Wound Length (cm) 1.7 cm 02/21/23 0915   Wound Width (cm) 1.8 cm 02/21/23 0915   Wound Depth (cm) 0.1 cm 02/21/23 0915   Wound Surface Area (cm^2) 3.06 cm^2 02/21/23 0915   Change in Wound Size % (l*w) 51.04 02/21/23 0915   Wound Volume (cm^3) 0.306 cm^3 02/21/23 0915   Wound Healing % 84 02/21/23 0915   Post-Procedure Length (cm) 1.7 cm 02/21/23 0940   Post-Procedure Width (cm) 1.8 cm 02/21/23 0940   Post-Procedure Depth (cm) 0.1 cm 02/21/23 0940   Post-Procedure Surface Area (cm^2) 3.06 cm^2 02/21/23 0940   Post-Procedure Volume (cm^3) 0.306 cm^3 02/21/23 0940   Distance Tunneling (cm) 0 cm 01/17/23 0852   Tunneling Position ___ O'Clock 0 01/17/23 0852   Undermining Starts ___ O'Clock 0 01/17/23 0852   Undermining Ends___ O'Clock 0 01/17/23 0852   Undermining Maxium Distance (cm) 0 01/17/23 0852   Wound Assessment Eschar dry 02/21/23 0915   Drainage Amount Scant 02/21/23 0915   Drainage Description Serosanguinous 02/21/23 0915   Odor None 02/21/23 0915   Aure-wound Assessment Intact 02/21/23 0915   Margins Defined edges 02/21/23 0915   Wound Thickness Description not for Pressure Injury Full thickness 02/21/23 0915   Number of days: 41           Problem List Items Addressed This Visit          Circulatory    Infection of AV graft for dialysis [facility])    Relevant Orders    Initiate Outpatient Wound Care Protocol       Endocrine    Diabetes mellitus [facility])    Relevant Orders    Initiate Outpatient Wound Care Protocol       Other    Hand lesion - Primary    Relevant Orders    Initiate Outpatient Wound Care Protocol    Hand ulceration with fat layer exposed ([facility] [address]    Relevant Orders    Initiate Outpatient Wound Care Protocol    Cellulitis of right upper extremity    Relevant Orders    Initiate Outpatient Wound Care Protocol             Plan:     Problem List Items Addressed This Visit          Circulatory    Infection of AV graft for dialysis [facility])    Relevant Orders    Initiate Outpatient Wound Care Protocol       Endocrine    Diabetes mellitus ([facility] [address] Relevant Orders    Initiate Outpatient Wound Care Protocol       Other    Hand lesion - Primary    Relevant Orders    Initiate Outpatient Wound Care Protocol    Hand ulceration with fat layer exposed [facility])    Relevant Orders    Initiate Outpatient Wound Care Protocol    Cellulitis of right upper extremity    Relevant Orders    Initiate Outpatient Wound Care Protocol       Treatment Note please see attached Discharge Instructions    In my professional opinion this patient would benefit from HBO Therapy: Yes    Written patient dismissal instructions given to patient and signed by patient or POA. Cont local wound care. Pt may require radiation therapy to the left hand. Will refer to Felipe Ruiz once healed. Plan to reapply a skin substitute next week. Local wound care.     Electronically signed by Juan Vieira DO on 3/1/2023 at 11:47 AM

## 2023-03-07 ENCOUNTER — HOSPITAL ENCOUNTER (OUTPATIENT)
Dept: WOUND CARE | Age: 68
Discharge: HOME | End: 2023-03-07
Payer: MEDICARE

## 2023-03-07 VITALS
DIASTOLIC BLOOD PRESSURE: 67 MMHG | HEART RATE: 93 BPM | RESPIRATION RATE: 20 BRPM | TEMPERATURE: 97.7 F | SYSTOLIC BLOOD PRESSURE: 105 MMHG

## 2023-03-07 DIAGNOSIS — L98.492 HAND ULCERATION WITH FAT LAYER EXPOSED (HCC): ICD-10-CM

## 2023-03-07 DIAGNOSIS — C44.92 SQUAMOUS CELL SKIN CANCER: ICD-10-CM

## 2023-03-07 DIAGNOSIS — L03.113 CELLULITIS OF RIGHT UPPER EXTREMITY: ICD-10-CM

## 2023-03-07 DIAGNOSIS — L98.9 HAND LESION: Primary | ICD-10-CM

## 2023-03-07 DIAGNOSIS — T82.7XXA INFECTION OF AV GRAFT FOR DIALYSIS (HCC): ICD-10-CM

## 2023-03-07 DIAGNOSIS — E11.9 TYPE 2 DIABETES MELLITUS WITHOUT COMPLICATION, WITHOUT LONG-TERM CURRENT USE OF INSULIN (HCC): ICD-10-CM

## 2023-03-07 PROCEDURE — 97597 DBRDMT OPN WND 1ST 20 CM/<: CPT

## 2023-03-07 PROCEDURE — 6370000000 HC RX 637 (ALT 250 FOR IP): Performed by: SURGERY

## 2023-03-07 PROCEDURE — 97597 DBRDMT OPN WND 1ST 20 CM/<: CPT | Performed by: SURGERY

## 2023-03-07 RX ORDER — LIDOCAINE HYDROCHLORIDE 20 MG/ML
JELLY TOPICAL ONCE
Status: COMPLETED | OUTPATIENT
Start: 2023-03-07 | End: 2023-03-07

## 2023-03-07 RX ORDER — LIDOCAINE 40 MG/G
CREAM TOPICAL ONCE
OUTPATIENT
Start: 2023-03-07 | End: 2023-03-07

## 2023-03-07 RX ORDER — LIDOCAINE HYDROCHLORIDE 20 MG/ML
JELLY TOPICAL ONCE
OUTPATIENT
Start: 2023-03-07 | End: 2023-03-07

## 2023-03-07 RX ORDER — LIDOCAINE HYDROCHLORIDE 40 MG/ML
SOLUTION TOPICAL ONCE
OUTPATIENT
Start: 2023-03-07 | End: 2023-03-07

## 2023-03-07 RX ORDER — LIDOCAINE 50 MG/G
OINTMENT TOPICAL ONCE
OUTPATIENT
Start: 2023-03-07 | End: 2023-03-07

## 2023-03-07 RX ADMIN — LIDOCAINE HYDROCHLORIDE: 20 JELLY TOPICAL at 11:08

## 2023-03-07 NOTE — PROGRESS NOTES
[address]   Progress Note and Procedure Note      Malik Nichole [facility]  MEDICAL RECORD NUMBER:  [id number]  AGE: 69 y.o. GENDER: male  : 1955  EPISODE DATE:  3/7/2023    Subjective:     Chief Complaint   Patient presents with    Wound Check     Patient presents for recheck of left hand wound      HISTORY of PRESENT ILLNESS HPI     Sandra Guardado is a 69 y.o. male who presents today for wound/ulcer evaluation. History of Wound Context: Patient status post excision of multiple skin cancers on 2023. He did have lesions of both his left and right arm and left hand. The lesion of the left hand was not able to be closed due to the size of the lesion. He is here today for wound care of the left hand. All of the resected areas noted to be squamous cell carcinoma with negative margins. 2023: Returns today for suture removal and wound care. Right arm wound now draining purulence. 2023: Right arm wound with significant improvement. Denies complaints. 2023: NuShield placed last week. Doing well. Dressing removed.     2023: Wound doing well. Scab removed. Reapplication of NuShield. 2023: Doing well. New shield showing improvement on the wound. 3/7/2023: Left hand wound close to being healed. Denies new complaints. Wound/Ulcer Pain Timing/Severity: none  Quality of pain: N/A  Severity:  1 / 10   Modifying Factors: None  Associated Signs/Symptoms: none    Ulcer Identification:  Ulcer Type: non-healing surgical  Contributing Factors: edema    Wound: Surgical incision        PAST MEDICAL HISTORY        Diagnosis Date    Arthritis     Atrial fibrillation ([address]     Bleeding ulcer 2019    Cancer ([address]     skin    CHF (congestive heart failure) [facility]     Diabetes mellitus ([facility]     Ex-cigarette smoker 2016    History of blood transfusion     History of gastric ulcer     Hyperlipidemia     Hypertension     hx.  10 years ago    Kidney disease, chronic, end stage on dialysis [facility]) 2006    2007, dialysis for 3 years, then had a kidney transplant,    Obese     Peritoneal dialysis status ([facility]     past hx    Prolonged emergence from general anesthesia     Vision problem     wears glasses       PAST SURGICAL HISTORY    Past Surgical History:   Procedure Laterality Date    ARM SURGERY Right 1/5/2023    EXCISION RIGHT ARM LESION performed by Samuel Rand DO at [address]  06/23/2017    Dr Letitia Uribe: Diverticulosis, internal hemorrhoids, 5yr recall    COLONOSCOPY  01/05/2012    Dr Montana Art, 5 yr recall    DIALYSIS FISTULA CREATION  [place] 2007    left arm radial cephalic    HAND SURGERY Left 1/5/2023    EXCISION LEFT HAND LESION AND LEFT ARM LESIONS X2 performed by Samuel Rand DO at [address]      umbilical hernia repair    KIDNEY TRANSPLANT      2/18/2010 in [facility] REVJ/CLSR Right 2/23/2018    AV FISTULA GRAFT REMOVAL performed by Tod Garibay MD at [address]      TUNNELED VENOUS CATHETER PLACEMENT  multiple    UPPER GASTROINTESTINAL ENDOSCOPY N/A 11/4/2019    Dr Garcia  (Dr Letitia Uribe pt)1.2 cm superficial gastric ulcer in the antrum with surrounding moderate gastritis and oozing of blood    UPPER GASTROINTESTINAL ENDOSCOPY  10/05/2009    Dr Jazz Lawson w/resolution of ulcer, lenin +    UPPER GASTROINTESTINAL ENDOSCOPY  07/10/2009    Dr Mery Harris ulceration w/pigmented base on posterior wall of the body, small erosions, active gastritis    UPPER GASTROINTESTINAL ENDOSCOPY N/A 1/10/2020    Dr Dara Chow hiatal hernia, healed previous antral ulcer-Gastritis    VASCULAR SURGERY Right [place] 2008    av loop graft       FAMILY HISTORY    Family History   Problem Relation Age of Onset    Kidney Disease Father     Heart Disease Father     Colon Cancer Neg Hx     Colon Polyps Neg Hx        SOCIAL HISTORY    Social History     Tobacco Use    Smoking status: Former     Packs/day: 2.00     Years: 4.00     Pack years: 8.00     Types: Cigarettes     Quit date: 1973     Years since quittin.0    Smokeless tobacco: Never   Vaping Use    Vaping Use: Never used   Substance Use Topics    Alcohol use: No    Drug use: Never       ALLERGIES    No Known Allergies    MEDICATIONS    Current Outpatient Medications on File Prior to Encounter   Medication Sig Dispense Refill    magnesium oxide (MAG-OX) 400 MG tablet Take 400 mg by mouth 2 times daily      tamsulosin (FLOMAX) 0.4 MG capsule TAKE 2 CAPSULES BY MOUTH EVERY DAY 60 capsule 2    tacrolimus ER (ENVARSUS XR) 1 MG TB24 tablet Take 2 mg by mouth daily      albuterol sulfate HFA (PROVENTIL HFA) 108 (90 Base) MCG/ACT inhaler Inhale 2 puffs into the lungs every 6 hours as needed for Wheezing 18 g 3    JARDIANCE 25 MG tablet TAKE 1 TABLET BY MOUTH EVERY DAY (Patient taking differently: Take 25 mg by mouth daily TAKE 1 TABLET BY MOUTH EVERY DAY) 90 tablet 0    calcitRIOL (ROCALTROL) 0.25 MCG capsule Take 0.25 mcg by mouth every other day      propranolol (INDERAL) 80 MG tablet Take 1 tablet by mouth 2 times daily 180 tablet 3    Lancets MISC 1 each by Does not apply route daily 100 each 5    blood glucose test strips (TRUE METRIX BLOOD GLUCOSE TEST) strip TEST ONE TIME A DAY & AS NEEDED FOR SYMPTOMS OF IRREGULAR BLOOD GLUCOSE.  DX: E11.9 100 strip 5    sodium bicarbonate 325 MG tablet TAKE 1 TABLET BY MOUTH TWICE A DAY (Patient taking differently: Take 650 mg by mouth 3 times daily) 60 tablet 5    metFORMIN (GLUCOPHAGE) 1000 MG tablet TAKE 1 TABLET BY MOUTH TWICE A DAY WITH MEALS (Patient taking differently: Take 1,000 mg by mouth 2 times daily (with meals)) 180 tablet 3    vitamin D (ERGOCALCIFEROL) 1.25 MG (69806 UT) CAPS capsule TAKE 1 CAPSULE BY MOUTH ONE TIME PER WEEK (Patient taking differently: Take 50,000 Units by mouth every 30 days) 12 capsule 1    blood glucose monitor strips Test one time a day & as needed for symptoms of irregular blood glucose. 100 strip 5    Omega-3 Fatty Acids (FISH OIL) 1000 MG CPDR Take 1,000 mg by mouth 2 times daily      mycophenolate (CELLCEPT) 250 MG capsule Take 500 mg by mouth 2 times daily      glucose blood VI test strips (ACCU-CHEK ANANT) strip Contour strips,check daily E11.9 100 each 0     No current facility-administered medications on file prior to encounter. REVIEW OF SYSTEMS    Pertinent items are noted in HPI. Objective:      /67   Pulse 93   Temp 97.7 °F (36.5 °C) (Temporal)   Resp 20     Wt Readings from Last 3 Encounters:   01/17/23 255 lb (115.7 kg)   01/10/23 255 lb (115.7 kg)   01/05/23 255 lb (115.7 kg)       PHYSICAL EXAM    General Appearance: alert and oriented to person, place and time  Skin: warm and dry, no rash or erythema and Wound as noted  Pulmonary/Chest: no chest wall tenderness  Abdomen: soft, non-tender, non-distended, normal bowel sounds, no masses or organomegaly  Extremities: no cyanosis and no clubbing  Musculoskeletal: normal range of motion, no joint swelling, deformity or tenderness      Assessment:      Patient Active Problem List   Diagnosis Code    Essential hypertension I10    Chest pressure R07.89    Diabetes mellitus ([facility] E11.9    Ex-cigarette smoker Z87.891    Chest pain R07.9    Cellulitis L03.90    Infection of AV graft for dialysis ([facility] T82. 7XXA    Cellulitis of right upper extremity L03.113    Atrial fibrillation with RVR ([facility]) I48.91    Immunosuppression ([facility]) D84.9    Daytime somnolence R40.0    PAF (paroxysmal atrial fibrillation) ([facility]) I48.0    Fatigue R53.83    History of tachycardia Z87.898    Chronic anticoagulation Z79.01    Orthostatic dizziness R42    PSVT (paroxysmal supraventricular tachycardia) ([facility]) I47.1    UGI bleed K92.2    History of renal transplant Z94.0    LVH (left ventricular hypertrophy) I51.7    Presence of Watchman left atrial appendage closure device Z95.818    Longstanding persistent atrial fibrillation (HCC) I48.11    Hand lesion L98.9    Squamous cell skin cancer C44.92    Hand ulceration with fat layer exposed ([address] L98.492       Wound 01/10/23 Hand Left;Dorsal Wound #1 Left hand (surgical) (Active)   Wound Image   03/07/23 1100   Wound Etiology Surgical 03/07/23 1100   Dressing Status Dry; Intact 03/07/23 1100   Wound Cleansed Soap and water 03/07/23 1100   Dressing/Treatment Collagen; Hydrating gel;Silicone border 56/99/64 1138   Wound Length (cm) 0.7 cm 03/07/23 1100   Wound Width (cm) 0.7 cm 03/07/23 1100   Wound Depth (cm) 0.1 cm 03/07/23 1100   Wound Surface Area (cm^2) 0.49 cm^2 03/07/23 1100   Change in Wound Size % (l*w) 92.16 03/07/23 1100   Wound Volume (cm^3) 0.049 cm^3 03/07/23 1100   Wound Healing % 97 03/07/23 1100   Post-Procedure Length (cm) 0.7 cm 03/07/23 1119   Post-Procedure Width (cm) 0.7 cm 03/07/23 1119   Post-Procedure Depth (cm) 0.1 cm 03/07/23 1119   Post-Procedure Surface Area (cm^2) 0.49 cm^2 03/07/23 1119   Post-Procedure Volume (cm^3) 0.049 cm^3 03/07/23 1119   Distance Tunneling (cm) 0 cm 03/07/23 1100   Tunneling Position ___ O'Clock 0 03/07/23 1100   Undermining Starts ___ O'Clock 0 03/07/23 1100   Undermining Ends___ O'Clock 0 03/07/23 1100   Undermining Maxium Distance (cm) 0 03/07/23 1100   Wound Assessment Dry;Epithelialization 03/07/23 1100   Drainage Amount Scant 03/07/23 1100   Drainage Description Serosanguinous 03/07/23 1100   Odor None 03/07/23 1100   Aure-wound Assessment Intact 03/07/23 1100   Margins Defined edges 03/07/23 1100   Wound Thickness Description not for Pressure Injury Full thickness 03/07/23 1100   Number of days: 56                   Wound 01/10/23 Hand Left;Dorsal Wound #1 Left hand (surgical) (Active)   Wound Image   02/21/23 0915   Wound Etiology Surgical 02/21/23 0915   Dressing Status Old drainage noted 02/21/23 0915   Wound Cleansed Soap and water 02/21/23 0915 Dressing/Treatment Alginate with Ag;Silicone border 91/11/16 0920   Wound Length (cm) 1.7 cm 02/21/23 0915   Wound Width (cm) 1.8 cm 02/21/23 0915   Wound Depth (cm) 0.1 cm 02/21/23 0915   Wound Surface Area (cm^2) 3.06 cm^2 02/21/23 0915   Change in Wound Size % (l*w) 51.04 02/21/23 0915   Wound Volume (cm^3) 0.306 cm^3 02/21/23 0915   Wound Healing % 84 02/21/23 0915   Post-Procedure Length (cm) 1.7 cm 02/21/23 0940   Post-Procedure Width (cm) 1.8 cm 02/21/23 0940   Post-Procedure Depth (cm) 0.1 cm 02/21/23 0940   Post-Procedure Surface Area (cm^2) 3.06 cm^2 02/21/23 0940   Post-Procedure Volume (cm^3) 0.306 cm^3 02/21/23 0940   Distance Tunneling (cm) 0 cm 01/17/23 0852   Tunneling Position ___ O'Clock 0 01/17/23 0852   Undermining Starts ___ O'Clock 0 01/17/23 0852   Undermining Ends___ O'Clock 0 01/17/23 0852   Undermining Maxium Distance (cm) 0 01/17/23 0852   Wound Assessment Eschar dry 02/21/23 0915   Drainage Amount Scant 02/21/23 0915   Drainage Description Serosanguinous 02/21/23 0915   Odor None 02/21/23 0915   Aure-wound Assessment Intact 02/21/23 0915   Margins Defined edges 02/21/23 0915   Wound Thickness Description not for Pressure Injury Full thickness 02/21/23 0915   Number of days: 41       Procedure Note  Indications:  Based on my examination of this patient's wound(s)/ulcer(s) today, debridement is required to promote healing and evaluate the wound base. Performed by: Cassie Melendez DO    Consent obtained:  Yes    Time out taken:  Yes    Pain Control: Anesthetic  Anesthetic: 2% Lidocaine Gel Topical       Debridement:Excisional Debridement    Using curette the wound(s)/ulcer(s) was/were sharply debrided down through and including the removal of epidermis and dermis.         Devitalized Tissue Debrided:  biofilm and slough    Pre Debridement Measurements:  Are located in the Wound/Ulcer Documentation Flow Sheet    Wound/Ulcer #: 1    Post Debridement Measurements:  Wound/Ulcer Descriptions are Pre Debridement except measurements:      Percent of Wound/Ulcer Debrided: 90%    Total Surface Area Debrided:  3 sq cm     Estimated Blood Loss:  None    Hemostasis Achieved:  by pressure    Procedural Pain:  0  / 10     Post Procedural Pain:  0 / 10     Response to treatment:  Well tolerated by patient.          Diabetic/Pressure/Non Pressure Ulcers only:  Ulcer: Non-Pressure ulcer, fat layer exposed        Problem List Items Addressed This Visit          Circulatory    Infection of AV graft for dialysis [facility])    Relevant Orders    Initiate Outpatient Wound Care Protocol       Endocrine    Diabetes mellitus [facility])    Relevant Orders    Initiate Outpatient Wound Care Protocol       Other    Hand lesion - Primary    Relevant Orders    Initiate Outpatient Wound Care Protocol    Squamous cell skin cancer    Relevant Orders    External Referral To Radiation Oncology    Hand ulceration with fat layer exposed [facility])    Relevant Orders    Initiate Outpatient Wound Care Protocol    Cellulitis of right upper extremity    Relevant Orders    Initiate Outpatient Wound Care Protocol             Plan:     Problem List Items Addressed This Visit          Circulatory    Infection of AV graft for dialysis [facility])    Relevant Orders    Initiate Outpatient Wound Care Protocol       Endocrine    Diabetes mellitus [facility])    Relevant Orders    Initiate Outpatient Wound Care Protocol       Other    Hand lesion - Primary    Relevant Orders    Initiate Outpatient Wound Care Protocol    Squamous cell skin cancer    Relevant Orders    External Referral To Radiation Oncology    Hand ulceration with fat layer exposed [facility])    Relevant Orders    Initiate Outpatient Wound Care Protocol    Cellulitis of right upper extremity    Relevant Orders    Initiate Outpatient Wound Care Protocol       Treatment Note please see attached Discharge Instructions    In my professional opinion this patient would benefit from HBO Therapy: Yes    Written patient dismissal instructions given to patient and signed by patient or POA. Cont local wound care. Pt may require radiation therapy to the left hand due to size of SCC. Will refer to Radiation oncology.         Electronically signed by Gabriella Hernandez DO on 3/7/2023 at 11:25 AM

## 2023-03-07 NOTE — DISCHARGE INSTRUCTIONS
[facility]   Physician Orders and Discharge Instructions  [address] [place] [address] Drive  [address]  [place] [address]  Telephone: [phone]      FAX [phone]    NAME:  Scottie Mireles  YOB: 1955  MEDICAL RECORD NUMBER:  [id number]  DATE:  3/7/2023    Discharge condition: Stable    Discharge to: Home    Left via:Private automobile    Accompanied by:  self    [facility]: [facility]     NuIvan placed left hand 2/21/23    Dr. Back [phone] follow up on 3/15/2023 at 2:00 pm     Dressing Orders:  Left hand cleanse with soap and water, pat dry, apply Promogram to wound, cover promogram with wound gel, cover with Silicone Border dressing (such as Mepilex Silicone Border Dressing), change dressing every other day.     Stretch netting to help hold in place if needed.     Right lateral forearm:   Moisturize and protect      Right lateral forearm:   Moisturize and protect      Treatment Orders:Protein rich diet (unless restricted by your physician)  Multivitamin daily  Elevate left hand 3-4 times per day above level of the heart     May shower- do not get left hand wet        [facility] follow up visit ______________2 weeks with Patricia Elizabeth_______________  (Please note your next appointment above and if you are unable to keep, kindly give a 24 hour notice. Thank you.)          If you experience any of the following, please call the Wound Care Center during business hours:    * Increase in Pain  * Temperature over 101  * Increase in drainage from your wound  * Drainage with a foul odor  * Bleeding  * Increase in swelling  * Need for compression bandage changes due to slippage, breakthrough drainage.    If you need medical attention outside of the business hours of the Wound Care Centers please contact your PCP or go to the nearest emergency room.

## 2023-03-13 ENCOUNTER — OFFICE VISIT (OUTPATIENT)
Dept: OTOLARYNGOLOGY | Facility: CLINIC | Age: 68
End: 2023-03-13
Payer: MEDICARE

## 2023-03-13 ENCOUNTER — PRE-ADMISSION TESTING (OUTPATIENT)
Dept: PREADMISSION TESTING | Facility: HOSPITAL | Age: 68
End: 2023-03-13
Payer: MEDICARE

## 2023-03-13 VITALS
RESPIRATION RATE: 16 BRPM | HEART RATE: 81 BPM | BODY MASS INDEX: 35.83 KG/M2 | OXYGEN SATURATION: 95 % | DIASTOLIC BLOOD PRESSURE: 85 MMHG | WEIGHT: 255.95 LBS | HEIGHT: 71 IN | SYSTOLIC BLOOD PRESSURE: 142 MMHG

## 2023-03-13 VITALS
HEIGHT: 73 IN | BODY MASS INDEX: 34.46 KG/M2 | WEIGHT: 260 LBS | SYSTOLIC BLOOD PRESSURE: 136 MMHG | DIASTOLIC BLOOD PRESSURE: 75 MMHG | RESPIRATION RATE: 20 BRPM | HEART RATE: 65 BPM | TEMPERATURE: 98 F

## 2023-03-13 DIAGNOSIS — C44.92 SQUAMOUS CELL SKIN CANCER: ICD-10-CM

## 2023-03-13 DIAGNOSIS — Z01.818 PREOPERATIVE TESTING: ICD-10-CM

## 2023-03-13 DIAGNOSIS — Z98.890 MOHS DEFECT OF LEFT CHEEK: Primary | ICD-10-CM

## 2023-03-13 DIAGNOSIS — T14.8XXA OPEN WOUND: ICD-10-CM

## 2023-03-13 DIAGNOSIS — M95.2 MOHS DEFECT OF LEFT CHEEK: Primary | ICD-10-CM

## 2023-03-13 DIAGNOSIS — Z94.0 HISTORY OF RENAL TRANSPLANT: ICD-10-CM

## 2023-03-13 PROBLEM — Z87.891 FORMER SMOKER: Status: ACTIVE | Noted: 2023-03-13

## 2023-03-13 LAB
ANION GAP SERPL CALCULATED.3IONS-SCNC: 13 MMOL/L (ref 5–15)
BUN SERPL-MCNC: 18 MG/DL (ref 8–23)
BUN/CREAT SERPL: 16.2 (ref 7–25)
CALCIUM SPEC-SCNC: 9.7 MG/DL (ref 8.6–10.5)
CHLORIDE SERPL-SCNC: 104 MMOL/L (ref 98–107)
CO2 SERPL-SCNC: 21 MMOL/L (ref 22–29)
CREAT SERPL-MCNC: 1.11 MG/DL (ref 0.76–1.27)
DEPRECATED RDW RBC AUTO: 46.6 FL (ref 37–54)
EGFRCR SERPLBLD CKD-EPI 2021: 71.9 ML/MIN/1.73
ERYTHROCYTE [DISTWIDTH] IN BLOOD BY AUTOMATED COUNT: 13.7 % (ref 12.3–15.4)
GLUCOSE SERPL-MCNC: 160 MG/DL (ref 65–99)
HCT VFR BLD AUTO: 48.7 % (ref 37.5–51)
HGB BLD-MCNC: 15.8 G/DL (ref 13–17.7)
MCH RBC QN AUTO: 29.8 PG (ref 26.6–33)
MCHC RBC AUTO-ENTMCNC: 32.4 G/DL (ref 31.5–35.7)
MCV RBC AUTO: 91.9 FL (ref 79–97)
PLATELET # BLD AUTO: 168 10*3/MM3 (ref 140–450)
PMV BLD AUTO: 10.5 FL (ref 6–12)
POTASSIUM SERPL-SCNC: 4.3 MMOL/L (ref 3.5–5.2)
RBC # BLD AUTO: 5.3 10*6/MM3 (ref 4.14–5.8)
SODIUM SERPL-SCNC: 138 MMOL/L (ref 136–145)
WBC NRBC COR # BLD: 7.57 10*3/MM3 (ref 3.4–10.8)

## 2023-03-13 PROCEDURE — 85027 COMPLETE CBC AUTOMATED: CPT

## 2023-03-13 PROCEDURE — 1160F RVW MEDS BY RX/DR IN RCRD: CPT | Performed by: NURSE PRACTITIONER

## 2023-03-13 PROCEDURE — 99204 OFFICE O/P NEW MOD 45 MIN: CPT | Performed by: NURSE PRACTITIONER

## 2023-03-13 PROCEDURE — 1159F MED LIST DOCD IN RCRD: CPT | Performed by: NURSE PRACTITIONER

## 2023-03-13 PROCEDURE — 93005 ELECTROCARDIOGRAM TRACING: CPT

## 2023-03-13 PROCEDURE — 36415 COLL VENOUS BLD VENIPUNCTURE: CPT

## 2023-03-13 PROCEDURE — 93010 ELECTROCARDIOGRAM REPORT: CPT | Performed by: INTERNAL MEDICINE

## 2023-03-13 PROCEDURE — 80048 BASIC METABOLIC PNL TOTAL CA: CPT

## 2023-03-13 RX ORDER — MYCOPHENOLATE MOFETIL 250 MG/1
500 CAPSULE ORAL
COMMUNITY
Start: 2023-01-03 | End: 2023-03-13

## 2023-03-13 RX ORDER — SODIUM BICARBONATE 650 MG/1
1 TABLET ORAL 3 TIMES DAILY
COMMUNITY
Start: 2023-02-28

## 2023-03-13 RX ORDER — TACROLIMUS 1 MG/1
1 TABLET, EXTENDED RELEASE ORAL DAILY
COMMUNITY

## 2023-03-13 RX ORDER — MYCOPHENOLATE MOFETIL 250 MG/1
250 CAPSULE ORAL 2 TIMES DAILY
COMMUNITY

## 2023-03-13 RX ORDER — FLUOROURACIL 50 MG/G
CREAM TOPICAL
COMMUNITY
Start: 2023-02-11

## 2023-03-13 RX ORDER — ALBUTEROL SULFATE 90 UG/1
2 AEROSOL, METERED RESPIRATORY (INHALATION) EVERY 6 HOURS PRN
COMMUNITY
Start: 2022-11-29

## 2023-03-13 RX ORDER — HYDROCODONE BITARTRATE AND ACETAMINOPHEN 5; 325 MG/1; MG/1
TABLET ORAL
COMMUNITY
Start: 2023-01-05 | End: 2023-03-14 | Stop reason: HOSPADM

## 2023-03-13 RX ORDER — MAGNESIUM OXIDE 400 MG/1
400 TABLET ORAL
COMMUNITY
Start: 2023-03-08 | End: 2023-03-13

## 2023-03-13 RX ORDER — ERGOCALCIFEROL 1.25 MG/1
50000 CAPSULE ORAL
COMMUNITY
End: 2023-03-13

## 2023-03-13 RX ORDER — BROMFENAC SODIUM 0.7 MG/ML
SOLUTION/ DROPS OPHTHALMIC
COMMUNITY
Start: 2022-12-23 | End: 2023-03-13

## 2023-03-13 RX ORDER — TAMSULOSIN HYDROCHLORIDE 0.4 MG/1
2 CAPSULE ORAL 2 TIMES DAILY
COMMUNITY
Start: 2023-02-20

## 2023-03-13 RX ORDER — MAGNESIUM OXIDE 400 MG/1
400 TABLET ORAL DAILY
COMMUNITY

## 2023-03-13 RX ORDER — ACETAMINOPHEN 500 MG
500 TABLET ORAL EVERY 6 HOURS PRN
COMMUNITY

## 2023-03-13 NOTE — PROGRESS NOTES
PRIMARY CARE PROVIDER: Nolan Garrison MD  REFERRING PROVIDER: No ref. provider found    Chief Complaint   Patient presents with   • Squamous Cell Carcinoma     MOHS of left cheek        Subjective   History of Present Illness:  Jovani Kate is a  67 y.o. male who presents for surgical consultation regarding a skin defect of the left cheek as a result of a Mohs excision performed by Dr. Seaman today.  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  Dr. Seaman removed the lesion completely in 2 stages.  Prior to the excision, the lesion had the following characteristics:    Quality: bleeding, enlarging, tender, draining  Severity: severe  Duration: 2-3 years, worsened over the last 6 months  Timing: constant  Modifying Factors: none  Associated Signs & Symptoms: no lymphadenopathy    No blood thinners.  History of kidney transplant - taking CellCept and Envarsus XR.    History of multiple squamous cell carcinoma-  hands, arms, neck, face.      Review of Systems:  Review of Systems   Constitutional: Negative for chills, fatigue, fever and unexpected weight change.   HENT: Negative for facial swelling.    Respiratory: Negative for cough, chest tightness and shortness of breath.    Cardiovascular: Negative for chest pain.   Musculoskeletal: Negative for neck pain.   Skin: Positive for wound. Negative for color change.   Allergic/Immunologic: Positive for immunocompromised state.   Neurological: Positive for facial asymmetry.   Hematological: Negative for adenopathy. Does not bruise/bleed easily.       Past History:  Past Medical History:   Diagnosis Date   • Arthritis    • Atrial fibrillation (HCC)    • CHF (congestive heart failure) (HCC)    • Diabetes mellitus (HCC)    • Hypertension    • Renal disease    • SCC (squamous cell carcinoma)     left cheek     Past Surgical History:   Procedure Laterality Date   • CARDIAC SURGERY N/A     WATCHMAN IMPLANTED NOV 2021/ FROM [place], PT CANNOT TAKE  BLOOD THINNERS   • CHOLECYSTECTOMY     • COLONOSCOPY N/A 06/23/2017    Procedure: COLONOSCOPY WITH ANESTHESIA;  Surgeon: Gideon Cabrera MD;  Location: [facility];  Service:    • DIALYSIS FISTULA CREATION Bilateral    • HERNIA REPAIR     • SKIN CANCER EXCISION      scc of left cheek   • SKIN CANCER EXCISION Left     LEFT ARM, HAND, RIGHT FOREARM   • TONSILLECTOMY     • TRANSPLANTATION RENAL       History reviewed. No pertinent family history.  Social History     Tobacco Use   • Smoking status: Former   • Smokeless tobacco: Never   • Tobacco comments:     QUIT SMOKING 50 YRS AGO   Vaping Use   • Vaping Use: Never used   Substance Use Topics   • Alcohol use: No     Comment: QUIT OVER 50 YRS AGO   • Drug use: No     Allergies:  Patient has no known allergies.    Current Outpatient Medications:   •  albuterol sulfate  (90 Base) MCG/ACT inhaler, Inhale 2 puffs Every 6 (Six) Hours As Needed., Disp: , Rfl:   •  calcitriol (ROCALTROL) 0.25 MCG capsule, Take 1 capsule by mouth 3 (Three) Times a Week., Disp: , Rfl:   •  empagliflozin (JARDIANCE) 25 MG tablet tablet, Take 1 tablet by mouth Daily., Disp: , Rfl:   •  fluorouracil (EFUDEX) 5 % cream, PLEASE SEE ATTACHED FOR DETAILED DIRECTIONS, Disp: , Rfl:   •  HYDROcodone-acetaminophen (NORCO) 5-325 MG per tablet, , Disp: , Rfl:   •  metFORMIN (GLUCOPHAGE) 1000 MG tablet, Take 1 tablet by mouth 2 (Two) Times a Day With Meals., Disp: , Rfl:   •  mupirocin (BACTROBAN) 2 % ointment, PLEASE SEE ATTACHED FOR DETAILED DIRECTIONS, Disp: , Rfl:   •  Omega 3 1200 MG capsule, Take 1,200 mg by mouth Daily., Disp: , Rfl:   •  propranolol (INDERAL) 80 MG tablet, Take 1 tablet by mouth 2 (Two) Times a Day., Disp: , Rfl:   •  sodium bicarbonate 650 MG tablet, Take 1 tablet by mouth 3 (Three) Times a Day., Disp: , Rfl:   •  tamsulosin (FLOMAX) 0.4 MG capsule 24 hr capsule, Take 2 capsules by mouth 2 (Two) Times a Day., Disp: , Rfl:   •  zolpidem (AMBIEN) 10 MG tablet, Take 1  tablet by mouth At Night As Needed for Sleep., Disp: , Rfl:   •  acetaminophen (TYLENOL) 500 MG tablet, Take 1 tablet by mouth Every 6 (Six) Hours As Needed for Mild Pain., Disp: , Rfl:   •  Calcium Citrate-Vitamin D 250-1.25 MG-MCG tablet, Take 1.25 mcg by mouth Every 30 (Thirty) Days., Disp: , Rfl:   •  magnesium oxide (MAG-OX) 400 MG tablet, Take 1 tablet by mouth Daily., Disp: , Rfl:   •  mycophenolate (CELLCEPT) 250 MG capsule, Take 1 capsule by mouth 2 (Two) Times a Day., Disp: , Rfl:   •  Tacrolimus ER (Envarsus XR) 1 MG tablet sustained-release 24 hour, Take 1 tablet by mouth Daily., Disp: , Rfl:     Objective     Vital Signs:  Temp:  [98 °F (36.7 °C)] 98 °F (36.7 °C)  Heart Rate:  [65-81] 81  Resp:  [16-20] 16  BP: (136-142)/(75-85) 142/85    Physical Exam:  Physical Exam  Vitals reviewed.   Constitutional:       General: He is not in acute distress.     Appearance: He is well-developed.   HENT:      Head: Normocephalic and atraumatic.        Right Ear: External ear normal.      Left Ear: External ear normal.      Mouth/Throat:      Lips: Pink.   Eyes:      General: Lids are normal.   Pulmonary:      Effort: Pulmonary effort is normal. No respiratory distress.      Breath sounds: No stridor.   Musculoskeletal:      Cervical back: Neck supple.   Neurological:      Mental Status: He is alert and oriented to person, place, and time.      Cranial Nerves: Cranial nerve deficit (CN VII) and facial asymmetry present.   Psychiatric:         Mood and Affect: Mood normal.         Speech: Speech normal.         Behavior: Behavior normal. Behavior is cooperative.               Data Review:  I have personally reviewed the pathology report:          Assessment   1. Mohs defect of left cheek    2. Open wound    3. Squamous cell skin cancer    4. History of renal transplant    5. Preoperative testing        Plan     I have offered repair of the Mohs defect of the left cheek with likely cervicofacial flap in the operating  room under anesthesia.     Discussion of skin lesion. Discussed risks, benefits, alternatives, and possible complications of excision of the skin lesion with reconstruction utilizing local tissue rearrangement, full-thickness skin grafting, or local interpolated flaps. Risks include, but are not limited too: bleeding, infection, hematoma, recurrence, need for additional procedures, flap failure, cosmetic deformity. Patient understands risks and would like to proceed with surgery.     My findings and recommendations were discussed and questions were answered.     Juliet Galindo, EUGENIE  03/13/23  17:14 CDT

## 2023-03-13 NOTE — DISCHARGE INSTRUCTIONS
Before you come to the hospital        Arrival time: AS DIRECTED BY OFFICE     YOU MAY TAKE THE FOLLOWING MEDICATION(S) THE MORNING OF SURGERY WITH A SIP OF WATER: NORCO, PROPRANOLOL ENVARSUS, MYCOPHENOLATE           ALL OTHER HOME MEDICATION CHECK WITH YOUR PHYSICIAN (especially if   you are taking diabetes medicines or blood thinners)    Do not take any Erectile Dysfunction medications (EX: CIALIS, VIAGRA) 24 hours prior to surgery.      If you were given and instructed to use a germ- killing soap, use as directed the night before surgery and again the morning of surgery or as directed by your surgeon. (Use one-half of the bottle with each shower.)   See attached information for How to Use Chlorhexidine for Bathing if applicable.            Eating and drinking restrictions prior to scheduled arrival time    2 Hours before arrival time STOP   Drinking Clear liquids (water, apple juice-no pulp)     6 Hours before arrival time STOP   Milk or drinks that contain milk, full liquids    6 Hours before arrival time STOP   Light meals or foods, such as toast or cereal    8 Hours before arrival time STOP   Heavy foods, such as meat, fried foods, or fatty foods    (It is extremely important that you follow these guidelines to prevent delay or cancelation of your procedure)     Clear Liquids  Water and flavored water                                                                      Clear Fruit juices, such as cranberry juice and apple juice.  Black coffee (NO cream of any kind, including powdered).  Plain tea  Clear bouillon or broth.  Flavored gelatin.  Soda.  Gatorade or Powerade.  Full liquid examples  Juices that have pulp.  Frozen ice pops that contain fruit pieces.  Coffee with creamer  Milk.  Yogurt.                MANAGING PAIN AFTER SURGERY    We know you are probably wondering what your pain will be like after surgery.  Following surgery it is unrealistic to expect you will not have pain.   Pain is how our bodies  let us know that something is wrong or cautions us to be careful.  That said, our goal is to make your pain tolerable.    Methods we may use to treat your pain include (oral or IV medications, PCAs, epidurals, nerve blocks, etc.)   While some procedures require IV pain medications for a short time after surgery, transitioning to pain medications by mouth allows for better management of pain.   Your nurse will encourage you to take oral pain medications whenever possible.  IV medications work almost immediately, but only last a short while.  Taking medications by mouth allows for a more constant level of medication in your blood stream for a longer period of time.      Once your pain is out of control it is harder to get back under control.  It is important you are aware when your next dose of pain medication is due.  If you are admitted, your nurse may write the time of your next dose on the white board in your room to help you remember.      We are interested in your pain and encourage you to inform us about aggravating factors during your visit.   Many times a simple repositioning every few hours can make a big difference.    If your physician says it is okay, do not let your pain prevent you from getting out of bed. Be sure to call your nurse for assistance prior to getting up so you do not fall.      Before surgery, please decide your tolerable pain goal.  These faces help describe the pain ratings we use on a 0-10 scale.   Be prepared to tell us your goal and whether or not you take pain or anxiety medications at home.          Preparing for Surgery  Preparing for surgery is an important part of your care. It can make things go more smoothly and help you avoid complications. The steps leading up to surgery may vary among hospitals. Follow all instructions given to you by your health care providers. Ask questions if you do not understand something. Talk about any concerns that you have.  Here are some questions  to consider asking before your surgery:  If my surgery is not an emergency (is elective), when would be the best time to have the surgery?  What arrangements do I need to make for work, home, or school?  What will my recovery be like? How long will it be before I can return to normal activities?  Will I need to prepare my home? Will I need to arrange care for me or my children?  Should I expect to have pain after surgery? What are my pain management options? Are there nonmedical options that I can try for pain?  Tell a health care provider about:  Any allergies you have.  All medicines you are taking, including vitamins, herbs, eye drops, creams, and over-the-counter medicines.  Any problems you or family members have had with anesthetic medicines.  Any blood disorders you have.  Any surgeries you have had.  Any medical conditions you have.  Whether you are pregnant or may be pregnant.  What are the risks?  The risks and complications of surgery depend on the specific procedure that you have. Discuss all the risks with your health care providers before your surgery. Ask about common surgical complications, which may include:  Infection.  Bleeding or a need for blood replacement (transfusion).  Allergic reactions to medicines.  Damage to surrounding nerves, tissues, or structures.  A blood clot.  Scarring.  Failure of the surgery to correct the problem.  Follow these instructions before the procedure:  Several days or weeks before your procedure  You may have a physical exam by your primary health care provider to make sure it is safe for you to have surgery.  You may have testing. This may include a chest X-ray, blood and urine tests, electrocardiogram (ECG), or other testing.  Ask your health care provider about:  Changing or stopping your regular medicines. This is especially important if you are taking diabetes medicines or blood thinners.  Taking medicines such as aspirin and ibuprofen. These medicines can thin  your blood. Do not take these medicines unless your health care provider tells you to take them.  Taking over-the-counter medicines, vitamins, herbs, and supplements.  Do not use any products that contain nicotine or tobacco, such as cigarettes and e-cigarettes. If you need help quitting, ask your health care provider.  Avoid alcohol.  Ask your health care provider if there are exercises you can do to prepare for surgery.  Eat a healthy diet.   Plan to have someone take you home from the hospital or clinic.  Plan to have a responsible adult care for you for at least 24 hours after you leave the hospital or clinic. This is important.  The day before your procedure  You may be given antibiotic medicine to take by mouth to help prevent infection. Take it as told by your health care provider.  You may be asked to shower with a germ-killing soap.  Follow instructions from your health care provider about eating and drinking restrictions. This includes gum, mints and hard candy.  Pack comfortable clothes according to your procedure.   The day of your procedure  You may need to take another shower with a germ-killing soap before you leave home in the morning.  With a small sip of water, take only the medicines that you are told to take.  Remove all jewelry including rings.   Leave anything you consider valuable at home except hearing aids if needed.  You do not need to bring your home medications into the hospital.   Do not wear any makeup, nail polish, powder, deodorant, lotion, hair accessories, or anything on your skin or body except your clothes.  If you will be staying in the hospital, bring a case to hold your glasses, contacts, or dentures. You may also want to bring your robe and non-skid footwear.       (Do not use denture adhesives since you will be asked to remove them during  surgery).   If you wear oxygen at home, bring it with you the day of surgery.  If instructed by your health care provider, bring your  sleep apnea device with you on the day of your surgery (if this applies to you).  You may want to leave your suitcase and sleep apnea device in the car until after surgery.   Arrive at the hospital as scheduled.  Bring a friend or family member with you who can help to answer questions and be present while you meet with your health care provider.  At the hospital  When you arrive at the hospital:  Go to registration located at the main entrance of the hospital. You will be registered and given a beeper and a sticker sheet. Take the stickers to the Outpatient nurses desk and place in the black tray. This is to notify staff that you have arrived. Then return to the lobby to wait.   When your beeper lights up and vibrates proceed through the double doors, under the stairs, and a member of the Outpatient Surgery staff will escort you to your preoperative room.  You may have to wear compression sleeves. These help to prevent blood clots and reduce swelling in your legs.  An IV may be inserted into one of your veins.              In the operating room, you may be given one or more of the following:        A medicine to help you relax (sedative).        A medicine to numb the area (local anesthetic).        A medicine to make you fall asleep (general anesthetic).        A medicine that is injected into an area of your body to numb everything below the                      injection site (regional anesthetic).  You may be given an antibiotic through your IV to help prevent infection.  Your surgical site will be marked or identified.    Contact a health care provider if you:  Develop a fever of more than 100.4°F (38°C) or other feelings of illness during the 48 hours before your surgery.  Have symptoms that get worse.  Have questions or concerns about your surgery.  Summary  Preparing for surgery can make the procedure go more smoothly and lower your risk of complications.  Before surgery, make a list of questions and  concerns to discuss with your surgeon. Ask about the risks and possible complications.  In the days or weeks before your surgery, follow all instructions from your health care provider. You may need to stop smoking, avoid alcohol, follow eating restrictions, and change or stop your regular medicines.  Contact your surgeon if you develop a fever or other signs of illness during the few days before your surgery.  This information is not intended to replace advice given to you by your health care provider. Make sure you discuss any questions you have with your health care provider.  Document Revised: 12/21/2018 Document Reviewed: 10/23/2018  Elsevier Patient Education © 2021 [employer]

## 2023-03-13 NOTE — PROGRESS NOTES
"History of Present Illness  Jovani Kate complains of a skin defect of the {JAB LOCATION:27223} as a result of a Mohs excision performed by Dr. Morataya/Carol today.  Prior to the excision, the lesion had been present for {0-10:07562} {time units:11}. Symptoms associated with the lesion are: {Skin lesion symptoms:61782::\"none\"}. Patient denies {Skin lesion symptoms:67000::\"none\"}.    Dr. morataya remove the lesion completely in {NUMBER:91340} stages.    He states he {JARIOISTORY:05540}.      Past Medical History:   Diagnosis Date   • Arthritis    • CHF (congestive heart failure) (HCC)    • Hypertension    • Renal disease    • SCC (squamous cell carcinoma)     left cheek     Past Surgical History:   Procedure Laterality Date   • CHOLECYSTECTOMY     • COLONOSCOPY N/A 06/23/2017    Procedure: COLONOSCOPY WITH ANESTHESIA;  Surgeon: Gideon Cabrera MD;  Location: [facility] ENDOSCOPY;  Service:    • DIALYSIS FISTULA CREATION Bilateral    • HERNIA REPAIR     • SKIN CANCER EXCISION      scc of left cheek   • TONSILLECTOMY     • TRANSPLANTATION RENAL       History reviewed. No pertinent family history.  Social History     Tobacco Use   • Smoking status: Former   Substance Use Topics   • Alcohol use: No   • Drug use: No     Allergies:  Patient has no known allergies.    Current Outpatient Medications:   •  albuterol sulfate  (90 Base) MCG/ACT inhaler, Inhale 2 puffs Every 6 (Six) Hours As Needed., Disp: , Rfl:   •  allopurinol (ZYLOPRIM) 300 MG tablet, Take 1 tablet by mouth Daily., Disp: , Rfl:   •  calcitriol (ROCALTROL) 0.25 MCG capsule, Take 1 capsule by mouth 3 (Three) Times a Week., Disp: , Rfl:   •  empagliflozin (JARDIANCE) 25 MG tablet tablet, Take 1 tablet by mouth Daily., Disp: , Rfl:   •  fluorouracil (EFUDEX) 5 % cream, PLEASE SEE ATTACHED FOR DETAILED DIRECTIONS, Disp: , Rfl:   •  HYDROcodone-acetaminophen (NORCO) 5-325 MG per tablet, , Disp: , Rfl:   •  magnesium gluconate (MAGONATE) 500 MG tablet, Take 500 mg " "by mouth Daily., Disp: , Rfl:   •  metFORMIN (GLUCOPHAGE) 1000 MG tablet, Take 1 tablet by mouth 2 (Two) Times a Day With Meals., Disp: , Rfl:   •  montelukast (SINGULAIR) 10 MG tablet, Take 1 tablet by mouth Every Night., Disp: , Rfl:   •  mupirocin (BACTROBAN) 2 % ointment, PLEASE SEE ATTACHED FOR DETAILED DIRECTIONS, Disp: , Rfl:   •  Omega 3 1200 MG capsule, Take 1,200 mg by mouth Daily., Disp: , Rfl:   •  Prolensa 0.07 % solution, INSTILL 1 DROP INTO AFFECTED EYE ONCE A DAY AS DIRECTED BEGIN 1 DAY PRIOR TO SURGERY, Disp: , Rfl:   •  propranolol (INDERAL) 40 MG tablet, Take 1 tablet by mouth 2 (Two) Times a Day., Disp: , Rfl:   •  sodium bicarbonate 650 MG tablet, Take 1 tablet by mouth 3 (Three) Times a Day., Disp: , Rfl:   •  tacrolimus (PROGRAF) 1 MG capsule, Take 1 capsule by mouth 2 (Two) Times a Day., Disp: , Rfl:   •  tamsulosin (FLOMAX) 0.4 MG capsule 24 hr capsule, Take 2 capsules by mouth Daily., Disp: , Rfl:   •  vitamin C (ASCORBIC ACID) 500 MG tablet, Take 1 tablet by mouth Daily., Disp: , Rfl:   •  zolpidem (AMBIEN) 5 MG tablet, Take 1 tablet by mouth At Night As Needed for Sleep., Disp: , Rfl:     Review of Systems    /75   Pulse 65   Temp 98 °F (36.7 °C)   Resp 20   Ht 185.4 cm (73\")   Wt 118 kg (260 lb)   BMI 34.30 kg/m²     Physical Exam    Assessment   No diagnosis found.    Photographs were taken today. Postoperative care instructions were given.     Discussion of skin defect. Discussed risks, benefits, alternatives, and possible complications of repair of the defect with reconstruction utilizing local tissue rearrangement, full-thickness skin grafting, or local interpolated flaps. Risks include, but are not limited to: bleeding, infection, hematoma, recurrence, need for additional procedures, flap failure, cosmetic deformity. Patient understands risks and would like to proceed with surgery.     There are no Patient Instructions on file for this visit.    I have recommended repair " of the Mohs defect with *** in the office under local anesthesia.     I have recommended  repair of the Mohs defect with *** in the operating room.      No follow-ups on file.    Juliet Galindo, APRN  03/13/23  11:17 CDT

## 2023-03-13 NOTE — H&P (VIEW-ONLY)
PRIMARY CARE PROVIDER: Nolan Garrison MD  REFERRING PROVIDER: No ref. provider found    Chief Complaint   Patient presents with   • Squamous Cell Carcinoma     MOHS of left cheek        Subjective   History of Present Illness:  Jovani Kate is a  67 y.o. male who presents for surgical consultation regarding a skin defect of the left cheek as a result of a Mohs excision performed by Dr. Seaman today.  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  Dr. Seaman removed the lesion completely in 2 stages.  Prior to the excision, the lesion had the following characteristics:    Quality: bleeding, enlarging, tender, draining  Severity: severe  Duration: 2-3 years, worsened over the last 6 months  Timing: constant  Modifying Factors: none  Associated Signs & Symptoms: no lymphadenopathy    No blood thinners.  History of kidney transplant - taking CellCept and Envarsus XR.    History of multiple squamous cell carcinoma-  hands, arms, neck, face.      Review of Systems:  Review of Systems   Constitutional: Negative for chills, fatigue, fever and unexpected weight change.   HENT: Negative for facial swelling.    Respiratory: Negative for cough, chest tightness and shortness of breath.    Cardiovascular: Negative for chest pain.   Musculoskeletal: Negative for neck pain.   Skin: Positive for wound. Negative for color change.   Allergic/Immunologic: Positive for immunocompromised state.   Neurological: Positive for facial asymmetry.   Hematological: Negative for adenopathy. Does not bruise/bleed easily.       Past History:  Past Medical History:   Diagnosis Date   • Arthritis    • Atrial fibrillation (HCC)    • CHF (congestive heart failure) (HCC)    • Diabetes mellitus (HCC)    • Hypertension    • Renal disease    • SCC (squamous cell carcinoma)     left cheek     Past Surgical History:   Procedure Laterality Date   • CARDIAC SURGERY N/A     WATCHMAN IMPLANTED NOV 2021/ FROM [place], PT CANNOT TAKE  BLOOD THINNERS   • CHOLECYSTECTOMY     • COLONOSCOPY N/A 06/23/2017    Procedure: COLONOSCOPY WITH ANESTHESIA;  Surgeon: Gideon Cabrera MD;  Location: [facility] ENDOSCOPY;  Service:    • DIALYSIS FISTULA CREATION Bilateral    • HERNIA REPAIR     • SKIN CANCER EXCISION      scc of left cheek   • SKIN CANCER EXCISION Left     LEFT ARM, HAND, RIGHT FOREARM   • TONSILLECTOMY     • TRANSPLANTATION RENAL       History reviewed. No pertinent family history.  Social History     Tobacco Use   • Smoking status: Former   • Smokeless tobacco: Never   • Tobacco comments:     QUIT SMOKING 50 YRS AGO   Vaping Use   • Vaping Use: Never used   Substance Use Topics   • Alcohol use: No     Comment: QUIT OVER 50 YRS AGO   • Drug use: No     Allergies:  Patient has no known allergies.    Current Outpatient Medications:   •  albuterol sulfate  (90 Base) MCG/ACT inhaler, Inhale 2 puffs Every 6 (Six) Hours As Needed., Disp: , Rfl:   •  calcitriol (ROCALTROL) 0.25 MCG capsule, Take 1 capsule by mouth 3 (Three) Times a Week., Disp: , Rfl:   •  empagliflozin (JARDIANCE) 25 MG tablet tablet, Take 1 tablet by mouth Daily., Disp: , Rfl:   •  fluorouracil (EFUDEX) 5 % cream, PLEASE SEE ATTACHED FOR DETAILED DIRECTIONS, Disp: , Rfl:   •  HYDROcodone-acetaminophen (NORCO) 5-325 MG per tablet, , Disp: , Rfl:   •  metFORMIN (GLUCOPHAGE) 1000 MG tablet, Take 1 tablet by mouth 2 (Two) Times a Day With Meals., Disp: , Rfl:   •  mupirocin (BACTROBAN) 2 % ointment, PLEASE SEE ATTACHED FOR DETAILED DIRECTIONS, Disp: , Rfl:   •  Omega 3 1200 MG capsule, Take 1,200 mg by mouth Daily., Disp: , Rfl:   •  propranolol (INDERAL) 80 MG tablet, Take 1 tablet by mouth 2 (Two) Times a Day., Disp: , Rfl:   •  sodium bicarbonate 650 MG tablet, Take 1 tablet by mouth 3 (Three) Times a Day., Disp: , Rfl:   •  tamsulosin (FLOMAX) 0.4 MG capsule 24 hr capsule, Take 2 capsules by mouth 2 (Two) Times a Day., Disp: , Rfl:   •  zolpidem (AMBIEN) 10 MG tablet, Take 1  tablet by mouth At Night As Needed for Sleep., Disp: , Rfl:   •  acetaminophen (TYLENOL) 500 MG tablet, Take 1 tablet by mouth Every 6 (Six) Hours As Needed for Mild Pain., Disp: , Rfl:   •  Calcium Citrate-Vitamin D 250-1.25 MG-MCG tablet, Take 1.25 mcg by mouth Every 30 (Thirty) Days., Disp: , Rfl:   •  magnesium oxide (MAG-OX) 400 MG tablet, Take 1 tablet by mouth Daily., Disp: , Rfl:   •  mycophenolate (CELLCEPT) 250 MG capsule, Take 1 capsule by mouth 2 (Two) Times a Day., Disp: , Rfl:   •  Tacrolimus ER (Envarsus XR) 1 MG tablet sustained-release 24 hour, Take 1 tablet by mouth Daily., Disp: , Rfl:     Objective     Vital Signs:  Temp:  [98 °F (36.7 °C)] 98 °F (36.7 °C)  Heart Rate:  [65-81] 81  Resp:  [16-20] 16  BP: (136-142)/(75-85) 142/85    Physical Exam:  Physical Exam  Vitals reviewed.   Constitutional:       General: He is not in acute distress.     Appearance: He is well-developed.   HENT:      Head: Normocephalic and atraumatic.        Right Ear: External ear normal.      Left Ear: External ear normal.      Mouth/Throat:      Lips: Pink.   Eyes:      General: Lids are normal.   Pulmonary:      Effort: Pulmonary effort is normal. No respiratory distress.      Breath sounds: No stridor.   Musculoskeletal:      Cervical back: Neck supple.   Neurological:      Mental Status: He is alert and oriented to person, place, and time.      Cranial Nerves: Cranial nerve deficit (CN VII) and facial asymmetry present.   Psychiatric:         Mood and Affect: Mood normal.         Speech: Speech normal.         Behavior: Behavior normal. Behavior is cooperative.               Data Review:  I have personally reviewed the pathology report:          Assessment   1. Mohs defect of left cheek    2. Open wound    3. Squamous cell skin cancer    4. History of renal transplant    5. Preoperative testing        Plan     I have offered repair of the Mohs defect of the left cheek with likely cervicofacial flap in the operating  room under anesthesia.     Discussion of skin lesion. Discussed risks, benefits, alternatives, and possible complications of excision of the skin lesion with reconstruction utilizing local tissue rearrangement, full-thickness skin grafting, or local interpolated flaps. Risks include, but are not limited too: bleeding, infection, hematoma, recurrence, need for additional procedures, flap failure, cosmetic deformity. Patient understands risks and would like to proceed with surgery.     My findings and recommendations were discussed and questions were answered.     Juliet Galindo, EUGENIE  03/13/23  17:14 CDT

## 2023-03-14 ENCOUNTER — ANESTHESIA EVENT (OUTPATIENT)
Dept: PERIOP | Facility: HOSPITAL | Age: 68
End: 2023-03-14
Payer: MEDICARE

## 2023-03-14 ENCOUNTER — HOSPITAL ENCOUNTER (OUTPATIENT)
Facility: HOSPITAL | Age: 68
Setting detail: HOSPITAL OUTPATIENT SURGERY
Discharge: HOME | End: 2023-03-14
Attending: OTOLARYNGOLOGY | Admitting: OTOLARYNGOLOGY
Payer: MEDICARE

## 2023-03-14 ENCOUNTER — ANESTHESIA (OUTPATIENT)
Dept: PERIOP | Facility: HOSPITAL | Age: 68
End: 2023-03-14
Payer: MEDICARE

## 2023-03-14 VITALS
HEART RATE: 80 BPM | RESPIRATION RATE: 14 BRPM | DIASTOLIC BLOOD PRESSURE: 96 MMHG | OXYGEN SATURATION: 97 % | SYSTOLIC BLOOD PRESSURE: 147 MMHG | TEMPERATURE: 96.9 F

## 2023-03-14 DIAGNOSIS — Z98.890 MOHS DEFECT OF LEFT CHEEK: Primary | ICD-10-CM

## 2023-03-14 DIAGNOSIS — M95.2 MOHS DEFECT OF LEFT CHEEK: Primary | ICD-10-CM

## 2023-03-14 LAB
GLUCOSE BLDC GLUCOMTR-MCNC: 157 MG/DL (ref 70–130)
GLUCOSE BLDC GLUCOMTR-MCNC: 165 MG/DL (ref 70–130)
QT INTERVAL: 390 MS
QTC INTERVAL: 471 MS

## 2023-03-14 PROCEDURE — 14301 TIS TRNFR ANY 30.1-60 SQ CM: CPT | Performed by: OTOLARYNGOLOGY

## 2023-03-14 PROCEDURE — 14302 TIS TRNFR ADDL 30 SQ CM: CPT | Performed by: OTOLARYNGOLOGY

## 2023-03-14 PROCEDURE — 25010000002 PROPOFOL 10 MG/ML EMULSION: Performed by: NURSE ANESTHETIST, CERTIFIED REGISTERED

## 2023-03-14 PROCEDURE — 82962 GLUCOSE BLOOD TEST: CPT

## 2023-03-14 PROCEDURE — 25010000002 ONDANSETRON PER 1 MG: Performed by: NURSE ANESTHETIST, CERTIFIED REGISTERED

## 2023-03-14 PROCEDURE — 25010000002 DEXAMETHASONE PER 1 MG: Performed by: NURSE ANESTHETIST, CERTIFIED REGISTERED

## 2023-03-14 PROCEDURE — 25010000002 CEFAZOLIN PER 500 MG: Performed by: NURSE ANESTHETIST, CERTIFIED REGISTERED

## 2023-03-14 PROCEDURE — 25010000002 FENTANYL CITRATE (PF) 100 MCG/2ML SOLUTION: Performed by: NURSE ANESTHETIST, CERTIFIED REGISTERED

## 2023-03-14 RX ORDER — MAGNESIUM HYDROXIDE 1200 MG/15ML
LIQUID ORAL AS NEEDED
Status: DISCONTINUED | OUTPATIENT
Start: 2023-03-14 | End: 2023-03-14 | Stop reason: HOSPADM

## 2023-03-14 RX ORDER — NEOSTIGMINE METHYLSULFATE 5 MG/5 ML
SYRINGE (ML) INTRAVENOUS AS NEEDED
Status: DISCONTINUED | OUTPATIENT
Start: 2023-03-14 | End: 2023-03-14 | Stop reason: SURG

## 2023-03-14 RX ORDER — SODIUM CHLORIDE, SODIUM LACTATE, POTASSIUM CHLORIDE, CALCIUM CHLORIDE 600; 310; 30; 20 MG/100ML; MG/100ML; MG/100ML; MG/100ML
100 INJECTION, SOLUTION INTRAVENOUS CONTINUOUS
Status: DISCONTINUED | OUTPATIENT
Start: 2023-03-14 | End: 2023-03-14 | Stop reason: HOSPADM

## 2023-03-14 RX ORDER — LIDOCAINE HYDROCHLORIDE 10 MG/ML
0.5 INJECTION, SOLUTION EPIDURAL; INFILTRATION; INTRACAUDAL; PERINEURAL ONCE AS NEEDED
Status: DISCONTINUED | OUTPATIENT
Start: 2023-03-14 | End: 2023-03-14 | Stop reason: HOSPADM

## 2023-03-14 RX ORDER — FENTANYL CITRATE 50 UG/ML
25 INJECTION, SOLUTION INTRAMUSCULAR; INTRAVENOUS
Status: DISCONTINUED | OUTPATIENT
Start: 2023-03-14 | End: 2023-03-14 | Stop reason: HOSPADM

## 2023-03-14 RX ORDER — SODIUM CHLORIDE 9 MG/ML
40 INJECTION, SOLUTION INTRAVENOUS AS NEEDED
Status: DISCONTINUED | OUTPATIENT
Start: 2023-03-14 | End: 2023-03-14 | Stop reason: HOSPADM

## 2023-03-14 RX ORDER — OXYCODONE AND ACETAMINOPHEN 10; 325 MG/1; MG/1
1 TABLET ORAL ONCE AS NEEDED
Status: COMPLETED | OUTPATIENT
Start: 2023-03-14 | End: 2023-03-14

## 2023-03-14 RX ORDER — TRANEXAMIC ACID 100 MG/ML
INJECTION, SOLUTION INTRAVENOUS AS NEEDED
Status: DISCONTINUED | OUTPATIENT
Start: 2023-03-14 | End: 2023-03-14 | Stop reason: HOSPADM

## 2023-03-14 RX ORDER — OXYCODONE AND ACETAMINOPHEN 7.5; 325 MG/1; MG/1
2 TABLET ORAL EVERY 4 HOURS PRN
Status: DISCONTINUED | OUTPATIENT
Start: 2023-03-14 | End: 2023-03-14 | Stop reason: HOSPADM

## 2023-03-14 RX ORDER — FLUMAZENIL 0.1 MG/ML
0.2 INJECTION INTRAVENOUS AS NEEDED
Status: DISCONTINUED | OUTPATIENT
Start: 2023-03-14 | End: 2023-03-14 | Stop reason: HOSPADM

## 2023-03-14 RX ORDER — SODIUM CHLORIDE, SODIUM LACTATE, POTASSIUM CHLORIDE, CALCIUM CHLORIDE 600; 310; 30; 20 MG/100ML; MG/100ML; MG/100ML; MG/100ML
1000 INJECTION, SOLUTION INTRAVENOUS CONTINUOUS
Status: DISCONTINUED | OUTPATIENT
Start: 2023-03-14 | End: 2023-03-14 | Stop reason: HOSPADM

## 2023-03-14 RX ORDER — ROCURONIUM BROMIDE 10 MG/ML
INJECTION, SOLUTION INTRAVENOUS AS NEEDED
Status: DISCONTINUED | OUTPATIENT
Start: 2023-03-14 | End: 2023-03-14 | Stop reason: SURG

## 2023-03-14 RX ORDER — SODIUM CHLORIDE 0.9 % (FLUSH) 0.9 %
3 SYRINGE (ML) INJECTION AS NEEDED
Status: DISCONTINUED | OUTPATIENT
Start: 2023-03-14 | End: 2023-03-14 | Stop reason: HOSPADM

## 2023-03-14 RX ORDER — ONDANSETRON 2 MG/ML
4 INJECTION INTRAMUSCULAR; INTRAVENOUS ONCE AS NEEDED
Status: DISCONTINUED | OUTPATIENT
Start: 2023-03-14 | End: 2023-03-14 | Stop reason: HOSPADM

## 2023-03-14 RX ORDER — LABETALOL HYDROCHLORIDE 5 MG/ML
5 INJECTION, SOLUTION INTRAVENOUS
Status: DISCONTINUED | OUTPATIENT
Start: 2023-03-14 | End: 2023-03-14 | Stop reason: HOSPADM

## 2023-03-14 RX ORDER — DEXAMETHASONE SODIUM PHOSPHATE 4 MG/ML
INJECTION, SOLUTION INTRA-ARTICULAR; INTRALESIONAL; INTRAMUSCULAR; INTRAVENOUS; SOFT TISSUE AS NEEDED
Status: DISCONTINUED | OUTPATIENT
Start: 2023-03-14 | End: 2023-03-14 | Stop reason: SURG

## 2023-03-14 RX ORDER — FENTANYL CITRATE 50 UG/ML
INJECTION, SOLUTION INTRAMUSCULAR; INTRAVENOUS AS NEEDED
Status: DISCONTINUED | OUTPATIENT
Start: 2023-03-14 | End: 2023-03-14 | Stop reason: SURG

## 2023-03-14 RX ORDER — ONDANSETRON 2 MG/ML
INJECTION INTRAMUSCULAR; INTRAVENOUS AS NEEDED
Status: DISCONTINUED | OUTPATIENT
Start: 2023-03-14 | End: 2023-03-14 | Stop reason: SURG

## 2023-03-14 RX ORDER — CEPHALEXIN 500 MG/1
500 CAPSULE ORAL 3 TIMES DAILY
Qty: 21 CAPSULE | Refills: 0 | Status: SHIPPED | OUTPATIENT
Start: 2023-03-14

## 2023-03-14 RX ORDER — PROPOFOL 10 MG/ML
VIAL (ML) INTRAVENOUS AS NEEDED
Status: DISCONTINUED | OUTPATIENT
Start: 2023-03-14 | End: 2023-03-14 | Stop reason: SURG

## 2023-03-14 RX ORDER — LIDOCAINE HYDROCHLORIDE AND EPINEPHRINE 10; 10 MG/ML; UG/ML
INJECTION, SOLUTION INFILTRATION; PERINEURAL AS NEEDED
Status: DISCONTINUED | OUTPATIENT
Start: 2023-03-14 | End: 2023-03-14 | Stop reason: HOSPADM

## 2023-03-14 RX ORDER — NALOXONE HCL 0.4 MG/ML
0.4 VIAL (ML) INJECTION AS NEEDED
Status: DISCONTINUED | OUTPATIENT
Start: 2023-03-14 | End: 2023-03-14 | Stop reason: HOSPADM

## 2023-03-14 RX ORDER — DROPERIDOL 2.5 MG/ML
0.62 INJECTION, SOLUTION INTRAMUSCULAR; INTRAVENOUS ONCE AS NEEDED
Status: DISCONTINUED | OUTPATIENT
Start: 2023-03-14 | End: 2023-03-14 | Stop reason: HOSPADM

## 2023-03-14 RX ORDER — LIDOCAINE HYDROCHLORIDE 20 MG/ML
INJECTION, SOLUTION EPIDURAL; INFILTRATION; INTRACAUDAL; PERINEURAL AS NEEDED
Status: DISCONTINUED | OUTPATIENT
Start: 2023-03-14 | End: 2023-03-14 | Stop reason: SURG

## 2023-03-14 RX ORDER — SODIUM CHLORIDE 0.9 % (FLUSH) 0.9 %
3 SYRINGE (ML) INJECTION EVERY 12 HOURS SCHEDULED
Status: DISCONTINUED | OUTPATIENT
Start: 2023-03-14 | End: 2023-03-14 | Stop reason: HOSPADM

## 2023-03-14 RX ORDER — BUPIVACAINE HCL/0.9 % NACL/PF 0.125 %
PLASTIC BAG, INJECTION (ML) EPIDURAL AS NEEDED
Status: DISCONTINUED | OUTPATIENT
Start: 2023-03-14 | End: 2023-03-14 | Stop reason: SURG

## 2023-03-14 RX ORDER — HYDROCODONE BITARTRATE AND ACETAMINOPHEN 7.5; 325 MG/1; MG/1
1 TABLET ORAL ONCE AS NEEDED
Status: DISCONTINUED | OUTPATIENT
Start: 2023-03-14 | End: 2023-03-14 | Stop reason: HOSPADM

## 2023-03-14 RX ORDER — SODIUM CHLORIDE 0.9 % (FLUSH) 0.9 %
3-10 SYRINGE (ML) INJECTION AS NEEDED
Status: DISCONTINUED | OUTPATIENT
Start: 2023-03-14 | End: 2023-03-14 | Stop reason: HOSPADM

## 2023-03-14 RX ORDER — NALOXONE HYDROCHLORIDE 4 MG/.1ML
1 SPRAY NASAL AS NEEDED
Qty: 1 EACH | Refills: 2 | Status: SHIPPED | OUTPATIENT
Start: 2023-03-14

## 2023-03-14 RX ORDER — IBUPROFEN 600 MG/1
600 TABLET ORAL ONCE AS NEEDED
Status: DISCONTINUED | OUTPATIENT
Start: 2023-03-14 | End: 2023-03-14 | Stop reason: HOSPADM

## 2023-03-14 RX ORDER — LIDOCAINE HYDROCHLORIDE 10 MG/ML
0.5 INJECTION, SOLUTION EPIDURAL; INFILTRATION; INTRACAUDAL; PERINEURAL ONCE AS NEEDED
Status: DISCONTINUED | OUTPATIENT
Start: 2023-03-14 | End: 2023-03-14 | Stop reason: SDUPTHER

## 2023-03-14 RX ORDER — CEFAZOLIN SODIUM 1 G/3ML
INJECTION, POWDER, FOR SOLUTION INTRAMUSCULAR; INTRAVENOUS AS NEEDED
Status: DISCONTINUED | OUTPATIENT
Start: 2023-03-14 | End: 2023-03-14 | Stop reason: SURG

## 2023-03-14 RX ORDER — HYDROCODONE BITARTRATE AND ACETAMINOPHEN 7.5; 325 MG/1; MG/1
1 TABLET ORAL EVERY 4 HOURS PRN
Qty: 18 TABLET | Refills: 0 | Status: SHIPPED | OUTPATIENT
Start: 2023-03-14 | End: 2023-03-17

## 2023-03-14 RX ADMIN — CEFAZOLIN 2 G: 330 INJECTION, POWDER, FOR SOLUTION INTRAMUSCULAR; INTRAVENOUS at 14:26

## 2023-03-14 RX ADMIN — Medication 3 MG: at 15:56

## 2023-03-14 RX ADMIN — LIDOCAINE HYDROCHLORIDE 100 MG: 20 INJECTION, SOLUTION EPIDURAL; INFILTRATION; INTRACAUDAL; PERINEURAL at 14:17

## 2023-03-14 RX ADMIN — GLYCOPYRROLATE 0.6 MG: 0.2 INJECTION INTRAMUSCULAR; INTRAVENOUS at 15:56

## 2023-03-14 RX ADMIN — Medication 100 MCG: at 15:31

## 2023-03-14 RX ADMIN — OXYCODONE AND ACETAMINOPHEN 1 TABLET: 325; 10 TABLET ORAL at 16:42

## 2023-03-14 RX ADMIN — Medication 100 MCG: at 15:19

## 2023-03-14 RX ADMIN — Medication 100 MCG: at 15:00

## 2023-03-14 RX ADMIN — Medication 200 MCG: at 15:36

## 2023-03-14 RX ADMIN — PROPOFOL INJECTABLE EMULSION 250 MG: 10 INJECTION, EMULSION INTRAVENOUS at 14:17

## 2023-03-14 RX ADMIN — ROCURONIUM BROMIDE 40 MG: 10 INJECTION, SOLUTION INTRAVENOUS at 14:18

## 2023-03-14 RX ADMIN — SODIUM CHLORIDE, POTASSIUM CHLORIDE, SODIUM LACTATE AND CALCIUM CHLORIDE 1000 ML: 600; 310; 30; 20 INJECTION, SOLUTION INTRAVENOUS at 10:00

## 2023-03-14 RX ADMIN — DEXAMETHASONE SODIUM PHOSPHATE 4 MG: 4 INJECTION, SOLUTION INTRA-ARTICULAR; INTRALESIONAL; INTRAMUSCULAR; INTRAVENOUS; SOFT TISSUE at 14:45

## 2023-03-14 RX ADMIN — Medication 200 MCG: at 15:52

## 2023-03-14 RX ADMIN — ONDANSETRON 4 MG: 2 INJECTION INTRAMUSCULAR; INTRAVENOUS at 14:45

## 2023-03-14 RX ADMIN — SODIUM CHLORIDE, POTASSIUM CHLORIDE, SODIUM LACTATE AND CALCIUM CHLORIDE: 600; 310; 30; 20 INJECTION, SOLUTION INTRAVENOUS at 15:51

## 2023-03-14 RX ADMIN — FENTANYL CITRATE 100 MCG: 50 INJECTION INTRAMUSCULAR; INTRAVENOUS at 14:17

## 2023-03-14 NOTE — ANESTHESIA POSTPROCEDURE EVALUATION
Patient: Jovani Kate    Procedure Summary     Date: 03/14/23 Room / Location:  [facility] OR 03 /  [facility]    Anesthesia Start: 1408 Anesthesia Stop: 1623    Procedure: Repair of Mohs defect of the left cheek (Left: Face) Diagnosis:       Mohs defect of left cheek      Open wound      (Mohs defect of left cheek [M95.2, Z98.890])      (Open wound [T14.8XXA])    Surgeons: Les Lynne MD Provider: JOSE Olivares CRNA    Anesthesia Type: general ASA Status: 3          Anesthesia Type: general    Vitals  Vitals Value Taken Time   /96 03/14/23 1655   Temp 96.9 °F (36.1 °C) 03/14/23 1655   Pulse 80 03/14/23 1655   Resp 14 03/14/23 1655   SpO2 97 % 03/14/23 1655           Post Anesthesia Care and Evaluation    Patient location during evaluation: PACU  Patient participation: complete - patient participated  Level of consciousness: awake and alert  Pain management: adequate    Airway patency: patent  Anesthetic complications: No anesthetic complications    Cardiovascular status: acceptable  Respiratory status: acceptable  Hydration status: acceptable    Comments: Blood pressure 143/76, pulse 76, temperature 96.9 °F (36.1 °C), temperature source Temporal, resp. rate 14, SpO2 95 %.    Pt discharged from PACU based on catherine score >8

## 2023-03-14 NOTE — OP NOTE
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  The defect measured 6.3 cm x 6.7 cm.  I had planned on a cervicofacial advancement flap, but I decided to change the reconstructive plan due to the location of multiple additional cutaneous carcinomas throughout the left cheek, neck and left postauricular skin.  I designed a subcutaneous-pedicled V to Y flap in order to help preserve as much tissue as possible, and to help prevent cutting through an additional carcinoma in order to    OPERATIVE FINDINGS:     Defect: 6.3 cm x 6.7 cm  Flap and Defect: 15.0 cm x 8.0 cm  Depth: Subcutaneous fat    OPERATIVE DETAILS:       After patient verification consent material was reviewed, the patient was taken to the operating room and laid supine on the operating table.  After the induction of anesthesia, the dressing was removed.  The skin surrounding the defect was infiltrated with a total of 10 cc of of a mixture composed of 9 cc of 1% lidocaine with 1-100,000 epinephrine with 1 cc of 100mg/ml tranexamic acid.  I marked the additional cutaneous carcinomas, consisting of left medial cheek, and left neck.  The wound was examined and noted to be involving the skin and subcutaneous fat.  The parotid gland was not exposed.  In the preoperative  area, the patient had full facial movement.  I confirmed with the anesthetist that no long-acting paralytics have been administered.    The patient was then sterilely prepped and draped.    The flap design was then created, consisting of a subcutaneous-pedicled V-Y flap, recruiting tissue from posteriorly to the medial cheek carcinoma, and anteriorly to the neck carcinoma.  The flap and defect measured 15.0 cm x 8.0 cm.    Utilizing a fresh 15 blade, the flap incisions were then created.  The flap was then undermined laterally in the immediate subcutaneous plane utilizing retraction with skin hooks and dissection with the curved iris scissors.  This maintained the deep pedicle, from the underlying musculature.  Hemostasis was again obtained utilizing bipolar cautery set at 15.    The flap was then advanced, and closed utilizing deep 4-0 undyed Vicryl suture.  The skin was further closed utilizing running, locking 5-0 Prolene.      Antibiotic ointment was placed to the incisions and the flaps.      DISPOSITION:  The procedures were completed without complication and tolerated well.  The patient was released in the company of his son to return home in satisfactory condition.  A follow-up appointment will be scheduled, routine post-op medications prescribed (if required), and post-op instructions were given to the responsible party.           Les Lynne MD, FACS  Board Certified Facial Plastic and Reconstructive Surgery  Board Certified Otolaryngology -- Head and Neck Surgery    Electronically signed by Les Lynne MD, 03/14/23, 4:21 PM CDT.

## 2023-03-14 NOTE — ANESTHESIA PREPROCEDURE EVALUATION
Anesthesia Evaluation     Patient summary reviewed   no history of anesthetic complications:  NPO Solid Status: > 8 hours  NPO Liquid Status: > 8 hours           Airway   Mallampati: III  TM distance: >3 FB  Neck ROM: full  no difficulty expected  Dental    (+) poor dentition    Pulmonary    (+) a smoker Former,   Cardiovascular   Exercise tolerance: poor (<4 METS)    Patient on routine beta blocker and Beta blocker given within 24 hours of surgery    (+) hypertension well controlled, dysrhythmias (s/p Watchman ) Atrial Fib, CHF ,   (-) cardiac stents, CABG      Neuro/Psych- negative ROS  GI/Hepatic/Renal/Endo    (+) obesity,   renal disease, diabetes mellitus type 2 well controlled,     ROS Comment: S/P right kidney transplant    Musculoskeletal     Abdominal   (+) obese,    Substance History      OB/GYN          Other   arthritis,    history of cancer                      Anesthesia Plan    ASA 3     general     intravenous induction     Anesthetic plan, risks, benefits, and alternatives have been provided, discussed and informed consent has been obtained with: patient and child.    Plan discussed with CRNA.

## 2023-03-14 NOTE — ANESTHESIA PROCEDURE NOTES
Airway  Urgency: elective    Date/Time: 3/14/2023 2:20 PM  Airway not difficult    General Information and Staff    Patient location during procedure: OR  CRNA/CAA: Kris Carey CRNA    Indications and Patient Condition  Indications for airway management: airway protection    Preoxygenated: yes  Mask difficulty assessment: 1 - vent by mask    Final Airway Details  Final airway type: endotracheal airway      Successful airway: ETT  Cuffed: yes   Successful intubation technique: direct laryngoscopy  Endotracheal tube insertion site: oral  Blade: Ramos  Blade size: 2  ETT size (mm): 7.5  Cormack-Lehane Classification: grade I - full view of glottis  Placement verified by: chest auscultation and capnometry   Cuff volume (mL): 8  Measured from: lips  ETT/EBT  to lips (cm): 23  Number of attempts at approach: 2 (first attempt by SRNA, second by CRNA)  Assessment: lips, teeth, and gum same as pre-op and atraumatic intubation

## 2023-03-14 NOTE — INTERVAL H&P NOTE
H&P updated. The patient was examined and the following changes are noted:  Facial nerve in intact bilaterally.  Plan for cervicofacial advancement flap with possible skin grafting.  DEANNA discussed with son.

## 2023-03-15 ENCOUNTER — TELEPHONE (OUTPATIENT)
Dept: RADIATION ONCOLOGY | Facility: HOSPITAL | Age: 68
End: 2023-03-15
Payer: MEDICARE

## 2023-03-15 ENCOUNTER — TELEPHONE (OUTPATIENT)
Dept: FAMILY MEDICINE CLINIC | Age: 68
End: 2023-03-15

## 2023-03-16 ENCOUNTER — TELEPHONE (OUTPATIENT)
Dept: FAMILY MEDICINE CLINIC | Age: 68
End: 2023-03-16

## 2023-03-16 NOTE — TELEPHONE ENCOUNTER
Patient returning KP call, patient is aware of abnormal EKG. States he sees Sebastian Agrawal and is scheduled to see Dr Bonnie Connor in May. I urged patient to call [facility] to see if Aubrie Corona wants a follow up prior to his Dr Bonnie Connor appointment OR if Dr Bonnie Connor would want to see him sooner. He voiced understanding and confirmed he will give them a call today.

## 2023-03-17 ENCOUNTER — OFFICE VISIT (OUTPATIENT)
Dept: CARDIOLOGY CLINIC | Age: 68
End: 2023-03-17
Payer: MEDICARE

## 2023-03-17 VITALS
BODY MASS INDEX: 36.4 KG/M2 | OXYGEN SATURATION: 98 % | WEIGHT: 260 LBS | HEART RATE: 78 BPM | DIASTOLIC BLOOD PRESSURE: 60 MMHG | SYSTOLIC BLOOD PRESSURE: 120 MMHG | HEIGHT: 71 IN

## 2023-03-17 DIAGNOSIS — Z95.818 PRESENCE OF WATCHMAN LEFT ATRIAL APPENDAGE CLOSURE DEVICE: ICD-10-CM

## 2023-03-17 DIAGNOSIS — I10 ESSENTIAL HYPERTENSION: ICD-10-CM

## 2023-03-17 DIAGNOSIS — I48.21 PERMANENT ATRIAL FIBRILLATION (HCC): Primary | ICD-10-CM

## 2023-03-17 PROCEDURE — 3074F SYST BP LT 130 MM HG: CPT | Performed by: CLINICAL NURSE SPECIALIST

## 2023-03-17 PROCEDURE — 3078F DIAST BP <80 MM HG: CPT | Performed by: CLINICAL NURSE SPECIALIST

## 2023-03-17 PROCEDURE — 99214 OFFICE O/P EST MOD 30 MIN: CPT | Performed by: CLINICAL NURSE SPECIALIST

## 2023-03-17 PROCEDURE — 1123F ACP DISCUSS/DSCN MKR DOCD: CPT | Performed by: CLINICAL NURSE SPECIALIST

## 2023-03-17 RX ORDER — CEPHALEXIN 500 MG/1
500 CAPSULE ORAL 3 TIMES DAILY
COMMUNITY
Start: 2023-03-14

## 2023-03-17 NOTE — PROGRESS NOTES
[facility]  [facility] [address]  [address]  Phone: [phone]  Fax: [phone]    OFFICE VISIT:  3/17/2023    Kenney Mireles - : 1955    Reason For Visit:  Jose Antoine is a 69 y.o. male who is here for Follow-up (Pt has chronic afib and had an EKG at [facility]) and Atrial Fibrillation  History of hypertension, diabetes, CKD status post kidney transplant and atrial fibrillation which is now permanent. Due to recurrent bleeding patient was evaluated for left atrial closure device. 2021 Watchman left atrial closure device placed per Dr. Su Gomez     Had LINDA 10/20/2022. Left atrial appendage occluder/watchman device was well-seated with no leak around device. Patiently recent had extensive skin cancer surgery at [facility] on his face. EKG noted atrial fibrillation with a rate of 88. Left posterior fascicular block. He returns today for follow-up. He states has not noticed any symptoms or change of activity tolerance. Facial surgery wound is healing well. Subjective  Jose Antoine denies exertional chest pain, shortness of breath, orthopnea, paroxysmal nocturnal dyspnea, syncope, presyncope, arrhythmia, edema and fatigue. The patient denies numbness or weakness to suggest cerebrovascular accident or transient ischemic attack. Tiffanie Anne MD is PCP and follows labs .   Lali Drew has the following history as recorded in [facility]:    Patient Active Problem List    Diagnosis Date Noted    Presence of Watchman left atrial appendage closure device 2021    Hand ulceration with fat layer exposed ([address] 2023    Squamous cell skin cancer 2023    Hand lesion 2023    Longstanding persistent atrial fibrillation (HCC)     LVH (left ventricular hypertrophy) 2020    History of renal transplant 2019    UGI bleed 2019    PSVT (paroxysmal supraventricular tachycardia) ([address] 2019    Chronic anticoagulation 2019    Orthostatic dizziness 01/07/2019    Daytime somnolence 04/09/2018    PAF (paroxysmal atrial fibrillation) ([address] 04/09/2018    Fatigue 04/09/2018    History of tachycardia 04/09/2018    Immunosuppression (HCC)     Atrial fibrillation with RVR ([address] 02/26/2018    Infection of AV graft for dialysis ([address] 02/23/2018    Cellulitis of right upper extremity     Cellulitis 02/22/2018    Chest pressure 07/17/2016    Diabetes mellitus ([address] 07/17/2016    Ex-cigarette smoker 07/17/2016    Essential hypertension     Chest pain      Past Medical History:   Diagnosis Date    Arthritis     Atrial fibrillation ([address]     Bleeding ulcer 11/2019    Cancer ([address]     skin    CHF (congestive heart failure) ([address]     Diabetes mellitus [facility]     Ex-cigarette smoker 7/17/2016    History of blood transfusion     History of gastric ulcer 2009    Hyperlipidemia     Hypertension     hx.  10 years ago    Kidney disease, chronic, end stage on dialysis [facility]) 2006 2007, dialysis for 3 years, then had a kidney transplant,    Obese     Peritoneal dialysis status ([address]     past hx    Prolonged emergence from general anesthesia     Vision problem     wears glasses     Past Surgical History:   Procedure Laterality Date    ARM SURGERY Right 01/05/2023    EXCISION RIGHT ARM LESION performed by Alin Whittington DO at [address]  06/23/2017    Dr Louis Plan: Diverticulosis, internal hemorrhoids, 5yr recall    COLONOSCOPY  01/05/2012    Dr Em [place], 5 yr recall    DIALYSIS FISTULA CREATION  [employer] 2007    left arm radial cephalic    HAND SURGERY Left 01/05/2023    EXCISION LEFT HAND LESION AND LEFT ARM LESIONS X2 performed by Alin Whittington DO at [address] [facility]      umbilical hernia repair    KIDNEY TRANSPLANT      2/18/2010 in [facility] REVJ/CLSR Right 02/23/2018    AV FISTULA GRAFT REMOVAL performed by Homar Spivey MD at [address] [facility] SKIN CANCER EXCISION Left     facial    TONSILLECTOMY      TUNNELED VENOUS CATHETER PLACEMENT  multiple    UPPER GASTROINTESTINAL ENDOSCOPY N/A 2019    Dr Demond Ribera (Dr Dea Horvath pt)1.2 cm superficial gastric ulcer in the antrum with surrounding moderate gastritis and oozing of blood    UPPER GASTROINTESTINAL ENDOSCOPY  10/05/2009    Dr Krysta Briceño w/resolution of ulcer, lenin +    UPPER GASTROINTESTINAL ENDOSCOPY  07/10/2009    Dr Saundra Dubon ulceration w/pigmented base on posterior wall of the body, small erosions, active gastritis    UPPER GASTROINTESTINAL ENDOSCOPY N/A 01/10/2020    Dr Ailyn Agustin hiatal hernia, healed previous antral ulcer-Gastritis    VASCULAR SURGERY Right [place]     av loop graft     Family History   Problem Relation Age of Onset    Kidney Disease Father     Heart Disease Father     Colon Cancer Neg Hx     Colon Polyps Neg Hx      Social History     Tobacco Use    Smoking status: Former     Packs/day: 2.00     Years: 4.00     Pack years: 8.00     Types: Cigarettes     Quit date: 1973     Years since quittin.1    Smokeless tobacco: Never   Substance Use Topics    Alcohol use: No      Current Outpatient Medications   Medication Sig Dispense Refill    cephALEXin (KEFLEX) 500 MG capsule Take 500 mg by mouth 3 times daily      magnesium oxide (MAG-OX) 400 MG tablet Take 400 mg by mouth 2 times daily      tamsulosin (FLOMAX) 0.4 MG capsule TAKE 2 CAPSULES BY MOUTH EVERY DAY 60 capsule 2    tacrolimus ER (ENVARSUS XR) 1 MG TB24 tablet Take 2 mg by mouth daily      albuterol sulfate HFA (PROVENTIL HFA) 108 (90 Base) MCG/ACT inhaler Inhale 2 puffs into the lungs every 6 hours as needed for Wheezing 18 g 3    JARDIANCE 25 MG tablet TAKE 1 TABLET BY MOUTH EVERY DAY (Patient taking differently: Take 25 mg by mouth daily TAKE 1 TABLET BY MOUTH EVERY DAY) 90 tablet 0    calcitRIOL (ROCALTROL) 0.25 MCG capsule Take 0.25 mcg by mouth every other day      propranolol (INDERAL) 80 MG tablet Take 1 tablet by mouth 2 times daily 180 tablet 3    Lancets MISC 1 each by Does not apply route daily 100 each 5    blood glucose test strips (TRUE METRIX BLOOD GLUCOSE TEST) strip TEST ONE TIME A DAY & AS NEEDED FOR SYMPTOMS OF IRREGULAR BLOOD GLUCOSE. DX: E11.9 100 strip 5    sodium bicarbonate 325 MG tablet TAKE 1 TABLET BY MOUTH TWICE A DAY (Patient taking differently: Take 650 mg by mouth 3 times daily) 60 tablet 5    metFORMIN (GLUCOPHAGE) 1000 MG tablet TAKE 1 TABLET BY MOUTH TWICE A DAY WITH MEALS (Patient taking differently: Take 1,000 mg by mouth 2 times daily (with meals)) 180 tablet 3    vitamin D (ERGOCALCIFEROL) 1.25 MG (43816 UT) CAPS capsule TAKE 1 CAPSULE BY MOUTH ONE TIME PER WEEK (Patient taking differently: Take 50,000 Units by mouth every 30 days) 12 capsule 1    blood glucose monitor strips Test one time a day & as needed for symptoms of irregular blood glucose. 100 strip 5    Omega-3 Fatty Acids (FISH OIL) 1000 MG CPDR Take 1,000 mg by mouth 2 times daily      mycophenolate (CELLCEPT) 250 MG capsule Take 500 mg by mouth 2 times daily      glucose blood VI test strips (ACCU-CHEK ANANT) strip Contour strips,check daily E11.9 100 each 0     No current facility-administered medications for this visit. Allergies: Patient has no known allergies. Review of Systems  Constitutional - no significant activity change, appetite change, or unexpected weight change. No fever, chills or diaphoresis. No fatigue. HEENT - no significant rhinorrhea or epistaxis. No tinnitus or significant hearing loss. Eyes - no sudden vision change or amaurosis. Respiratory - no significant wheezing, stridor, apnea or cough. No dyspnea on exertion or shortness of breath. Cardiovascular - no exertional chest pain, orthopnea or PND. No sensation of arrhythmia or slow heart rate. No claudication or leg edema. Gastrointestinal - no abdominal swelling or pain. No blood in stool.  No severe constipation, diarrhea, nausea, or vomiting. Genitourinary - no difficulty urinating, dysuria, frequency, or urgency. No flank pain or hematuria. Musculoskeletal - no back pain, gait-uses cane  Skin - no color change or rash. No pallor. Left facial surgical incision  Neurologic - no speech difficulty, facial asymmetry or lateralizing weakness. No seizures, presyncope, syncope, or significant dizziness. Hematologic - no easy bruising or excessive bleeding. Psychiatric - no severe anxiety or insomnia. No confusion. All other review of systems are negative. Objective  Vital Signs - /60   Pulse 78   Ht 5' 11\" (1.803 m)   Wt 260 lb (117.9 kg)   SpO2 98%   BMI 36.26 kg/m²   General - Kristen Mauro is alert, cooperative, and pleasant. Well groomed. No acute distress. Body habitus is obese. HEENT - The head is normocephalic. No circumoral cyanosis. Dentition is normal.   EYES -  No Xanthelasma, no arcus senilis, no conjunctival hemorrhages or discharge. Neck - Supple, without increased jugular venous pressures. No carotid bruits. No mass. Respiratory - Lungs are clear bilaterally. No wheezes or rales. Normal effort without use of accessory muscles. Cardiovascular - Heart has irregular rhythm controlled rate. No murmurs, rubs or gallops. + pedal pulses and no varicosities. Abdominal -  Soft, nontender, nondistended. Bowel sounds are intact. Extremities - No clubbing, cyanosis, or  edema. Musculoskeletal -  No clubbing . No Osler's nodes. Gait-using cane. No kyphosis or scoliosis. Skin -left facial surgical incision well approximated. Slight redness but no drainage. No statis ulcers or dermatitis. Neurological - No focal signs are identified. Oriented to person, place and time. Psychiatric -  Appropriate affect and mood. Assessment:     Diagnosis Orders   1. Permanent atrial fibrillation ([address]        2. Presence of Watchman left atrial appendage closure device        3. Essential hypertension          Data:  BP Readings from Last 3 Encounters:   03/17/23 120/60   03/07/23 105/67   02/28/23 126/74    Pulse Readings from Last 3 Encounters:   03/17/23 78   03/07/23 93   02/28/23 77        Wt Readings from Last 3 Encounters:   03/17/23 260 lb (117.9 kg)   01/17/23 255 lb (115.7 kg)   01/10/23 255 lb (115.7 kg)       Permanent atrial fibrillation. No significant change in recent EKG done at [address] [place] [address] Drive is well controlled. Patient is not anticoagulated. Had left atrial closure device with watchman last year. Repeat transesophageal echo showed well-seated device       Reviewed recent notes   Reviewed recent labs       Transesophageal echocardiogram findings:      LV is normal in size with borderline normal LV systolic function. LV   ejection fraction estimated at 50 to 55%. RV appears moderately dilated with mildly reduced RV systolic function. Left atrium is moderately dilated. No mass or thrombus in left atrium. Left atrial appendage occluder (watchman device) visualized and is   well-seated. No leak around device with no color Doppler flow noted within   appendage. Interatrial septum is mostly intact with a minimal coronary jet visualized   on some views. Bubble study fails to demonstrate any significant   right-to-left shunting. Right atrium appears severely dilated. No mass or thrombus in right   atrium. Mitral valve with normal leaflet mobility. Moderate mitral regurgitation   (ERO 0.18 cm^2, vena contractor 0.51 cm,PISA radius 0.7). No stenosis. Moderate tricuspid regurgitation. Aortic valve is trileaflet with mild leaflet thickening. Eccentric mild   aortic regurgitation. No stenosis. Pulmonary valve appears unremarkable. No significant pericardial effusion.    Mild atheromatous changes in descending thoracic aorta.     ----------------------------------------   Electronically signed by Dax Powers MD(Interpreting physician)   on 10/20/2022 02:35 PM ----------------------------------------------------------------      States taking medications as prescribed  Stable cardiovascular status. No evidence of overt heart failure, angina   30 minutes were spent preparing, reviewing and seeing patient. All questions answered    Plan    Maintain good blood pressure control-goal<130/80 at rest  Maintain good cholesterol control LDL goal<70 with arterial disease  If you are diabetic work to keep/obtain hemoglobin A1c< 7    Follow up in May With Dr. Shay Hernandez   Call with any questions or concerns  Follow up with Tiffanie Anne MD for non cardiac problems  Report any new problems  Cardiovascular Fitness-Exercise as tolerated. Cardiac / Healthy Diet- Avoid processed high fat foods, maintain low sodium/salt   Continue current medications as directed  Continue plan of treatment  It is always recommended that you bring your medications bottles with you to each visit - this is for your safety! CHRIS Moseley dragon/transcription disclaimer: Much of this encounter note is electronic transcription/translation of spoken language to printed tach. Electronic translation of spoken language may be erroneous, or at times, nonsensical words or phrases may be inadvertently transcribed.  Although, I have reviewed the note for such errors, some may still exist.

## 2023-03-17 NOTE — PATIENT INSTRUCTIONS
Maintain good blood pressure control-goal<130/80 at rest  Maintain good cholesterol control LDL goal<70 with arterial disease  If you are diabetic work to keep/obtain hemoglobin A1c< 7    Follow up in May With Dr. Phyllis Arcos   Call with any questions or concerns  Follow up with Sawyer Leach MD for non cardiac problems  Report any new problems  Cardiovascular Fitness-Exercise as tolerated. Cardiac / Healthy Diet- Avoid processed high fat foods, maintain low sodium/salt   Continue current medications as directed  Continue plan of treatment  It is always recommended that you bring your medications bottles with you to each visit - this is for your safety!

## 2023-03-20 DIAGNOSIS — E11.9 TYPE 2 DIABETES MELLITUS WITHOUT COMPLICATION, WITHOUT LONG-TERM CURRENT USE OF INSULIN (HCC): ICD-10-CM

## 2023-03-20 RX ORDER — EMPAGLIFLOZIN 25 MG/1
TABLET, FILM COATED ORAL
Qty: 90 TABLET | Refills: 0 | Status: SHIPPED | OUTPATIENT
Start: 2023-03-20

## 2023-03-20 NOTE — DISCHARGE INSTRUCTIONS
[address] [facility]   Physician Orders and Discharge Instructions  [address] [facility]  [place], [address]  Telephone: [phone]    NAME:  Katya Lee OF BIRTH:  1955  MEDICAL RECORD NUMBER:  [id number]  DATE:  3/21/23    Discharge condition: {STABLE/UNSTABLE:192949807}    Discharge to: {[facility]P Wound Discharge To:23588}    Left via:{Left QRE:40660}    Accompanied by:  {:[id number]}    ECF/HHA: Randolph Duron [phone]     Dressing Orders:  Left hand cleanse with soap and water, pat dry, apply Promogram to wound, cover promogram with wound gel, cover with Silicone Border dressing (such as Mepilex Silicone Border Dressing), change dressing every other day. Stretch netting to help hold in place if needed. Right lateral forearm:   Moisturize and protect      Right lateral forearm:   Moisturize and protect      Treatment Orders:Protein rich diet (unless restricted by your physician)  Multivitamin daily  Elevate left hand 3-4 times per day above level of the heart     May shower- do not get left hand wet        [facility] follow up visit ______________2 weeks with Patricia Elizabeth_______________  (Please note your next appointment above and if you are unable to keep, kindly give a 24 hour notice. Thank you.)          If you experience any of the following, please call the [facility] during business hours:    * Increase in Pain  * Temperature over 101  * Increase in drainage from your wound  * Drainage with a foul odor  * Bleeding  * Increase in swelling  * Need for compression bandage changes due to slippage, breakthrough drainage. If you need medical attention outside of the business hours of the [facility] please contact your PCP or go to the nearest emergency room.

## 2023-03-21 ENCOUNTER — HOSPITAL ENCOUNTER (OUTPATIENT)
Dept: WOUND CARE | Age: 68
Discharge: HOME | End: 2023-03-21
Payer: MEDICARE

## 2023-03-21 VITALS
DIASTOLIC BLOOD PRESSURE: 84 MMHG | TEMPERATURE: 98.1 F | HEART RATE: 81 BPM | RESPIRATION RATE: 20 BRPM | SYSTOLIC BLOOD PRESSURE: 134 MMHG | HEIGHT: 71 IN | BODY MASS INDEX: 36.4 KG/M2 | WEIGHT: 260 LBS

## 2023-03-21 DIAGNOSIS — E11.9 TYPE 2 DIABETES MELLITUS WITHOUT COMPLICATION, WITHOUT LONG-TERM CURRENT USE OF INSULIN (HCC): ICD-10-CM

## 2023-03-21 DIAGNOSIS — T82.7XXA INFECTION OF AV GRAFT FOR DIALYSIS (HCC): ICD-10-CM

## 2023-03-21 DIAGNOSIS — L03.113 CELLULITIS OF RIGHT UPPER EXTREMITY: ICD-10-CM

## 2023-03-21 DIAGNOSIS — L98.492 HAND ULCERATION WITH FAT LAYER EXPOSED (HCC): ICD-10-CM

## 2023-03-21 DIAGNOSIS — L98.9 HAND LESION: Primary | ICD-10-CM

## 2023-03-21 PROCEDURE — 6370000000 HC RX 637 (ALT 250 FOR IP): Performed by: SURGERY

## 2023-03-21 PROCEDURE — 99211 OFF/OP EST MAY X REQ PHY/QHP: CPT | Performed by: SURGERY

## 2023-03-21 PROCEDURE — 99213 OFFICE O/P EST LOW 20 MIN: CPT

## 2023-03-21 RX ORDER — LIDOCAINE HYDROCHLORIDE 40 MG/ML
SOLUTION TOPICAL ONCE
OUTPATIENT
Start: 2023-03-21 | End: 2023-03-21

## 2023-03-21 RX ORDER — LIDOCAINE 40 MG/G
CREAM TOPICAL ONCE
OUTPATIENT
Start: 2023-03-21 | End: 2023-03-21

## 2023-03-21 RX ORDER — LIDOCAINE 50 MG/G
OINTMENT TOPICAL ONCE
OUTPATIENT
Start: 2023-03-21 | End: 2023-03-21

## 2023-03-21 RX ORDER — LIDOCAINE HYDROCHLORIDE 20 MG/ML
JELLY TOPICAL ONCE
OUTPATIENT
Start: 2023-03-21 | End: 2023-03-21

## 2023-03-21 RX ORDER — LIDOCAINE HYDROCHLORIDE 20 MG/ML
JELLY TOPICAL ONCE
Status: COMPLETED | OUTPATIENT
Start: 2023-03-21 | End: 2023-03-21

## 2023-03-21 RX ADMIN — LIDOCAINE HYDROCHLORIDE: 20 JELLY TOPICAL at 09:32

## 2023-03-21 NOTE — PROGRESS NOTES
mouth every 30 days) 12 capsule 1    blood glucose monitor strips Test one time a day & as needed for symptoms of irregular blood glucose. 100 strip 5    Omega-3 Fatty Acids (FISH OIL) 1000 MG CPDR Take 1,000 mg by mouth 2 times daily      mycophenolate (CELLCEPT) 250 MG capsule Take 500 mg by mouth 2 times daily      glucose blood VI test strips (ACCU-CHEK ANANT) strip Contour strips,check daily E11.9 100 each 0     No current facility-administered medications on file prior to encounter. REVIEW OF SYSTEMS    Pertinent items are noted in HPI. Objective:      /84   Pulse 81   Temp 98.1 °F (36.7 °C) (Temporal)   Resp 20   Ht 5' 11\" (1.803 m)   Wt 260 lb (117.9 kg)   BMI 36.26 kg/m²     Wt Readings from Last 3 Encounters:   03/21/23 260 lb (117.9 kg)   03/17/23 260 lb (117.9 kg)   01/17/23 255 lb (115.7 kg)       PHYSICAL EXAM    General Appearance: alert and oriented to person, place and time  Skin: warm and dry, no rash or erythema and Wound as noted  Pulmonary/Chest: no chest wall tenderness  Abdomen: soft, non-tender, non-distended, normal bowel sounds, no masses or organomegaly  Extremities: no cyanosis and no clubbing  Musculoskeletal: normal range of motion, no joint swelling, deformity or tenderness      Assessment:      Patient Active Problem List   Diagnosis Code    Essential hypertension I10    Chest pressure R07.89    Diabetes mellitus ([facility] E11.9    Ex-cigarette smoker Z87.891    Chest pain R07.9    Cellulitis L03.90    Infection of AV graft for dialysis ([facility] T82. 7XXA    Cellulitis of right upper extremity L03.113    Atrial fibrillation with RVR ([facility]) I48.91    Immunosuppression ([facility]) D84.9    Daytime somnolence R40.0    PAF (paroxysmal atrial fibrillation) ([facility]) I48.0    Fatigue R53.83    History of tachycardia Z87.898    Chronic anticoagulation Z79.01    Orthostatic dizziness R42    PSVT (paroxysmal supraventricular tachycardia) ([facility]) I47.1    UGI bleed K92.2    History of renal

## 2023-03-22 ENCOUNTER — OFFICE VISIT (OUTPATIENT)
Dept: OTOLARYNGOLOGY | Facility: CLINIC | Age: 68
End: 2023-03-22
Payer: MEDICARE

## 2023-03-22 VITALS
RESPIRATION RATE: 20 BRPM | SYSTOLIC BLOOD PRESSURE: 145 MMHG | BODY MASS INDEX: 35.7 KG/M2 | TEMPERATURE: 98 F | DIASTOLIC BLOOD PRESSURE: 80 MMHG | HEIGHT: 71 IN | HEART RATE: 85 BPM | WEIGHT: 255 LBS

## 2023-03-22 DIAGNOSIS — C44.92 SQUAMOUS CELL SKIN CANCER: ICD-10-CM

## 2023-03-22 DIAGNOSIS — Z98.890 MOHS DEFECT OF LEFT CHEEK: Primary | ICD-10-CM

## 2023-03-22 DIAGNOSIS — T14.8XXA OPEN WOUND: ICD-10-CM

## 2023-03-22 DIAGNOSIS — Z94.0 HISTORY OF RENAL TRANSPLANT: ICD-10-CM

## 2023-03-22 DIAGNOSIS — M95.2 MOHS DEFECT OF LEFT CHEEK: Primary | ICD-10-CM

## 2023-03-22 PROCEDURE — 99024 POSTOP FOLLOW-UP VISIT: CPT | Performed by: OTOLARYNGOLOGY

## 2023-03-22 PROCEDURE — 1159F MED LIST DOCD IN RCRD: CPT | Performed by: OTOLARYNGOLOGY

## 2023-03-22 PROCEDURE — 1160F RVW MEDS BY RX/DR IN RCRD: CPT | Performed by: OTOLARYNGOLOGY

## 2023-03-22 RX ORDER — SULFAMETHOXAZOLE AND TRIMETHOPRIM 800; 160 MG/1; MG/1
1 TABLET ORAL 2 TIMES DAILY
Qty: 20 TABLET | Refills: 0 | Status: SHIPPED | OUTPATIENT
Start: 2023-03-22 | End: 2023-04-01

## 2023-03-22 NOTE — PROGRESS NOTES
"CC/Reason for visit: Jovani Kate returns to the office following repair of a Mohs defect of the left cheek utilizing a subcutaneously-pedicled V-Y flap on March 14, 2023.    SUBJECTIVE:  He has had some yellow-bloody drainage for the past 2 days.  Intermittent.  Not sure where it is coming from.    OBJECTIVE:  /80   Pulse 85   Temp 98 °F (36.7 °C)   Resp 20   Ht 179.1 cm (70.5\")   Wt 116 kg (255 lb)   BMI 36.07 kg/m²   Healing well.  No obvious infection present.  Sutures removed.  Facial movement symmetric     ASSESSMENT:  Diagnoses and all orders for this visit:    1. Mohs defect of left cheek (Primary)    2. Open wound    3. Squamous cell skin cancer    4. History of renal transplant        PLAN:   Will d/c keflex.  Start mupirocin and added bactrim.  Seeing Dr. Seaman Monday for Mohs - has 3 lesions.        Les Lynne MD   03/22/2023  14:20 CDT    "

## 2023-03-29 DIAGNOSIS — E11.65 TYPE 2 DIABETES MELLITUS WITH HYPERGLYCEMIA, WITHOUT LONG-TERM CURRENT USE OF INSULIN (HCC): ICD-10-CM

## 2023-04-04 ENCOUNTER — HOSPITAL ENCOUNTER (OUTPATIENT)
Dept: WOUND CARE | Age: 68
Discharge: HOME | End: 2023-04-04

## 2023-04-05 ENCOUNTER — OFFICE VISIT (OUTPATIENT)
Dept: OTOLARYNGOLOGY | Facility: CLINIC | Age: 68
End: 2023-04-05
Payer: MEDICARE

## 2023-04-05 DIAGNOSIS — Z48.02 VISIT FOR SUTURE REMOVAL: Primary | ICD-10-CM

## 2023-04-05 PROCEDURE — 99024 POSTOP FOLLOW-UP VISIT: CPT | Performed by: OTOLARYNGOLOGY

## 2023-04-05 NOTE — PROGRESS NOTES
Patient here today for 2nd post-op visit after MOHS repaired. Patient is doing well. Wound is healing well for the most part,but there is an area of concerned,but I do believe in time it will healed just fine. Patient was given instructions on wound care. He also had a spot on the same side that was done after our repaired by Dr. Seaman that I believe needs attention,the skin graft is not doing as well as it should. I will contact Dr. Seaman's office in regards.patient will follow up with us in 3 weeks after he sees Dr. Seaman.

## 2023-04-19 ENCOUNTER — TELEPHONE (OUTPATIENT)
Dept: OTOLARYNGOLOGY | Facility: CLINIC | Age: 68
End: 2023-04-19

## 2023-04-19 NOTE — PROGRESS NOTES
[facility]  MD Alexander Jeffrey APRN  ________________________________________  [facility]  Department of Radiation Oncology  [address] [place] [id number]  Office:  [phone]  Fax: [phone]    DATE:  04/20/2023  PATIENT:  Jovani Kate 1955                 MEDICAL RECORD #:  [id number]                                                       REASON FOR VISIT  Chief Complaint   Patient presents with   • Squamous Cell Carcinoma     Hand     Jovani Kate is a very pleasant male that has been referred to our office for radiotherapy considerations regarding a completely excised left hand dorsum lesion.     History of Present Illness:  01/05/2023 - Biopsies:  • Skin lesion, left upper arm, excision:   o Early invasive squamous cell carcinoma arising in a squamous cell in situ.   o The lesion is completely excised.   • Skin lesion, left middle arm, excision:   o Squamous cell carcinoma in situ.   o The lesion is completely excised.   • Skin lesion, left hand, excision:   o Invasive well differentiated squamous cell carcinoma.   o The lesion is completely excised.   • Skin lesion, right arm, excision:   o Invasive well differentiated squamous cell carcinoma.   o The lesion is completely excised.     02/03/2023 - Biopsies:  • Left lateral face/preauricular area:  o Squamous cell carcinoma in situ arising in an actinic keratosis  • Zygomatic process left:  o Invasive, well to moderately differentiated squamous cell carcinoma  - Comment: Tumor cells extend to the peripheral and deep biopsy edges.  • Helical rim, left superior:  o actinic keratosis, hyperkeratotic variant  • Postauricular skin left:  o Invasive, well-differentiated squamous cell carcinoma    03/13/2023 - MOHS surgery per .   • Removed the lesion completely in 2 stages    Referred to  for closure.     03/13/2023 - Appointment with EUGENIE Johnson:  • I have offered repair  of the Mohs defect of the left cheek with likely cervicofacial flap in the operating room under anesthesia.   • Discussion of skin lesion. Discussed risks, benefits, alternatives, and possible complications of excision of the skin lesion with reconstruction utilizing local tissue rearrangement, full-thickness skin grafting, or local interpolated flaps. Risks include, but are not limited too: bleeding, infection, hematoma, recurrence, need for additional procedures, flap failure, cosmetic deformity. Patient understands risks and would like to proceed with surgery.   • My findings and recommendations were discussed and questions were answered.      03/14/2023 -  Repair of Mohs defect of the left cheek per .    03/22/2023 - Appointment with :  PLAN:   • Will d/c keflex.  Start mupirocin and added bactrim.  Seeing Dr. Seaman Monday for Mohs - has 3 lesions.    Patient comes in for consultation by himself without offering spontaneous complaints.  He reports that he has had a poor healing of his left hand dorsum and requires continued dressing with emollients and antibiotic ointments.  He describes being in the process of having additional surgeries for multiple lesions.  He confirms being a kidney transplant patient remaining on transplant meds.     History obtained from  PATIENT and CHART    PAST MEDICAL HISTORY  Past Medical History:   Diagnosis Date   • Arthritis    • Atrial fibrillation    • CHF (congestive heart failure)    • Diabetes mellitus    • Hypertension    • Renal disease    • SCC (squamous cell carcinoma)     left cheek      PAST SURGICAL HISTORY  Past Surgical History:   Procedure Laterality Date   • CARDIAC SURGERY N/A     WATCHMAN IMPLANTED NOV 2021/ FROM [place], PT CANNOT TAKE BLOOD THINNERS   • CHOLECYSTECTOMY     • COLONOSCOPY N/A 06/23/2017    Procedure: COLONOSCOPY WITH ANESTHESIA;  Surgeon: Gideon Cabrera MD;  Location: [facility] ENDOSCOPY;  Service:    • DIALYSIS FISTULA  CREATION Bilateral    • HERNIA REPAIR     • SKIN CANCER EXCISION      scc of left cheek   • SKIN CANCER EXCISION Left     LEFT ARM, HAND, RIGHT FOREARM   • TONSILLECTOMY     • TRANSPLANTATION RENAL     • WOUND CLOSURE Left 3/14/2023    Procedure: Repair of Mohs defect of the left cheek;  Surgeon: Les Lynne MD;  Location: [facility];  Service: ENT;  Laterality: Left;      PAST ONCOLOGIC HISTORY: Patient confirms history of multiple nonmelanoma skin cancers.  He denies prior chemotherapy or radiation therapy.  He denies knowledge of vascular/connective tissue disorders or active rheumatologic disease.    FAMILY HISTORY  family history is not on file.     SOCIAL HISTORY  Social History     Tobacco Use   • Smoking status: Former   • Smokeless tobacco: Never   • Tobacco comments:     QUIT SMOKING 50 YRS AGO   Vaping Use   • Vaping Use: Never used   Substance Use Topics   • Alcohol use: No     Comment: QUIT OVER 50 YRS AGO   • Drug use: No     ALLERGIES  Patient has no known allergies.     MEDICATIONS    Current Outpatient Medications:   •  acetaminophen (TYLENOL) 500 MG tablet, Take 1 tablet by mouth Every 6 (Six) Hours As Needed for Mild Pain., Disp: , Rfl:   •  albuterol sulfate  (90 Base) MCG/ACT inhaler, Inhale 2 puffs Every 6 (Six) Hours As Needed., Disp: , Rfl:   •  calcitriol (ROCALTROL) 0.25 MCG capsule, Take 1 capsule by mouth 3 (Three) Times a Week., Disp: , Rfl:   •  Calcium Citrate-Vitamin D 250-1.25 MG-MCG tablet, Take 1.25 mcg by mouth Every 30 (Thirty) Days., Disp: , Rfl:   •  empagliflozin (JARDIANCE) 25 MG tablet tablet, Take 1 tablet by mouth Daily., Disp: , Rfl:   •  magnesium oxide (MAG-OX) 400 MG tablet, Take 1 tablet by mouth Daily., Disp: , Rfl:   •  metFORMIN (GLUCOPHAGE) 1000 MG tablet, Take 1 tablet by mouth 2 (Two) Times a Day With Meals., Disp: , Rfl:   •  mycophenolate (CELLCEPT) 250 MG capsule, Take 1 capsule by mouth 2 (Two) Times a Day., Disp: , Rfl:   •  naloxone (NARCAN) 4  "MG/0.1ML nasal spray, 1 spray into the nostril(s) as directed by provider As Needed (Overdose)., Disp: 1 each, Rfl: 2  •  Omega 3 1200 MG capsule, Take 1,200 mg by mouth Daily., Disp: , Rfl:   •  propranolol (INDERAL) 80 MG tablet, Take 1 tablet by mouth 2 (Two) Times a Day., Disp: , Rfl:   •  sodium bicarbonate 650 MG tablet, Take 1 tablet by mouth 3 (Three) Times a Day., Disp: , Rfl:   •  Tacrolimus ER (Envarsus XR) 1 MG tablet sustained-release 24 hour, Take 1 tablet by mouth Daily., Disp: , Rfl:   •  tamsulosin (FLOMAX) 0.4 MG capsule 24 hr capsule, Take 2 capsules by mouth 2 (Two) Times a Day., Disp: , Rfl:   •  zolpidem (AMBIEN) 10 MG tablet, Take 1 tablet by mouth At Night As Needed for Sleep., Disp: , Rfl:   •  fluorouracil (EFUDEX) 5 % cream, PLEASE SEE ATTACHED FOR DETAILED DIRECTIONS (Patient not taking: Reported on 4/20/2023), Disp: , Rfl:     Current outpatient and discharge medications have been reconciled for the patient.  Reviewed by: Diony Hurtado MD    The following portions of the patient's history were reviewed and updated as appropriate: allergies, current medications, past family history, past medical history, past social history, past surgical history and problem list.    REVIEW OF SYSTEMS  Review of Systems   Constitutional: Negative.    HENT:  Negative.    Eyes: Negative.    Respiratory: Negative.    Cardiovascular: Negative.    Gastrointestinal: Negative.    Endocrine: Negative.    Genitourinary: Negative.     Musculoskeletal: Negative.    Skin: Positive for wound.   Neurological: Negative.    Hematological: Negative.    Psychiatric/Behavioral: Negative.        I have reviewed and confirmed the accuracy of the ROS as documented by the MA/LPN/RN Diony Hurtado MD     PHYSICAL EXAM  Vital Signs:   Vitals:    04/20/23 0958   BP: 143/93   Weight: 116 kg (255 lb)   Height: 179.1 cm (70.5\")   PainSc: 0-No pain      Physical Exam  Constitutional:       Appearance: Normal appearance. He is " obese.   HENT:      Head: Normocephalic and atraumatic.      Comments: Multiple clean and approximated surgical excisions and areas of recent surgical manipulation for multiple skin cancers noted in face and neck.     Nose: Nose normal.      Mouth/Throat:      Mouth: Mucous membranes are moist.      Pharynx: Oropharynx is clear.   Eyes:      Extraocular Movements: Extraocular movements intact.      Conjunctiva/sclera: Conjunctivae normal.      Pupils: Pupils are equal, round, and reactive to light.   Cardiovascular:      Pulses: Normal pulses.   Pulmonary:      Effort: Pulmonary effort is normal.   Abdominal:      Palpations: Abdomen is soft.   Musculoskeletal:         General: Normal range of motion.      Cervical back: Normal range of motion and neck supple.   Skin:     General: Skin is warm.             Comments: Bandage was removed from the left hand dorsum revealing thickened erythematous skin with apparent dryness and scaling without suspicious skin lesion seen or palpated.  No subcutaneous no nodularity noted.  No palpable epitrochlear, axillary, supraclavicular/cervical lymphadenopathy.   Neurological:      General: No focal deficit present.      Mental Status: He is alert and oriented to person, place, and time. Mental status is at baseline.   Psychiatric:         Mood and Affect: Mood normal.         Behavior: Behavior normal.         Thought Content: Thought content normal.           Performance Status: ECOG (0) Fully active, able to carry on all predisease performance without restriction    Clinical Quality Measures  -Pain Documented by Standardized Tool, TANISHA Kate reports a pain score of 0. Given his pain assessment as noted, treatment options were discussed and the following options were decided upon as a follow-up plan to address the patient's pain: No pain, no plan given.  Pain Medications             acetaminophen (TYLENOL) 500 MG tablet Take 1 tablet by mouth Every 6 (Six) Hours As Needed for  Mild Pain.        -Advanced Care Planning Advance Care Planning   ACP discussion was held with the patient during this visit. Patient does not have an advance directive, information provided.     -Body Mass Index Screening and Follow-Up Plan Class 2 Severe Obesity (BMI >=35 and <=39.9). Obesity-related health conditions include the following: none.     -Tobacco Use: Screening and Cessation Intervention Social History    Tobacco Use      Smoking status: Former      Smokeless tobacco: Never      Tobacco comments: QUIT SMOKING 50 YRS AGO     ASSESSMENT AND PLAN  1. Squamous cell skin cancer    2. Former smoker      No orders of the defined types were placed in this encounter.     RECOMMENDATIONS: Jovani Kate has a history of multiple nonmelanoma skin cancers, multiple squamous cell skin cancers.  The left hand dorsum squamous cell skin cancer is status post excision on 01/05/2023 revealing an invasive well-differentiated squamous cell carcinoma that was completely excised (negative margins), likely low risk stage I (T1 NX MX) lesion.    We have discussed the indications and rationale of radiation therapy according to the NCCN Guidelines advocating adjuvant radiotherapy in the setting of recurrent lesions or lesions with resulting high risk features.  This lesion appeared to measure less than 2 cm by patient description as I cannot find documentation supporting actual measurements; no high risk features that would render him at an increased risk for local recurrence were noted on pathology review and lesion was completely excised.  Furthermore, area is located in the left hand dorsum considered to be an area prone to trauma/continued sun exposure, poor vascularity in the setting of degree of immuno compromised status on transplant medications which carry a higher side effect profile risk for radiotherapy related complications. I have extensively reviewed the risks, benefits and alternatives of therapy and progression of  disease in spite of therapy with either local or systemic failure.   I have seen, examined and reviewed his medication list, appropriate labs and imaging studies as well as other physician notes. We discussed the goals/plans of care and answered all questions.     After consideration of the diagnostic data and evaluation of the patient, I do not believe he would benefit from adjuvant radiotherapy at this time and favor consideration of reresection or definitive radiotherapy treatment in the event of a local recurrence.      The patient verbalized understanding of this discussion, voiced no further questions and wish to continue routine follow-up with his rheumatologist, surgeons and other providers.  He can return to this office on an as-needed basis. Continue ongoing management per primary care physician and other specialists.    Thank you for allowing me to assist in this patients care.    Time Spent: I spent 56 minutes caring for Jovani on this date of service. This time includes time spent by me in the following activities: preparing for the visit, reviewing tests, obtaining and/or reviewing a separately obtained history, performing a medically appropriate examination and/or evaluation, counseling and educating the patient/family/caregiver, referring and communicating with other health care professionals and documenting information in the medical record.   Diony Hurtado MD   04/20/2023

## 2023-04-19 NOTE — TELEPHONE ENCOUNTER
Caller: DANUTA ALDANA     Relationship: SELF     Best call back number: [phone]      PT SAID GARY TOLD HIM TO COME IN Monday THE 24TH (NOT IN Epic) BUT HE WANTS TO COME IN ON THE 25TH INSTEAD. UNABLE TO REACH OFFICE.

## 2023-04-20 ENCOUNTER — HOSPITAL ENCOUNTER (OUTPATIENT)
Dept: RADIATION ONCOLOGY | Facility: HOSPITAL | Age: 68
Setting detail: RADIATION/ONCOLOGY SERIES
End: 2023-04-20
Payer: MEDICARE

## 2023-04-20 ENCOUNTER — CONSULT (OUTPATIENT)
Dept: RADIATION ONCOLOGY | Facility: HOSPITAL | Age: 68
End: 2023-04-20
Payer: MEDICARE

## 2023-04-20 VITALS
SYSTOLIC BLOOD PRESSURE: 143 MMHG | WEIGHT: 255 LBS | DIASTOLIC BLOOD PRESSURE: 93 MMHG | BODY MASS INDEX: 35.7 KG/M2 | HEIGHT: 71 IN

## 2023-04-20 DIAGNOSIS — C44.92 SQUAMOUS CELL SKIN CANCER: Primary | ICD-10-CM

## 2023-04-20 DIAGNOSIS — Z87.891 FORMER SMOKER: ICD-10-CM

## 2023-04-20 PROCEDURE — G0463 HOSPITAL OUTPT CLINIC VISIT: HCPCS | Performed by: RADIOLOGY

## 2023-04-25 ENCOUNTER — OFFICE VISIT (OUTPATIENT)
Dept: OTOLARYNGOLOGY | Facility: CLINIC | Age: 68
End: 2023-04-25
Payer: MEDICARE

## 2023-04-25 ENCOUNTER — HOSPITAL ENCOUNTER (OUTPATIENT)
Dept: WOUND CARE | Age: 68
Discharge: HOME | End: 2023-04-25
Payer: MEDICARE

## 2023-04-25 VITALS
DIASTOLIC BLOOD PRESSURE: 72 MMHG | HEART RATE: 71 BPM | SYSTOLIC BLOOD PRESSURE: 124 MMHG | RESPIRATION RATE: 18 BRPM | TEMPERATURE: 97.2 F

## 2023-04-25 DIAGNOSIS — L03.113 CELLULITIS OF RIGHT UPPER EXTREMITY: ICD-10-CM

## 2023-04-25 DIAGNOSIS — Z48.02 VISIT FOR SUTURE REMOVAL: Primary | ICD-10-CM

## 2023-04-25 DIAGNOSIS — L98.492 HAND ULCERATION WITH FAT LAYER EXPOSED (HCC): ICD-10-CM

## 2023-04-25 DIAGNOSIS — T82.7XXA INFECTION OF AV GRAFT FOR DIALYSIS (HCC): ICD-10-CM

## 2023-04-25 DIAGNOSIS — E11.9 TYPE 2 DIABETES MELLITUS WITHOUT COMPLICATION, WITHOUT LONG-TERM CURRENT USE OF INSULIN (HCC): ICD-10-CM

## 2023-04-25 DIAGNOSIS — L98.9 HAND LESION: Primary | ICD-10-CM

## 2023-04-25 PROCEDURE — 99212 OFFICE O/P EST SF 10 MIN: CPT | Performed by: SURGERY

## 2023-04-25 PROCEDURE — 99212 OFFICE O/P EST SF 10 MIN: CPT

## 2023-04-25 RX ORDER — LIDOCAINE 50 MG/G
OINTMENT TOPICAL ONCE
Status: CANCELLED | OUTPATIENT
Start: 2023-04-25 | End: 2023-04-25

## 2023-04-25 RX ORDER — LIDOCAINE HYDROCHLORIDE 20 MG/ML
JELLY TOPICAL ONCE
Status: CANCELLED | OUTPATIENT
Start: 2023-04-25 | End: 2023-04-25

## 2023-04-25 RX ORDER — LIDOCAINE HYDROCHLORIDE 40 MG/ML
SOLUTION TOPICAL ONCE
Status: CANCELLED | OUTPATIENT
Start: 2023-04-25 | End: 2023-04-25

## 2023-04-25 RX ORDER — LIDOCAINE 40 MG/G
CREAM TOPICAL ONCE
Status: CANCELLED | OUTPATIENT
Start: 2023-04-25 | End: 2023-04-25

## 2023-04-25 NOTE — PROGRESS NOTES
Visit          Circulatory    Infection of AV graft for dialysis [facility])    Relevant Orders    Initiate Outpatient Wound Care Protocol       Endocrine    Diabetes mellitus [facility])    Relevant Orders    Initiate Outpatient Wound Care Protocol       Other    Hand lesion - Primary    Relevant Orders    Initiate Outpatient Wound Care Protocol    Hand ulceration with fat layer exposed [facility])    Relevant Orders    Initiate Outpatient Wound Care Protocol    Cellulitis of right upper extremity    Relevant Orders    Initiate Outpatient Wound Care Protocol       Treatment Note please see attached Discharge Instructions    In my professional opinion this patient would benefit from HBO Therapy: Yes    Written patient dismissal instructions given to patient and signed by patient or POA. Cont local wound care. FU prn.        Electronically signed by Darlean Phoenix, DO on 4/25/2023 at 11:05 AM

## 2023-04-25 NOTE — PROGRESS NOTES
Patient here today 3 weeks out from exc bcc of cheek left. Wound check and he is healing great. Patient is happy with results. He will continued wound care and will follow up with us in 2-3 month for wound check. He will call in between time if he has any questions or concerns.

## 2023-04-25 NOTE — PLAN OF CARE
Problem: Chronic Conditions and Co-morbidities  Goal: Patient's chronic conditions and co-morbidity symptoms are monitored and maintained or improved  4/25/2023 1502 by Kimberly Bray RN  Outcome: Completed  4/25/2023 0908 by Rustam Reynaga RN  Outcome: Progressing     Problem: Discharge Planning  Goal: Discharge to home or other facility with appropriate resources  4/25/2023 1502 by Kimberly Bray RN  Outcome: Completed  4/25/2023 0908 by Rustam Reynaga RN  Outcome: Progressing     Problem: Wound:  Goal: Will show signs of wound healing; wound closure and no evidence of infection  Description: Will show signs of wound healing; wound closure and no evidence of infection  4/25/2023 1502 by Kimberly Bray RN  Outcome: Completed  4/25/2023 0908 by Rustam Reynaga RN  Outcome: Progressing     Problem: Falls - Risk of:  Goal: Will remain free from falls  Description: Will remain free from falls  4/25/2023 1502 by Kimberly Bray RN  Outcome: Completed  4/25/2023 0908 by Rustam Reynaga RN  Outcome: Progressing     Problem: Blood Glucose:  Goal: Ability to maintain appropriate glucose levels will improve  Description: Ability to maintain appropriate glucose levels will improve  4/25/2023 1502 by Kimberly Bray RN  Outcome: Completed  4/25/2023 0908 by Rustam Reynaga RN  Outcome: Progressing       Patient healed, follow up as needed.

## 2023-05-19 ENCOUNTER — OFFICE VISIT (OUTPATIENT)
Dept: CARDIOLOGY CLINIC | Age: 68
End: 2023-05-19

## 2023-05-19 VITALS
SYSTOLIC BLOOD PRESSURE: 128 MMHG | BODY MASS INDEX: 33.93 KG/M2 | HEART RATE: 86 BPM | DIASTOLIC BLOOD PRESSURE: 84 MMHG | HEIGHT: 73 IN | WEIGHT: 256 LBS

## 2023-05-19 DIAGNOSIS — I48.21 PERMANENT ATRIAL FIBRILLATION (HCC): Primary | ICD-10-CM

## 2023-05-19 DIAGNOSIS — I10 ESSENTIAL HYPERTENSION: ICD-10-CM

## 2023-05-19 RX ORDER — FUROSEMIDE 20 MG/1
20 TABLET ORAL DAILY
COMMUNITY

## 2023-05-19 RX ORDER — HYDROCODONE BITARTRATE AND ACETAMINOPHEN 7.5; 325 MG/1; MG/1
1 TABLET ORAL EVERY 4 HOURS PRN
COMMUNITY

## 2023-05-19 ASSESSMENT — ENCOUNTER SYMPTOMS
DIARRHEA: 0
SHORTNESS OF BREATH: 0
ABDOMINAL DISTENTION: 0
VOMITING: 0
BLOOD IN STOOL: 0
BACK PAIN: 0
WHEEZING: 0
ABDOMINAL PAIN: 0
COUGH: 0

## 2023-05-19 NOTE — PROGRESS NOTES
[facility]  Cardiology Office Note  [address]  [id number]  Phone: [phone]  Fax: [phone]                            Date:  5/19/2023  Patient: Em Celeste  Age:  69 y.o., 1955    Referral: No ref. provider found      PROBLEM LIST:    Patient Active Problem List    Diagnosis Date Noted    Presence of Watchman left atrial appendage closure device 11/18/2021     Priority: High    Hand ulceration with fat layer exposed ([address] 02/07/2023     Priority: Medium    Squamous cell skin cancer 01/17/2023     Priority: Medium    Hand lesion 01/05/2023     Priority: Medium    Longstanding persistent atrial fibrillation (HCC)      Priority: Low    LVH (left ventricular hypertrophy) 07/08/2020     Priority: Low    History of renal transplant 11/05/2019     Priority: Low    UGI bleed 11/01/2019     Priority: Low    PSVT (paroxysmal supraventricular tachycardia) ([address] 02/05/2019     Priority: Low    Chronic anticoagulation 01/07/2019     Priority: Low     Overview Note:     Eliquis      Orthostatic dizziness 01/07/2019     Priority: Low    Daytime somnolence 04/09/2018     Priority: Low    PAF (paroxysmal atrial fibrillation) ([address] 04/09/2018     Priority: Low    Fatigue 04/09/2018     Priority: Low    History of tachycardia 04/09/2018     Priority: Low    Immunosuppression ([address]      Priority: Low    Atrial fibrillation with RVR ([address] 02/26/2018     Priority: Low    Infection of AV graft for dialysis ([address] 02/23/2018     Priority: Low    Cellulitis of right upper extremity      Priority: Low    Cellulitis 02/22/2018     Priority: Low    Chest pressure 07/17/2016     Priority: Low    Diabetes mellitus ([address] 07/17/2016     Priority: Low    Ex-cigarette smoker 07/17/2016     Priority: Low    Essential hypertension      Priority: Low    Chest pain      Priority: Low     1.   Permanent atrial fibrillation with prior DCCV with recurrence, last DCCV 11/18/2021 with

## 2023-06-14 DIAGNOSIS — I48.21 PERMANENT ATRIAL FIBRILLATION (HCC): ICD-10-CM

## 2023-06-14 DIAGNOSIS — I10 ESSENTIAL HYPERTENSION: ICD-10-CM

## 2023-06-19 DIAGNOSIS — E11.9 TYPE 2 DIABETES MELLITUS WITHOUT COMPLICATION, WITHOUT LONG-TERM CURRENT USE OF INSULIN (HCC): ICD-10-CM

## 2023-06-19 RX ORDER — EMPAGLIFLOZIN 25 MG/1
TABLET, FILM COATED ORAL
Qty: 90 TABLET | Refills: 0 | Status: SHIPPED | OUTPATIENT
Start: 2023-06-19

## 2023-06-19 NOTE — TELEPHONE ENCOUNTER
Last OV 11/29/2022  Next OV Visit date not found      Requested Prescriptions     Pending Prescriptions Disp Refills    JARDIANCE 25 MG tablet [Pharmacy Med Name: Macarena Funk 25 MG TABLET] 90 tablet 0     Sig: TAKE 1 TABLET BY MOUTH EVERY DAY

## 2023-06-25 DIAGNOSIS — N40.1 BENIGN PROSTATIC HYPERPLASIA WITH INCOMPLETE BLADDER EMPTYING: ICD-10-CM

## 2023-06-25 DIAGNOSIS — R39.14 BENIGN PROSTATIC HYPERPLASIA WITH INCOMPLETE BLADDER EMPTYING: ICD-10-CM

## 2023-06-26 RX ORDER — TAMSULOSIN HYDROCHLORIDE 0.4 MG/1
CAPSULE ORAL
Qty: 60 CAPSULE | Refills: 2 | Status: SHIPPED | OUTPATIENT
Start: 2023-06-26

## 2023-07-25 ENCOUNTER — OFFICE VISIT (OUTPATIENT)
Dept: CARDIOLOGY CLINIC | Age: 68
End: 2023-07-25
Payer: MEDICARE

## 2023-07-25 VITALS
SYSTOLIC BLOOD PRESSURE: 124 MMHG | BODY MASS INDEX: 36.12 KG/M2 | WEIGHT: 258 LBS | OXYGEN SATURATION: 94 % | HEART RATE: 74 BPM | DIASTOLIC BLOOD PRESSURE: 74 MMHG | HEIGHT: 71 IN

## 2023-07-25 DIAGNOSIS — Z95.818 PRESENCE OF WATCHMAN LEFT ATRIAL APPENDAGE CLOSURE DEVICE: ICD-10-CM

## 2023-07-25 DIAGNOSIS — I48.21 PERMANENT ATRIAL FIBRILLATION (HCC): Primary | ICD-10-CM

## 2023-07-25 DIAGNOSIS — I10 ESSENTIAL HYPERTENSION: ICD-10-CM

## 2023-07-25 PROCEDURE — 1123F ACP DISCUSS/DSCN MKR DOCD: CPT | Performed by: CLINICAL NURSE SPECIALIST

## 2023-07-25 PROCEDURE — 99214 OFFICE O/P EST MOD 30 MIN: CPT | Performed by: CLINICAL NURSE SPECIALIST

## 2023-07-25 PROCEDURE — 3078F DIAST BP <80 MM HG: CPT | Performed by: CLINICAL NURSE SPECIALIST

## 2023-07-25 PROCEDURE — 3074F SYST BP LT 130 MM HG: CPT | Performed by: CLINICAL NURSE SPECIALIST

## 2023-07-25 NOTE — PROGRESS NOTES
1000 MG tablet TAKE 1 TABLET BY MOUTH TWICE A DAY WITH MEALS 180 tablet 1    tacrolimus ER (ENVARSUS XR) 1 MG TB24 tablet Take 2 tablets by mouth daily      albuterol sulfate HFA (PROVENTIL HFA) 108 (90 Base) MCG/ACT inhaler Inhale 2 puffs into the lungs every 6 hours as needed for Wheezing 18 g 3    calcitRIOL (ROCALTROL) 0.25 MCG capsule Take 1 capsule by mouth Takes on Monday, Wednesday and Friday      propranolol (INDERAL) 80 MG tablet Take 1 tablet by mouth 2 times daily 180 tablet 3    Lancets MISC 1 each by Does not apply route daily 100 each 5    blood glucose test strips (TRUE METRIX BLOOD GLUCOSE TEST) strip TEST ONE TIME A DAY & AS NEEDED FOR SYMPTOMS OF IRREGULAR BLOOD GLUCOSE. DX: E11.9 100 strip 5    sodium bicarbonate 325 MG tablet TAKE 1 TABLET BY MOUTH TWICE A DAY (Patient taking differently: Take 2 tablets by mouth 3 times daily) 60 tablet 5    vitamin D (ERGOCALCIFEROL) 1.25 MG (03707 UT) CAPS capsule TAKE 1 CAPSULE BY MOUTH ONE TIME PER WEEK (Patient taking differently: Take 1 capsule by mouth every 30 days) 12 capsule 1    Omega-3 Fatty Acids (FISH OIL) 1000 MG CPDR Take 1 capsule by mouth 2 times daily      mycophenolate (CELLCEPT) 250 MG capsule Take 2 capsules by mouth 2 times daily      magnesium oxide (MAG-OX) 400 MG tablet Take 1 tablet by mouth 2 times daily       No current facility-administered medications for this visit. Allergies: Patient has no known allergies. Review of Systems  Constitutional - no significant activity change, appetite change, or unexpected weight change. No fever, chills or diaphoresis. No fatigue. HEENT - no significant rhinorrhea or epistaxis. No tinnitus or significant hearing loss. Eyes - no sudden vision change or amaurosis. Respiratory - no significant wheezing, stridor, apnea or cough. No dyspnea on exertion or shortness of breath. Cardiovascular - no exertional chest pain, orthopnea or PND. No sensation of arrhythmia or slow heart rate.

## 2023-07-25 NOTE — PATIENT INSTRUCTIONS
Let us know if you have heart rates staying > 100  Will add Digoxin   Maintain good blood pressure control-goal<130/80 at rest  Maintain good cholesterol control LDL goal<70 with arterial disease  If you are diabetic work to keep/obtain hemoglobin A1c< 7    Follow up in Nov  Call with any questions or concerns  Follow up with Lina Bravo MD for non cardiac problems  Report any new problems  Cardiovascular Fitness-Exercise as tolerated. Strive for 30 minutes of exercise most days of the week. Cardiac / Healthy Diet- Avoid processed high fat foods, maintain low sodium/salt   Continue current medications as directed  Continue plan of treatment  It is always recommended that you bring your medications bottles with you to each visit - this is for your safety!

## 2023-08-29 DIAGNOSIS — N40.1 BENIGN PROSTATIC HYPERPLASIA WITH INCOMPLETE BLADDER EMPTYING: ICD-10-CM

## 2023-08-29 DIAGNOSIS — R39.14 BENIGN PROSTATIC HYPERPLASIA WITH INCOMPLETE BLADDER EMPTYING: ICD-10-CM

## 2023-08-29 DIAGNOSIS — E11.9 TYPE 2 DIABETES MELLITUS WITHOUT COMPLICATION, WITHOUT LONG-TERM CURRENT USE OF INSULIN (HCC): ICD-10-CM

## 2023-08-30 RX ORDER — EMPAGLIFLOZIN 25 MG/1
TABLET, FILM COATED ORAL
Qty: 90 TABLET | Refills: 0 | Status: SHIPPED | OUTPATIENT
Start: 2023-08-30

## 2023-08-30 RX ORDER — PROPRANOLOL HYDROCHLORIDE 80 MG/1
TABLET ORAL
Qty: 180 TABLET | Refills: 3 | Status: SHIPPED | OUTPATIENT
Start: 2023-08-30

## 2023-08-30 RX ORDER — TAMSULOSIN HYDROCHLORIDE 0.4 MG/1
CAPSULE ORAL
Qty: 90 CAPSULE | Refills: 1 | Status: SHIPPED | OUTPATIENT
Start: 2023-08-30

## 2023-10-01 DIAGNOSIS — E11.65 TYPE 2 DIABETES MELLITUS WITH HYPERGLYCEMIA, WITHOUT LONG-TERM CURRENT USE OF INSULIN (HCC): ICD-10-CM

## 2023-10-02 NOTE — TELEPHONE ENCOUNTER
Socorro Echevarria called to request a refill on his medication.       Last office visit : 11/29/2022   Next office visit : Visit date not found     Requested Prescriptions     Pending Prescriptions Disp Refills    metFORMIN (GLUCOPHAGE) 1000 MG tablet [Pharmacy Med Name: METFORMIN HCL 1,000 MG TABLET] 180 tablet 1     Sig: TAKE 1 TABLET BY MOUTH TWICE A DAY WITH MEALS

## 2023-10-10 DIAGNOSIS — E55.9 VITAMIN D DEFICIENCY: ICD-10-CM

## 2023-10-10 RX ORDER — ERGOCALCIFEROL 1.25 MG/1
CAPSULE ORAL
Qty: 12 CAPSULE | Refills: 1 | Status: SHIPPED | OUTPATIENT
Start: 2023-10-10

## 2023-10-11 DIAGNOSIS — R39.14 BENIGN PROSTATIC HYPERPLASIA WITH INCOMPLETE BLADDER EMPTYING: ICD-10-CM

## 2023-10-11 DIAGNOSIS — N40.1 BENIGN PROSTATIC HYPERPLASIA WITH INCOMPLETE BLADDER EMPTYING: ICD-10-CM

## 2023-10-11 RX ORDER — TAMSULOSIN HYDROCHLORIDE 0.4 MG/1
0.4 CAPSULE ORAL 2 TIMES DAILY
Qty: 60 CAPSULE | Refills: 5 | Status: SHIPPED | OUTPATIENT
Start: 2023-10-11

## 2023-10-11 NOTE — TELEPHONE ENCOUNTER
Received fax from pharmacy requesting refill on pts medication(s). Pt was last seen in office on 11/29/2022  and has a follow up scheduled for Visit date not found. Will send request to   Dr. Hortencia Johnson   for authorization.      Requested Prescriptions     Pending Prescriptions Disp Refills    tamsulosin (FLOMAX) 0.4 MG capsule 60 capsule 5     Sig: Take 1 capsule by mouth in the morning and at bedtime

## 2023-11-01 ENCOUNTER — OFFICE VISIT (OUTPATIENT)
Dept: FAMILY MEDICINE CLINIC | Age: 68
End: 2023-11-01

## 2023-11-01 VITALS
HEART RATE: 83 BPM | RESPIRATION RATE: 18 BRPM | TEMPERATURE: 97.1 F | DIASTOLIC BLOOD PRESSURE: 68 MMHG | WEIGHT: 251 LBS | BODY MASS INDEX: 35.01 KG/M2 | OXYGEN SATURATION: 98 % | SYSTOLIC BLOOD PRESSURE: 114 MMHG

## 2023-11-01 DIAGNOSIS — J20.9 ACUTE BRONCHITIS, UNSPECIFIED ORGANISM: Primary | ICD-10-CM

## 2023-11-01 DIAGNOSIS — J98.01 ACUTE BRONCHOSPASM: ICD-10-CM

## 2023-11-01 RX ORDER — AZITHROMYCIN 250 MG/1
250 TABLET, FILM COATED ORAL SEE ADMIN INSTRUCTIONS
Qty: 6 TABLET | Refills: 0 | Status: SHIPPED | OUTPATIENT
Start: 2023-11-01 | End: 2023-11-06

## 2023-11-01 RX ORDER — METHYLPREDNISOLONE 4 MG/1
TABLET ORAL
Qty: 1 KIT | Refills: 0 | Status: SHIPPED | OUTPATIENT
Start: 2023-11-01 | End: 2023-11-07

## 2023-11-01 ASSESSMENT — PATIENT HEALTH QUESTIONNAIRE - PHQ9
SUM OF ALL RESPONSES TO PHQ9 QUESTIONS 1 & 2: 0
SUM OF ALL RESPONSES TO PHQ QUESTIONS 1-9: 0
SUM OF ALL RESPONSES TO PHQ QUESTIONS 1-9: 0
1. LITTLE INTEREST OR PLEASURE IN DOING THINGS: 0
SUM OF ALL RESPONSES TO PHQ QUESTIONS 1-9: 0
2. FEELING DOWN, DEPRESSED OR HOPELESS: 0
SUM OF ALL RESPONSES TO PHQ QUESTIONS 1-9: 0

## 2023-11-01 NOTE — PROGRESS NOTES
Anast[facility]  [address]  Dept: [phone]  Dept Fax: [phone]    Visit type: Established patient    Reason for Visit: Congestion, Cough, and Shortness of Breath (X1 month. )         Assessment and Plan       1. Acute bronchitis, unspecified organism  -     azithromycin (ZITHROMAX) 250 MG tablet; Take 1 tablet by mouth See Admin Instructions for 5 days 500mg on day 1 followed by 250mg on days 2 - 5, Disp-6 tablet, R-0Normal  -     methylPREDNISolone (MEDROL DOSEPACK) 4 MG tablet; Take by mouth., Disp-1 kit, R-0Normal  2. Acute bronchospasm  -     methylPREDNISolone (MEDROL DOSEPACK) 4 MG tablet; Take by mouth., Disp-1 kit, R-0Normal    Discussed diagnosis, expected course, and proper use of medication, including OTC medications if prescription is too expensive or insurance does not cover. Discussed signs and symptoms requiring medical attention. All questions were answered and patient voiced understanding and agreement with plan as discussed. Return if symptoms worsen or fail to improve, for next scheduled follow up with PCP. Subjective       HPI   Productive cough and congestion and shortness of breath that has been ongoing for the past few weeks. He states that some of the symptoms are clearing up but he is still having the shortness of breath. He has taken some OTC medicine and seems to be helping but is just not getting it resolved. Review of Systems   Constitutional:  Negative for activity change, appetite change, fatigue and fever. HENT:  Positive for congestion and rhinorrhea. Negative for sore throat. Eyes:  Negative for pain and discharge. Respiratory:  Positive for cough and shortness of breath. Cardiovascular:  Negative for chest pain and palpitations. Gastrointestinal:  Negative for abdominal pain, constipation, diarrhea, nausea and vomiting. Endocrine: Negative for cold intolerance and heat intolerance.    Genitourinary:

## 2023-11-12 ASSESSMENT — ENCOUNTER SYMPTOMS
RHINORRHEA: 1
SHORTNESS OF BREATH: 1
DIARRHEA: 0
CONSTIPATION: 0
COUGH: 1
SORE THROAT: 0
EYE DISCHARGE: 0
BACK PAIN: 0
EYE PAIN: 0
NAUSEA: 0
ABDOMINAL PAIN: 0
VOMITING: 0

## 2023-11-21 ENCOUNTER — OFFICE VISIT (OUTPATIENT)
Dept: CARDIOLOGY CLINIC | Age: 68
End: 2023-11-21
Payer: MEDICARE

## 2023-11-21 VITALS
HEIGHT: 73 IN | OXYGEN SATURATION: 95 % | BODY MASS INDEX: 33.53 KG/M2 | DIASTOLIC BLOOD PRESSURE: 80 MMHG | SYSTOLIC BLOOD PRESSURE: 120 MMHG | HEART RATE: 86 BPM | WEIGHT: 253 LBS

## 2023-11-21 DIAGNOSIS — I48.21 PERMANENT ATRIAL FIBRILLATION (HCC): Primary | ICD-10-CM

## 2023-11-21 DIAGNOSIS — I10 ESSENTIAL HYPERTENSION: ICD-10-CM

## 2023-11-21 DIAGNOSIS — Z95.818 PRESENCE OF WATCHMAN LEFT ATRIAL APPENDAGE CLOSURE DEVICE: ICD-10-CM

## 2023-11-21 PROCEDURE — 3079F DIAST BP 80-89 MM HG: CPT | Performed by: CLINICAL NURSE SPECIALIST

## 2023-11-21 PROCEDURE — 1123F ACP DISCUSS/DSCN MKR DOCD: CPT | Performed by: CLINICAL NURSE SPECIALIST

## 2023-11-21 PROCEDURE — 3074F SYST BP LT 130 MM HG: CPT | Performed by: CLINICAL NURSE SPECIALIST

## 2023-11-21 PROCEDURE — 99214 OFFICE O/P EST MOD 30 MIN: CPT | Performed by: CLINICAL NURSE SPECIALIST

## 2023-11-21 NOTE — PROGRESS NOTES
[facility]  [address] [facility]  [address]  Phone: [phone]  Fax: [phone]    OFFICE VISIT:  2023    La Nena Melendez - : 1955    Reason For Visit:  Miya Mancilla is a 77 y.o. male who is here for 6 Month Follow-Up (Pt has  soa  recently  had bronchitis) and Atrial Fibrillation  1. Permanent atrial fibrillation with prior DCCV with recurrence, last DCCV 2021 with recurrent GI bleed, status post watchman device left atrial appendage closure 2021. LV ejection fraction 56%, moderate LVH, negative Lexiscan study 3/2/2018, short runs of SVT. 2.  End-stage renal disease due to hypertensive renal disease status post renal transplantation 2010.  3.  Diabetes mellitus non-insulin-requiring. 4.  Morbid obesity. 5.  Essential tremor. He was seen in May with Dr. Rudy Decker complaining of fast rates. Discussed option of adding digoxin. Currently on propranolol. ZIO monitor recommended to evaluate rate and rhythm  2-week monitor showed chronic atrial fibrillation 100% burden. Slowest rate was 56 average heart rate is 93, fastest was 150 bpm.    Patient was seen in July doing well. He returns today for routine follow-up. He had bronchitis earlier this month. He is finished a course of antibiotics. He did run a low-grade fever. Cough is slowly subsiding. He still having ADAMS but recovers quickly. Orts that heart rate and blood pressure been well controlled at home    Continues to follow with nephrology due to previous renal transplant. Subjective  Miya Mancilla denies exertional chest pain, resting shortness of breath, orthopnea, paroxysmal nocturnal dyspnea, syncope, presyncope,  edema and fatigue. The patient denies numbness or weakness to suggest cerebrovascular accident or transient ischemic attack. Geronimo Santizo MD is PCP and nephrology following labs.   La Nena Raygraham has the following history as recorded in [facility]:    Patient Active Problem List    Diagnosis Date

## 2023-11-21 NOTE — PATIENT INSTRUCTIONS
Maintain good blood pressure control-goal<130/80 at rest  Maintain good cholesterol control LDL goal<70 with arterial disease  If you are diabetic work to keep/obtain hemoglobin A1c< 7    Follow up in May as planned with DR Arnaldo Oliva   Call with any questions or concerns  Follow up with Juana Briones MD for non cardiac problems  Report any new problems  Cardiovascular Fitness-Exercise as tolerated. Strive for 30 minutes of exercise most days of the week. Cardiac / Healthy Diet- Avoid processed high fat foods, maintain low sodium/salt   Continue current medications as directed  Continue plan of treatment  It is always recommended that you bring your medications bottles with you to each visit - this is for your safety!

## 2023-11-29 SDOH — ECONOMIC STABILITY: HOUSING INSECURITY
IN THE LAST 12 MONTHS, WAS THERE A TIME WHEN YOU DID NOT HAVE A STEADY PLACE TO SLEEP OR SLEPT IN A SHELTER (INCLUDING NOW)?: NO

## 2023-11-29 SDOH — ECONOMIC STABILITY: FOOD INSECURITY: WITHIN THE PAST 12 MONTHS, THE FOOD YOU BOUGHT JUST DIDN'T LAST AND YOU DIDN'T HAVE MONEY TO GET MORE.: NEVER TRUE

## 2023-11-29 SDOH — ECONOMIC STABILITY: TRANSPORTATION INSECURITY
IN THE PAST 12 MONTHS, HAS LACK OF TRANSPORTATION KEPT YOU FROM MEETINGS, WORK, OR FROM GETTING THINGS NEEDED FOR DAILY LIVING?: NO

## 2023-11-29 SDOH — ECONOMIC STABILITY: INCOME INSECURITY: HOW HARD IS IT FOR YOU TO PAY FOR THE VERY BASICS LIKE FOOD, HOUSING, MEDICAL CARE, AND HEATING?: NOT VERY HARD

## 2023-11-30 ENCOUNTER — OFFICE VISIT (OUTPATIENT)
Dept: FAMILY MEDICINE CLINIC | Age: 68
End: 2023-11-30
Payer: MEDICARE

## 2023-11-30 VITALS
BODY MASS INDEX: 32.87 KG/M2 | SYSTOLIC BLOOD PRESSURE: 118 MMHG | TEMPERATURE: 96.9 F | WEIGHT: 248 LBS | DIASTOLIC BLOOD PRESSURE: 74 MMHG | RESPIRATION RATE: 14 BRPM | HEART RATE: 99 BPM | HEIGHT: 73 IN | OXYGEN SATURATION: 98 %

## 2023-11-30 DIAGNOSIS — R13.10 DYSPHAGIA, UNSPECIFIED TYPE: Primary | ICD-10-CM

## 2023-11-30 DIAGNOSIS — K21.9 GASTROESOPHAGEAL REFLUX DISEASE, UNSPECIFIED WHETHER ESOPHAGITIS PRESENT: ICD-10-CM

## 2023-11-30 DIAGNOSIS — E11.9 TYPE 2 DIABETES MELLITUS WITHOUT COMPLICATION, WITHOUT LONG-TERM CURRENT USE OF INSULIN (HCC): ICD-10-CM

## 2023-11-30 PROCEDURE — 99213 OFFICE O/P EST LOW 20 MIN: CPT | Performed by: FAMILY MEDICINE

## 2023-11-30 PROCEDURE — 3074F SYST BP LT 130 MM HG: CPT | Performed by: FAMILY MEDICINE

## 2023-11-30 PROCEDURE — 1123F ACP DISCUSS/DSCN MKR DOCD: CPT | Performed by: FAMILY MEDICINE

## 2023-11-30 PROCEDURE — 3078F DIAST BP <80 MM HG: CPT | Performed by: FAMILY MEDICINE

## 2023-11-30 RX ORDER — PANTOPRAZOLE SODIUM 40 MG/1
40 TABLET, DELAYED RELEASE ORAL
Qty: 30 TABLET | Refills: 2 | Status: SHIPPED | OUTPATIENT
Start: 2023-11-30

## 2023-11-30 RX ORDER — EMPAGLIFLOZIN 25 MG/1
TABLET, FILM COATED ORAL
Qty: 90 TABLET | Refills: 0 | Status: SHIPPED | OUTPATIENT
Start: 2023-11-30

## 2023-11-30 RX ORDER — SUCRALFATE 1 G/1
1 TABLET ORAL 4 TIMES DAILY
Qty: 120 TABLET | Refills: 3 | Status: SHIPPED | OUTPATIENT
Start: 2023-11-30

## 2023-11-30 ASSESSMENT — ENCOUNTER SYMPTOMS
ABDOMINAL PAIN: 0
RHINORRHEA: 0
EYE PAIN: 0
SORE THROAT: 0
TROUBLE SWALLOWING: 1
VOMITING: 0
NAUSEA: 0
DIARRHEA: 0
COUGH: 0
CONSTIPATION: 0
EYE DISCHARGE: 0
BACK PAIN: 0
SHORTNESS OF BREATH: 0

## 2023-11-30 NOTE — PROGRESS NOTES
[facility]  [address] [facility]  Dept: [phone]  Dept Fax: [phone]    Visit type: Established patient    Reason for Visit: Other (Patient states he is having a hard time swallowing any food and it hurts his chest when he does eat. )         Assessment and Plan       1. Dysphagia, unspecified type  -     Mer Guerrero MD, Gastroenterology, [place]  -     pantoprazole (PROTONIX) 40 MG tablet; Take 1 tablet by mouth every morning (before breakfast), Disp-30 tablet, R-2Normal  -     sucralfate (CARAFATE) 1 GM tablet; Take 1 tablet by mouth 4 times daily, Disp-120 tablet, R-3Normal  2. Gastroesophageal reflux disease, unspecified whether esophagitis present  -     pantoprazole (PROTONIX) 40 MG tablet; Take 1 tablet by mouth every morning (before breakfast), Disp-30 tablet, R-2Normal  -     sucralfate (CARAFATE) 1 GM tablet; Take 1 tablet by mouth 4 times daily, Disp-120 tablet, R-3Normal    Discussed suggestive diagnosis, expected course, and proper use of medication, including OTC medications if prescription is too expensive or insurance does not cover. Discussed dietary and lifestyle modifications that may be beneficial.  With the extent of his symptoms discussed possibility getting set up with GI for further evaluation recommendations and patient was agreeable. Discussed signs and symptoms requiring medical attention. All questions were answered and patient voiced understanding and agreement with plan as discussed. Return if symptoms worsen or fail to improve, for next scheduled follow up with PCP. Subjective       HPI   Patient reports that on Monday he started having symptoms of difficulty swallowing and feels like he has some discomfort when he was taking his medicine.  He states that he really only has the burning chest discomfort when eating or drinking something and reports that he noticed it with orange juice and tonic water and another

## 2023-12-27 ENCOUNTER — OFFICE VISIT (OUTPATIENT)
Dept: GASTROENTEROLOGY | Age: 68
End: 2023-12-27
Payer: MEDICARE

## 2023-12-27 VITALS
OXYGEN SATURATION: 97 % | HEIGHT: 73 IN | DIASTOLIC BLOOD PRESSURE: 80 MMHG | BODY MASS INDEX: 33.8 KG/M2 | WEIGHT: 255 LBS | SYSTOLIC BLOOD PRESSURE: 135 MMHG | HEART RATE: 78 BPM

## 2023-12-27 DIAGNOSIS — Z86.010 HX OF COLONIC POLYPS: ICD-10-CM

## 2023-12-27 DIAGNOSIS — K21.9 GASTROESOPHAGEAL REFLUX DISEASE, UNSPECIFIED WHETHER ESOPHAGITIS PRESENT: ICD-10-CM

## 2023-12-27 DIAGNOSIS — R13.10 DYSPHAGIA, UNSPECIFIED TYPE: Primary | ICD-10-CM

## 2023-12-27 PROCEDURE — 1123F ACP DISCUSS/DSCN MKR DOCD: CPT | Performed by: NURSE PRACTITIONER

## 2023-12-27 PROCEDURE — 3075F SYST BP GE 130 - 139MM HG: CPT | Performed by: NURSE PRACTITIONER

## 2023-12-27 PROCEDURE — 3079F DIAST BP 80-89 MM HG: CPT | Performed by: NURSE PRACTITIONER

## 2023-12-27 PROCEDURE — 99204 OFFICE O/P NEW MOD 45 MIN: CPT | Performed by: NURSE PRACTITIONER

## 2023-12-27 RX ORDER — SUCRALFATE 1 G/1
1 TABLET ORAL 4 TIMES DAILY PRN
Qty: 120 TABLET | Refills: 5 | Status: SHIPPED | OUTPATIENT
Start: 2023-12-27

## 2023-12-27 RX ORDER — ACETAMINOPHEN 500 MG
500 TABLET ORAL PRN
COMMUNITY

## 2023-12-27 RX ORDER — ACETAMINOPHEN 160 MG
1 TABLET,DISINTEGRATING ORAL DAILY
COMMUNITY

## 2023-12-27 NOTE — PATIENT INSTRUCTIONS
colonoscopy, continue to drink plenty of clear fluids. It is important   to keep yourself hydrated before the exam.     Please follow all instructions as provided for cleansing the bowel. Failure to have an adequately prepped colon may cause you to have incomplete exam with further testing required.      [email or web address]

## 2023-12-27 NOTE — PROGRESS NOTES
Subjective:     Patient ID: Socorro Echevarria is a 77 y.o. male  PCP: Nikole Alfaro MD  Referring Provider: Nikole Alfaro MD    HPI  Patient presents to the office today with the following complaints: Dysphagia      Pt seen today for c/o dysphagia. This began about 2 months ago. It began with pill dysphagia and epigastric pain. PCP started him on Protonix and Carafate. Severe symptoms resolved after about a week. He reports intermittent issues swallowing foods where he feels like foods get stuck. Patient denies any lower GI symptoms such as constipation, diarrhea, change in bowel habits, rectal bleeding, and rectal pain. Last EGD 1/10/2020 - Sliding hiatal hernia, healed previous antral ulcer-Gastritis   Last Colonoscopy 6/2017 - Diverticulosis, internal hemorrhoids, 5yr recall    Denies any family hx colon cancer or colon polyps    Assessment:     1. Dysphagia, unspecified type    2. Gastroesophageal reflux disease, unspecified whether esophagitis present    3. Hx of colonic polyps            Plan:   - Continue Protonix 40 mg daily  - Ok for Carafate 1 gm QID prn   - Schedule Colonoscopy and EGD  Instruct on bowel prep. Nothing to eat or drink after midnight the day of the exam.  Unable to drive for 24 hours after the procedure. No aspirin or nonsteroidal anti-inflammatories for 5 days before procedure. I have discussed the benefits, alternatives, and risks (including bleeding, perforation and death)  for pursuing Endoscopy (EGD/Colonscopy/EUS/ERCP) with the patient and they are willing to continue. We also discussed the need for anesthesia, IV access, proper dietary changes, medication changes if necessary, and need for bowel prep (if ordered) prior to their Endoscopic procedure. They are aware they must have someone accompany them to their scheduled procedure to drive them home - they agree to the above and are willing to continue.         Orders  No orders of the defined types were placed in this

## 2024-01-19 ENCOUNTER — TELEPHONE (OUTPATIENT)
Dept: GASTROENTEROLOGY | Age: 69
End: 2024-01-19

## 2024-01-19 NOTE — TELEPHONE ENCOUNTER
Called patient to [facility]  to arrive at 700     RESPONSE:  JACQUIE

## 2024-02-23 DIAGNOSIS — R13.10 DYSPHAGIA, UNSPECIFIED TYPE: ICD-10-CM

## 2024-02-23 DIAGNOSIS — K21.9 GASTROESOPHAGEAL REFLUX DISEASE, UNSPECIFIED WHETHER ESOPHAGITIS PRESENT: ICD-10-CM

## 2024-02-23 NOTE — TELEPHONE ENCOUNTER
Scottie Mireles called to request a refill on his medication.      Last office visit : 12/13/2023   Next office visit : Visit date not found     Requested Prescriptions     Pending Prescriptions Disp Refills    pantoprazole (PROTONIX) 40 MG tablet [Pharmacy Med Name: PANTOPRAZOLE SOD DR 40 MG TAB] 30 tablet 2     Sig: TAKE 1 TABLET BY MOUTH EVERY DAY BEFORE BREAKFAST            Bettye Castañeda MA

## 2024-02-26 RX ORDER — PANTOPRAZOLE SODIUM 40 MG/1
40 TABLET, DELAYED RELEASE ORAL
Qty: 30 TABLET | Refills: 2 | Status: SHIPPED | OUTPATIENT
Start: 2024-02-26

## 2024-03-10 DIAGNOSIS — N40.1 BENIGN PROSTATIC HYPERPLASIA WITH INCOMPLETE BLADDER EMPTYING: ICD-10-CM

## 2024-03-10 DIAGNOSIS — R39.14 BENIGN PROSTATIC HYPERPLASIA WITH INCOMPLETE BLADDER EMPTYING: ICD-10-CM

## 2024-03-11 RX ORDER — TAMSULOSIN HYDROCHLORIDE 0.4 MG/1
CAPSULE ORAL DAILY
Qty: 90 CAPSULE | Refills: 1 | Status: SHIPPED | OUTPATIENT
Start: 2024-03-11

## 2024-04-01 DIAGNOSIS — R39.14 BENIGN PROSTATIC HYPERPLASIA WITH INCOMPLETE BLADDER EMPTYING: ICD-10-CM

## 2024-04-01 DIAGNOSIS — E11.9 TYPE 2 DIABETES MELLITUS WITHOUT COMPLICATION, WITHOUT LONG-TERM CURRENT USE OF INSULIN (HCC): ICD-10-CM

## 2024-04-01 DIAGNOSIS — N40.1 BENIGN PROSTATIC HYPERPLASIA WITH INCOMPLETE BLADDER EMPTYING: ICD-10-CM

## 2024-04-01 RX ORDER — EMPAGLIFLOZIN 25 MG/1
TABLET, FILM COATED ORAL
Qty: 90 TABLET | Refills: 0 | Status: SHIPPED | OUTPATIENT
Start: 2024-04-01

## 2024-04-01 RX ORDER — TAMSULOSIN HYDROCHLORIDE 0.4 MG/1
CAPSULE ORAL
Qty: 180 CAPSULE | Refills: 1 | Status: SHIPPED | OUTPATIENT
Start: 2024-04-01

## 2024-04-08 DIAGNOSIS — E11.65 TYPE 2 DIABETES MELLITUS WITH HYPERGLYCEMIA, WITHOUT LONG-TERM CURRENT USE OF INSULIN (HCC): ICD-10-CM

## 2024-04-22 DIAGNOSIS — E55.9 VITAMIN D DEFICIENCY: ICD-10-CM

## 2024-04-22 RX ORDER — ERGOCALCIFEROL 1.25 MG/1
CAPSULE ORAL
Qty: 12 CAPSULE | Refills: 1 | Status: SHIPPED | OUTPATIENT
Start: 2024-04-22

## 2024-06-06 LAB
ALBUMIN SERPL-MCNC: 4.3 G/DL (ref 3.5–5.2)
ANION GAP SERPL CALCULATED.3IONS-SCNC: 16 MMOL/L (ref 7–19)
BUN SERPL-MCNC: 14 MG/DL (ref 8–23)
CALCIUM SERPL-MCNC: 9.6 MG/DL (ref 8.8–10.2)
CHLORIDE SERPL-SCNC: 102 MMOL/L (ref 98–111)
CO2 SERPL-SCNC: 19 MMOL/L (ref 22–29)
CREAT SERPL-MCNC: 1.1 MG/DL (ref 0.5–1.2)
GLUCOSE SERPL-MCNC: 177 MG/DL (ref 74–109)
PHOSPHATE SERPL-MCNC: 3.2 MG/DL (ref 2.5–4.5)
POTASSIUM SERPL-SCNC: 4.5 MMOL/L (ref 3.5–5)
SODIUM SERPL-SCNC: 137 MMOL/L (ref 136–145)

## 2024-06-07 ENCOUNTER — OFFICE VISIT (OUTPATIENT)
Dept: CARDIOLOGY CLINIC | Age: 69
End: 2024-06-07
Payer: MEDICARE

## 2024-06-07 VITALS
SYSTOLIC BLOOD PRESSURE: 126 MMHG | DIASTOLIC BLOOD PRESSURE: 84 MMHG | HEIGHT: 73 IN | BODY MASS INDEX: 33.8 KG/M2 | WEIGHT: 255 LBS | HEART RATE: 74 BPM

## 2024-06-07 DIAGNOSIS — R06.02 SHORTNESS OF BREATH: Primary | ICD-10-CM

## 2024-06-07 LAB — TACROLIMUS BLD-MCNC: 7.4 NG/ML

## 2024-06-07 PROCEDURE — 1123F ACP DISCUSS/DSCN MKR DOCD: CPT | Performed by: INTERNAL MEDICINE

## 2024-06-07 PROCEDURE — 3079F DIAST BP 80-89 MM HG: CPT | Performed by: INTERNAL MEDICINE

## 2024-06-07 PROCEDURE — 3074F SYST BP LT 130 MM HG: CPT | Performed by: INTERNAL MEDICINE

## 2024-06-07 PROCEDURE — 99214 OFFICE O/P EST MOD 30 MIN: CPT | Performed by: INTERNAL MEDICINE

## 2024-06-07 RX ORDER — PROPRANOLOL HYDROCHLORIDE 80 MG/1
80 TABLET ORAL 2 TIMES DAILY
Qty: 180 TABLET | Refills: 3 | Status: SHIPPED | OUTPATIENT
Start: 2024-06-07

## 2024-06-07 ASSESSMENT — ENCOUNTER SYMPTOMS
BACK PAIN: 0
BLOOD IN STOOL: 0
VOMITING: 0
DIARRHEA: 0
SHORTNESS OF BREATH: 1
ABDOMINAL PAIN: 0
WHEEZING: 0
ABDOMINAL DISTENTION: 0
COUGH: 0

## 2024-06-07 NOTE — PROGRESS NOTES
hours.  APTT:No results for input(s): \"APTT\" in the last 72 hours.  CARDIAC ENZYMES:No results for input(s): \"CKTOTAL\", \"CKMB\", \"CKMBINDEX\", \"TROPONINI\" in the last 72 hours.  FASTING LIPID PANEL:  Lab Results   Component Value Date/Time    HDL 25 07/11/2019 09:11 AM    TRIG 199 07/11/2019 09:11 AM     LIVER PROFILE:No results for input(s): \"AST\", \"ALT\", \"LABALBU\" in the last 72 hours.        Imaging:          ASSESSMENT and PLAN:    This is a 67 y.o. year old male with past medical history of end-stage renal disease status post renal transplantation February 2010, on immunosuppressive therapy, hypertensive disease, non-insulin-dependent diabetes mellitus, permanent atrial fibrillation with recurrent GI bleeds on Eliquis, status post left atrial appendage closure with watchman device 11/18/2021, normal LV ejection fraction with moderate LVH, here for follow-up evaluation.    1.  He does appear short of breath even with transfer to the bed.  No significant volume retention noted.  Reports good renal function on his last review with normal creatinine.  He does have a left arm AV fistula which was not removed but does not appear large in size.  It was never utilized.  Given that he is diabetic with renal disease, would repeat ischemia evaluation as it has been over 6 years.  Obtain a Lexiscan study as well as a repeat echo.  He will need to check the cost if this is affordable for him.  2.  Atrial fibrillation rate average around 93 bpm on ZIO.  He did trigger it when his heart rates were fast with feeling of fatigue.  Options discussed with him including addition of digoxin and possibly AV node modification with pacemaker placement.  At this time we will continue to monitor.  3.  Can follow-up with nurse practitioner in 6 months.    Orders:  Orders Placed This Encounter   Procedures    Nuclear stress test with myocardial perfusion     Orders Placed This Encounter   Medications    propranolol (INDERAL) 80 MG tablet

## 2024-07-12 ENCOUNTER — HOSPITAL ENCOUNTER (OUTPATIENT)
Dept: NUCLEAR MEDICINE | Age: 69
Discharge: HOME | End: 2024-07-14
Attending: INTERNAL MEDICINE
Payer: MEDICARE

## 2024-07-12 ENCOUNTER — HOSPITAL ENCOUNTER (OUTPATIENT)
Age: 69
End: 2024-07-12
Attending: INTERNAL MEDICINE
Payer: MEDICARE

## 2024-07-12 VITALS
DIASTOLIC BLOOD PRESSURE: 69 MMHG | SYSTOLIC BLOOD PRESSURE: 139 MMHG | HEIGHT: 72 IN | WEIGHT: 243 LBS | BODY MASS INDEX: 32.91 KG/M2

## 2024-07-12 DIAGNOSIS — R06.02 SHORTNESS OF BREATH: ICD-10-CM

## 2024-07-12 LAB
ECHO AO ASC DIAM: 3.3 CM
ECHO AO ASCENDING AORTA INDEX: 1.43 CM/M2
ECHO AO ROOT DIAM: 2.3 CM
ECHO AO ROOT INDEX: 1 CM/M2
ECHO AO SINUS VALSALVA DIAM: 3 CM
ECHO AO SINUS VALSALVA INDEX: 1.3 CM/M2
ECHO AO ST JNCT DIAM: 2.5 CM
ECHO AV AREA PEAK VELOCITY: 3 CM2
ECHO AV AREA VTI: 3.2 CM2
ECHO AV AREA/BSA PEAK VELOCITY: 1.3 CM2/M2
ECHO AV AREA/BSA VTI: 1.4 CM2/M2
ECHO AV MEAN GRADIENT: 3 MMHG
ECHO AV MEAN VELOCITY: 0.9 M/S
ECHO AV PEAK GRADIENT: 6 MMHG
ECHO AV PEAK VELOCITY: 1.2 M/S
ECHO AV VELOCITY RATIO: 0.75
ECHO AV VTI: 24.1 CM
ECHO BSA: 2.37 M2
ECHO EST RA PRESSURE: 8 MMHG
ECHO IVC PROX: 2.2 CM
ECHO LA AREA 2C: 34.5 CM2
ECHO LA AREA 4C: 36.7 CM2
ECHO LA DIAMETER INDEX: 1.86 CM/M2
ECHO LA DIAMETER: 4.3 CM
ECHO LA MAJOR AXIS: 8.2 CM
ECHO LA MINOR AXIS: 8 CM
ECHO LA TO AORTIC ROOT RATIO: 1.87
ECHO LA VOL BP: 127 ML (ref 18–58)
ECHO LA VOL MOD A2C: 126 ML (ref 18–58)
ECHO LA VOL MOD A4C: 132 ML (ref 18–58)
ECHO LA VOL/BSA BIPLANE: 55 ML/M2 (ref 16–34)
ECHO LA VOLUME INDEX MOD A2C: 55 ML/M2 (ref 16–34)
ECHO LA VOLUME INDEX MOD A4C: 57 ML/M2 (ref 16–34)
ECHO LV E' LATERAL VELOCITY: 8 CM/S
ECHO LV E' SEPTAL VELOCITY: 8 CM/S
ECHO LV EDV A2C: 142 ML
ECHO LV EDV A4C: 134 ML
ECHO LV EDV INDEX A4C: 58 ML/M2
ECHO LV EDV NDEX A2C: 61 ML/M2
ECHO LV EJECTION FRACTION A2C: 51 %
ECHO LV EJECTION FRACTION A4C: 50 %
ECHO LV EJECTION FRACTION BIPLANE: 50 % (ref 55–100)
ECHO LV ESV A2C: 70 ML
ECHO LV ESV A4C: 67 ML
ECHO LV ESV INDEX A2C: 30 ML/M2
ECHO LV ESV INDEX A4C: 29 ML/M2
ECHO LV FRACTIONAL SHORTENING: 40 % (ref 28–44)
ECHO LV INTERNAL DIMENSION DIASTOLE INDEX: 1.82 CM/M2
ECHO LV INTERNAL DIMENSION DIASTOLIC: 4.2 CM (ref 4.2–5.9)
ECHO LV INTERNAL DIMENSION SYSTOLIC INDEX: 1.08 CM/M2
ECHO LV INTERNAL DIMENSION SYSTOLIC: 2.5 CM
ECHO LV ISOVOLUMETRIC RELAXATION TIME (IVRT): 74 MS
ECHO LV IVSD: 1.8 CM (ref 0.6–1)
ECHO LV MASS 2D: 304.2 G (ref 88–224)
ECHO LV MASS INDEX 2D: 131.7 G/M2 (ref 49–115)
ECHO LV POSTERIOR WALL DIASTOLIC: 1.6 CM (ref 0.6–1)
ECHO LV RELATIVE WALL THICKNESS RATIO: 0.76
ECHO LVOT AREA: 4.2 CM2
ECHO LVOT AV VTI INDEX: 0.77
ECHO LVOT DIAM: 2.3 CM
ECHO LVOT MEAN GRADIENT: 2 MMHG
ECHO LVOT PEAK GRADIENT: 3 MMHG
ECHO LVOT PEAK VELOCITY: 0.9 M/S
ECHO LVOT STROKE VOLUME INDEX: 33.4 ML/M2
ECHO LVOT SV: 77.2 ML
ECHO LVOT VTI: 18.6 CM
ECHO MV A VELOCITY: 0.83 M/S
ECHO MV ANNULUS DIAMETER: 4.2 CM
ECHO MV AREA VTI: 4.1 CM2
ECHO MV E DECELERATION TIME (DT): 180 MS
ECHO MV E VELOCITY: 1.24 M/S
ECHO MV E/A RATIO: 1.49
ECHO MV E/E' LATERAL: 15.5
ECHO MV E/E' RATIO (AVERAGED): 15.5
ECHO MV E/E' SEPTAL: 15.5
ECHO MV LVOT VTI INDEX: 1.02
ECHO MV MAX VELOCITY: 0.9 M/S
ECHO MV MEAN GRADIENT: 3 MMHG
ECHO MV MEAN VELOCITY: 0.7 M/S
ECHO MV PEAK GRADIENT: 3 MMHG
ECHO MV VTI: 18.9 CM
ECHO RA AREA 4C: 23.6 CM2
ECHO RA END SYSTOLIC VOLUME APICAL 4 CHAMBER INDEX BSA: 35 ML/M2
ECHO RA MAJOR AXIS INDEX: 2.9 CM/M2
ECHO RA MAJOR AXIS: 6.7 CM
ECHO RA MINOR AXIS INDEX: 2.08 CM/M2
ECHO RA MINOR AXIS: 4.8 CM
ECHO RA VOLUME: 82 ML
ECHO RIGHT VENTRICULAR SYSTOLIC PRESSURE (RVSP): 27 MMHG
ECHO RV BASAL DIMENSION: 3 CM
ECHO RV INTERNAL DIMENSION: 3.4 CM
ECHO RV LONGITUDINAL DIMENSION: 8.4 CM
ECHO RV MID DIMENSION: 3.9 CM
ECHO RV TAPSE: 2 CM (ref 1.7–?)
ECHO TV REGURGITANT MAX VELOCITY: 2.2 M/S
ECHO TV REGURGITANT PEAK GRADIENT: 19 MMHG
NUC STRESS EJECTION FRACTION: 64 %
STRESS BASELINE DIAS BP: 72 MMHG
STRESS BASELINE HR: 102 BPM
STRESS BASELINE SYS BP: 141 MMHG
STRESS EXERCISE DUR MIN: 5 MIN
STRESS EXERCISE DUR SEC: 0 SEC
STRESS PEAK DIAS BP: 72 MMHG
STRESS PEAK SYS BP: 141 MMHG
STRESS PERCENT HR ACHIEVED: 97 %
STRESS POST PEAK HR: 146 BPM
STRESS RATE PRESSURE PRODUCT: NORMAL BPM*MMHG
STRESS STAGE 1 COMMENTS: NORMAL
STRESS STAGE 1 DURATION: 1 MIN:SEC
STRESS STAGE 1 HR: 100 BPM
STRESS STAGE 2 BP: NORMAL MMHG
STRESS STAGE 2 DURATION: 1 MIN:SEC
STRESS STAGE 2 HR: 146 BPM
STRESS STAGE 3 BP: NORMAL MMHG
STRESS STAGE 3 DURATION: 1 MIN:SEC
STRESS STAGE 3 HR: 125 BPM
STRESS STAGE 4 BP: NORMAL MMHG
STRESS STAGE 4 DURATION: 1 MIN:SEC
STRESS STAGE 4 HR: 109 BPM
STRESS STAGE 5 BP: NORMAL MMHG
STRESS STAGE 5 DURATION: 1 MIN:SEC
STRESS STAGE 5 HR: 122 BPM
STRESS STAGE RECOVERY 1 BP: NORMAL MMHG
STRESS STAGE RECOVERY 1 DURATION: 1 MIN:SEC
STRESS STAGE RECOVERY 1 HR: 96 BPM
STRESS TARGET HR: 150 BPM

## 2024-07-12 PROCEDURE — 93017 CV STRESS TEST TRACING ONLY: CPT

## 2024-07-12 PROCEDURE — 93018 CV STRESS TEST I&R ONLY: CPT | Performed by: INTERNAL MEDICINE

## 2024-07-12 PROCEDURE — 6360000002 HC RX W HCPCS: Performed by: INTERNAL MEDICINE

## 2024-07-12 PROCEDURE — 2580000003 HC RX 258: Performed by: INTERNAL MEDICINE

## 2024-07-12 PROCEDURE — 78452 HT MUSCLE IMAGE SPECT MULT: CPT

## 2024-07-12 PROCEDURE — 3430000000 HC RX DIAGNOSTIC RADIOPHARMACEUTICAL: Performed by: INTERNAL MEDICINE

## 2024-07-12 PROCEDURE — 6360000004 HC RX CONTRAST MEDICATION: Performed by: INTERNAL MEDICINE

## 2024-07-12 PROCEDURE — C8929 TTE W OR WO FOL WCON,DOPPLER: HCPCS

## 2024-07-12 PROCEDURE — 93306 TTE W/DOPPLER COMPLETE: CPT | Performed by: INTERNAL MEDICINE

## 2024-07-12 PROCEDURE — 78452 HT MUSCLE IMAGE SPECT MULT: CPT | Performed by: INTERNAL MEDICINE

## 2024-07-12 PROCEDURE — A9502 TC99M TETROFOSMIN: HCPCS | Performed by: INTERNAL MEDICINE

## 2024-07-12 PROCEDURE — 93016 CV STRESS TEST SUPVJ ONLY: CPT | Performed by: INTERNAL MEDICINE

## 2024-07-12 RX ORDER — REGADENOSON 0.08 MG/ML
0.4 INJECTION, SOLUTION INTRAVENOUS
Status: COMPLETED | OUTPATIENT
Start: 2024-07-12 | End: 2024-07-12

## 2024-07-12 RX ADMIN — REGADENOSON 0.4 MG: 0.08 INJECTION, SOLUTION INTRAVENOUS at 11:44

## 2024-07-12 RX ADMIN — SODIUM CHLORIDE, PRESERVATIVE FREE 1.5 ML: 5 INJECTION INTRAVENOUS at 08:23

## 2024-07-12 RX ADMIN — TETROFOSMIN 24 MILLICURIE: 0.23 INJECTION, POWDER, LYOPHILIZED, FOR SOLUTION INTRAVENOUS at 12:27

## 2024-07-12 RX ADMIN — TETROFOSMIN 8 MILLICURIE: 0.23 INJECTION, POWDER, LYOPHILIZED, FOR SOLUTION INTRAVENOUS at 09:30

## 2024-07-15 ENCOUNTER — TELEPHONE (OUTPATIENT)
Dept: CARDIOLOGY CLINIC | Age: 69
End: 2024-07-15

## 2024-07-15 DIAGNOSIS — R94.39 ABNORMAL CARDIOVASCULAR STRESS TEST: Primary | ICD-10-CM

## 2024-07-15 DIAGNOSIS — Z01.818 PRE-OP TESTING: Primary | ICD-10-CM

## 2024-07-15 DIAGNOSIS — R06.02 SHORTNESS OF BREATH: ICD-10-CM

## 2024-07-15 DIAGNOSIS — Z01.818 PRE-OP TESTING: ICD-10-CM

## 2024-07-15 LAB
ALBUMIN SERPL-MCNC: 4.1 G/DL (ref 3.5–5.2)
ALP SERPL-CCNC: 64 U/L (ref 40–130)
ALT SERPL-CCNC: 18 U/L (ref 5–41)
ANION GAP SERPL CALCULATED.3IONS-SCNC: 17 MMOL/L (ref 7–19)
AST SERPL-CCNC: 23 U/L (ref 5–40)
BILIRUB SERPL-MCNC: 1.4 MG/DL (ref 0.2–1.2)
BUN SERPL-MCNC: 19 MG/DL (ref 8–23)
CALCIUM SERPL-MCNC: 9.8 MG/DL (ref 8.8–10.2)
CHLORIDE SERPL-SCNC: 102 MMOL/L (ref 98–111)
CO2 SERPL-SCNC: 21 MMOL/L (ref 22–29)
CREAT SERPL-MCNC: 1.1 MG/DL (ref 0.5–1.2)
ERYTHROCYTE [DISTWIDTH] IN BLOOD BY AUTOMATED COUNT: 13.9 % (ref 11.5–14.5)
GLUCOSE SERPL-MCNC: 146 MG/DL (ref 74–109)
HCT VFR BLD AUTO: 45.8 % (ref 42–52)
HGB BLD-MCNC: 14.8 G/DL (ref 14–18)
MCH RBC QN AUTO: 29.2 PG (ref 27–31)
MCHC RBC AUTO-ENTMCNC: 32.3 G/DL (ref 33–37)
MCV RBC AUTO: 90.3 FL (ref 80–94)
PLATELET # BLD AUTO: 189 K/UL (ref 130–400)
PMV BLD AUTO: 9.8 FL (ref 9.4–12.4)
POTASSIUM SERPL-SCNC: 4.3 MMOL/L (ref 3.5–5)
PROT SERPL-MCNC: 7.1 G/DL (ref 6.6–8.7)
RBC # BLD AUTO: 5.07 M/UL (ref 4.7–6.1)
SODIUM SERPL-SCNC: 140 MMOL/L (ref 136–145)
WBC # BLD AUTO: 7.2 K/UL (ref 4.8–10.8)

## 2024-07-15 NOTE — TELEPHONE ENCOUNTER
----- Message from Yandel Powers MD sent at 7/14/2024  9:01 AM CDT -----  Please let patient know his stress test shows an abnormality with reduced blood supply.  Would consider scheduling cardiac catheterization to evaluate for blockage.  If he is agreeable, this will be scheduled.  
will then be slipped over the wire and threaded up to your heart.  The doctor will be taking x-ray pictures during the procedure to know where the wire and catheter are. Dye will be injected into the arteries of the heart. This will make the arteries and heart show up on the x-ray images. You may feel warm during the dye injection.     Insertion of Catheter with Guide Wire    Once in place, the catheter can be used to take measurements. Blood pressure can be taken within the heart's different chambers. Blood samples may also be taken. Multiple x-ray images will be taken to look for any disease in the arteries. An aortogram may also be done at this time. This step will give a clear image of the aorta (large artery leaving the heart). Once all the tests and images are complete, the catheter will be removed.  Sometimes, the doctor will perform balloon angioplasty and stenting if he finds an area in your arteries that is narrow or clogged. These are procedures that help to open narrowed arteries.  Finally, a bandage will be placed over the groin or arm area.     How Long Will It Take?  The procedure takes about 1-2 hours. Preparation before the test will take another 1-2 hours.      How Much Will It Hurt?  Although the procedure is generally not painful, it can cause some discomfort, including:   Burning sensation (when skin at catheter insertion site is anesthetized)   Pressure when catheter is inserted or replaced with other catheters   A flushing feeling or nausea when the dye is injected   Headache   Heart palpitations   Pain medicine will be given when needed.     Average Hospital Stay:  0-1 days     Postoperative Care At the [facility]   EKG and blood studies may be done.   You will likely need to lie still and flat on your back for a period of time. A pressure dressing may be placed over the area where the catheter was inserted to help prevent bleeding. It is important to follow the nurse's directions.   At Home

## 2024-07-22 DIAGNOSIS — E11.9 TYPE 2 DIABETES MELLITUS WITHOUT COMPLICATION, WITHOUT LONG-TERM CURRENT USE OF INSULIN (HCC): ICD-10-CM

## 2024-07-23 RX ORDER — EMPAGLIFLOZIN 25 MG/1
TABLET, FILM COATED ORAL
Qty: 90 TABLET | Refills: 0 | Status: SHIPPED | OUTPATIENT
Start: 2024-07-23

## 2024-07-28 NOTE — H&P
Patient:  Scottie Mireles                  1955  MRN: [id number]    PROBLEM LIST:    Patient Active Problem List    Diagnosis Date Noted    Presence of Watchman left atrial appendage closure device 11/18/2021     Priority: High    Hand ulceration with fat layer exposed ([facility]) 02/07/2023     Priority: Medium    Squamous cell skin cancer 01/17/2023     Priority: Medium    Hand lesion 01/05/2023     Priority: Medium    Longstanding persistent atrial fibrillation ([facility])      Priority: Low    LVH (left ventricular hypertrophy) 07/08/2020     Priority: Low    History of renal transplant 11/05/2019     Priority: Low    UGI bleed 11/01/2019     Priority: Low    PSVT (paroxysmal supraventricular tachycardia) ([facility]) 02/05/2019     Priority: Low    Chronic anticoagulation 01/07/2019     Priority: Low     Overview Note:     Eliquis      Orthostatic dizziness 01/07/2019     Priority: Low    Daytime somnolence 04/09/2018     Priority: Low    PAF (paroxysmal atrial fibrillation) ([facility]) 04/09/2018     Priority: Low    Fatigue 04/09/2018     Priority: Low    History of tachycardia 04/09/2018     Priority: Low    Immunosuppression ([facility])      Priority: Low    Atrial fibrillation with RVR ([facility]) 02/26/2018     Priority: Low    Infection of AV graft for dialysis ([facility]) 02/23/2018     Priority: Low    Cellulitis of right upper extremity      Priority: Low    Cellulitis 02/22/2018     Priority: Low    Chest pressure 07/17/2016     Priority: Low    Diabetes mellitus ([facility]) 07/17/2016     Priority: Low    Ex-cigarette smoker 07/17/2016     Priority: Low    Essential hypertension      Priority: Low    Chest pain      Priority: Low    Abnormal cardiovascular stress test 07/15/2024     1.  Permanent atrial fibrillation with prior DCCV with recurrence, last DCCV 11/18/2021 with recurrent GI bleed, status post watchman device left atrial appendage closure 11/18/2021.  LV ejection fraction 56%, moderate LVH, negative Lexiscan study 3/2/2018, short runs of  \"AST\", \"ALT\", \"BILITOT\", \"ALKPHOS\" in the last 72 hours.    Invalid input(s): \"ALB\"  CK, CKMB, Troponin: @LABRCNT (CKTOTAL:3, CKMB:3, TROPONINI:3)@  Pro-BNP: No results for input(s): \"BNP\" in the last 72 hours.  Lipids: No results for input(s): \"CHOL\", \"HDL\", \"LDL\" in the last 72 hours.  ABGs: No results for input(s): \"PHART\", \"RML5SOC\", \"PO2ART\", \"KHL5UAC\", \"BEART\", \"HGBAE\", \"K7LSGMQK\", \"CARBOXHGBART\", \"02THERAPY\" in the last 72 hours.  INR: No results for input(s): \"INR\" in the last 72 hours.  A1c:Invalid input(s): \"HEMOGLOBIN A1C\"  URINALYSIS:   Lab Results   Component Value Date/Time    NITRU Negative 10/13/2022 12:54 PM    WBCUA 1 02/13/2019 09:06 AM    BACTERIA NEGATIVE 02/13/2019 09:06 AM    RBCUA 1 02/13/2019 09:06 AM    BLOODU Negative 10/13/2022 12:54 PM    GLUCOSEU =>1000 10/13/2022 12:54 PM     -----------------------------------------------------------------  IMAGING:  No orders to display         Stress Combined Conclusion: The study is most consistent with apical myocardial ischemia.    Stress Function: Left ventricular function post-stress is normal. Post-stress ejection fraction is 64%.    Perfusion Comments: LV perfusion is probably abnormal. There is evidence of inducible ischemia.    Perfusion Defect: There is a moderate severity left ventricular stress perfusion defect that is small to medium in size present in the apex segment(s) that is reversible. This defect was visualized during the stress phase of imaging.    ECG: Resting ECG demonstrates atrial fibrillation.    ECG: The stress ECG was not diagnostic due to pharmacologic protocol.    Stress Test: A pharmacological stress test was performed using regadenoson (Lexiscan). The patient reported dyspnea during the stress test. Symptoms began during stress and ended during stress. The patient reached the end of the protocol.    Resting ECG: The ECG shows atrial fibrillation. The ECG shows premature ventricular contractions.    Stress ECG:

## 2024-07-29 ENCOUNTER — HOSPITAL ENCOUNTER (OUTPATIENT)
Age: 69
Setting detail: OUTPATIENT SURGERY
Discharge: HOME | End: 2024-07-29
Attending: INTERNAL MEDICINE | Admitting: INTERNAL MEDICINE
Payer: MEDICARE

## 2024-07-29 VITALS
WEIGHT: 243 LBS | TEMPERATURE: 97 F | HEART RATE: 93 BPM | DIASTOLIC BLOOD PRESSURE: 86 MMHG | SYSTOLIC BLOOD PRESSURE: 155 MMHG | BODY MASS INDEX: 32.91 KG/M2 | HEIGHT: 72 IN | RESPIRATION RATE: 19 BRPM | OXYGEN SATURATION: 95 %

## 2024-07-29 DIAGNOSIS — R06.02 SHORTNESS OF BREATH: ICD-10-CM

## 2024-07-29 DIAGNOSIS — R94.39 ABNORMAL CARDIOVASCULAR STRESS TEST: Primary | ICD-10-CM

## 2024-07-29 DIAGNOSIS — R94.39 ABNORMAL CARDIOVASCULAR STRESS TEST: ICD-10-CM

## 2024-07-29 DIAGNOSIS — E55.9 VITAMIN D DEFICIENCY: ICD-10-CM

## 2024-07-29 LAB
ECHO BSA: 2.37 M2
EKG P AXIS: NORMAL DEGREES
EKG P AXIS: NORMAL DEGREES
EKG P-R INTERVAL: NORMAL MS
EKG P-R INTERVAL: NORMAL MS
EKG Q-T INTERVAL: 388 MS
EKG Q-T INTERVAL: 394 MS
EKG QRS DURATION: 112 MS
EKG QRS DURATION: 116 MS
EKG QTC CALCULATION (BAZETT): 419 MS
EKG QTC CALCULATION (BAZETT): 429 MS
EKG T AXIS: 10 DEGREES
EKG T AXIS: 10 DEGREES

## 2024-07-29 PROCEDURE — C9600 PERC DRUG-EL COR STENT SING: HCPCS | Performed by: INTERNAL MEDICINE

## 2024-07-29 PROCEDURE — C1887 CATHETER, GUIDING: HCPCS | Performed by: INTERNAL MEDICINE

## 2024-07-29 PROCEDURE — 92928 PRQ TCAT PLMT NTRAC ST 1 LES: CPT | Performed by: INTERNAL MEDICINE

## 2024-07-29 PROCEDURE — 99152 MOD SED SAME PHYS/QHP 5/>YRS: CPT | Performed by: INTERNAL MEDICINE

## 2024-07-29 PROCEDURE — 2709999900 HC NON-CHARGEABLE SUPPLY: Performed by: INTERNAL MEDICINE

## 2024-07-29 PROCEDURE — 93458 L HRT ARTERY/VENTRICLE ANGIO: CPT | Performed by: INTERNAL MEDICINE

## 2024-07-29 PROCEDURE — C1894 INTRO/SHEATH, NON-LASER: HCPCS | Performed by: INTERNAL MEDICINE

## 2024-07-29 PROCEDURE — 93010 ELECTROCARDIOGRAM REPORT: CPT | Performed by: INTERNAL MEDICINE

## 2024-07-29 PROCEDURE — 93571 IV DOP VEL&/PRESS C FLO 1ST: CPT | Performed by: INTERNAL MEDICINE

## 2024-07-29 PROCEDURE — 2500000003 HC RX 250 WO HCPCS: Performed by: INTERNAL MEDICINE

## 2024-07-29 PROCEDURE — 93571 IV DOP VEL&/PRESS C FLO 1ST: CPT

## 2024-07-29 PROCEDURE — 92920 PRQ TRLUML C ANGIOP 1ART&/BR: CPT | Performed by: INTERNAL MEDICINE

## 2024-07-29 PROCEDURE — 2580000003 HC RX 258: Performed by: INTERNAL MEDICINE

## 2024-07-29 PROCEDURE — 93005 ELECTROCARDIOGRAM TRACING: CPT | Performed by: INTERNAL MEDICINE

## 2024-07-29 PROCEDURE — 6360000002 HC RX W HCPCS: Performed by: INTERNAL MEDICINE

## 2024-07-29 PROCEDURE — 6360000004 HC RX CONTRAST MEDICATION: Performed by: INTERNAL MEDICINE

## 2024-07-29 PROCEDURE — 7100000011 HC PHASE II RECOVERY - ADDTL 15 MIN: Performed by: INTERNAL MEDICINE

## 2024-07-29 PROCEDURE — C9601 PERC DRUG-EL COR STENT BRAN: HCPCS | Performed by: INTERNAL MEDICINE

## 2024-07-29 PROCEDURE — C1874 STENT, COATED/COV W/DEL SYS: HCPCS | Performed by: INTERNAL MEDICINE

## 2024-07-29 PROCEDURE — 7100000010 HC PHASE II RECOVERY - FIRST 15 MIN: Performed by: INTERNAL MEDICINE

## 2024-07-29 PROCEDURE — 99153 MOD SED SAME PHYS/QHP EA: CPT | Performed by: INTERNAL MEDICINE

## 2024-07-29 PROCEDURE — 6370000000 HC RX 637 (ALT 250 FOR IP): Performed by: INTERNAL MEDICINE

## 2024-07-29 PROCEDURE — C1769 GUIDE WIRE: HCPCS | Performed by: INTERNAL MEDICINE

## 2024-07-29 PROCEDURE — C1725 CATH, TRANSLUMIN NON-LASER: HCPCS | Performed by: INTERNAL MEDICINE

## 2024-07-29 DEVICE — STENT ONYXNG35026UX ONYX 3.50X26RX
Type: IMPLANTABLE DEVICE | Status: FUNCTIONAL
Brand: ONYX FRONTIER™

## 2024-07-29 DEVICE — STENT ONYXNG35030UX ONYX 3.50X30RX
Type: IMPLANTABLE DEVICE | Status: FUNCTIONAL
Brand: ONYX FRONTIER™

## 2024-07-29 DEVICE — STENT ONYXNG25015UX ONYX 2.50X15RX
Type: IMPLANTABLE DEVICE | Status: FUNCTIONAL
Brand: ONYX FRONTIER™

## 2024-07-29 RX ORDER — FENTANYL CITRATE 50 UG/ML
INJECTION, SOLUTION INTRAMUSCULAR; INTRAVENOUS PRN
Status: DISCONTINUED | OUTPATIENT
Start: 2024-07-29 | End: 2024-07-29 | Stop reason: HOSPADM

## 2024-07-29 RX ORDER — MIDAZOLAM HYDROCHLORIDE 1 MG/ML
INJECTION INTRAMUSCULAR; INTRAVENOUS PRN
Status: DISCONTINUED | OUTPATIENT
Start: 2024-07-29 | End: 2024-07-29 | Stop reason: HOSPADM

## 2024-07-29 RX ORDER — ACETAMINOPHEN 325 MG/1
650 TABLET ORAL EVERY 4 HOURS PRN
Status: DISCONTINUED | OUTPATIENT
Start: 2024-07-29 | End: 2024-07-29 | Stop reason: HOSPADM

## 2024-07-29 RX ORDER — ASPIRIN 81 MG/1
81 TABLET ORAL DAILY
Qty: 30 TABLET | Refills: 0 | Status: SHIPPED | OUTPATIENT
Start: 2024-07-29

## 2024-07-29 RX ORDER — ATORVASTATIN CALCIUM 20 MG/1
20 TABLET, FILM COATED ORAL DAILY
Qty: 30 TABLET | Refills: 3 | Status: SHIPPED | OUTPATIENT
Start: 2024-07-29

## 2024-07-29 RX ORDER — BIVALIRUDIN 250 MG/5ML
INJECTION, POWDER, LYOPHILIZED, FOR SOLUTION INTRAVENOUS PRN
Status: DISCONTINUED | OUTPATIENT
Start: 2024-07-29 | End: 2024-07-29 | Stop reason: HOSPADM

## 2024-07-29 RX ORDER — NITROGLYCERIN 20 MG/100ML
INJECTION INTRAVENOUS PRN
Status: DISCONTINUED | OUTPATIENT
Start: 2024-07-29 | End: 2024-07-29 | Stop reason: HOSPADM

## 2024-07-29 RX ORDER — NITROGLYCERIN 0.4 MG/1
0.4 TABLET SUBLINGUAL EVERY 5 MIN PRN
Status: DISCONTINUED | OUTPATIENT
Start: 2024-07-29 | End: 2024-07-29 | Stop reason: HOSPADM

## 2024-07-29 RX ORDER — SODIUM CHLORIDE 9 MG/ML
INJECTION, SOLUTION INTRAVENOUS CONTINUOUS
Status: DISCONTINUED | OUTPATIENT
Start: 2024-07-29 | End: 2024-07-29 | Stop reason: HOSPADM

## 2024-07-29 RX ORDER — ASPIRIN 81 MG/1
81 TABLET ORAL DAILY
Status: DISCONTINUED | OUTPATIENT
Start: 2024-07-30 | End: 2024-07-29 | Stop reason: HOSPADM

## 2024-07-29 RX ORDER — SODIUM BICARBONATE 325 MG/1
650 TABLET ORAL 2 TIMES DAILY
Qty: 120 TABLET | Refills: 1 | Status: SHIPPED | OUTPATIENT
Start: 2024-07-29

## 2024-07-29 RX ORDER — ONDANSETRON 2 MG/ML
4 INJECTION INTRAMUSCULAR; INTRAVENOUS EVERY 6 HOURS PRN
Status: DISCONTINUED | OUTPATIENT
Start: 2024-07-29 | End: 2024-07-29 | Stop reason: HOSPADM

## 2024-07-29 RX ORDER — ASPIRIN 325 MG
325 TABLET ORAL ONCE
Status: COMPLETED | OUTPATIENT
Start: 2024-07-29 | End: 2024-07-29

## 2024-07-29 RX ORDER — CLOPIDOGREL BISULFATE 75 MG/1
TABLET ORAL PRN
Status: DISCONTINUED | OUTPATIENT
Start: 2024-07-29 | End: 2024-07-29 | Stop reason: HOSPADM

## 2024-07-29 RX ORDER — ERGOCALCIFEROL 1.25 MG/1
CAPSULE ORAL
Qty: 12 CAPSULE | Refills: 1 | Status: SHIPPED | OUTPATIENT
Start: 2024-07-29

## 2024-07-29 RX ORDER — CLOPIDOGREL BISULFATE 75 MG/1
75 TABLET ORAL DAILY
Status: DISCONTINUED | OUTPATIENT
Start: 2024-07-30 | End: 2024-07-29 | Stop reason: HOSPADM

## 2024-07-29 RX ORDER — SODIUM CHLORIDE 9 MG/ML
INJECTION, SOLUTION INTRAVENOUS PRN
Status: DISCONTINUED | OUTPATIENT
Start: 2024-07-29 | End: 2024-07-29 | Stop reason: HOSPADM

## 2024-07-29 RX ORDER — HEPARIN SODIUM 1000 [USP'U]/ML
INJECTION, SOLUTION INTRAVENOUS; SUBCUTANEOUS PRN
Status: DISCONTINUED | OUTPATIENT
Start: 2024-07-29 | End: 2024-07-29 | Stop reason: HOSPADM

## 2024-07-29 RX ORDER — CLOPIDOGREL BISULFATE 75 MG/1
75 TABLET ORAL DAILY
Qty: 30 TABLET | Refills: 3 | Status: SHIPPED | OUTPATIENT
Start: 2024-07-29

## 2024-07-29 RX ORDER — IODIXANOL 320 MG/ML
INJECTION, SOLUTION INTRAVASCULAR PRN
Status: DISCONTINUED | OUTPATIENT
Start: 2024-07-29 | End: 2024-07-29 | Stop reason: HOSPADM

## 2024-07-29 RX ORDER — SODIUM CHLORIDE 0.9 % (FLUSH) 0.9 %
5-40 SYRINGE (ML) INJECTION PRN
Status: DISCONTINUED | OUTPATIENT
Start: 2024-07-29 | End: 2024-07-29 | Stop reason: HOSPADM

## 2024-07-29 RX ORDER — HYDROCODONE BITARTRATE AND ACETAMINOPHEN 5; 325 MG/1; MG/1
1 TABLET ORAL EVERY 4 HOURS PRN
Status: DISCONTINUED | OUTPATIENT
Start: 2024-07-29 | End: 2024-07-29 | Stop reason: HOSPADM

## 2024-07-29 RX ORDER — HYDROCODONE BITARTRATE AND ACETAMINOPHEN 5; 325 MG/1; MG/1
2 TABLET ORAL EVERY 4 HOURS PRN
Status: DISCONTINUED | OUTPATIENT
Start: 2024-07-29 | End: 2024-07-29 | Stop reason: HOSPADM

## 2024-07-29 RX ORDER — SODIUM CHLORIDE 9 MG/ML
INJECTION, SOLUTION INTRAVENOUS CONTINUOUS
Status: DISCONTINUED | OUTPATIENT
Start: 2024-07-29 | End: 2024-07-29

## 2024-07-29 RX ORDER — SODIUM CHLORIDE 0.9 % (FLUSH) 0.9 %
5-40 SYRINGE (ML) INJECTION EVERY 12 HOURS SCHEDULED
Status: DISCONTINUED | OUTPATIENT
Start: 2024-07-29 | End: 2024-07-29 | Stop reason: HOSPADM

## 2024-07-29 RX ORDER — ATORVASTATIN CALCIUM 20 MG/1
20 TABLET, FILM COATED ORAL NIGHTLY
Status: DISCONTINUED | OUTPATIENT
Start: 2024-07-29 | End: 2024-07-29 | Stop reason: HOSPADM

## 2024-07-29 RX ADMIN — SODIUM BICARBONATE: 84 INJECTION, SOLUTION INTRAVENOUS at 11:10

## 2024-07-29 RX ADMIN — ASPIRIN 325 MG: 325 TABLET ORAL at 07:28

## 2024-07-29 RX ADMIN — SODIUM CHLORIDE: 9 INJECTION, SOLUTION INTRAVENOUS at 07:29

## 2024-07-29 NOTE — PROCEDURES
PROCEDURE NOTE  Date: 7/29/2024   Name: Scottie Mireles  YOB: 1955    Procedures      Cardiac catheterization preliminary note:    LAD proximal 60%, mid 60 to 65% stenosis.  iFR 0.83 consistent with obstructive lesion.  Circumflex with OM2 proximal 80% focal stenosis.  RCA distal 90-95% stenosis, RPDA mid 70%.  Normal LV systolic function.    Successful PCI to proximal LAD and mid LAD with VAHID x 2.  Successful PCI to OM 2 with VAHID x 1.    Plan: Return for staged PCI to RCA in 2 weeks.  Procedure staged due to need for contrast limitation with known renal transplantation

## 2024-07-29 NOTE — DISCHARGE INSTRUCTIONS
PATIENT INSTRUCTIONS- POST RADIAL CARDIAC CATH/ANGIOPLASTY      Do not subject hand/arm to any forceful movements for 24 hours (i.e. Supporting weight) when rising from a chair or bed.       For 2 days following discharge:  Do Not  Operate a motor vehicle, tractor, lawnmower, motorcycle or all-terrain vehicle.  Do Not  Lift anything heavier than 1 pound with affected arm.  Avoid  Excessive (extension/flexion) wrist movement.      Avoid heavy lifting with affected arm for 3 days following discharge.      Do Not engage in vigorous exercise (i.e. Tennis, ect.) using affected arm for 5 days following discharge.     If bleeding should occur while in the hospital, apply firm pressure to the site an call your nurse.     If bleeding should occur following discharge:  Sit down and apply firm pressure to site with your fingers x 10 mins.  If the bleeding stops, continue to sit quietly, keeping your wrist straight for 2 hours. Notify your physician as soon as possible.  If bleeding DOES NOT STOP after 10 mins or if there is a large amount of bleeding or spurting , CALL 911 immediately. DO NOT DRIVE YOURSELF TO THE HOSPITAL.     Remove bandage 24 hours following application and replace with a band aid.     You may shower on the day following your procedure. Do not take a tub bath for 3 days following discharge. Do not submerge arm in dishwater or other water sources for 5 days.           Your second procedure (Heart cath with stents) is scheduled for 8/12/24.  Please arrive at 6am to [facility], turn left as soon as you enter the front of the hospital. Nothing to eat or drink, except a sip of water with your medicines, after midnight the night before. Please call the cardiology office with any questions or if you need to reschedule [phone]

## 2024-07-29 NOTE — PROGRESS NOTES
Pt ambulated without complication, R radial site cdi with no s/s of bleeding or hematoma. Pt denies any pain. Discharge instructions reviewed with patient and patient states understanding. Patient discharged from cath holding with all belongings and AVS.  Stent cards and cardiac rehab brochure given to pt. Pt assisted to front entrance via wheelchair, pt's son plans to drive pt home. Electronically signed by Avelina Atkins RN on 7/29/2024 at 5:28 PM

## 2024-07-30 ENCOUNTER — TELEPHONE (OUTPATIENT)
Dept: CARDIOLOGY CLINIC | Age: 69
End: 2024-07-30

## 2024-07-30 NOTE — TELEPHONE ENCOUNTER
will then be slipped over the wire and threaded up to your heart.  The doctor will be taking x-ray pictures during the procedure to know where the wire and catheter are. Dye will be injected into the arteries of the heart. This will make the arteries and heart show up on the x-ray images. You may feel warm during the dye injection.     Insertion of Catheter with Guide Wire    Once in place, the catheter can be used to take measurements. Blood pressure can be taken within the heart's different chambers. Blood samples may also be taken. Multiple x-ray images will be taken to look for any disease in the arteries. An aortogram may also be done at this time. This step will give a clear image of the aorta (large artery leaving the heart). Once all the tests and images are complete, the catheter will be removed.  Sometimes, the doctor will perform balloon angioplasty and stenting if he finds an area in your arteries that is narrow or clogged. These are procedures that help to open narrowed arteries.  Finally, a bandage will be placed over the groin or arm area.     How Long Will It Take?  The procedure takes about 1-2 hours. Preparation before the test will take another 1-2 hours.      How Much Will It Hurt?  Although the procedure is generally not painful, it can cause some discomfort, including:   Burning sensation (when skin at catheter insertion site is anesthetized)   Pressure when catheter is inserted or replaced with other catheters   A flushing feeling or nausea when the dye is injected   Headache   Heart palpitations   Pain medicine will be given when needed.     Average Hospital Stay:  0-1 days     Postoperative Care At the Care [facility]   EKG and blood studies may be done.   You will likely need to lie still and flat on your back for a period of time. A pressure dressing may be placed over the area where the catheter was inserted to help prevent bleeding. It is important to follow the nurse's directions.   At Home

## 2024-07-30 NOTE — CONSULTS
Cardiac Rehab MI/PTCA/Stent education packet was sent to the patient's address on record.  Handouts titled; \"Home Instructions Following a Cardiac Event\", \"A Healthy Heart: Care Instructions\",  \"Cardiac Diet/Low Cholesterol\", \"Cardiac Rehabilitation: An Individualized Supervised Program For You\" and a [facility] Cardiac & Pulmonary Rehabilitation brochure were included.  Patient was instructed to contact [facility] or the hospital nearest their residence for the opportunity to enroll in Phase II Outpatient Cardiac Rehab.

## 2024-08-11 NOTE — H&P
Patient:  Scottie Mireles                  1955  MRN: [id number]    PROBLEM LIST:    Patient Active Problem List    Diagnosis Date Noted    Presence of Watchman left atrial appendage closure device 11/18/2021     Priority: High    Hand ulceration with fat layer exposed ([facility]) 02/07/2023     Priority: Medium    Squamous cell skin cancer 01/17/2023     Priority: Medium    Hand lesion 01/05/2023     Priority: Medium    Shortness of breath 07/29/2024     Priority: Low    Abnormal cardiovascular stress test 07/15/2024     Priority: Low    Longstanding persistent atrial fibrillation ([facility])      Priority: Low    LVH (left ventricular hypertrophy) 07/08/2020     Priority: Low    History of renal transplant 11/05/2019     Priority: Low    UGI bleed 11/01/2019     Priority: Low    PSVT (paroxysmal supraventricular tachycardia) ([facility]) 02/05/2019     Priority: Low    Chronic anticoagulation 01/07/2019     Priority: Low     Overview Note:     Eliquis      Orthostatic dizziness 01/07/2019     Priority: Low    Daytime somnolence 04/09/2018     Priority: Low    PAF (paroxysmal atrial fibrillation) ([facility]) 04/09/2018     Priority: Low    Fatigue 04/09/2018     Priority: Low    History of tachycardia 04/09/2018     Priority: Low    Immunosuppression ([facility])      Priority: Low    Atrial fibrillation with RVR ([facility]) 02/26/2018     Priority: Low    Infection of AV graft for dialysis ([facility]) 02/23/2018     Priority: Low    Cellulitis of right upper extremity      Priority: Low    Cellulitis 02/22/2018     Priority: Low    Chest pressure 07/17/2016     Priority: Low    Diabetes mellitus ([facility]) 07/17/2016     Priority: Low    Ex-cigarette smoker 07/17/2016     Priority: Low    Essential hypertension      Priority: Low    Chest pain      Priority: Low     1.  Permanent atrial fibrillation with prior DCCV with recurrence, last DCCV 11/18/2021 with recurrent GI bleed, status post watchman device left atrial appendage closure 11/18/2021.  LV ejection      Double vessel, branch disease with obstructive disease of proximal and mid LAD (IFR 0.83), OM 2, distal RCA and RPDA.  Normal LV systolic function.  Successful PCI of proximal LAD (3.5 x 26 mm Toribio frontier taken to 4.0 mm) utilizing drug-eluting stent.  Successful PCI of mid LAD (3.5 x 30 mm Bronson frontier) utilizing drug-eluting stent.  Successful PCI of OM 2 (2.5 x 15 mm Bronson frontier) utilizing drug-eluting stent.    Assessment and Recommendations:    This is a 69 y.o. year old male with past medical history of end-stage renal disease status post renal transplantation February 2010, on immunosuppressive therapy, hypertensive disease, non-insulin-dependent diabetes mellitus, permanent atrial fibrillation with recurrent GI bleeds on Eliquis, status post left atrial appendage closure with watchman device 11/18/2021, normal LV ejection fraction with moderate LVH, catheterization 7/29/2024 with triple-vessel disease, undergoing PCI to proximal and mid LAD, OM 2 with VAHID, here for staged PCI to RCA.    Risks, benefits, alternatives of cardiac catheterization/PCI discussed with the patient and full informed consent obtained.  Mallampati score 2  Consent for moderate conscious sedation  ASA 3         Electronically signed by Yandel Powers MD on 8/11/2024 at 10:01 AM        Thisdictation was generated by voice recognition computer software.  Although all attempts are made to edit the dictation for accuracy, there may be errors in the transcription that are not intended.

## 2024-08-12 ENCOUNTER — HOSPITAL ENCOUNTER (OUTPATIENT)
Age: 69
Setting detail: OUTPATIENT SURGERY
Discharge: HOME | End: 2024-08-12
Attending: INTERNAL MEDICINE | Admitting: INTERNAL MEDICINE
Payer: MEDICARE

## 2024-08-12 VITALS
WEIGHT: 243 LBS | SYSTOLIC BLOOD PRESSURE: 148 MMHG | TEMPERATURE: 97.2 F | DIASTOLIC BLOOD PRESSURE: 78 MMHG | BODY MASS INDEX: 32.91 KG/M2 | HEART RATE: 78 BPM | HEIGHT: 72 IN | OXYGEN SATURATION: 95 % | RESPIRATION RATE: 19 BRPM

## 2024-08-12 DIAGNOSIS — R94.39 ABNORMAL CARDIOVASCULAR STRESS TEST: ICD-10-CM

## 2024-08-12 DIAGNOSIS — R06.02 SHORTNESS OF BREATH: ICD-10-CM

## 2024-08-12 DIAGNOSIS — I20.89 STABLE ANGINA (HCC): Primary | ICD-10-CM

## 2024-08-12 LAB
ANION GAP SERPL CALCULATED.3IONS-SCNC: 16 MMOL/L (ref 7–19)
BUN SERPL-MCNC: 18 MG/DL (ref 8–23)
CALCIUM SERPL-MCNC: 9.6 MG/DL (ref 8.8–10.2)
CHLORIDE SERPL-SCNC: 102 MMOL/L (ref 98–111)
CO2 SERPL-SCNC: 20 MMOL/L (ref 22–29)
CREAT SERPL-MCNC: 1 MG/DL (ref 0.5–1.2)
ECHO BSA: 2.37 M2
EKG P AXIS: NORMAL DEGREES
EKG P-R INTERVAL: NORMAL MS
EKG Q-T INTERVAL: 404 MS
EKG QRS DURATION: 112 MS
EKG QTC CALCULATION (BAZETT): 439 MS
EKG T AXIS: 41 DEGREES
GLUCOSE SERPL-MCNC: 148 MG/DL (ref 74–109)
POTASSIUM SERPL-SCNC: 4.3 MMOL/L (ref 3.5–5)
SODIUM SERPL-SCNC: 138 MMOL/L (ref 136–145)

## 2024-08-12 PROCEDURE — 6370000000 HC RX 637 (ALT 250 FOR IP): Performed by: INTERNAL MEDICINE

## 2024-08-12 PROCEDURE — C1769 GUIDE WIRE: HCPCS | Performed by: INTERNAL MEDICINE

## 2024-08-12 PROCEDURE — 99153 MOD SED SAME PHYS/QHP EA: CPT | Performed by: INTERNAL MEDICINE

## 2024-08-12 PROCEDURE — 7100000010 HC PHASE II RECOVERY - FIRST 15 MIN: Performed by: INTERNAL MEDICINE

## 2024-08-12 PROCEDURE — 92929 PR PRQ TRLUML CORONARY STENT W/ANGIO ADDL ART/BRNCH: CPT | Performed by: INTERNAL MEDICINE

## 2024-08-12 PROCEDURE — C9601 PERC DRUG-EL COR STENT BRAN: HCPCS | Performed by: INTERNAL MEDICINE

## 2024-08-12 PROCEDURE — 2580000003 HC RX 258: Performed by: INTERNAL MEDICINE

## 2024-08-12 PROCEDURE — 92929 HC PRQ CARD STENT W/ANGIO ADDL: CPT | Performed by: INTERNAL MEDICINE

## 2024-08-12 PROCEDURE — C1874 STENT, COATED/COV W/DEL SYS: HCPCS | Performed by: INTERNAL MEDICINE

## 2024-08-12 PROCEDURE — 92928 PRQ TCAT PLMT NTRAC ST 1 LES: CPT | Performed by: INTERNAL MEDICINE

## 2024-08-12 PROCEDURE — 93005 ELECTROCARDIOGRAM TRACING: CPT

## 2024-08-12 PROCEDURE — 36415 COLL VENOUS BLD VENIPUNCTURE: CPT

## 2024-08-12 PROCEDURE — C1725 CATH, TRANSLUMIN NON-LASER: HCPCS | Performed by: INTERNAL MEDICINE

## 2024-08-12 PROCEDURE — C1894 INTRO/SHEATH, NON-LASER: HCPCS | Performed by: INTERNAL MEDICINE

## 2024-08-12 PROCEDURE — C1887 CATHETER, GUIDING: HCPCS | Performed by: INTERNAL MEDICINE

## 2024-08-12 PROCEDURE — 6360000002 HC RX W HCPCS: Performed by: INTERNAL MEDICINE

## 2024-08-12 PROCEDURE — C9600 PERC DRUG-EL COR STENT SING: HCPCS | Performed by: INTERNAL MEDICINE

## 2024-08-12 PROCEDURE — 7100000011 HC PHASE II RECOVERY - ADDTL 15 MIN: Performed by: INTERNAL MEDICINE

## 2024-08-12 PROCEDURE — 2709999900 HC NON-CHARGEABLE SUPPLY: Performed by: INTERNAL MEDICINE

## 2024-08-12 PROCEDURE — 6360000004 HC RX CONTRAST MEDICATION: Performed by: INTERNAL MEDICINE

## 2024-08-12 PROCEDURE — 80048 BASIC METABOLIC PNL TOTAL CA: CPT

## 2024-08-12 PROCEDURE — 99152 MOD SED SAME PHYS/QHP 5/>YRS: CPT | Performed by: INTERNAL MEDICINE

## 2024-08-12 PROCEDURE — 93454 CORONARY ARTERY ANGIO S&I: CPT

## 2024-08-12 PROCEDURE — 2500000003 HC RX 250 WO HCPCS: Performed by: INTERNAL MEDICINE

## 2024-08-12 DEVICE — STENT ONYXNG25012UX ONYX 2.50X12RX
Type: IMPLANTABLE DEVICE | Status: FUNCTIONAL
Brand: ONYX FRONTIER™

## 2024-08-12 DEVICE — STENT ONYXNG25008UX ONYX 2.50X08RX
Type: IMPLANTABLE DEVICE | Status: FUNCTIONAL
Brand: ONYX FRONTIER™

## 2024-08-12 DEVICE — STENT ONYXNG30022UX ONYX 3.00X22RX
Type: IMPLANTABLE DEVICE | Status: FUNCTIONAL
Brand: ONYX FRONTIER™

## 2024-08-12 RX ORDER — HYDROCODONE BITARTRATE AND ACETAMINOPHEN 5; 325 MG/1; MG/1
1 TABLET ORAL EVERY 4 HOURS PRN
Status: DISCONTINUED | OUTPATIENT
Start: 2024-08-12 | End: 2024-08-12 | Stop reason: HOSPADM

## 2024-08-12 RX ORDER — BIVALIRUDIN 250 MG/5ML
INJECTION, POWDER, LYOPHILIZED, FOR SOLUTION INTRAVENOUS PRN
Status: DISCONTINUED | OUTPATIENT
Start: 2024-08-12 | End: 2024-08-12 | Stop reason: HOSPADM

## 2024-08-12 RX ORDER — SODIUM CHLORIDE 9 MG/ML
INJECTION, SOLUTION INTRAVENOUS PRN
Status: DISCONTINUED | OUTPATIENT
Start: 2024-08-12 | End: 2024-08-12 | Stop reason: HOSPADM

## 2024-08-12 RX ORDER — SODIUM CHLORIDE 9 MG/ML
INJECTION, SOLUTION INTRAVENOUS CONTINUOUS
Status: DISCONTINUED | OUTPATIENT
Start: 2024-08-12 | End: 2024-08-12 | Stop reason: HOSPADM

## 2024-08-12 RX ORDER — SODIUM CHLORIDE 0.9 % (FLUSH) 0.9 %
5-40 SYRINGE (ML) INJECTION EVERY 12 HOURS SCHEDULED
Status: DISCONTINUED | OUTPATIENT
Start: 2024-08-12 | End: 2024-08-12 | Stop reason: HOSPADM

## 2024-08-12 RX ORDER — ASPIRIN 325 MG
325 TABLET ORAL ONCE
Status: COMPLETED | OUTPATIENT
Start: 2024-08-12 | End: 2024-08-12

## 2024-08-12 RX ORDER — IODIXANOL 320 MG/ML
INJECTION, SOLUTION INTRAVASCULAR PRN
Status: DISCONTINUED | OUTPATIENT
Start: 2024-08-12 | End: 2024-08-12 | Stop reason: HOSPADM

## 2024-08-12 RX ORDER — HEPARIN SODIUM 1000 [USP'U]/ML
INJECTION, SOLUTION INTRAVENOUS; SUBCUTANEOUS PRN
Status: DISCONTINUED | OUTPATIENT
Start: 2024-08-12 | End: 2024-08-12 | Stop reason: HOSPADM

## 2024-08-12 RX ORDER — CLOPIDOGREL BISULFATE 75 MG/1
TABLET ORAL PRN
Status: DISCONTINUED | OUTPATIENT
Start: 2024-08-12 | End: 2024-08-12 | Stop reason: HOSPADM

## 2024-08-12 RX ORDER — ACETAMINOPHEN 325 MG/1
650 TABLET ORAL EVERY 4 HOURS PRN
Status: DISCONTINUED | OUTPATIENT
Start: 2024-08-12 | End: 2024-08-12 | Stop reason: HOSPADM

## 2024-08-12 RX ORDER — NITROGLYCERIN 20 MG/100ML
INJECTION INTRAVENOUS PRN
Status: DISCONTINUED | OUTPATIENT
Start: 2024-08-12 | End: 2024-08-12 | Stop reason: HOSPADM

## 2024-08-12 RX ORDER — NITROGLYCERIN 0.4 MG/1
0.4 TABLET SUBLINGUAL EVERY 5 MIN PRN
Status: DISCONTINUED | OUTPATIENT
Start: 2024-08-12 | End: 2024-08-12 | Stop reason: HOSPADM

## 2024-08-12 RX ORDER — ONDANSETRON 2 MG/ML
4 INJECTION INTRAMUSCULAR; INTRAVENOUS EVERY 6 HOURS PRN
Status: DISCONTINUED | OUTPATIENT
Start: 2024-08-12 | End: 2024-08-12 | Stop reason: HOSPADM

## 2024-08-12 RX ORDER — HYDROCODONE BITARTRATE AND ACETAMINOPHEN 5; 325 MG/1; MG/1
2 TABLET ORAL EVERY 4 HOURS PRN
Status: DISCONTINUED | OUTPATIENT
Start: 2024-08-12 | End: 2024-08-12 | Stop reason: HOSPADM

## 2024-08-12 RX ORDER — FENTANYL CITRATE 50 UG/ML
INJECTION, SOLUTION INTRAMUSCULAR; INTRAVENOUS PRN
Status: DISCONTINUED | OUTPATIENT
Start: 2024-08-12 | End: 2024-08-12 | Stop reason: HOSPADM

## 2024-08-12 RX ORDER — SODIUM CHLORIDE 0.9 % (FLUSH) 0.9 %
5-40 SYRINGE (ML) INJECTION PRN
Status: DISCONTINUED | OUTPATIENT
Start: 2024-08-12 | End: 2024-08-12 | Stop reason: HOSPADM

## 2024-08-12 RX ORDER — MIDAZOLAM HYDROCHLORIDE 1 MG/ML
INJECTION INTRAMUSCULAR; INTRAVENOUS PRN
Status: DISCONTINUED | OUTPATIENT
Start: 2024-08-12 | End: 2024-08-12 | Stop reason: HOSPADM

## 2024-08-12 RX ADMIN — ASPIRIN 325 MG: 325 TABLET ORAL at 07:04

## 2024-08-12 RX ADMIN — SODIUM CHLORIDE: 9 INJECTION, SOLUTION INTRAVENOUS at 07:02

## 2024-08-12 NOTE — DISCHARGE INSTRUCTIONS
Your Cardiologist has referred you for participation in Cardiac Rehabilitation and a   referral has been sent to the [facility] Cardiac Rehab program.     You are to contact [facility] Cardiac [facility] Pulmonary Rehab within  one week of hospital discharge to schedule your \"orientation & assessment\"   appointment by calling direct at [phone].        1.  Do not subject hand/arm to any forceful movements for 24 hours (ie supporting weight when rising from a chair or bed).  2.  For 2 Days following discharge:         Do not operate a motor vehicle, tractor, lawnmower, motorcycle or all-terrain vehicle.         Do not lift anything heavier than 1 pound with affected arm.         Avoid excessive ( extension/ flexion) wrist movement.  3.  Avoid heavy lifting with affected arm for 3 days following discharge.  4.  Do not engage in vigorous exercise(ie tennis) using the affected arm for 5 days  following discharge.  5.  If bleeding should occur while in hospital, apply firm pressure to the site an call your      nurse.  6.  If bleeding should occur following discharge:         Sit down and apply firm pressure to site with your fingers x 10 minutes.         If the bleeding stops, continue to sit quietly, keeping your wrist straight for 2 hours. Notify your MD as soon as possible.         If bleeding does not stop after 10 minutes or if there is a large amount of bleeding or spurting, call 911 immediately.  Do not drive yourself to the hospital.  7.  Remove bandage 24 hours following application and replace with a bandaid.  8.  You may shower on the day following your procedure. Do not take a tub bath for 3  days following discharge.  Do not submerge arm in dishwater or other water sources  for 5 days.  9.  Expect mild tingling of hand and tenderness at the puncture site for up to 3 days.  If  this persists or other symptoms develop, notify your nurse/physician.  10. If any signs of infectionshould  occur, such as : redness, swelling, drainage, fever  over 101 degrees, or pain notify your physician immediately

## 2024-08-12 NOTE — PROGRESS NOTES
Tr band removed from right wrist and site cleansed with Chloroprep swab then gauze and tegaderm dressing in place. Site bruised but no bleeding noted.

## 2024-08-12 NOTE — PROGRESS NOTES
Returned post cath.  Awake and alert.  Puncture site to right wrist clear with TR band in place with 13 ml air.  Denies any c/o pain.  Son at bedside.

## 2024-08-12 NOTE — PROCEDURES
Cardiac catheterization/PCI preliminary note:    Successful PCI to distal RCA and RPDA with VAHID x 3.    Plan: Medical management.  Plavix uninterrupted for 6 months.  Follow-up with cardiology in 2 to 4 weeks.

## 2024-08-12 NOTE — PROGRESS NOTES
Discharged home with  son in stable condition.  Assisted to front entrance via wheelchair to be transported home with son.  Right wrist site clear.

## 2024-08-12 NOTE — PROGRESS NOTES
Patient and son instructed on care of right wrist post cath.  Given written instructions, Stent cards and cardiac rehab information.  Both stated understanding.

## 2024-08-19 ENCOUNTER — OFFICE VISIT (OUTPATIENT)
Dept: CARDIOLOGY CLINIC | Age: 69
End: 2024-08-19
Payer: MEDICARE

## 2024-08-19 ENCOUNTER — CLINICAL DOCUMENTATION (OUTPATIENT)
Dept: VASCULAR SURGERY | Age: 69
End: 2024-08-19

## 2024-08-19 ENCOUNTER — HOSPITAL ENCOUNTER (OUTPATIENT)
Dept: VASCULAR LAB | Age: 69
Discharge: HOME | End: 2024-08-21
Payer: MEDICARE

## 2024-08-19 VITALS
SYSTOLIC BLOOD PRESSURE: 138 MMHG | HEART RATE: 70 BPM | DIASTOLIC BLOOD PRESSURE: 88 MMHG | HEIGHT: 72 IN | BODY MASS INDEX: 34.4 KG/M2 | WEIGHT: 254 LBS | OXYGEN SATURATION: 97 %

## 2024-08-19 DIAGNOSIS — S55.101A INJURY OF RIGHT RADIAL ARTERY, INITIAL ENCOUNTER: ICD-10-CM

## 2024-08-19 DIAGNOSIS — T88.8XXA OTHER SPECIFIED COMPLICATIONS OF SURGICAL AND MEDICAL CARE, NOT ELSEWHERE CLASSIFIED, INITIAL ENCOUNTER: ICD-10-CM

## 2024-08-19 DIAGNOSIS — I48.21 PERMANENT ATRIAL FIBRILLATION (HCC): ICD-10-CM

## 2024-08-19 DIAGNOSIS — Z95.818 PRESENCE OF WATCHMAN LEFT ATRIAL APPENDAGE CLOSURE DEVICE: ICD-10-CM

## 2024-08-19 DIAGNOSIS — I25.10 CORONARY ARTERY DISEASE INVOLVING NATIVE CORONARY ARTERY OF NATIVE HEART WITHOUT ANGINA PECTORIS: Primary | ICD-10-CM

## 2024-08-19 DIAGNOSIS — I10 ESSENTIAL HYPERTENSION: ICD-10-CM

## 2024-08-19 PROCEDURE — 93931 UPPER EXTREMITY STUDY: CPT

## 2024-08-19 PROCEDURE — 1123F ACP DISCUSS/DSCN MKR DOCD: CPT | Performed by: CLINICAL NURSE SPECIALIST

## 2024-08-19 PROCEDURE — 3079F DIAST BP 80-89 MM HG: CPT | Performed by: CLINICAL NURSE SPECIALIST

## 2024-08-19 PROCEDURE — 3075F SYST BP GE 130 - 139MM HG: CPT | Performed by: CLINICAL NURSE SPECIALIST

## 2024-08-19 PROCEDURE — 99214 OFFICE O/P EST MOD 30 MIN: CPT | Performed by: CLINICAL NURSE SPECIALIST

## 2024-08-19 NOTE — PROGRESS NOTES
balloon used was a CATH BLLN ANGIO 4X12MM NC EUPHORIA RX.   Supplies used: CATH BLLN ANGIO 4X12MM NC EUPHORIA RX   Angioplasty (Also treats lesions: Prox LAD to Mid LAD)   Angioplasty was performed prior to stent deployment. The balloon used was a CATH BLLN ANGIO 2X12MM SC EUPHORA RX. Lesion complication(s): no complications.   Angioplasty (Also treats lesions: Prox LAD to Mid LAD)   Angioplasty was performed following stent deployment. The balloon used was a CATH BLLN ANGIO 3.74M24HE NC EUPHORIA RX. Lesion complication(s): no complications.   Post-Intervention Lesion Assessment   The intervention was successful. The pre-interventional distal flow is normal (MARK 3). Post-intervention MARK flow is 3. There were no complications.   There is a 0% residual stenosis post intervention.   Prox LAD to Mid LAD lesion   Stent   Multiple inflations done and stents placed. Drug-eluting stent was successfully placed. The stent used was a STENT CORONARY JAVAN FRONTIER RX 3.5X30 MM ZOTAROLIMUS ELUT.   Supplies used: STENT CORONARY JAVAN FRONTIER RX 3.5X26 MM ZOTAROLIMUS ELUT   Angioplasty (Also treats lesions: Ost LAD to Prox LAD)   See details in Ost LAD to Prox LAD lesion.   Angioplasty (Also treats lesions: Ost LAD to Prox LAD)   See details in Ost LAD to Prox LAD lesion.   Post-Intervention Lesion Assessment   The intervention was successful. The pre-interventional distal flow is normal (MARK 3). Post-intervention MARK flow is 3.   There is a 0% residual stenosis post intervention.   2nd Mrg lesion   Angioplasty   Angioplasty was performed prior to stent deployment. The balloon used was a CATH BLLN ANGIO 2X12MM SC EUPHORA RX.   Supplies used: CATH BLLN ANGIO 2X12MM SC EUPHORA RX   Stent   A single stent was placed. Drug-eluting stent was successfully placed. The stent used was a STENT CORONARY JAVAN FRONTIER RX 2.5X15 MM ZOTAROLIMUS ELUT.   Supplies used: STENT CORONARY JAVAN FRONTIER RX 2.5X15 MM ZOTAROLIMUS ELUT

## 2024-08-19 NOTE — PATIENT INSTRUCTIONS
Report to vascular for arterial scan of wrist.  Will call you with results    Do not stop aspirin and Plavix for minimum 6 months from procedure.  Watch for any abnormal bleeding  Maintain good blood pressure control-goal<130/80 at rest  Maintain good cholesterol control LDL goal<70 with arterial disease  If you are diabetic work to keep/obtain hemoglobin A1c< 7    Follow up in December as planned  Call with any questions or concerns  Follow up with Modesto Mcmullen MD for non cardiac problems  Report any new problems  Cardiovascular Fitness-Exercise as tolerated.  Strive for 30 minutes of exercise most days of the week.    Cardiac / Healthy Diet- Avoid processed high fat foods, maintain low sodium/salt   Continue current medications as directed  Continue plan of treatment  It is always recommended that you bring your medications bottles with you to each visit - this is for your safety!

## 2024-08-20 DIAGNOSIS — I72.1 RADIAL ARTERY ANEURYSM (HCC): ICD-10-CM

## 2024-08-20 DIAGNOSIS — S55.101A INJURY OF RIGHT RADIAL ARTERY, INITIAL ENCOUNTER: Primary | ICD-10-CM

## 2024-08-20 LAB
ECHO BSA: 2.42 M2
VAS RIGHT AX A MID PSV: 80.3 CM/S
VAS RIGHT BRACHIAL A DIST PSV: 89.7 CM/S
VAS RIGHT BRACHIAL A MID PSV: 138 CM/S
VAS RIGHT BRACHIAL A PROX PSV: 113 CM/S
VAS RIGHT RADIAL A DIST PSV: 108 CM/S
VAS RIGHT RADIAL A MID PSV: 118 CM/S
VAS RIGHT RADIAL A PROX PSV: 101 CM/S
VAS RIGHT SUBCLAVIAN DIST PSV: 90.9 CM/S
VAS RIGHT SUBCLAVIAN PROX PSV: 80 CM/S
VAS RIGHT ULNAR A DIST PSV: 90.1 CM/S
VAS RIGHT ULNAR A MID PSV: 78.4 CM/S
VAS RIGHT ULNAR A PROX PSV: 84.5 CM/S

## 2024-08-20 PROCEDURE — 93931 UPPER EXTREMITY STUDY: CPT | Performed by: SURGERY

## 2024-08-26 RX ORDER — SALICYLIC ACID 40 %
81 ADHESIVE PATCH, MEDICATED TOPICAL DAILY
Qty: 90 TABLET | Refills: 3 | Status: SHIPPED | OUTPATIENT
Start: 2024-08-26

## 2024-09-05 ENCOUNTER — PREP FOR PROCEDURE (OUTPATIENT)
Dept: VASCULAR SURGERY | Age: 69
End: 2024-09-05

## 2024-09-05 ENCOUNTER — HOSPITAL ENCOUNTER (OUTPATIENT)
Dept: PREADMISSION TESTING | Age: 69
Discharge: HOME | End: 2024-09-09
Payer: MEDICARE

## 2024-09-05 ENCOUNTER — OFFICE VISIT (OUTPATIENT)
Dept: VASCULAR SURGERY | Age: 69
End: 2024-09-05
Payer: MEDICARE

## 2024-09-05 ENCOUNTER — HOSPITAL ENCOUNTER (OUTPATIENT)
Dept: NON INVASIVE DIAGNOSTICS | Age: 69
Discharge: HOME | End: 2024-09-05
Payer: MEDICARE

## 2024-09-05 ENCOUNTER — HOSPITAL ENCOUNTER (OUTPATIENT)
Dept: GENERAL RADIOLOGY | Age: 69
Discharge: HOME | End: 2024-09-05
Payer: MEDICARE

## 2024-09-05 VITALS — WEIGHT: 250 LBS | BODY MASS INDEX: 33.91 KG/M2

## 2024-09-05 VITALS — TEMPERATURE: 97.9 F | OXYGEN SATURATION: 94 % | HEART RATE: 73 BPM

## 2024-09-05 DIAGNOSIS — Z01.818 PRE-OP TESTING: ICD-10-CM

## 2024-09-05 DIAGNOSIS — I72.1 RADIAL ARTERY ANEURYSM (HCC): ICD-10-CM

## 2024-09-05 DIAGNOSIS — Z01.818 PRE-OP TESTING: Primary | ICD-10-CM

## 2024-09-05 DIAGNOSIS — I72.1 ANEURYSM OF ARTERY OF UPPER EXTREMITY (HCC): ICD-10-CM

## 2024-09-05 DIAGNOSIS — I72.1 RADIAL ARTERY ANEURYSM, RIGHT (HCC): Primary | ICD-10-CM

## 2024-09-05 LAB
ABO/RH: NORMAL
ANION GAP SERPL CALCULATED.3IONS-SCNC: 13 MMOL/L (ref 7–19)
ANTIBODY SCREEN: NORMAL
APTT PPP: 30.3 SEC (ref 26–36.2)
BUN SERPL-MCNC: 17 MG/DL (ref 8–23)
CALCIUM SERPL-MCNC: 9.7 MG/DL (ref 8.8–10.2)
CHLORIDE SERPL-SCNC: 102 MMOL/L (ref 98–111)
CO2 SERPL-SCNC: 22 MMOL/L (ref 22–29)
CREAT SERPL-MCNC: 1.2 MG/DL (ref 0.7–1.2)
EKG P AXIS: NORMAL DEGREES
EKG P-R INTERVAL: NORMAL MS
EKG Q-T INTERVAL: 398 MS
EKG QRS DURATION: 104 MS
EKG QTC CALCULATION (BAZETT): 438 MS
EKG T AXIS: 44 DEGREES
ERYTHROCYTE [DISTWIDTH] IN BLOOD BY AUTOMATED COUNT: 14.1 % (ref 11.5–14.5)
GLUCOSE SERPL-MCNC: 124 MG/DL (ref 70–99)
HCT VFR BLD AUTO: 41.9 % (ref 42–52)
HGB BLD-MCNC: 13.6 G/DL (ref 14–18)
INR PPP: 1.21 (ref 0.88–1.18)
MCH RBC QN AUTO: 29.8 PG (ref 27–31)
MCHC RBC AUTO-ENTMCNC: 32.5 G/DL (ref 33–37)
MCV RBC AUTO: 91.7 FL (ref 80–94)
MRSA DNA SPEC QL NAA+PROBE: NOT DETECTED
PLATELET # BLD AUTO: 164 K/UL (ref 130–400)
PMV BLD AUTO: 11.1 FL (ref 9.4–12.4)
POTASSIUM SERPL-SCNC: 4.6 MMOL/L (ref 3.5–5)
PROTHROMBIN TIME: 15 SEC (ref 12–14.6)
RBC # BLD AUTO: 4.57 M/UL (ref 4.7–6.1)
SODIUM SERPL-SCNC: 137 MMOL/L (ref 136–145)
VAS RIGHT AX A MID PSV: 118 CM/S
VAS RIGHT BRACHIAL A DIST PSV: 101 CM/S
VAS RIGHT BRACHIAL A MID PSV: 127 CM/S
VAS RIGHT BRACHIAL A PROX PSV: 169 CM/S
VAS RIGHT RADIAL A DIST PSV: 98 CM/S
VAS RIGHT RADIAL A MID PSV: 117 CM/S
VAS RIGHT RADIAL A PROX PSV: 89 CM/S
VAS RIGHT SUBCLAVIAN DIST PSV: 76 CM/S
VAS RIGHT SUBCLAVIAN PROX PSV: 97 CM/S
VAS RIGHT ULNAR A DIST PSV: 83 CM/S
VAS RIGHT ULNAR A MID PSV: 67 CM/S
VAS RIGHT ULNAR A PROX PSV: 69 CM/S
WBC # BLD AUTO: 8.2 K/UL (ref 4.8–10.8)

## 2024-09-05 PROCEDURE — 93010 ELECTROCARDIOGRAM REPORT: CPT | Performed by: INTERNAL MEDICINE

## 2024-09-05 PROCEDURE — 87641 MR-STAPH DNA AMP PROBE: CPT

## 2024-09-05 PROCEDURE — 99214 OFFICE O/P EST MOD 30 MIN: CPT | Performed by: NURSE PRACTITIONER

## 2024-09-05 PROCEDURE — 1123F ACP DISCUSS/DSCN MKR DOCD: CPT | Performed by: NURSE PRACTITIONER

## 2024-09-05 PROCEDURE — 71046 X-RAY EXAM CHEST 2 VIEWS: CPT

## 2024-09-05 PROCEDURE — 93005 ELECTROCARDIOGRAM TRACING: CPT

## 2024-09-05 PROCEDURE — 85730 THROMBOPLASTIN TIME PARTIAL: CPT

## 2024-09-05 PROCEDURE — 86850 RBC ANTIBODY SCREEN: CPT

## 2024-09-05 PROCEDURE — 86900 BLOOD TYPING SEROLOGIC ABO: CPT

## 2024-09-05 PROCEDURE — 85610 PROTHROMBIN TIME: CPT

## 2024-09-05 PROCEDURE — 93931 UPPER EXTREMITY STUDY: CPT | Performed by: SURGERY

## 2024-09-05 PROCEDURE — 93931 UPPER EXTREMITY STUDY: CPT

## 2024-09-05 PROCEDURE — 86901 BLOOD TYPING SEROLOGIC RH(D): CPT

## 2024-09-05 PROCEDURE — 85027 COMPLETE CBC AUTOMATED: CPT

## 2024-09-05 PROCEDURE — 80048 BASIC METABOLIC PNL TOTAL CA: CPT

## 2024-09-05 RX ORDER — ACETAMINOPHEN 160 MG
1 TABLET,DISINTEGRATING ORAL DAILY
COMMUNITY
End: 2024-09-05 | Stop reason: SDUPTHER

## 2024-09-05 RX ORDER — SODIUM CHLORIDE 0.9 % (FLUSH) 0.9 %
5-40 SYRINGE (ML) INJECTION EVERY 12 HOURS SCHEDULED
OUTPATIENT
Start: 2024-09-05

## 2024-09-05 RX ORDER — SODIUM CHLORIDE 9 MG/ML
INJECTION, SOLUTION INTRAVENOUS PRN
OUTPATIENT
Start: 2024-09-05

## 2024-09-05 RX ORDER — SODIUM CHLORIDE 0.9 % (FLUSH) 0.9 %
5-40 SYRINGE (ML) INJECTION PRN
OUTPATIENT
Start: 2024-09-05

## 2024-09-05 NOTE — H&P
Scottie Mireles (:  1955) is a 69 y.o. male,Established patient, here for evaluation of the following chief complaint(s):  Follow-up            SUBJECTIVE/OBJECTIVE:  He has a known history of radial artery pseudoaneurysm.  He had a heart cath .  Developed pain and swelling of the wrist.  Pseudo was identified.  Pressure was held and area of active flow decreased.  He came in today for repeat scan.  He reports pain has decreased.          I have personally reviewed the following: problem list, current meds, allergies, PMH, PSH, family hx, and social hx  Scottie Mireles is a 69 y.o. male with the following history as recorded in [facility]:  Patient Active Problem List    Diagnosis Date Noted    Presence of Watchman left atrial appendage closure device 2021    Hand ulceration with fat layer exposed ([facility]) 2023    Squamous cell skin cancer 2023    Hand lesion 2023    Aneurysm of artery of upper extremity ([facility]) 2024    Shortness of breath 2024    Abnormal cardiovascular stress test 07/15/2024    Longstanding persistent atrial fibrillation ([facility])     LVH (left ventricular hypertrophy) 2020    History of renal transplant 2019    UGI bleed 2019    PSVT (paroxysmal supraventricular tachycardia) ([facility]) 2019    Chronic anticoagulation 2019    Orthostatic dizziness 2019    Daytime somnolence 2018    PAF (paroxysmal atrial fibrillation) ([facility]) 2018    Fatigue 2018    History of tachycardia 2018    Immunosuppression ([facility])     Atrial fibrillation with RVR ([facility]) 2018    Infection of AV graft for dialysis ([facility]) 2018    Cellulitis of right upper extremity     Cellulitis 2018    Chest pressure 2016    Diabetes mellitus ([facility]) 2016    Ex-cigarette smoker 2016    Essential hypertension     Chest pain      Current Outpatient Medications   Medication Sig Dispense Refill    [facility] ASPIRIN LOW DOSE 81 MG EC tablet

## 2024-09-05 NOTE — PROGRESS NOTES
Scottie Mireles (:  1955) is a 69 y.o. male,Established patient, here for evaluation of the following chief complaint(s):  Follow-up            SUBJECTIVE/OBJECTIVE:  He has a known history of radial artery pseudoaneurysm.  He had a heart cath .  Developed pain and swelling of the wrist.  Pseudo was identified.  Pressure was held and area of active flow decreased.  He came in today for repeat scan.  He reports pain has decreased.          I have personally reviewed the following: problem list, current meds, allergies, PMH, PSH, family hx, and social hx  Scottie Mireles is a 69 y.o. male with the following history as recorded in [facility]:  Patient Active Problem List    Diagnosis Date Noted    Presence of Watchman left atrial appendage closure device 2021    Hand ulceration with fat layer exposed ([facility]) 2023    Squamous cell skin cancer 2023    Hand lesion 2023    Aneurysm of artery of upper extremity ([facility]) 2024    Shortness of breath 2024    Abnormal cardiovascular stress test 07/15/2024    Longstanding persistent atrial fibrillation ([facility])     LVH (left ventricular hypertrophy) 2020    History of renal transplant 2019    UGI bleed 2019    PSVT (paroxysmal supraventricular tachycardia) ([facility]) 2019    Chronic anticoagulation 2019    Orthostatic dizziness 2019    Daytime somnolence 2018    PAF (paroxysmal atrial fibrillation) ([facility]) 2018    Fatigue 2018    History of tachycardia 2018    Immunosuppression ([facility])     Atrial fibrillation with RVR ([facility]) 2018    Infection of AV graft for dialysis ([facility]) 2018    Cellulitis of right upper extremity     Cellulitis 2018    Chest pressure 2016    Diabetes mellitus ([facility]) 2016    Ex-cigarette smoker 2016    Essential hypertension     Chest pain      Current Outpatient Medications   Medication Sig Dispense Refill    [facility] ASPIRIN LOW DOSE 81 MG EC tablet

## 2024-09-05 NOTE — H&P (VIEW-ONLY)
Scottie Mireles (:  1955) is a 69 y.o. male,Established patient, here for evaluation of the following chief complaint(s):  Follow-up            SUBJECTIVE/OBJECTIVE:  He has a known history of radial artery pseudoaneurysm.  He had a heart cath .  Developed pain and swelling of the wrist.  Pseudo was identified.  Pressure was held and area of active flow decreased.  He came in today for repeat scan.  He reports pain has decreased.          I have personally reviewed the following: problem list, current meds, allergies, PMH, PSH, family hx, and social hx  Scottie Mireles is a 69 y.o. male with the following history as recorded in [facility]:  Patient Active Problem List    Diagnosis Date Noted    Presence of Watchman left atrial appendage closure device 2021    Hand ulceration with fat layer exposed ([facility]) 2023    Squamous cell skin cancer 2023    Hand lesion 2023    Aneurysm of artery of upper extremity ([facility]) 2024    Shortness of breath 2024    Abnormal cardiovascular stress test 07/15/2024    Longstanding persistent atrial fibrillation ([facility])     LVH (left ventricular hypertrophy) 2020    History of renal transplant 2019    UGI bleed 2019    PSVT (paroxysmal supraventricular tachycardia) ([facility]) 2019    Chronic anticoagulation 2019    Orthostatic dizziness 2019    Daytime somnolence 2018    PAF (paroxysmal atrial fibrillation) ([facility]) 2018    Fatigue 2018    History of tachycardia 2018    Immunosuppression ([facility])     Atrial fibrillation with RVR ([facility]) 2018    Infection of AV graft for dialysis ([facility]) 2018    Cellulitis of right upper extremity     Cellulitis 2018    Chest pressure 2016    Diabetes mellitus ([facility]) 2016    Ex-cigarette smoker 2016    Essential hypertension     Chest pain      Current Outpatient Medications   Medication Sig Dispense Refill    [facility] ASPIRIN LOW DOSE 81 MG EC tablet

## 2024-09-09 ENCOUNTER — ANESTHESIA EVENT (OUTPATIENT)
Dept: OPERATING ROOM | Age: 69
End: 2024-09-09
Payer: MEDICARE

## 2024-09-09 ENCOUNTER — HOSPITAL ENCOUNTER (OUTPATIENT)
Age: 69
Setting detail: OUTPATIENT SURGERY
Discharge: HOME | End: 2024-09-09
Attending: SURGERY | Admitting: SURGERY
Payer: MEDICARE

## 2024-09-09 ENCOUNTER — ANESTHESIA (OUTPATIENT)
Dept: OPERATING ROOM | Age: 69
End: 2024-09-09
Payer: MEDICARE

## 2024-09-09 VITALS
RESPIRATION RATE: 20 BRPM | TEMPERATURE: 98.5 F | HEIGHT: 72 IN | SYSTOLIC BLOOD PRESSURE: 135 MMHG | BODY MASS INDEX: 33.86 KG/M2 | OXYGEN SATURATION: 91 % | DIASTOLIC BLOOD PRESSURE: 80 MMHG | HEART RATE: 75 BPM | WEIGHT: 250 LBS

## 2024-09-09 LAB
GLUCOSE BLD-MCNC: 133 MG/DL (ref 70–99)
GLUCOSE BLD-MCNC: 148 MG/DL (ref 70–99)
PERFORMED ON: ABNORMAL
PERFORMED ON: ABNORMAL

## 2024-09-09 PROCEDURE — A4217 STERILE WATER/SALINE, 500 ML: HCPCS | Performed by: SURGERY

## 2024-09-09 PROCEDURE — 2580000003 HC RX 258: Performed by: NURSE ANESTHETIST, CERTIFIED REGISTERED

## 2024-09-09 PROCEDURE — 3700000001 HC ADD 15 MINUTES (ANESTHESIA): Performed by: SURGERY

## 2024-09-09 PROCEDURE — 3700000000 HC ANESTHESIA ATTENDED CARE: Performed by: SURGERY

## 2024-09-09 PROCEDURE — 82962 GLUCOSE BLOOD TEST: CPT

## 2024-09-09 PROCEDURE — C1769 GUIDE WIRE: HCPCS | Performed by: SURGERY

## 2024-09-09 PROCEDURE — 6360000002 HC RX W HCPCS: Performed by: NURSE ANESTHETIST, CERTIFIED REGISTERED

## 2024-09-09 PROCEDURE — 7100000011 HC PHASE II RECOVERY - ADDTL 15 MIN: Performed by: SURGERY

## 2024-09-09 PROCEDURE — 2709999900 HC NON-CHARGEABLE SUPPLY: Performed by: SURGERY

## 2024-09-09 PROCEDURE — 2580000003 HC RX 258: Performed by: NURSE PRACTITIONER

## 2024-09-09 PROCEDURE — 2500000003 HC RX 250 WO HCPCS: Performed by: NURSE ANESTHETIST, CERTIFIED REGISTERED

## 2024-09-09 PROCEDURE — 2720000010 HC SURG SUPPLY STERILE: Performed by: SURGERY

## 2024-09-09 PROCEDURE — 7100000010 HC PHASE II RECOVERY - FIRST 15 MIN: Performed by: SURGERY

## 2024-09-09 PROCEDURE — 7100000001 HC PACU RECOVERY - ADDTL 15 MIN: Performed by: SURGERY

## 2024-09-09 PROCEDURE — 6360000002 HC RX W HCPCS: Performed by: NURSE PRACTITIONER

## 2024-09-09 PROCEDURE — 3600000017 HC SURGERY HYBRID ADDL 15MIN: Performed by: SURGERY

## 2024-09-09 PROCEDURE — 2580000003 HC RX 258: Performed by: SURGERY

## 2024-09-09 PROCEDURE — 7100000000 HC PACU RECOVERY - FIRST 15 MIN: Performed by: SURGERY

## 2024-09-09 PROCEDURE — 2580000003 HC RX 258: Performed by: ANESTHESIOLOGY

## 2024-09-09 PROCEDURE — 3600000007 HC SURGERY HYBRID BASE: Performed by: SURGERY

## 2024-09-09 PROCEDURE — 35206 REPAIR BLOOD VESSEL DIR UXTR: CPT | Performed by: SURGERY

## 2024-09-09 PROCEDURE — 6360000002 HC RX W HCPCS: Performed by: SURGERY

## 2024-09-09 PROCEDURE — 6370000000 HC RX 637 (ALT 250 FOR IP): Performed by: SURGERY

## 2024-09-09 RX ORDER — SODIUM CHLORIDE 9 MG/ML
INJECTION, SOLUTION INTRAVENOUS PRN
Status: CANCELLED | OUTPATIENT
Start: 2024-09-09

## 2024-09-09 RX ORDER — MIDAZOLAM HYDROCHLORIDE 1 MG/ML
INJECTION INTRAMUSCULAR; INTRAVENOUS PRN
Status: DISCONTINUED | OUTPATIENT
Start: 2024-09-09 | End: 2024-09-09 | Stop reason: SDUPTHER

## 2024-09-09 RX ORDER — SODIUM CHLORIDE, SODIUM LACTATE, POTASSIUM CHLORIDE, CALCIUM CHLORIDE 600; 310; 30; 20 MG/100ML; MG/100ML; MG/100ML; MG/100ML
INJECTION, SOLUTION INTRAVENOUS CONTINUOUS
Status: DISCONTINUED | OUTPATIENT
Start: 2024-09-09 | End: 2024-09-09 | Stop reason: HOSPADM

## 2024-09-09 RX ORDER — FENTANYL CITRATE 50 UG/ML
INJECTION, SOLUTION INTRAMUSCULAR; INTRAVENOUS PRN
Status: DISCONTINUED | OUTPATIENT
Start: 2024-09-09 | End: 2024-09-09 | Stop reason: SDUPTHER

## 2024-09-09 RX ORDER — SODIUM CHLORIDE 9 MG/ML
INJECTION, SOLUTION INTRAVENOUS CONTINUOUS
Status: CANCELLED | OUTPATIENT
Start: 2024-09-09

## 2024-09-09 RX ORDER — SODIUM CHLORIDE 9 MG/ML
INJECTION, SOLUTION INTRAVENOUS PRN
Status: DISCONTINUED | OUTPATIENT
Start: 2024-09-09 | End: 2024-09-09 | Stop reason: HOSPADM

## 2024-09-09 RX ORDER — SODIUM CHLORIDE 0.9 % (FLUSH) 0.9 %
5-40 SYRINGE (ML) INJECTION PRN
Status: CANCELLED | OUTPATIENT
Start: 2024-09-09

## 2024-09-09 RX ORDER — SODIUM CHLORIDE 9 MG/ML
INJECTION, SOLUTION INTRAVENOUS CONTINUOUS PRN
Status: DISCONTINUED | OUTPATIENT
Start: 2024-09-09 | End: 2024-09-09 | Stop reason: SDUPTHER

## 2024-09-09 RX ORDER — ONDANSETRON 2 MG/ML
INJECTION INTRAMUSCULAR; INTRAVENOUS PRN
Status: DISCONTINUED | OUTPATIENT
Start: 2024-09-09 | End: 2024-09-09 | Stop reason: SDUPTHER

## 2024-09-09 RX ORDER — METOCLOPRAMIDE HYDROCHLORIDE 5 MG/ML
10 INJECTION INTRAMUSCULAR; INTRAVENOUS
Status: DISCONTINUED | OUTPATIENT
Start: 2024-09-09 | End: 2024-09-09 | Stop reason: HOSPADM

## 2024-09-09 RX ORDER — SODIUM CHLORIDE 0.9 % (FLUSH) 0.9 %
5-40 SYRINGE (ML) INJECTION EVERY 12 HOURS SCHEDULED
Status: DISCONTINUED | OUTPATIENT
Start: 2024-09-09 | End: 2024-09-09 | Stop reason: HOSPADM

## 2024-09-09 RX ORDER — HYDROCODONE BITARTRATE AND ACETAMINOPHEN 7.5; 325 MG/1; MG/1
1 TABLET ORAL ONCE
Status: COMPLETED | OUTPATIENT
Start: 2024-09-09 | End: 2024-09-09

## 2024-09-09 RX ORDER — DIPHENHYDRAMINE HYDROCHLORIDE 50 MG/ML
12.5 INJECTION INTRAMUSCULAR; INTRAVENOUS
Status: DISCONTINUED | OUTPATIENT
Start: 2024-09-09 | End: 2024-09-09 | Stop reason: HOSPADM

## 2024-09-09 RX ORDER — SODIUM CHLORIDE 0.9 % (FLUSH) 0.9 %
5-40 SYRINGE (ML) INJECTION PRN
Status: DISCONTINUED | OUTPATIENT
Start: 2024-09-09 | End: 2024-09-09 | Stop reason: HOSPADM

## 2024-09-09 RX ORDER — SODIUM CHLORIDE 0.9 % (FLUSH) 0.9 %
5-40 SYRINGE (ML) INJECTION EVERY 12 HOURS SCHEDULED
Status: CANCELLED | OUTPATIENT
Start: 2024-09-09

## 2024-09-09 RX ORDER — ROCURONIUM BROMIDE 10 MG/ML
INJECTION, SOLUTION INTRAVENOUS PRN
Status: DISCONTINUED | OUTPATIENT
Start: 2024-09-09 | End: 2024-09-09 | Stop reason: SDUPTHER

## 2024-09-09 RX ORDER — DEXMEDETOMIDINE HYDROCHLORIDE 100 UG/ML
INJECTION, SOLUTION INTRAVENOUS PRN
Status: DISCONTINUED | OUTPATIENT
Start: 2024-09-09 | End: 2024-09-09 | Stop reason: SDUPTHER

## 2024-09-09 RX ORDER — PROPOFOL 10 MG/ML
INJECTION, EMULSION INTRAVENOUS PRN
Status: DISCONTINUED | OUTPATIENT
Start: 2024-09-09 | End: 2024-09-09 | Stop reason: SDUPTHER

## 2024-09-09 RX ORDER — LIDOCAINE HYDROCHLORIDE 10 MG/ML
INJECTION, SOLUTION EPIDURAL; INFILTRATION; INTRACAUDAL; PERINEURAL PRN
Status: DISCONTINUED | OUTPATIENT
Start: 2024-09-09 | End: 2024-09-09 | Stop reason: SDUPTHER

## 2024-09-09 RX ORDER — NALOXONE HYDROCHLORIDE 0.4 MG/ML
INJECTION, SOLUTION INTRAMUSCULAR; INTRAVENOUS; SUBCUTANEOUS PRN
Status: DISCONTINUED | OUTPATIENT
Start: 2024-09-09 | End: 2024-09-09 | Stop reason: HOSPADM

## 2024-09-09 RX ORDER — HYDROMORPHONE HYDROCHLORIDE 1 MG/ML
0.25 INJECTION, SOLUTION INTRAMUSCULAR; INTRAVENOUS; SUBCUTANEOUS EVERY 5 MIN PRN
Status: DISCONTINUED | OUTPATIENT
Start: 2024-09-09 | End: 2024-09-09 | Stop reason: HOSPADM

## 2024-09-09 RX ORDER — HYDROMORPHONE HYDROCHLORIDE 1 MG/ML
0.5 INJECTION, SOLUTION INTRAMUSCULAR; INTRAVENOUS; SUBCUTANEOUS EVERY 5 MIN PRN
Status: DISCONTINUED | OUTPATIENT
Start: 2024-09-09 | End: 2024-09-09 | Stop reason: HOSPADM

## 2024-09-09 RX ORDER — HEPARIN SODIUM 1000 [USP'U]/ML
INJECTION, SOLUTION INTRAVENOUS; SUBCUTANEOUS PRN
Status: DISCONTINUED | OUTPATIENT
Start: 2024-09-09 | End: 2024-09-09 | Stop reason: SDUPTHER

## 2024-09-09 RX ADMIN — PHENYLEPHRINE HYDROCHLORIDE 100 MCG: 10 INJECTION INTRAVENOUS at 15:35

## 2024-09-09 RX ADMIN — FENTANYL CITRATE 50 MCG: 0.05 INJECTION, SOLUTION INTRAMUSCULAR; INTRAVENOUS at 16:45

## 2024-09-09 RX ADMIN — PHENYLEPHRINE HYDROCHLORIDE 100 MCG: 10 INJECTION INTRAVENOUS at 15:19

## 2024-09-09 RX ADMIN — PROPOFOL 30 MG: 10 INJECTION, EMULSION INTRAVENOUS at 15:43

## 2024-09-09 RX ADMIN — PHENYLEPHRINE HYDROCHLORIDE 100 MCG: 10 INJECTION INTRAVENOUS at 15:11

## 2024-09-09 RX ADMIN — DEXMEDETOMIDINE HYDROCHLORIDE 8 MCG: 100 INJECTION, SOLUTION, CONCENTRATE INTRAVENOUS at 15:43

## 2024-09-09 RX ADMIN — PHENYLEPHRINE HYDROCHLORIDE 100 MCG: 10 INJECTION INTRAVENOUS at 14:48

## 2024-09-09 RX ADMIN — ROCURONIUM BROMIDE 20 MG: 10 INJECTION, SOLUTION INTRAVENOUS at 14:33

## 2024-09-09 RX ADMIN — HEPARIN SODIUM 2000 UNITS: 1000 INJECTION, SOLUTION INTRAVENOUS; SUBCUTANEOUS at 15:37

## 2024-09-09 RX ADMIN — CEFAZOLIN 2000 MG: 2 INJECTION, POWDER, FOR SOLUTION INTRAMUSCULAR; INTRAVENOUS at 14:11

## 2024-09-09 RX ADMIN — PHENYLEPHRINE HYDROCHLORIDE 100 MCG: 10 INJECTION INTRAVENOUS at 16:15

## 2024-09-09 RX ADMIN — DEXMEDETOMIDINE HYDROCHLORIDE 4 MCG: 100 INJECTION, SOLUTION, CONCENTRATE INTRAVENOUS at 15:39

## 2024-09-09 RX ADMIN — HYDROCODONE BITARTRATE AND ACETAMINOPHEN 1 TABLET: 7.5; 325 TABLET ORAL at 18:01

## 2024-09-09 RX ADMIN — PHENYLEPHRINE HYDROCHLORIDE 100 MCG: 10 INJECTION INTRAVENOUS at 15:16

## 2024-09-09 RX ADMIN — ONDANSETRON 4 MG: 2 INJECTION INTRAMUSCULAR; INTRAVENOUS at 16:49

## 2024-09-09 RX ADMIN — PROPOFOL 30 MG: 10 INJECTION, EMULSION INTRAVENOUS at 16:45

## 2024-09-09 RX ADMIN — MIDAZOLAM 2 MG: 1 INJECTION INTRAMUSCULAR; INTRAVENOUS at 13:56

## 2024-09-09 RX ADMIN — ROCURONIUM BROMIDE 30 MG: 10 INJECTION, SOLUTION INTRAVENOUS at 14:05

## 2024-09-09 RX ADMIN — DEXMEDETOMIDINE HYDROCHLORIDE 4 MCG: 100 INJECTION, SOLUTION, CONCENTRATE INTRAVENOUS at 14:35

## 2024-09-09 RX ADMIN — PHENYLEPHRINE HYDROCHLORIDE 100 MCG: 10 INJECTION INTRAVENOUS at 15:28

## 2024-09-09 RX ADMIN — SODIUM CHLORIDE: 9 INJECTION, SOLUTION INTRAVENOUS at 14:48

## 2024-09-09 RX ADMIN — PHENYLEPHRINE HYDROCHLORIDE 100 MCG: 10 INJECTION INTRAVENOUS at 15:24

## 2024-09-09 RX ADMIN — PROPOFOL 150 MG: 10 INJECTION, EMULSION INTRAVENOUS at 14:02

## 2024-09-09 RX ADMIN — SODIUM CHLORIDE: 9 INJECTION, SOLUTION INTRAVENOUS at 15:30

## 2024-09-09 RX ADMIN — PHENYLEPHRINE HYDROCHLORIDE 100 MCG: 10 INJECTION INTRAVENOUS at 15:21

## 2024-09-09 RX ADMIN — FENTANYL CITRATE 50 MCG: 0.05 INJECTION, SOLUTION INTRAMUSCULAR; INTRAVENOUS at 15:02

## 2024-09-09 RX ADMIN — DEXMEDETOMIDINE HYDROCHLORIDE 4 MCG: 100 INJECTION, SOLUTION, CONCENTRATE INTRAVENOUS at 14:15

## 2024-09-09 RX ADMIN — SUGAMMADEX 200 MG: 100 INJECTION, SOLUTION INTRAVENOUS at 16:45

## 2024-09-09 RX ADMIN — PHENYLEPHRINE HYDROCHLORIDE 100 MCG: 10 INJECTION INTRAVENOUS at 14:44

## 2024-09-09 RX ADMIN — PHENYLEPHRINE HYDROCHLORIDE 100 MCG: 10 INJECTION INTRAVENOUS at 16:02

## 2024-09-09 RX ADMIN — FENTANYL CITRATE 50 MCG: 0.05 INJECTION, SOLUTION INTRAMUSCULAR; INTRAVENOUS at 15:47

## 2024-09-09 RX ADMIN — PHENYLEPHRINE HYDROCHLORIDE 100 MCG: 10 INJECTION INTRAVENOUS at 16:08

## 2024-09-09 RX ADMIN — LIDOCAINE HYDROCHLORIDE 50 MG: 10 INJECTION, SOLUTION EPIDURAL; INFILTRATION; INTRACAUDAL; PERINEURAL at 14:05

## 2024-09-09 RX ADMIN — PHENYLEPHRINE HYDROCHLORIDE 100 MCG: 10 INJECTION INTRAVENOUS at 15:33

## 2024-09-09 RX ADMIN — SODIUM CHLORIDE, POTASSIUM CHLORIDE, SODIUM LACTATE AND CALCIUM CHLORIDE: 600; 310; 30; 20 INJECTION, SOLUTION INTRAVENOUS at 12:12

## 2024-09-09 RX ADMIN — FENTANYL CITRATE 50 MCG: 0.05 INJECTION, SOLUTION INTRAMUSCULAR; INTRAVENOUS at 14:33

## 2024-09-09 ASSESSMENT — PAIN - FUNCTIONAL ASSESSMENT
PAIN_FUNCTIONAL_ASSESSMENT: 0-10
PAIN_FUNCTIONAL_ASSESSMENT: PREVENTS OR INTERFERES SOME ACTIVE ACTIVITIES AND ADLS
PAIN_FUNCTIONAL_ASSESSMENT: ACTIVITIES ARE NOT PREVENTED
PAIN_FUNCTIONAL_ASSESSMENT: NONE - DENIES PAIN

## 2024-09-09 ASSESSMENT — PAIN DESCRIPTION - DESCRIPTORS
DESCRIPTORS: ACHING
DESCRIPTORS: ACHING

## 2024-09-09 ASSESSMENT — PAIN DESCRIPTION - ORIENTATION: ORIENTATION: RIGHT

## 2024-09-09 ASSESSMENT — PAIN DESCRIPTION - LOCATION: LOCATION: ARM

## 2024-09-09 ASSESSMENT — LIFESTYLE VARIABLES: SMOKING_STATUS: 0

## 2024-09-09 ASSESSMENT — PAIN SCALES - GENERAL: PAINLEVEL_OUTOF10: 4

## 2024-09-09 NOTE — OP NOTE
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  His right upper extremity was prepped and draped sterile fashion.  Preoperative antibiotics were given.  Timeout performed.  The incision was made on the distal right radial artery including the pseudoaneurysm using 15 blade and dissected using Metzenbaum scissors.  Initially radial artery was dissected proximal to the pseudoaneurysm.  Tourniquet was inflated on the proximal forearm.  Further dissection was performed distally.  Distal control was gained.  Vesseloops were placed.  Pseudoaneurysm of the right radial artery.  To be extremely scarred.  As per patient he was there since July.  It required extra 40 minutes of dissection.  Tourniquet was deflated.  Eventually was dissected off radial artery and removed using scissors.  There was a couple bleeding spots of the radial artery that was repaired using 6-0 Prolene.  Hemostasis was achieved of the dissection area using Evaseal.  After all hemostasis was good it was closed with 4-0 Vicryl and nylon.  Sterile dressing was applied, Kerlix roll and Ace wrap.  Patient tolerated procedure well and was transferred to PACU in stable condition.    Electronically signed by Florecita Rodriguez DO on 9/9/2024 at 5:50 PM

## 2024-09-09 NOTE — PROGRESS NOTES
All discharge instructions discussed with son and patient. Both state understanding. VSS. Patient assisted with dressing.

## 2024-09-09 NOTE — DISCHARGE INSTR - ACTIVITY
PATIENT INSTRUCTIONS- POST RADIAL ACCESS/ ANGIOGRAM / ANGIOPLASTY      Do not subject hand/arm to any forceful movements for 24 hours (i.e. Supporting weight) when rising from a chair or bed.       For 5  days following discharge:  Do Not  Operate a motor vehicle, tractor, lawnmower, motorcycle or all-terrain vehicle.  Do Not  Lift anything heavier than 1 pound with affected arm.  Avoid  Excessive (extension/flexion) wrist movement.      Avoid heavy lifting (NO MORE THAN 5 POUNDS) with affected arm for 3 DAYS following discharge.      DO NOT engage in vigorous exercise (i.e. Tennis, ect.) using affected arm for 5 DAYS following discharge.     If bleeding should occur while in the hospital, apply firm pressure to the site an call your nurse.     If bleeding should occur following discharge:  Sit down and apply firm pressure to site with your fingers x 10 mins.  If the bleeding stops, continue to sit quietly, keeping your wrist straight for 2 hours. Notify your physician as soon as possible.  If bleeding DOES NOT STOP after 10 mins or if there is a large amount of bleeding or spurting , CALL 911 immediately. DO NOT DRIVE YOURSELF TO THE HOSPITAL.     Remove bandage 24 hours following application and replace with a band aid.     You may shower on the day following your procedure. NO TUB BATH, HOT TUB, OR SWIMMING FOR 2 WEEKS  following discharge. Do not submerge arm in dishwater or other water sources for 5 days.

## 2024-09-09 NOTE — INTERVAL H&P NOTE
Update History & Physical    The patient's History and Physical of September 5, 2024 was reviewed with the patient and I examined the patient. There was no change. The surgical site was confirmed by the patient and me.     Plan: The risks, benefits, expected outcome, and alternative to the recommended procedure have been discussed with the patient. Patient understands and wants to proceed with the procedure.     Electronically signed by Florecita Rodriguez DO on 9/9/2024 at 1:49 PM

## 2024-09-11 ENCOUNTER — HOSPITAL ENCOUNTER (EMERGENCY)
Age: 69
Discharge: HOME | End: 2024-09-11
Attending: STUDENT IN AN ORGANIZED HEALTH CARE EDUCATION/TRAINING PROGRAM
Payer: MEDICARE

## 2024-09-11 VITALS
HEART RATE: 76 BPM | TEMPERATURE: 98.2 F | OXYGEN SATURATION: 96 % | DIASTOLIC BLOOD PRESSURE: 76 MMHG | RESPIRATION RATE: 19 BRPM | SYSTOLIC BLOOD PRESSURE: 154 MMHG

## 2024-09-11 DIAGNOSIS — I97.89: Primary | ICD-10-CM

## 2024-09-11 PROCEDURE — 99282 EMERGENCY DEPT VISIT SF MDM: CPT

## 2024-09-11 ASSESSMENT — ENCOUNTER SYMPTOMS
COUGH: 0
CHEST TIGHTNESS: 0
ABDOMINAL PAIN: 0
VOMITING: 0
EYE PAIN: 0
SORE THROAT: 0
NAUSEA: 0
SHORTNESS OF BREATH: 0
BLOOD IN STOOL: 0
EYE REDNESS: 0
DIARRHEA: 0

## 2024-09-13 ENCOUNTER — PATIENT MESSAGE (OUTPATIENT)
Dept: FAMILY MEDICINE CLINIC | Age: 69
End: 2024-09-13

## 2024-09-13 ENCOUNTER — OFFICE VISIT (OUTPATIENT)
Dept: FAMILY MEDICINE CLINIC | Age: 69
End: 2024-09-13
Payer: MEDICARE

## 2024-09-13 VITALS
OXYGEN SATURATION: 98 % | WEIGHT: 250 LBS | TEMPERATURE: 98.1 F | HEART RATE: 74 BPM | BODY MASS INDEX: 33.91 KG/M2 | RESPIRATION RATE: 17 BRPM

## 2024-09-13 DIAGNOSIS — G62.9 PERIPHERAL POLYNEUROPATHY: ICD-10-CM

## 2024-09-13 DIAGNOSIS — E11.9 TYPE 2 DIABETES MELLITUS WITHOUT COMPLICATION, WITHOUT LONG-TERM CURRENT USE OF INSULIN (HCC): ICD-10-CM

## 2024-09-13 DIAGNOSIS — I97.618 POSTOPERATIVE HEMORRHAGE INVOLVING CIRCULATORY SYSTEM FOLLOWING OTHER CIRCULATORY SYSTEM PROCEDURE: Primary | ICD-10-CM

## 2024-09-13 LAB
ALBUMIN SERPL-MCNC: 4 G/DL (ref 3.5–5.2)
ALP SERPL-CCNC: 82 U/L (ref 40–129)
ALT SERPL-CCNC: 22 U/L (ref 5–41)
ANION GAP SERPL CALCULATED.3IONS-SCNC: 12 MMOL/L (ref 7–19)
AST SERPL-CCNC: 25 U/L (ref 5–40)
BASOPHILS # BLD: 0.1 K/UL (ref 0–0.2)
BASOPHILS NFR BLD: 0.7 % (ref 0–1)
BILIRUB SERPL-MCNC: 2.1 MG/DL (ref 0.2–1.2)
BUN SERPL-MCNC: 11 MG/DL (ref 8–23)
CALCIUM SERPL-MCNC: 9.2 MG/DL (ref 8.8–10.2)
CHLORIDE SERPL-SCNC: 100 MMOL/L (ref 98–111)
CO2 SERPL-SCNC: 23 MMOL/L (ref 22–29)
CREAT SERPL-MCNC: 0.8 MG/DL (ref 0.7–1.2)
EOSINOPHIL # BLD: 0.9 K/UL (ref 0–0.6)
EOSINOPHIL NFR BLD: 9.9 % (ref 0–5)
ERYTHROCYTE [DISTWIDTH] IN BLOOD BY AUTOMATED COUNT: 14.2 % (ref 11.5–14.5)
GLUCOSE SERPL-MCNC: 146 MG/DL (ref 70–99)
HBA1C MFR BLD: 7.2 % (ref 4–5.6)
HCT VFR BLD AUTO: 39.4 % (ref 42–52)
HGB BLD-MCNC: 12.5 G/DL (ref 14–18)
IMM GRANULOCYTES # BLD: 0 K/UL
LYMPHOCYTES # BLD: 1.4 K/UL (ref 1.1–4.5)
LYMPHOCYTES NFR BLD: 15.9 % (ref 20–40)
MAGNESIUM SERPL-MCNC: 1.6 MG/DL (ref 1.6–2.4)
MCH RBC QN AUTO: 29.5 PG (ref 27–31)
MCHC RBC AUTO-ENTMCNC: 31.7 G/DL (ref 33–37)
MCV RBC AUTO: 92.9 FL (ref 80–94)
MONOCYTES # BLD: 0.8 K/UL (ref 0–0.9)
MONOCYTES NFR BLD: 9.1 % (ref 0–10)
NEUTROPHILS # BLD: 5.6 K/UL (ref 1.5–7.5)
NEUTS SEG NFR BLD: 63.9 % (ref 50–65)
PLATELET # BLD AUTO: 166 K/UL (ref 130–400)
PMV BLD AUTO: 10.7 FL (ref 9.4–12.4)
POTASSIUM SERPL-SCNC: 4.3 MMOL/L (ref 3.5–5)
PROT SERPL-MCNC: 6.6 G/DL (ref 6.4–8.3)
RBC # BLD AUTO: 4.24 M/UL (ref 4.7–6.1)
SODIUM SERPL-SCNC: 135 MMOL/L (ref 136–145)
T4 FREE SERPL-MCNC: 1.86 NG/DL (ref 0.93–1.7)
TSH SERPL DL<=0.005 MIU/L-ACNC: 0.91 UIU/ML (ref 0.27–4.2)
VIT B12 SERPL-MCNC: 327 PG/ML (ref 232–1245)
WBC # BLD AUTO: 8.7 K/UL (ref 4.8–10.8)

## 2024-09-13 PROCEDURE — 3051F HG A1C>EQUAL 7.0%<8.0%: CPT | Performed by: CLINICAL NURSE SPECIALIST

## 2024-09-13 PROCEDURE — 99214 OFFICE O/P EST MOD 30 MIN: CPT | Performed by: CLINICAL NURSE SPECIALIST

## 2024-09-13 PROCEDURE — 1123F ACP DISCUSS/DSCN MKR DOCD: CPT | Performed by: CLINICAL NURSE SPECIALIST

## 2024-09-13 SDOH — ECONOMIC STABILITY: INCOME INSECURITY: HOW HARD IS IT FOR YOU TO PAY FOR THE VERY BASICS LIKE FOOD, HOUSING, MEDICAL CARE, AND HEATING?: NOT HARD AT ALL

## 2024-09-13 SDOH — ECONOMIC STABILITY: FOOD INSECURITY: WITHIN THE PAST 12 MONTHS, THE FOOD YOU BOUGHT JUST DIDN'T LAST AND YOU DIDN'T HAVE MONEY TO GET MORE.: NEVER TRUE

## 2024-09-13 SDOH — ECONOMIC STABILITY: FOOD INSECURITY: WITHIN THE PAST 12 MONTHS, YOU WORRIED THAT YOUR FOOD WOULD RUN OUT BEFORE YOU GOT MONEY TO BUY MORE.: NEVER TRUE

## 2024-09-13 ASSESSMENT — PATIENT HEALTH QUESTIONNAIRE - PHQ9
2. FEELING DOWN, DEPRESSED OR HOPELESS: NOT AT ALL
SUM OF ALL RESPONSES TO PHQ QUESTIONS 1-9: 0
SUM OF ALL RESPONSES TO PHQ9 QUESTIONS 1 & 2: 0
SUM OF ALL RESPONSES TO PHQ QUESTIONS 1-9: 0
1. LITTLE INTEREST OR PLEASURE IN DOING THINGS: NOT AT ALL
SUM OF ALL RESPONSES TO PHQ QUESTIONS 1-9: 0
SUM OF ALL RESPONSES TO PHQ QUESTIONS 1-9: 0

## 2024-09-16 DIAGNOSIS — R17 SERUM TOTAL BILIRUBIN ELEVATED: Primary | ICD-10-CM

## 2024-09-23 ENCOUNTER — OFFICE VISIT (OUTPATIENT)
Dept: VASCULAR SURGERY | Age: 69
End: 2024-09-23

## 2024-09-23 VITALS — OXYGEN SATURATION: 96 % | HEART RATE: 78 BPM | TEMPERATURE: 98.9 F

## 2024-09-23 DIAGNOSIS — I72.1 RADIAL ARTERY ANEURYSM, RIGHT (HCC): Primary | ICD-10-CM

## 2024-09-23 PROCEDURE — 99024 POSTOP FOLLOW-UP VISIT: CPT | Performed by: NURSE PRACTITIONER

## 2024-09-26 ENCOUNTER — TELEPHONE (OUTPATIENT)
Dept: FAMILY MEDICINE CLINIC | Age: 69
End: 2024-09-26

## 2024-09-27 ENCOUNTER — HOSPITAL ENCOUNTER (OUTPATIENT)
Dept: GENERAL RADIOLOGY | Age: 69
Discharge: HOME | End: 2024-09-27
Payer: MEDICARE

## 2024-09-27 DIAGNOSIS — R17 SERUM TOTAL BILIRUBIN ELEVATED: ICD-10-CM

## 2024-09-27 PROCEDURE — 76705 ECHO EXAM OF ABDOMEN: CPT

## 2024-09-30 RX ORDER — SODIUM BICARBONATE 325 MG/1
650 TABLET ORAL 2 TIMES DAILY
Qty: 360 TABLET | Refills: 1 | Status: SHIPPED | OUTPATIENT
Start: 2024-09-30

## 2024-10-11 ENCOUNTER — TELEPHONE (OUTPATIENT)
Dept: FAMILY MEDICINE CLINIC | Age: 69
End: 2024-10-11

## 2024-10-11 DIAGNOSIS — E53.8 B12 DEFICIENCY: Primary | ICD-10-CM

## 2024-10-11 DIAGNOSIS — R17 ELEVATED BILIRUBIN: ICD-10-CM

## 2024-10-11 NOTE — TELEPHONE ENCOUNTER
----- Message from CHRIS Cole sent at 10/11/2024  9:57 AM CDT -----  Overall benign liver ultrasound. He has mild fatty liver but this is benign.   I would recommend repeat liver labs and b12 in about 4 weeks. I am placing those lab orders.

## 2024-10-31 DIAGNOSIS — N40.1 BENIGN PROSTATIC HYPERPLASIA WITH INCOMPLETE BLADDER EMPTYING: ICD-10-CM

## 2024-10-31 DIAGNOSIS — R39.14 BENIGN PROSTATIC HYPERPLASIA WITH INCOMPLETE BLADDER EMPTYING: ICD-10-CM

## 2024-10-31 RX ORDER — TAMSULOSIN HYDROCHLORIDE 0.4 MG/1
CAPSULE ORAL
Qty: 180 CAPSULE | Refills: 1 | Status: SHIPPED | OUTPATIENT
Start: 2024-10-31

## 2024-11-04 RX ORDER — ATORVASTATIN CALCIUM 20 MG/1
20 TABLET, FILM COATED ORAL DAILY
Qty: 30 TABLET | Refills: 3 | Status: SHIPPED | OUTPATIENT
Start: 2024-11-04

## 2024-11-05 DIAGNOSIS — E11.65 TYPE 2 DIABETES MELLITUS WITH HYPERGLYCEMIA, WITHOUT LONG-TERM CURRENT USE OF INSULIN (HCC): ICD-10-CM

## 2024-11-11 RX ORDER — SODIUM BICARBONATE 325 MG/1
650 TABLET ORAL 2 TIMES DAILY
Qty: 120 TABLET | Refills: 1 | Status: SHIPPED | OUTPATIENT
Start: 2024-11-11

## 2024-11-14 DIAGNOSIS — R17 ELEVATED BILIRUBIN: ICD-10-CM

## 2024-11-14 DIAGNOSIS — E53.8 B12 DEFICIENCY: ICD-10-CM

## 2024-11-14 LAB
ALBUMIN SERPL-MCNC: 4.4 G/DL (ref 3.5–5.2)
ALP SERPL-CCNC: 66 U/L (ref 40–129)
ALT SERPL-CCNC: 13 U/L (ref 5–41)
ANION GAP SERPL CALCULATED.3IONS-SCNC: 16 MMOL/L (ref 7–19)
AST SERPL-CCNC: 18 U/L (ref 5–40)
BASOPHILS # BLD: 0.1 K/UL (ref 0–0.2)
BASOPHILS NFR BLD: 1 % (ref 0–1)
BILIRUB SERPL-MCNC: 1.9 MG/DL (ref 0.2–1.2)
BUN SERPL-MCNC: 16 MG/DL (ref 8–23)
CALCIUM SERPL-MCNC: 9.9 MG/DL (ref 8.8–10.2)
CHLORIDE SERPL-SCNC: 102 MMOL/L (ref 98–111)
CO2 SERPL-SCNC: 21 MMOL/L (ref 22–29)
CREAT SERPL-MCNC: 1 MG/DL (ref 0.7–1.2)
EOSINOPHIL # BLD: 0.5 K/UL (ref 0–0.6)
EOSINOPHIL NFR BLD: 6.2 % (ref 0–5)
ERYTHROCYTE [DISTWIDTH] IN BLOOD BY AUTOMATED COUNT: 13.7 % (ref 11.5–14.5)
GLUCOSE SERPL-MCNC: 136 MG/DL (ref 70–99)
HCT VFR BLD AUTO: 43.4 % (ref 42–52)
HGB BLD-MCNC: 13.7 G/DL (ref 14–18)
IMM GRANULOCYTES # BLD: 0 K/UL
LYMPHOCYTES # BLD: 1.6 K/UL (ref 1.1–4.5)
LYMPHOCYTES NFR BLD: 19 % (ref 20–40)
MCH RBC QN AUTO: 29.8 PG (ref 27–31)
MCHC RBC AUTO-ENTMCNC: 31.6 G/DL (ref 33–37)
MCV RBC AUTO: 94.6 FL (ref 80–94)
MONOCYTES # BLD: 0.7 K/UL (ref 0–0.9)
MONOCYTES NFR BLD: 8.4 % (ref 0–10)
NEUTROPHILS # BLD: 5.4 K/UL (ref 1.5–7.5)
NEUTS SEG NFR BLD: 65 % (ref 50–65)
PLATELET # BLD AUTO: 182 K/UL (ref 130–400)
PMV BLD AUTO: 10.8 FL (ref 9.4–12.4)
POTASSIUM SERPL-SCNC: 5.1 MMOL/L (ref 3.5–5)
PROT SERPL-MCNC: 7 G/DL (ref 6.4–8.3)
RBC # BLD AUTO: 4.59 M/UL (ref 4.7–6.1)
SODIUM SERPL-SCNC: 139 MMOL/L (ref 136–145)
WBC # BLD AUTO: 8.2 K/UL (ref 4.8–10.8)

## 2024-11-15 LAB — VIT B12 SERPL-MCNC: 890 PG/ML (ref 232–1245)

## 2024-11-19 ENCOUNTER — TELEPHONE (OUTPATIENT)
Dept: FAMILY MEDICINE CLINIC | Age: 69
End: 2024-11-19

## 2024-11-19 NOTE — TELEPHONE ENCOUNTER
Pt aware and voiced understanding. Pt stated his neuropathy symptoms were improving and that if he needed anything else he would let us know.

## 2024-11-19 NOTE — TELEPHONE ENCOUNTER
----- Message from CHRIS Cole sent at 11/15/2024  8:38 PM CST -----  B12 much improved. Does he notice any improvement in neuropathy symptoms in legs/feet?

## 2024-11-20 RX ORDER — CLOPIDOGREL BISULFATE 75 MG/1
75 TABLET ORAL DAILY
Qty: 90 TABLET | Refills: 1 | Status: SHIPPED | OUTPATIENT
Start: 2024-11-20

## 2024-12-04 DIAGNOSIS — R60.0 BILATERAL LOWER EXTREMITY EDEMA: ICD-10-CM

## 2024-12-04 RX ORDER — FUROSEMIDE 20 MG/1
20 TABLET ORAL DAILY
Qty: 30 TABLET | Refills: 2 | Status: SHIPPED | OUTPATIENT
Start: 2024-12-04

## 2024-12-06 ENCOUNTER — OFFICE VISIT (OUTPATIENT)
Dept: CARDIOLOGY CLINIC | Age: 69
End: 2024-12-06
Payer: MEDICARE

## 2024-12-06 VITALS
DIASTOLIC BLOOD PRESSURE: 78 MMHG | WEIGHT: 255 LBS | HEART RATE: 66 BPM | OXYGEN SATURATION: 99 % | BODY MASS INDEX: 34.54 KG/M2 | HEIGHT: 72 IN | SYSTOLIC BLOOD PRESSURE: 122 MMHG

## 2024-12-06 DIAGNOSIS — I25.10 CORONARY ARTERY DISEASE INVOLVING NATIVE CORONARY ARTERY OF NATIVE HEART WITHOUT ANGINA PECTORIS: Primary | ICD-10-CM

## 2024-12-06 DIAGNOSIS — I48.21 PERMANENT ATRIAL FIBRILLATION (HCC): ICD-10-CM

## 2024-12-06 DIAGNOSIS — I10 ESSENTIAL HYPERTENSION: ICD-10-CM

## 2024-12-06 DIAGNOSIS — Z95.818 PRESENCE OF WATCHMAN LEFT ATRIAL APPENDAGE CLOSURE DEVICE: ICD-10-CM

## 2024-12-06 PROBLEM — L98.492 HAND ULCERATION WITH FAT LAYER EXPOSED (HCC): Status: RESOLVED | Noted: 2023-02-07 | Resolved: 2024-12-06

## 2024-12-06 PROCEDURE — 3078F DIAST BP <80 MM HG: CPT | Performed by: CLINICAL NURSE SPECIALIST

## 2024-12-06 PROCEDURE — 1160F RVW MEDS BY RX/DR IN RCRD: CPT | Performed by: CLINICAL NURSE SPECIALIST

## 2024-12-06 PROCEDURE — 1159F MED LIST DOCD IN RCRD: CPT | Performed by: CLINICAL NURSE SPECIALIST

## 2024-12-06 PROCEDURE — 1123F ACP DISCUSS/DSCN MKR DOCD: CPT | Performed by: CLINICAL NURSE SPECIALIST

## 2024-12-06 PROCEDURE — 99214 OFFICE O/P EST MOD 30 MIN: CPT | Performed by: CLINICAL NURSE SPECIALIST

## 2024-12-06 PROCEDURE — 3074F SYST BP LT 130 MM HG: CPT | Performed by: CLINICAL NURSE SPECIALIST

## 2024-12-06 NOTE — PROGRESS NOTES
[facility]  [address] [place] [address]  Phone: [phone]  Fax: [phone]    OFFICE VISIT:  2024    Scottie Mireles - : 1955    Reason For Visit:  Scottie is a 69 y.o. male who is here for 6 Month Follow-Up (No current issues, has been having some pains in chest and back), Coronary Artery Disease, and Atrial Fibrillation  1.  Permanent atrial fibrillation with prior DCCV with recurrence, last DCCV 2021 with recurrent GI bleed, status post watchman device left atrial appendage closure 2021.  LV ejection fraction 56%, moderate LVH, negative Lexiscan study 3/2/2018, short runs of SVT.  2.  End-stage renal disease due to hypertensive renal disease status post renal transplantation 2010, left arm AV fistula.  3.  Diabetes mellitus non-insulin-requiring.  4.  Morbid obesity.  5.  Essential tremor.    Patient was seen for routine visit in  with Dr. Powers.  Recommended ischemic evaluation has been several years.  Nuclear stress test showed evidence of Myocard ischemia.  Patient underwent heart catheterization 2024 receiving drug-eluting stents to proximal LAD mid LAD and OM2     Recommended returning for staged procedure in 2 weeks  Patient returned on 2024 and received successful PCI and drug-eluting stents placed to RCA and RPDA    Patient was seen in follow-up with discomfort of his right wrist at cath site  Found to have pseudoaneurysm and underwent repair on 2024    He returns today for follow-up  States overall he is doing fairly well.  He still has a little mild discomfort in his right breast but no significant problems.  Sees his local kidney doctor next week    Subjective  Scottie denies exertional chest pain, shortness of breath, orthopnea, paroxysmal nocturnal dyspnea, syncope, presyncope, arrhythmia, edema and fatigue.  The patient denies numbness or weakness to suggest cerebrovascular accident or transient ischemic attack.    Benedict

## 2024-12-11 ENCOUNTER — OFFICE VISIT (OUTPATIENT)
Dept: FAMILY MEDICINE CLINIC | Age: 69
End: 2024-12-11

## 2024-12-11 VITALS
BODY MASS INDEX: 34.4 KG/M2 | DIASTOLIC BLOOD PRESSURE: 82 MMHG | TEMPERATURE: 97 F | HEIGHT: 72 IN | HEART RATE: 82 BPM | WEIGHT: 254 LBS | SYSTOLIC BLOOD PRESSURE: 122 MMHG | OXYGEN SATURATION: 96 %

## 2024-12-11 DIAGNOSIS — J98.01 ACUTE BRONCHOSPASM: ICD-10-CM

## 2024-12-11 DIAGNOSIS — J20.9 ACUTE BRONCHITIS, UNSPECIFIED ORGANISM: ICD-10-CM

## 2024-12-11 DIAGNOSIS — D84.9 IMMUNOSUPPRESSION (HCC): ICD-10-CM

## 2024-12-11 DIAGNOSIS — E78.5 HYPERLIPIDEMIA, UNSPECIFIED HYPERLIPIDEMIA TYPE: ICD-10-CM

## 2024-12-11 DIAGNOSIS — R05.1 ACUTE COUGH: ICD-10-CM

## 2024-12-11 DIAGNOSIS — R22.1 MASS OF RIGHT SIDE OF NECK: ICD-10-CM

## 2024-12-11 DIAGNOSIS — E11.65 TYPE 2 DIABETES MELLITUS WITH HYPERGLYCEMIA, WITHOUT LONG-TERM CURRENT USE OF INSULIN (HCC): Primary | ICD-10-CM

## 2024-12-11 RX ORDER — EMPAGLIFLOZIN 25 MG/1
25 TABLET, FILM COATED ORAL DAILY
COMMUNITY
Start: 2024-12-05

## 2024-12-11 RX ORDER — CEFDINIR 300 MG/1
300 CAPSULE ORAL 2 TIMES DAILY
Qty: 20 CAPSULE | Refills: 0 | Status: SHIPPED | OUTPATIENT
Start: 2024-12-11 | End: 2024-12-21

## 2024-12-11 RX ORDER — METHYLPREDNISOLONE 4 MG/1
TABLET ORAL
Qty: 1 KIT | Refills: 0 | Status: SHIPPED | OUTPATIENT
Start: 2024-12-11 | End: 2024-12-17

## 2024-12-11 RX ORDER — DEXTROMETHORPHAN HYDROBROMIDE AND PROMETHAZINE HYDROCHLORIDE 15; 6.25 MG/5ML; MG/5ML
5 SYRUP ORAL 4 TIMES DAILY PRN
Qty: 180 ML | Refills: 0 | Status: SHIPPED | OUTPATIENT
Start: 2024-12-11 | End: 2024-12-21

## 2024-12-11 RX ORDER — MAGNESIUM GLYCINATE 100 MG
1 CAPSULE ORAL 2 TIMES DAILY
COMMUNITY

## 2024-12-11 NOTE — PROGRESS NOTES
Scottie Mireles (:  1955) is a 69 y.o. male, Established patient, here for evaluation of the following chief complaint(s):  Cough, Congestion (Started 3 days ago), Headache, and Wheezing         Assessment & Plan  1. Congestion, coughing, and wheezing.  He reports congestion, coughing, and wheezing for the past three to four days. There is no fever or new muscle aches. He has been around his son, who had a cold last week. On examination, wheezing and \"junkiness\" were noted in the lungs. An antibiotic and a steroid pack will be prescribed to manage these symptoms. A prescription for cough medicine will be sent to the pharmacy for use if needed. He is advised to continue using his inhaler as required. He is informed that the steroid might cause a slight increase in blood sugar levels, so he should be diligent with his diet. If he develops high fevers or muscle aches, he should seek medical attention.    2. Left leg and foot swelling.  He reports intermittent swelling in his left leg and foot, which he manages by taking Lasix as needed. He is advised to continue this regimen as the swelling is not severe and appears to be under control.    3. Neck swelling.  He reports noticing a spot with some fullness and puffiness on one side of his neck. An ultrasound of the neck will be ordered to investigate the cause of the swelling. He is advised to schedule the ultrasound after his current respiratory symptoms have resolved.    Results  Laboratory Studies  A1c was normal a few months ago.  1. Type 2 diabetes mellitus with hyperglycemia, without long-term current use of insulin ([facility])  2. Immunosuppression ([facility])  3. Mass of right side of neck  -     US HEAD NECK SOFT TISSUE; Future  4. Acute bronchitis, unspecified organism  -     cefdinir (OMNICEF) 300 MG capsule; Take 1 capsule by mouth 2 times daily for 10 days, Disp-20 capsule, R-0Normal  5. Acute bronchospasm  -     methylPREDNISolone (MEDROL DOSEPACK) 4 MG tablet;

## 2024-12-30 ENCOUNTER — HOSPITAL ENCOUNTER (OUTPATIENT)
Dept: VASCULAR LAB | Age: 69
Discharge: HOME | End: 2025-01-01
Payer: MEDICARE

## 2024-12-30 ENCOUNTER — OFFICE VISIT (OUTPATIENT)
Dept: VASCULAR SURGERY | Age: 69
End: 2024-12-30
Payer: MEDICARE

## 2024-12-30 VITALS
SYSTOLIC BLOOD PRESSURE: 118 MMHG | DIASTOLIC BLOOD PRESSURE: 76 MMHG | TEMPERATURE: 97.4 F | HEART RATE: 90 BPM | OXYGEN SATURATION: 94 %

## 2024-12-30 DIAGNOSIS — I72.1 RADIAL ARTERY ANEURYSM, RIGHT (HCC): ICD-10-CM

## 2024-12-30 DIAGNOSIS — I72.1 RADIAL ARTERY ANEURYSM, RIGHT (HCC): Primary | ICD-10-CM

## 2024-12-30 PROCEDURE — 1159F MED LIST DOCD IN RCRD: CPT | Performed by: NURSE PRACTITIONER

## 2024-12-30 PROCEDURE — 93923 UPR/LXTR ART STDY 3+ LVLS: CPT

## 2024-12-30 PROCEDURE — 1123F ACP DISCUSS/DSCN MKR DOCD: CPT | Performed by: NURSE PRACTITIONER

## 2024-12-30 PROCEDURE — 3074F SYST BP LT 130 MM HG: CPT | Performed by: NURSE PRACTITIONER

## 2024-12-30 PROCEDURE — 3078F DIAST BP <80 MM HG: CPT | Performed by: NURSE PRACTITIONER

## 2024-12-30 PROCEDURE — 99213 OFFICE O/P EST LOW 20 MIN: CPT | Performed by: NURSE PRACTITIONER

## 2024-12-30 NOTE — PROGRESS NOTES
Scottie Mireles (:  1955) is a 69 y.o. 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  This was repaired 3 months ago.  He has no claudication or arm pain.  Incisions are healed.      I have personally reviewed the following: problem list, current meds, allergies, PMH, PSH, family hx, and social hx  Scottie Mireles is a 69 y.o. male with the following history as recorded in [facility]:  Patient Active Problem List    Diagnosis Date Noted    Presence of Watchman left atrial appendage closure device 2021    Squamous cell skin cancer 2023    Hand lesion 2023    Type 2 diabetes mellitus with hyperglycemia, without long-term current use of insulin ([facility]) 2024    Aneurysm of artery of upper extremity ([facility]) 2024    Shortness of breath 2024    Abnormal cardiovascular stress test 07/15/2024    Longstanding persistent atrial fibrillation ([facility])     LVH (left ventricular hypertrophy) 2020    History of renal transplant 2019    UGI bleed 2019    PSVT (paroxysmal supraventricular tachycardia) ([facility]) 2019    Chronic anticoagulation 2019    Orthostatic dizziness 2019    Daytime somnolence 2018    PAF (paroxysmal atrial fibrillation) ([facility]) 2018    Fatigue 2018    History of tachycardia 2018    Immunosuppression ([facility])     Atrial fibrillation with RVR ([facility]) 2018    Infection of AV graft for dialysis ([facility]) 2018    Cellulitis of right upper extremity     Cellulitis 2018    Chest pressure 2016    Diabetes mellitus ([facility]) 2016    Ex-cigarette smoker 2016    Essential hypertension     Chest pain      Current Outpatient Medications   Medication Sig Dispense Refill    JARDIANCE 25 MG tablet Take 1 tablet by mouth daily      Magnesium Glycinate 100 MG CAPS Take 1 capsule by

## 2024-12-31 ENCOUNTER — HOSPITAL ENCOUNTER (OUTPATIENT)
Dept: GENERAL RADIOLOGY | Age: 69
Discharge: HOME | End: 2024-12-31
Payer: MEDICARE

## 2024-12-31 DIAGNOSIS — R22.1 MASS OF RIGHT SIDE OF NECK: ICD-10-CM

## 2024-12-31 PROCEDURE — 76536 US EXAM OF HEAD AND NECK: CPT

## 2025-01-04 LAB
VAS RIGHT ARM BP: 145 MMHG
VAS RIGHT RADIAL A PROX BP: 154 MMHG
VAS RIGHT ULNAR A PROX BP: 152 MMHG
VAS RIGHT WBI: 1.06

## 2025-01-08 DIAGNOSIS — R13.10 DYSPHAGIA, UNSPECIFIED TYPE: ICD-10-CM

## 2025-01-08 DIAGNOSIS — N40.1 BENIGN PROSTATIC HYPERPLASIA WITH INCOMPLETE BLADDER EMPTYING: ICD-10-CM

## 2025-01-08 DIAGNOSIS — R39.14 BENIGN PROSTATIC HYPERPLASIA WITH INCOMPLETE BLADDER EMPTYING: ICD-10-CM

## 2025-01-08 DIAGNOSIS — K21.9 GASTROESOPHAGEAL REFLUX DISEASE, UNSPECIFIED WHETHER ESOPHAGITIS PRESENT: ICD-10-CM

## 2025-01-08 RX ORDER — TAMSULOSIN HYDROCHLORIDE 0.4 MG/1
CAPSULE ORAL DAILY
Qty: 90 CAPSULE | Refills: 1 | Status: SHIPPED | OUTPATIENT
Start: 2025-01-08

## 2025-01-08 RX ORDER — SODIUM BICARBONATE 325 MG/1
650 TABLET ORAL 2 TIMES DAILY
Qty: 120 TABLET | Refills: 1 | Status: SHIPPED | OUTPATIENT
Start: 2025-01-08

## 2025-01-08 RX ORDER — EMPAGLIFLOZIN 25 MG/1
25 TABLET, FILM COATED ORAL DAILY
Qty: 90 TABLET | Refills: 1 | Status: SHIPPED | OUTPATIENT
Start: 2025-01-08

## 2025-01-08 RX ORDER — SUCRALFATE 1 G/1
1 TABLET ORAL 4 TIMES DAILY PRN
Qty: 120 TABLET | Refills: 0 | Status: SHIPPED | OUTPATIENT
Start: 2025-01-08

## 2025-01-08 NOTE — TELEPHONE ENCOUNTER
01- Called the patient notified that the pharmacy has requested a refill on your current medication.  If regular medications are being prescribed our Providers prefers that patients have a yearly follow up apt.     Your last apt was on 12- w Marilyn PEREZ      He stated that right now he would not be able to schedule an apt.     Routed to MARQUEZ Perez

## 2025-01-27 SDOH — ECONOMIC STABILITY: FOOD INSECURITY: WITHIN THE PAST 12 MONTHS, YOU WORRIED THAT YOUR FOOD WOULD RUN OUT BEFORE YOU GOT MONEY TO BUY MORE.: NEVER TRUE

## 2025-01-27 SDOH — HEALTH STABILITY: PHYSICAL HEALTH: ON AVERAGE, HOW MANY DAYS PER WEEK DO YOU ENGAGE IN MODERATE TO STRENUOUS EXERCISE (LIKE A BRISK WALK)?: 0 DAYS

## 2025-01-27 SDOH — ECONOMIC STABILITY: INCOME INSECURITY: IN THE LAST 12 MONTHS, WAS THERE A TIME WHEN YOU WERE NOT ABLE TO PAY THE MORTGAGE OR RENT ON TIME?: NO

## 2025-01-27 SDOH — ECONOMIC STABILITY: TRANSPORTATION INSECURITY
IN THE PAST 12 MONTHS, HAS THE LACK OF TRANSPORTATION KEPT YOU FROM MEDICAL APPOINTMENTS OR FROM GETTING MEDICATIONS?: NO

## 2025-01-27 SDOH — ECONOMIC STABILITY: FOOD INSECURITY: WITHIN THE PAST 12 MONTHS, THE FOOD YOU BOUGHT JUST DIDN'T LAST AND YOU DIDN'T HAVE MONEY TO GET MORE.: NEVER TRUE

## 2025-01-27 SDOH — HEALTH STABILITY: PHYSICAL HEALTH: ON AVERAGE, HOW MANY MINUTES DO YOU ENGAGE IN EXERCISE AT THIS LEVEL?: 0 MIN

## 2025-01-27 ASSESSMENT — PATIENT HEALTH QUESTIONNAIRE - PHQ9
SUM OF ALL RESPONSES TO PHQ QUESTIONS 1-9: 0
1. LITTLE INTEREST OR PLEASURE IN DOING THINGS: NOT AT ALL
SUM OF ALL RESPONSES TO PHQ QUESTIONS 1-9: 0
2. FEELING DOWN, DEPRESSED OR HOPELESS: NOT AT ALL
SUM OF ALL RESPONSES TO PHQ9 QUESTIONS 1 & 2: 0

## 2025-01-27 ASSESSMENT — LIFESTYLE VARIABLES
HOW OFTEN DO YOU HAVE A DRINK CONTAINING ALCOHOL: 1
HOW OFTEN DO YOU HAVE SIX OR MORE DRINKS ON ONE OCCASION: 1
HOW MANY STANDARD DRINKS CONTAINING ALCOHOL DO YOU HAVE ON A TYPICAL DAY: 0
HOW MANY STANDARD DRINKS CONTAINING ALCOHOL DO YOU HAVE ON A TYPICAL DAY: PATIENT DOES NOT DRINK
HOW OFTEN DO YOU HAVE A DRINK CONTAINING ALCOHOL: NEVER

## 2025-01-29 ENCOUNTER — OFFICE VISIT (OUTPATIENT)
Age: 70
End: 2025-01-29

## 2025-01-29 VITALS
BODY MASS INDEX: 33.18 KG/M2 | OXYGEN SATURATION: 96 % | WEIGHT: 245 LBS | HEIGHT: 72 IN | TEMPERATURE: 98.2 F | SYSTOLIC BLOOD PRESSURE: 118 MMHG | HEART RATE: 95 BPM | DIASTOLIC BLOOD PRESSURE: 80 MMHG

## 2025-01-29 DIAGNOSIS — I48.21 PERMANENT ATRIAL FIBRILLATION (HCC): ICD-10-CM

## 2025-01-29 DIAGNOSIS — Z12.5 SCREENING FOR MALIGNANT NEOPLASM OF PROSTATE: ICD-10-CM

## 2025-01-29 DIAGNOSIS — E78.5 HYPERLIPIDEMIA, UNSPECIFIED HYPERLIPIDEMIA TYPE: ICD-10-CM

## 2025-01-29 DIAGNOSIS — E11.65 TYPE 2 DIABETES MELLITUS WITH HYPERGLYCEMIA, WITHOUT LONG-TERM CURRENT USE OF INSULIN (HCC): ICD-10-CM

## 2025-01-29 DIAGNOSIS — J34.89 POSTERIOR RHINORRHEA: ICD-10-CM

## 2025-01-29 DIAGNOSIS — Z94.0 HISTORY OF RENAL TRANSPLANT: ICD-10-CM

## 2025-01-29 DIAGNOSIS — Z00.00 MEDICARE ANNUAL WELLNESS VISIT, SUBSEQUENT: Primary | ICD-10-CM

## 2025-01-29 PROBLEM — N18.6 END STAGE RENAL DISEASE (HCC): Status: RESOLVED | Noted: 2025-01-29 | Resolved: 2025-01-29

## 2025-01-29 PROBLEM — N18.6 END STAGE RENAL DISEASE (HCC): Status: ACTIVE | Noted: 2025-01-29

## 2025-01-29 RX ORDER — FLUTICASONE PROPIONATE 50 MCG
2 SPRAY, SUSPENSION (ML) NASAL DAILY
Qty: 16 G | Refills: 0 | Status: SHIPPED | OUTPATIENT
Start: 2025-01-29

## 2025-01-29 RX ORDER — MONTELUKAST SODIUM 10 MG/1
10 TABLET ORAL NIGHTLY
Qty: 30 TABLET | Refills: 3 | Status: SHIPPED | OUTPATIENT
Start: 2025-01-29

## 2025-01-29 SDOH — ECONOMIC STABILITY: FOOD INSECURITY: WITHIN THE PAST 12 MONTHS, YOU WORRIED THAT YOUR FOOD WOULD RUN OUT BEFORE YOU GOT MONEY TO BUY MORE.: NEVER TRUE

## 2025-01-29 SDOH — ECONOMIC STABILITY: FOOD INSECURITY: WITHIN THE PAST 12 MONTHS, THE FOOD YOU BOUGHT JUST DIDN'T LAST AND YOU DIDN'T HAVE MONEY TO GET MORE.: NEVER TRUE

## 2025-01-29 NOTE — PROGRESS NOTES
or your family notice any trouble with your hearing that hasn't been managed with hearing aids?: (!) Yes    Interventions:  Patient declines any further evaluation or treatment    Vision Screen:  Do you have difficulty driving, watching TV, or doing any of your daily activities because of your eyesight?: No  Have you had an eye exam within the past year?: (!) No  Interventions:   Patient encouraged to make appointment with their eye specialist     ADL's:   Patient reports needing help with:  Select all that apply: (!) Housekeeping, Shopping, Food Preparation  Interventions:  He states that he can do it a little but his son takes care of most of that and has the help that he is needing without any problems.                   Objective   Vitals:    01/29/25 0914   BP: 118/80   Site: Left Upper Arm   Position: Sitting   Cuff Size: Large Adult   Pulse: 95   Temp: 98.2 °F (36.8 °C)   TempSrc: Temporal   SpO2: 96%   Weight: 111.1 kg (245 lb)   Height: 1.829 m (6')      Body mass index is 33.23 kg/m².      General Appearance: alert and oriented to person, place and time, well developed and well- nourished, in no acute distress  Skin: warm and dry, no rash or erythema  Head: normocephalic and atraumatic  Eyes: pupils equal, round, and reactive to light, extraocular eye movements intact, conjunctivae normal  Neck: supple and non-tender without mass, no thyromegaly or thyroid nodules, no cervical lymphadenopathy  Pulmonary/Chest: clear to auscultation bilaterally- no wheezes, rales or rhonchi, normal air movement, no respiratory distress  Cardiovascular: normal rate, regular rhythm, normal S1 and S2, no murmurs, rubs, clicks, or gallops, distal pulses intact, no carotid bruits  Abdomen: soft, non-tender, non-distended, normal bowel sounds, no masses or organomegaly  Extremities: no cyanosis, clubbing or edema  Musculoskeletal: normal range of motion, no joint swelling, deformity or tenderness          No Known

## 2025-01-29 NOTE — PATIENT INSTRUCTIONS
shoulders or arms.     Lightheadedness or sudden weakness.     A fast or irregular heartbeat.   After you call 911, the  may tell you to chew 1 adult-strength or 2 to 4 low-dose aspirin. Wait for an ambulance. Do not try to drive yourself.  Watch closely for changes in your health, and be sure to contact your doctor if you have any problems.  Where can you learn more?  Go to https://www.[employer].net/patientEd and enter F075 to learn more about \"A Healthy Heart: Care Instructions.\"  Current as of: July 31, 2024  Content Version: 14.3  © 2024 [employer].   Care instructions adapted under license by [employer]. If you have questions about a medical condition or this instruction, always ask your healthcare professional. [employer], disclaims any warranty or liability for your use of this information.    Personalized Preventive Plan for Scottie Mireles - 1/29/2025  Medicare offers a range of preventive health benefits. Some of the tests and screenings are paid in full while other may be subject to a deductible, co-insurance, and/or copay.  Some of these benefits include a comprehensive review of your medical history including lifestyle, illnesses that may run in your family, and various assessments and screenings as appropriate.  After reviewing your medical record and screening and assessments performed today your provider may have ordered immunizations, labs, imaging, and/or referrals for you.  A list of these orders (if applicable) as well as your Preventive Care list are included within your After Visit Summary for your review.

## 2025-02-17 RX ORDER — SODIUM BICARBONATE 325 MG/1
650 TABLET ORAL 2 TIMES DAILY
Qty: 120 TABLET | Refills: 2 | Status: SHIPPED | OUTPATIENT
Start: 2025-02-17

## 2025-02-18 DIAGNOSIS — R13.10 DYSPHAGIA, UNSPECIFIED TYPE: ICD-10-CM

## 2025-02-18 DIAGNOSIS — K21.9 GASTROESOPHAGEAL REFLUX DISEASE, UNSPECIFIED WHETHER ESOPHAGITIS PRESENT: ICD-10-CM

## 2025-02-18 RX ORDER — SUCRALFATE 1 G/1
1 TABLET ORAL 4 TIMES DAILY PRN
Qty: 120 TABLET | Refills: 3 | Status: SHIPPED | OUTPATIENT
Start: 2025-02-18

## 2025-02-25 DIAGNOSIS — J34.89 POSTERIOR RHINORRHEA: ICD-10-CM

## 2025-02-25 RX ORDER — FLUTICASONE PROPIONATE 50 MCG
2 SPRAY, SUSPENSION (ML) NASAL DAILY
Qty: 3 EACH | Refills: 1 | Status: SHIPPED | OUTPATIENT
Start: 2025-02-25

## 2025-02-25 RX ORDER — ATORVASTATIN CALCIUM 20 MG/1
20 TABLET, FILM COATED ORAL DAILY
Qty: 90 TABLET | Refills: 3 | Status: SHIPPED | OUTPATIENT
Start: 2025-02-25

## 2025-02-26 ENCOUNTER — TELEPHONE (OUTPATIENT)
Age: 70
End: 2025-02-26

## 2025-02-26 NOTE — TELEPHONE ENCOUNTER
----- Message from DELVIN FENTON MA sent at 2/26/2025 12:02 PM CST -----    ----- Message -----  From: Modesto Mcmullen MD  Sent: 2/25/2025   1:47 PM CST  To: Khushboo Jimenez RN    A1c was 7.8 which is up quite a bit from his last 7.2 and would recommend continuing to be diligent with his diet we can always discuss medication changes in the future if necessary.  Otherwise his labs appear to be okay at this time/consistent with previous lab results.

## 2025-03-20 DIAGNOSIS — J34.89 POSTERIOR RHINORRHEA: ICD-10-CM

## 2025-03-20 RX ORDER — MONTELUKAST SODIUM 10 MG/1
10 TABLET ORAL NIGHTLY
Qty: 90 TABLET | Refills: 1 | Status: SHIPPED | OUTPATIENT
Start: 2025-03-20

## 2025-03-25 ENCOUNTER — OFFICE VISIT (OUTPATIENT)
Age: 70
End: 2025-03-25
Payer: MEDICARE

## 2025-03-25 ENCOUNTER — TELEPHONE (OUTPATIENT)
Age: 70
End: 2025-03-25

## 2025-03-25 VITALS
OXYGEN SATURATION: 97 % | WEIGHT: 244.4 LBS | HEIGHT: 73 IN | BODY MASS INDEX: 32.39 KG/M2 | DIASTOLIC BLOOD PRESSURE: 64 MMHG | HEART RATE: 79 BPM | SYSTOLIC BLOOD PRESSURE: 110 MMHG | RESPIRATION RATE: 16 BRPM | TEMPERATURE: 97.3 F

## 2025-03-25 DIAGNOSIS — E11.65 TYPE 2 DIABETES MELLITUS WITH HYPERGLYCEMIA, WITHOUT LONG-TERM CURRENT USE OF INSULIN (HCC): ICD-10-CM

## 2025-03-25 DIAGNOSIS — H66.001 NON-RECURRENT ACUTE SUPPURATIVE OTITIS MEDIA OF RIGHT EAR WITHOUT SPONTANEOUS RUPTURE OF TYMPANIC MEMBRANE: Primary | ICD-10-CM

## 2025-03-25 LAB — HBA1C MFR BLD: 7.2 %

## 2025-03-25 PROCEDURE — 83036 HEMOGLOBIN GLYCOSYLATED A1C: CPT | Performed by: FAMILY MEDICINE

## 2025-03-25 NOTE — PROGRESS NOTES
Scottie Mireles (:  1955) is a 69 y.o. male, Established patient, here for evaluation of the following chief complaint(s):  Headache (Also has pain around right ear.  )         Assessment & Plan  1. Right-sided headaches: Acute.  - Symptoms include right-sided headaches, tenderness, and a bee sting-like sensation.  - Physical examination reveals an infected right ear, which may be contributing to the headaches.  - Discussed the possibility of nerve involvement; no vision changes or severe pain with combing hair reported.  - Prescribed a course of antibiotics to treat the suspected ear infection.    2. Ear pain: Acute.  - Right ear pain, more pronounced than on the left side.  - Examination shows the right ear appears infected and looks \"angry.\"  - Discussed the potential need for steroids if the infection resolves but symptoms persist.  - Initiated a course of antibiotics to address the ear infection.    3. Jaw pain.  - Occasional jaw pain, especially when chewing.  - Examination indicates more clicking and popping on the left side than the right, suggesting TMJ is less likely.  - Jaw pain may be related to the ear infection.  - Further evaluation for TMJ may be necessary if jaw pain persists after treating the ear infection.    4. Health maintenance.  - Last A1c check was approximately 6 months ago.  - A fingerstick test will be conducted today to monitor blood sugar levels.  - Monitoring blood sugar is important, especially if steroids are needed in the future.  - Continues to use Flonase and an allergy pill as part of ongoing management.    Follow-up  - A fingerstick test for A1c will be conducted today.    Results    1. Non-recurrent acute suppurative otitis media of right ear without spontaneous rupture of tympanic membrane  -     amoxicillin-clavulanate (AUGMENTIN) 875-125 MG per tablet; Take 1 tablet by mouth 2 times daily for 10 days, Disp-20 tablet, R-0Normal  2. Type 2 diabetes mellitus with

## 2025-04-16 ENCOUNTER — OFFICE VISIT (OUTPATIENT)
Age: 70
End: 2025-04-16

## 2025-04-16 VITALS
DIASTOLIC BLOOD PRESSURE: 66 MMHG | TEMPERATURE: 97.7 F | BODY MASS INDEX: 32.74 KG/M2 | WEIGHT: 247 LBS | OXYGEN SATURATION: 97 % | HEIGHT: 73 IN | SYSTOLIC BLOOD PRESSURE: 112 MMHG | HEART RATE: 86 BPM

## 2025-04-16 DIAGNOSIS — H65.01 NON-RECURRENT ACUTE SEROUS OTITIS MEDIA OF RIGHT EAR: Primary | ICD-10-CM

## 2025-04-16 RX ORDER — METHYLPREDNISOLONE 4 MG/1
TABLET ORAL
Qty: 1 KIT | Refills: 0 | Status: SHIPPED | OUTPATIENT
Start: 2025-04-16 | End: 2025-04-22

## 2025-04-16 NOTE — PROGRESS NOTES
Scottie Mireles (:  1955) is a 69 y.o. male, Established patient, here for evaluation of the following chief complaint(s):  Ear Pain (Right ear painful and unable to hear)         Assessment & Plan  1. Right otalgia: Chronic.  - Completed antibiotic course (amoxicillin) without resolution of symptoms; currently taking montelukast and nasal spray.  - Prescribed a course of steroids.  - Referral to ENT if symptoms persist.    2. Skin cancer: History.  - Previous biopsy and surgery performed on the upper part; lower part biopsy postponed due to aneurysm and stent issues.  - Dermatology follow-up to assess the scar and any further concerns.    Follow-up  - Dermatology appointment scheduled for 2025.    Results    1. Non-recurrent acute serous otitis media of right ear  -     methylPREDNISolone (MEDROL DOSEPACK) 4 MG tablet; Take by mouth., Disp-1 kit, R-0Normal    No follow-ups on file.         Subjective   History of Present Illness  The patient presents for evaluation of right ear pain.    Persistent right ear pain is reported, described as similar to the sensation of an ear infection. No associated jaw pain or symptoms suggestive of temporomandibular joint disorder such as clicking or popping sounds are noted. Despite completing a course of amoxicillin, hearing loss and discomfort in the affected ear continue. Symptoms have been managed with montelukast and a nasal spray, both prescribed during the last visit. Steroids have not been recently used, but a positive response to them was reported when previously administered in 2024.    A history of skin cancer in front of the ear is noted, with removal of the affected area. An appointment with a dermatologist is scheduled for 2025. A previous spot removal in the same area is confirmed. A biopsy was recommended for another spot, but it was postponed due to recent stent placement and issues with an aneurysm. A biopsy and subsequent surgery were performed on

## 2025-04-26 ASSESSMENT — ENCOUNTER SYMPTOMS
COUGH: 0
VOMITING: 0
EYE PAIN: 0
CONSTIPATION: 0
ABDOMINAL PAIN: 0
RHINORRHEA: 0
SORE THROAT: 0
BACK PAIN: 0
SHORTNESS OF BREATH: 0
NAUSEA: 0
EYE DISCHARGE: 0
DIARRHEA: 0

## 2025-04-28 DIAGNOSIS — H65.01 NON-RECURRENT ACUTE SEROUS OTITIS MEDIA OF RIGHT EAR: Primary | ICD-10-CM

## 2025-04-28 DIAGNOSIS — R39.14 BENIGN PROSTATIC HYPERPLASIA WITH INCOMPLETE BLADDER EMPTYING: ICD-10-CM

## 2025-04-28 DIAGNOSIS — N40.1 BENIGN PROSTATIC HYPERPLASIA WITH INCOMPLETE BLADDER EMPTYING: ICD-10-CM

## 2025-04-28 RX ORDER — TAMSULOSIN HYDROCHLORIDE 0.4 MG/1
CAPSULE ORAL DAILY
Qty: 90 CAPSULE | Refills: 1 | Status: SHIPPED | OUTPATIENT
Start: 2025-04-28

## 2025-05-01 ENCOUNTER — OFFICE VISIT (OUTPATIENT)
Dept: ENT CLINIC | Age: 70
End: 2025-05-01
Payer: MEDICARE

## 2025-05-01 VITALS
WEIGHT: 247 LBS | DIASTOLIC BLOOD PRESSURE: 88 MMHG | BODY MASS INDEX: 32.74 KG/M2 | HEIGHT: 73 IN | SYSTOLIC BLOOD PRESSURE: 130 MMHG

## 2025-05-01 DIAGNOSIS — H69.91 ETD (EUSTACHIAN TUBE DYSFUNCTION), RIGHT: Primary | ICD-10-CM

## 2025-05-01 PROCEDURE — 3075F SYST BP GE 130 - 139MM HG: CPT | Performed by: NURSE PRACTITIONER

## 2025-05-01 PROCEDURE — 3079F DIAST BP 80-89 MM HG: CPT | Performed by: NURSE PRACTITIONER

## 2025-05-01 PROCEDURE — 1160F RVW MEDS BY RX/DR IN RCRD: CPT | Performed by: NURSE PRACTITIONER

## 2025-05-01 PROCEDURE — 1123F ACP DISCUSS/DSCN MKR DOCD: CPT | Performed by: NURSE PRACTITIONER

## 2025-05-01 PROCEDURE — 1159F MED LIST DOCD IN RCRD: CPT | Performed by: NURSE PRACTITIONER

## 2025-05-01 PROCEDURE — 99203 OFFICE O/P NEW LOW 30 MIN: CPT | Performed by: NURSE PRACTITIONER

## 2025-05-01 RX ORDER — LEVOCETIRIZINE DIHYDROCHLORIDE 5 MG/1
5 TABLET, FILM COATED ORAL NIGHTLY
Qty: 30 TABLET | Refills: 1 | Status: SHIPPED | OUTPATIENT
Start: 2025-05-01

## 2025-05-01 ASSESSMENT — ENCOUNTER SYMPTOMS
ALLERGIC/IMMUNOLOGIC NEGATIVE: 1
GASTROINTESTINAL NEGATIVE: 1
EYES NEGATIVE: 1
RESPIRATORY NEGATIVE: 1

## 2025-05-01 NOTE — PROGRESS NOTES
2025    Scottie Mireles (:  1955) is a 69 y.o. male, Established patient, here for evaluation of the following chief complaint(s):  New Patient (Right ear )      Vitals:    25 1416   BP: 130/88   Weight: 112 kg (247 lb)   Height: 1.854 m (6' 0.99\")       Wt Readings from Last 3 Encounters:   25 112 kg (247 lb)   25 112 kg (247 lb)   25 110.9 kg (244 lb 6.4 oz)       BP Readings from Last 3 Encounters:   25 130/88   25 112/66   25 110/64         SUBJECTIVE/OBJECTIVE:    Patient seen today for his right ear. He has been having ear problems for about 2 months. He does take montelukast and Flonase currently. He reports he was initially given an antibiotic and then was given a medrol dose pack a couple weeks ago. He does feel his ear has improved, but does still have some fullness, occasional pain, and some muffled hearing. He denies ear drainage or tinnitus.         Review of Systems   Constitutional: Negative.    HENT:  Positive for ear pain (fullness and occasional pain, right) and hearing loss (muffled, right). Negative for ear discharge and tinnitus.    Eyes: Negative.    Respiratory: Negative.     Cardiovascular: Negative.    Gastrointestinal: Negative.    Endocrine: Negative.    Musculoskeletal: Negative.    Skin: Negative.    Allergic/Immunologic: Negative.    Neurological: Negative.    Hematological: Negative.    Psychiatric/Behavioral: Negative.          Physical Exam  Vitals reviewed.   Constitutional:       Appearance: Normal appearance. He is normal weight.   HENT:      Head: Normocephalic and atraumatic.      Right Ear: Tympanic membrane, ear canal and external ear normal.      Left Ear: Tympanic membrane, ear canal and external ear normal.      Nose: Nose normal.      Mouth/Throat:      Mouth: Mucous membranes are moist.   Eyes:      Extraocular Movements: Extraocular movements intact.      Pupils: Pupils are equal, round, and reactive to light.

## 2025-05-10 DIAGNOSIS — E11.65 TYPE 2 DIABETES MELLITUS WITH HYPERGLYCEMIA, WITHOUT LONG-TERM CURRENT USE OF INSULIN (HCC): ICD-10-CM

## 2025-05-12 RX ORDER — CLOPIDOGREL BISULFATE 75 MG/1
75 TABLET ORAL DAILY
Qty: 90 TABLET | Refills: 1 | Status: SHIPPED | OUTPATIENT
Start: 2025-05-12

## 2025-05-12 RX ORDER — TAMSULOSIN HYDROCHLORIDE 0.4 MG/1
0.4 CAPSULE ORAL 2 TIMES DAILY
Qty: 180 CAPSULE | Refills: 1 | Status: SHIPPED | OUTPATIENT
Start: 2025-05-12

## 2025-05-12 NOTE — TELEPHONE ENCOUNTER
Last OV- 3/25/2025    Requested Prescriptions     Pending Prescriptions Disp Refills    metFORMIN (GLUCOPHAGE) 1000 MG tablet [Pharmacy Med Name: METFORMIN HCL 1,000 MG TABLET] 180 tablet 1     Sig: TAKE 1 TABLET BY MOUTH TWICE A DAY WITH MEALS

## 2025-05-29 ENCOUNTER — OFFICE VISIT (OUTPATIENT)
Dept: ENT CLINIC | Age: 70
End: 2025-05-29
Payer: MEDICARE

## 2025-05-29 VITALS — SYSTOLIC BLOOD PRESSURE: 130 MMHG | WEIGHT: 247 LBS | DIASTOLIC BLOOD PRESSURE: 68 MMHG | BODY MASS INDEX: 32.59 KG/M2

## 2025-05-29 DIAGNOSIS — H69.91 ETD (EUSTACHIAN TUBE DYSFUNCTION), RIGHT: Primary | ICD-10-CM

## 2025-05-29 PROCEDURE — 3075F SYST BP GE 130 - 139MM HG: CPT | Performed by: NURSE PRACTITIONER

## 2025-05-29 PROCEDURE — 92504 EAR MICROSCOPY EXAMINATION: CPT | Performed by: NURSE PRACTITIONER

## 2025-05-29 PROCEDURE — 1123F ACP DISCUSS/DSCN MKR DOCD: CPT | Performed by: NURSE PRACTITIONER

## 2025-05-29 PROCEDURE — 1160F RVW MEDS BY RX/DR IN RCRD: CPT | Performed by: NURSE PRACTITIONER

## 2025-05-29 PROCEDURE — 1159F MED LIST DOCD IN RCRD: CPT | Performed by: NURSE PRACTITIONER

## 2025-05-29 PROCEDURE — 3078F DIAST BP <80 MM HG: CPT | Performed by: NURSE PRACTITIONER

## 2025-05-29 PROCEDURE — 99213 OFFICE O/P EST LOW 20 MIN: CPT | Performed by: NURSE PRACTITIONER

## 2025-05-29 RX ORDER — LEVOCETIRIZINE DIHYDROCHLORIDE 5 MG/1
5 TABLET, FILM COATED ORAL NIGHTLY
Qty: 30 TABLET | Refills: 1 | Status: SHIPPED | OUTPATIENT
Start: 2025-05-29

## 2025-05-29 RX ORDER — AZELASTINE 1 MG/ML
1 SPRAY, METERED NASAL 2 TIMES DAILY
Qty: 60 ML | Refills: 1 | Status: SHIPPED | OUTPATIENT
Start: 2025-05-29

## 2025-05-29 ASSESSMENT — ENCOUNTER SYMPTOMS
RESPIRATORY NEGATIVE: 1
EYES NEGATIVE: 1
ALLERGIC/IMMUNOLOGIC NEGATIVE: 1
GASTROINTESTINAL NEGATIVE: 1

## 2025-05-29 NOTE — PROGRESS NOTES
2025    Scottie Mireles (:  1955) is a 69 y.o. male, Established patient, here for evaluation of the following chief complaint(s):  Follow-up      Vitals:    25 0913   BP: 130/68   Weight: 112 kg (247 lb)       Wt Readings from Last 3 Encounters:   25 112 kg (247 lb)   25 112 kg (247 lb)   25 112 kg (247 lb)       BP Readings from Last 3 Encounters:   25 130/68   25 130/88   25 112/66         SUBJECTIVE/OBJECTIVE:    Patient seen today for follow up of his right ear. He was given Xyzal at his last visit. His on montelukast and Flonase currently. He reports that he may be a little better but still has fullness, occasional pain, and some muffled hearing. He denies tinnitus or ear drainage.         Review of Systems   Constitutional: Negative.    HENT:  Positive for ear pain (fullness and occasionl pain on right) and hearing loss (muffled, right). Negative for ear discharge and tinnitus.    Eyes: Negative.    Respiratory: Negative.     Cardiovascular: Negative.    Gastrointestinal: Negative.    Endocrine: Negative.    Musculoskeletal: Negative.    Skin: Negative.    Allergic/Immunologic: Negative.    Neurological: Negative.    Hematological: Negative.    Psychiatric/Behavioral: Negative.          Physical Exam  Vitals reviewed.   Constitutional:       Appearance: Normal appearance. He is normal weight.   HENT:      Head: Normocephalic and atraumatic.      Right Ear: Tympanic membrane, ear canal and external ear normal.      Left Ear: Tympanic membrane, ear canal and external ear normal. No mastoid tenderness.      Nose: Nose normal.      Mouth/Throat:      Mouth: Mucous membranes are moist.   Eyes:      Extraocular Movements: Extraocular movements intact.      Pupils: Pupils are equal, round, and reactive to light.   Pulmonary:      Effort: Pulmonary effort is normal.   Musculoskeletal:      Cervical back: Normal range of motion.   Skin:     General: Skin is warm

## 2025-06-17 ENCOUNTER — TELEPHONE (OUTPATIENT)
Dept: GASTROENTEROLOGY | Age: 70
End: 2025-06-17

## 2025-06-17 DIAGNOSIS — K21.9 GASTROESOPHAGEAL REFLUX DISEASE, UNSPECIFIED WHETHER ESOPHAGITIS PRESENT: ICD-10-CM

## 2025-06-17 DIAGNOSIS — R13.10 DYSPHAGIA, UNSPECIFIED TYPE: ICD-10-CM

## 2025-06-17 RX ORDER — SUCRALFATE 1 G/1
1 TABLET ORAL 4 TIMES DAILY PRN
Qty: 360 TABLET | Refills: 0 | Status: SHIPPED | OUTPATIENT
Start: 2025-06-17

## 2025-06-17 NOTE — TELEPHONE ENCOUNTER
06- [facility] requested a refill 90 day rx for Sucralfate 1gm tablet #360     Routed to MARQUEZ PEREZ

## 2025-06-24 ENCOUNTER — OFFICE VISIT (OUTPATIENT)
Dept: GASTROENTEROLOGY | Age: 70
End: 2025-06-24
Payer: MEDICARE

## 2025-06-24 VITALS
OXYGEN SATURATION: 97 % | DIASTOLIC BLOOD PRESSURE: 80 MMHG | SYSTOLIC BLOOD PRESSURE: 132 MMHG | HEIGHT: 72 IN | WEIGHT: 249 LBS | HEART RATE: 77 BPM | BODY MASS INDEX: 33.72 KG/M2

## 2025-06-24 DIAGNOSIS — Z86.0100 HX OF COLONIC POLYPS: ICD-10-CM

## 2025-06-24 DIAGNOSIS — K21.9 CHRONIC GERD: Primary | ICD-10-CM

## 2025-06-24 DIAGNOSIS — R13.10 DYSPHAGIA, UNSPECIFIED TYPE: ICD-10-CM

## 2025-06-24 LAB
ALBUMIN SERPL-MCNC: 4.2 G/DL (ref 3.5–5.2)
ANION GAP SERPL CALCULATED.3IONS-SCNC: 13 MMOL/L (ref 8–16)
BUN SERPL-MCNC: 15 MG/DL (ref 8–23)
CALCIUM SERPL-MCNC: 9.8 MG/DL (ref 8.8–10.2)
CHLORIDE SERPL-SCNC: 103 MMOL/L (ref 98–107)
CO2 SERPL-SCNC: 20 MMOL/L (ref 22–29)
CREAT SERPL-MCNC: 1.1 MG/DL (ref 0.7–1.2)
GLUCOSE SERPL-MCNC: 192 MG/DL (ref 70–99)
PHOSPHATE SERPL-MCNC: 2.9 MG/DL (ref 2.5–4.5)
POTASSIUM SERPL-SCNC: 4.1 MMOL/L (ref 3.5–5.1)
SODIUM SERPL-SCNC: 136 MMOL/L (ref 136–145)

## 2025-06-24 PROCEDURE — 1123F ACP DISCUSS/DSCN MKR DOCD: CPT | Performed by: NURSE PRACTITIONER

## 2025-06-24 PROCEDURE — 99214 OFFICE O/P EST MOD 30 MIN: CPT | Performed by: NURSE PRACTITIONER

## 2025-06-24 PROCEDURE — 3079F DIAST BP 80-89 MM HG: CPT | Performed by: NURSE PRACTITIONER

## 2025-06-24 PROCEDURE — 1159F MED LIST DOCD IN RCRD: CPT | Performed by: NURSE PRACTITIONER

## 2025-06-24 PROCEDURE — 3075F SYST BP GE 130 - 139MM HG: CPT | Performed by: NURSE PRACTITIONER

## 2025-06-24 ASSESSMENT — ENCOUNTER SYMPTOMS
CONSTIPATION: 0
VOMITING: 0
DIARRHEA: 0
ABDOMINAL PAIN: 0
ANAL BLEEDING: 0
RECTAL PAIN: 0
TROUBLE SWALLOWING: 1
NAUSEA: 0
CHOKING: 0
COUGH: 0
SHORTNESS OF BREATH: 0
BLOOD IN STOOL: 0
ABDOMINAL DISTENTION: 0

## 2025-06-24 NOTE — PROGRESS NOTES
Subjective:     Patient ID: Scottie Mirelse is a 69 y.o. male  PCP: Modesto Mcmullen MD  Referring Provider: No ref. provider found    HPI  Patient presents to the office today with the following complaints: Medication Refill, Colonoscopy, and EGD      History of Present Illness  The patient presents for a follow-up visit.    He reports overall good health, with the exception of occasional difficulty in swallowing. Reflux symptoms are well-managed with Carafate, although the dosage has been reduced from 4 to 2 tablets daily. He is due for a colonoscopy and endoscopy. He reports no significant bowel issues, but notes a decrease in regularity compared to his younger years. No blood has been observed in his stool. He has a history of colon polyps.    He has been on Plavix and Lipitor since 08/2024, following the placement of 6 stents. Complications with blood thinners have been experienced in the past, and there is a possibility of discontinuing these medications after a 6-month period.    He is not currently undergoing dialysis, having received a transplant 15 years ago.    PAST SURGICAL HISTORY:  Placement of 6 stents  Kidney transplant      Last EGD 1/10/2020 - Sliding hiatal hernia, healed previous antral ulcer-Gastritis   Last Colonoscopy 6/2017 - Diverticulosis, internal hemorrhoids, 5yr recall    Denies any family hx colon cancer or colon polyps    Results      Assessment:     1. Chronic GERD    2. Dysphagia, unspecified type    3. Hx of colonic polyps              Plan:     Assessment & Plan  1. Gastroesophageal Reflux Disease (GERD): Stable.  - Reflux controlled with current medication regimen.  - Carafate taken twice a day, reduced from four times a day.  - Prescription for Carafate provided for a 3-month period.  - Refill to be picked up from pharmacy.  - Contact office for another prescription if needed.    2. Dysphagia: Occasional.  - Endoscopy to be scheduled for further investigation.  - Procedure to be done at

## 2025-06-25 ENCOUNTER — TELEPHONE (OUTPATIENT)
Dept: CARDIOLOGY CLINIC | Age: 70
End: 2025-06-25

## 2025-06-25 NOTE — TELEPHONE ENCOUNTER
Date: 7/11/25    Cardiologist: Gio    Procedure: Endo & Colon    Surgeon: Charles    Last Office Visit: 12/6/24  Reason for office visit and medical concerns addressed at this office visit: h/o watchman, afib, dm, ckd, htn, chf, hyperlipidemia    Testing Performed and Date of Service:  8/12/24 Cath Successful PCI of distal RCA (3.0 x 22 mm [place] frontier) utilizing drug-eluting stent.  Successful PCI of RPDA (2.5 x 12 mm and 2.5 x 8 mm [place] frontier stents) utilizing drug-eluting stents.    Does the patient have a stent? If so, what type?  VAHID 8/12/24    Current Medications: flomax, envarsus, carafate, sodium bicarb, propranolol, fish oil, singulair, metformin, xyzal, norco, lasix, flonase, jardiance, aspirin, plavix, atorvastatin,     Is the patient currently taking an anticoagulant? If so, what is the diagnosis the patient has been given to warrant the need for the anticoagulant? Plavix for VAHID 8/12/24    Additional Notes: requesting to hold plavix

## 2025-06-26 LAB — TACROLIMUS BLD-MCNC: 5.9 NG/ML

## 2025-06-27 ENCOUNTER — OFFICE VISIT (OUTPATIENT)
Dept: ENT CLINIC | Age: 70
End: 2025-06-27

## 2025-06-27 VITALS — SYSTOLIC BLOOD PRESSURE: 130 MMHG | DIASTOLIC BLOOD PRESSURE: 76 MMHG

## 2025-06-27 DIAGNOSIS — H69.91 ETD (EUSTACHIAN TUBE DYSFUNCTION), RIGHT: Primary | ICD-10-CM

## 2025-06-27 RX ORDER — MAGNESIUM 64 MG (MAGNESIUM CHLORIDE) TABLET,DELAYED RELEASE
1 DAILY
COMMUNITY
Start: 2025-06-03

## 2025-06-27 ASSESSMENT — ENCOUNTER SYMPTOMS
EYES NEGATIVE: 1
ALLERGIC/IMMUNOLOGIC NEGATIVE: 1
RESPIRATORY NEGATIVE: 1
GASTROINTESTINAL NEGATIVE: 1

## 2025-06-27 NOTE — PROGRESS NOTES
2025    Scottie Mireles (:  1955) is a 69 y.o. male, Established patient, here for evaluation of the following chief complaint(s):  Follow-up (Right ear)      Vitals:    25 1030   BP: 130/76       Wt Readings from Last 3 Encounters:   25 112.9 kg (249 lb)   25 112 kg (247 lb)   25 112 kg (247 lb)       BP Readings from Last 3 Encounters:   25 130/76   25 132/80   25 130/68         SUBJECTIVE/OBJECTIVE:    Patient seen today for follow up of his right ear. He was started on Astelin at his last visit. He is currently on Xyzal, montelukast, and Flonase as well. He does feel his ear is doing a little better. He does still complain of right sided fullness, muffled hearing, and intermittent pain. He takes Tylenol for the pain occasionally. He denies tinnitus or drainage. He denies his pain being worse with chewing. He reports he is unable to take anti-inflammatories. He does have a skin lesion in the right preauricular region that he reports has been evaluated by a dermatologist that he reports is going to be biopsied on the  of next month.         Review of Systems   Constitutional: Negative.    HENT:  Positive for ear pain (fullness and pain on right) and hearing loss (muffled, right). Negative for ear discharge and tinnitus.    Eyes: Negative.    Respiratory: Negative.     Cardiovascular: Negative.    Gastrointestinal: Negative.    Endocrine: Negative.    Musculoskeletal: Negative.    Skin: Negative.    Allergic/Immunologic: Negative.    Neurological: Negative.    Hematological: Negative.    Psychiatric/Behavioral: Negative.          Physical Exam  Vitals reviewed.   Constitutional:       Appearance: Normal appearance. He is normal weight.   HENT:      Head: Normocephalic and atraumatic.        Comments: lesion     Right Ear: Tympanic membrane, ear canal and external ear normal.      Left Ear: Tympanic membrane, ear canal and external ear normal.      Nose:

## 2025-06-30 ENCOUNTER — TELEPHONE (OUTPATIENT)
Dept: GASTROENTEROLOGY | Age: 70
End: 2025-06-30

## 2025-06-30 NOTE — TELEPHONE ENCOUNTER
Patient: Scottie Mireles    YOB: 1955      Clearance was received on June 30, 2025.    for Endoscopy / Colonoscopy scheduled for: EGD/CLN    Patient may discontinue the use of Plavix  for 5  days prior to the procedure.    IS Lovenox required:  no     PATIENT NOTIFIED ON:  6.30.25      Clearance scanned into chart

## 2025-07-07 LAB
ALBUMIN SERPL-MCNC: 4.3 G/DL (ref 3.5–5.2)
ANION GAP SERPL CALCULATED.3IONS-SCNC: 13 MMOL/L (ref 8–16)
BUN SERPL-MCNC: 19 MG/DL (ref 8–23)
CALCIUM SERPL-MCNC: 9.6 MG/DL (ref 8.8–10.2)
CHLORIDE SERPL-SCNC: 101 MMOL/L (ref 98–107)
CO2 SERPL-SCNC: 23 MMOL/L (ref 22–29)
CREAT SERPL-MCNC: 1 MG/DL (ref 0.7–1.2)
GLUCOSE SERPL-MCNC: 145 MG/DL (ref 70–99)
PHOSPHATE SERPL-MCNC: 3.5 MG/DL (ref 2.5–4.5)
POTASSIUM SERPL-SCNC: 4.3 MMOL/L (ref 3.5–5.1)
SODIUM SERPL-SCNC: 137 MMOL/L (ref 136–145)

## 2025-07-08 ENCOUNTER — TELEPHONE (OUTPATIENT)
Dept: GASTROENTEROLOGY | Age: 70
End: 2025-07-08

## 2025-07-08 NOTE — TELEPHONE ENCOUNTER
Called patient to confirm procedure scheduled for   7.11.25 @11  With  at [facility]        Patient confirmed and has prep

## 2025-07-10 LAB — TACROLIMUS BLD-MCNC: 5.6 NG/ML

## 2025-07-11 ENCOUNTER — TELEPHONE (OUTPATIENT)
Dept: GASTROENTEROLOGY | Age: 70
End: 2025-07-11

## 2025-07-11 ENCOUNTER — HOSPITAL ENCOUNTER (OUTPATIENT)
Age: 70
Setting detail: OUTPATIENT SURGERY
Discharge: HOME | End: 2025-07-11
Attending: INTERNAL MEDICINE | Admitting: INTERNAL MEDICINE
Payer: MEDICARE

## 2025-07-11 ENCOUNTER — ANESTHESIA (OUTPATIENT)
Dept: ENDOSCOPY | Age: 70
End: 2025-07-11
Payer: MEDICARE

## 2025-07-11 ENCOUNTER — ANCILLARY PROCEDURE (OUTPATIENT)
Dept: ENDOSCOPY | Age: 70
End: 2025-07-11
Attending: INTERNAL MEDICINE
Payer: MEDICARE

## 2025-07-11 ENCOUNTER — ANESTHESIA EVENT (OUTPATIENT)
Dept: ENDOSCOPY | Age: 70
End: 2025-07-11
Payer: MEDICARE

## 2025-07-11 VITALS
RESPIRATION RATE: 18 BRPM | BODY MASS INDEX: 33.18 KG/M2 | WEIGHT: 245 LBS | HEIGHT: 72 IN | OXYGEN SATURATION: 96 % | HEART RATE: 77 BPM | SYSTOLIC BLOOD PRESSURE: 111 MMHG | TEMPERATURE: 97.9 F | DIASTOLIC BLOOD PRESSURE: 86 MMHG

## 2025-07-11 DIAGNOSIS — R93.3 ABNORMAL ENDOSCOPY OF UPPER GASTROINTESTINAL TRACT: Primary | ICD-10-CM

## 2025-07-11 DIAGNOSIS — R13.10 DYSPHAGIA, UNSPECIFIED TYPE: ICD-10-CM

## 2025-07-11 DIAGNOSIS — Z86.0100 HISTORY OF COLON POLYPS: ICD-10-CM

## 2025-07-11 DIAGNOSIS — K21.9 CHRONIC GERD: ICD-10-CM

## 2025-07-11 PROBLEM — R13.19 ESOPHAGEAL DYSPHAGIA: Status: ACTIVE | Noted: 2025-07-11

## 2025-07-11 PROBLEM — Z86.0101 HX OF ADENOMATOUS COLONIC POLYPS: Status: ACTIVE | Noted: 2025-07-11

## 2025-07-11 PROBLEM — D50.0 ANEMIA, BLOOD LOSS: Status: ACTIVE | Noted: 2025-07-11

## 2025-07-11 PROBLEM — Z87.11 HX OF GASTRIC ULCER: Status: ACTIVE | Noted: 2025-07-11

## 2025-07-11 LAB
GLUCOSE BLD-MCNC: 132 MG/DL (ref 70–99)
PERFORMED ON: ABNORMAL

## 2025-07-11 PROCEDURE — 3700000001 HC ADD 15 MINUTES (ANESTHESIA): Performed by: INTERNAL MEDICINE

## 2025-07-11 PROCEDURE — 45384 COLONOSCOPY W/LESION REMOVAL: CPT | Performed by: INTERNAL MEDICINE

## 2025-07-11 PROCEDURE — 2709999900 HC NON-CHARGEABLE SUPPLY: Performed by: INTERNAL MEDICINE

## 2025-07-11 PROCEDURE — 88305 TISSUE EXAM BY PATHOLOGIST: CPT | Performed by: PATHOLOGY

## 2025-07-11 PROCEDURE — 3700000000 HC ANESTHESIA ATTENDED CARE: Performed by: INTERNAL MEDICINE

## 2025-07-11 PROCEDURE — 88305 TISSUE EXAM BY PATHOLOGIST: CPT

## 2025-07-11 PROCEDURE — 3609015300 HC ESOPHAGEAL DILATION MALONEY: Performed by: INTERNAL MEDICINE

## 2025-07-11 PROCEDURE — 2580000003 HC RX 258: Performed by: ANESTHESIOLOGY

## 2025-07-11 PROCEDURE — 43239 EGD BIOPSY SINGLE/MULTIPLE: CPT | Performed by: INTERNAL MEDICINE

## 2025-07-11 PROCEDURE — 7100000011 HC PHASE II RECOVERY - ADDTL 15 MIN: Performed by: INTERNAL MEDICINE

## 2025-07-11 PROCEDURE — 43450 DILATE ESOPHAGUS 1/MULT PASS: CPT | Performed by: INTERNAL MEDICINE

## 2025-07-11 PROCEDURE — 82962 GLUCOSE BLOOD TEST: CPT

## 2025-07-11 PROCEDURE — 88342 IMHCHEM/IMCYTCHM 1ST ANTB: CPT | Performed by: PATHOLOGY

## 2025-07-11 PROCEDURE — 7100000010 HC PHASE II RECOVERY - FIRST 15 MIN: Performed by: INTERNAL MEDICINE

## 2025-07-11 PROCEDURE — 3609010400 HC COLONOSCOPY POLYPECTOMY HOT BIOPSY: Performed by: INTERNAL MEDICINE

## 2025-07-11 PROCEDURE — 3609012400 HC EGD TRANSORAL BIOPSY SINGLE/MULTIPLE: Performed by: INTERNAL MEDICINE

## 2025-07-11 PROCEDURE — 6360000002 HC RX W HCPCS

## 2025-07-11 RX ORDER — LIDOCAINE HYDROCHLORIDE 10 MG/ML
INJECTION, SOLUTION INFILTRATION; PERINEURAL
Status: DISCONTINUED | OUTPATIENT
Start: 2025-07-11 | End: 2025-07-11 | Stop reason: SDUPTHER

## 2025-07-11 RX ORDER — GLYCOPYRROLATE 0.2 MG/ML
INJECTION INTRAMUSCULAR; INTRAVENOUS
Status: DISCONTINUED | OUTPATIENT
Start: 2025-07-11 | End: 2025-07-11 | Stop reason: SDUPTHER

## 2025-07-11 RX ORDER — SODIUM CHLORIDE, SODIUM LACTATE, POTASSIUM CHLORIDE, CALCIUM CHLORIDE 600; 310; 30; 20 MG/100ML; MG/100ML; MG/100ML; MG/100ML
INJECTION, SOLUTION INTRAVENOUS CONTINUOUS
Status: DISCONTINUED | OUTPATIENT
Start: 2025-07-11 | End: 2025-07-11 | Stop reason: HOSPADM

## 2025-07-11 RX ORDER — PROPOFOL 10 MG/ML
INJECTION, EMULSION INTRAVENOUS
Status: DISCONTINUED | OUTPATIENT
Start: 2025-07-11 | End: 2025-07-11 | Stop reason: SDUPTHER

## 2025-07-11 RX ADMIN — PROPOFOL 130 MG: 10 INJECTION, EMULSION INTRAVENOUS at 12:10

## 2025-07-11 RX ADMIN — SODIUM CHLORIDE, SODIUM LACTATE, POTASSIUM CHLORIDE, AND CALCIUM CHLORIDE: .6; .31; .03; .02 INJECTION, SOLUTION INTRAVENOUS at 11:13

## 2025-07-11 RX ADMIN — PROPOFOL 250 MG: 10 INJECTION, EMULSION INTRAVENOUS at 12:14

## 2025-07-11 RX ADMIN — GLYCOPYRROLATE 0.2 MG: 0.2 INJECTION, SOLUTION INTRAMUSCULAR; INTRAVENOUS at 12:10

## 2025-07-11 RX ADMIN — LIDOCAINE HYDROCHLORIDE 100 MG: 10 INJECTION, SOLUTION INFILTRATION; PERINEURAL at 12:10

## 2025-07-11 ASSESSMENT — LIFESTYLE VARIABLES: SMOKING_STATUS: 0

## 2025-07-11 ASSESSMENT — PAIN - FUNCTIONAL ASSESSMENT
PAIN_FUNCTIONAL_ASSESSMENT: 0-10
PAIN_FUNCTIONAL_ASSESSMENT: NONE - DENIES PAIN
PAIN_FUNCTIONAL_ASSESSMENT: NONE - DENIES PAIN

## 2025-07-11 NOTE — ANESTHESIA PRE PROCEDURE
Department of Anesthesiology  Preprocedure Note       Name:  Scottie Mireles   Age:  69 y.o.  :  1955                                          MRN:  [id number]         Date:  2025      Surgeon: Surgeon(s):  Anahy Frank MD    Procedure: Procedure(s):  ESOPHAGOGASTRODUODENOSCOPY BIOPSY  COLORECTAL CANCER SCREENING, NOT HIGH RISK    Medications prior to admission:   Prior to Admission medications    Medication Sig Start Date End Date Taking? Authorizing Provider   VITAMIN D PO Take 1 tablet by mouth daily   Yes Karrie Ordonez MD   MAG64 64 MG TBEC extended release tablet Take 1 tablet by mouth daily 6/3/25  Yes Karrie Ordonez MD   sucralfate (CARAFATE) 1 GM tablet Take 1 tablet by mouth 4 times daily as needed (reflux, epigastric pain) 25  Yes Marilyn Deras APRN - NP   azelastine (ASTELIN) 0.1 % nasal spray 1 spray by Nasal route 2 times daily Use in each nostril as directed 25  Yes Bibiana Sweeney APRN - CNP   levocetirizine (XYZAL) 5 MG tablet Take 1 tablet by mouth nightly 25  Yes Bibiana Sweeney APRN - CNP   tamsulosin (FLOMAX) 0.4 MG capsule Take 1 capsule by mouth in the morning and at bedtime 25  Yes Modesto Mcmullen MD   montelukast (SINGULAIR) 10 MG tablet TAKE 1 TABLET BY MOUTH EVERY DAY AT NIGHT 3/20/25  Yes Modesto Mcmullen MD   fluticasone (FLONASE) 50 MCG/ACT nasal spray SPRAY 2 SPRAYS INTO EACH NOSTRIL EVERY DAY 25  Yes Modesto Mcmullen MD   sodium bicarbonate 325 MG tablet Take 2 tablets by mouth 2 times daily 25  Yes Modesto Mcmullen MD   propranolol (INDERAL) 80 MG tablet Take 1 tablet by mouth 2 times daily 24  Yes Yandel Powers MD   acetaminophen (TYLENOL) 500 MG tablet Take 2 tablets by mouth every 6 hours as needed for Pain   Yes Karrie Ordonez MD   tacrolimus ER (ENVARSUS XR) 0.75 MG tablet Take 2 tablets by mouth daily   Yes Karrie Ordonez MD   mycophenolate (CELLCEPT) 250 MG capsule Take 2 capsules by mouth 2 times daily   Yes

## 2025-07-11 NOTE — H&P
Patient Name: Scottie Mireles  : 1955  MRN: [id number]  DATE: 25    Allergies: No Known Allergies     ENDOSCOPY  History and Physical    Procedure:    [] Diagnostic Colonoscopy       [x] Screening Colonoscopy  [x] EGD      [] ERCP      [] EUS       [] Other    [x] Previous office notes/History and Physical reviewed from the patients chart. Please see EMR for further details of HPI. I have examined the patient's status immediately prior to the procedure and:      Indications/HPI:    For both EGD and colonoscopy exams today:     1. Chronic GERD    2. Dysphagia, unspecified type    3. Hx of colonic polyps    4.  History of large gastric antral ulcer with acute GI bleeding and anemia due to GI blood loss in     Last EGD 1/10/2020 - Sliding hiatal hernia, healed previous antral ulcer-Gastritis     Last Colonoscopy 2017 - Diverticulosis, internal hemorrhoids, 5yr recall    Denies any family hx colon cancer or colon polyps     []Abdominal Pain   []Cancer- GI/Lung     []Fhx of colon CA/polyps  []History of Polyps  []Barretts            []Melena  []Abnormal Imaging              []Dysphagia              []Persistent Pneumonia   []Anemia                            []Food Impaction        []History of Polyps  [] GI Bleed             []Pulmonary nodule/Mass   []Change in bowel habits []Heartburn/Reflux  []Rectal Bleed (BRBPR)  []Chest Pain - Non Cardiac []Heme (+) Stool []Ulcers  []Constipation  []Hemoptysis  []Varices  []Diarrhea  []Hypoxemia    []Nausea/Vomiting   []Screening   []Crohns/Colitis  []Other:     Anesthesia:   [x] MAC [] Moderate Sedation   [] General   [] None     ROS: 12 pt Review of Symptoms was negative unless mentioned above    Medications:   Prior to Admission medications    Medication Sig Start Date End Date Taking? Authorizing Provider   VITAMIN D PO Take 1 tablet by mouth daily   Yes Provider, Historical, MD   MAG64 64 MG TBEC extended release tablet Take 1 tablet by mouth daily 6/3/25   07/29/2024    Fractional flow reserve (FFR) performed by Yandel Powers MD at [facility] CARDIAC CATH LAB    CARDIAC PROCEDURE N/A 07/29/2024    Percutaneous coronary intervention performed by Yandel Powers MD at [facility] CARDIAC CATH LAB    CARDIAC PROCEDURE N/A 08/12/2024    Left heart cath / coronary angiography performed by Yandel Powers MD at [facility] CARDIAC CATH LAB    CARDIAC PROCEDURE N/A 08/12/2024    Percutaneous coronary intervention performed by Yandel Powers MD at [facility] CARDIAC CATH LAB    CARDIAC SURGERY      watchman's device    CHOLECYSTECTOMY      COLONOSCOPY  06/23/2017    Dr Lester: Diverticulosis, internal hemorrhoids, 5yr recall    COLONOSCOPY  01/05/2012    Dr Lester-Diverticulosis, 5 yr recall    DIALYSIS FISTULA CREATION  [place] 2007    left arm radial cephalic    HAND SURGERY Left 01/05/2023    EXCISION LEFT HAND LESION AND LEFT ARM LESIONS X2 performed by Joel Gomez DO at [facility] OR    HERNIA REPAIR      umbilical hernia repair    KIDNEY TRANSPLANT      2/18/2010 in [place]    MO INSJ CANNULA HEMO OTH SPX ARVEN XTRNL REVJ/CLSR Right 02/23/2018    AV FISTULA GRAFT REMOVAL performed by James North MD at [facility] OR    SKIN BIOPSY      SKIN CANCER EXCISION      SKIN CANCER EXCISION Left     facial    TONSILLECTOMY      TUNNELED VENOUS CATHETER PLACEMENT  multiple    UPPER GASTROINTESTINAL ENDOSCOPY N/A 11/04/2019    Dr Joy (Dr Lester pt)1.2 cm superficial gastric ulcer in the antrum with surrounding moderate gastritis and oozing of blood    UPPER GASTROINTESTINAL ENDOSCOPY  10/05/2009    Dr Lester-Normal w/resolution of ulcer, lenin +    UPPER GASTROINTESTINAL ENDOSCOPY  07/10/2009    Dr Lester-Large ulceration w/pigmented base on posterior wall of the body, small erosions, active gastritis    UPPER GASTROINTESTINAL ENDOSCOPY N/A 01/10/2020    Dr Frnak-Sliding hiatal hernia, healed previous antral ulcer-Gastritis    VASCULAR SURGERY Right [place] 2008    av loop graft    VASCULAR SURGERY  09/09/2024

## 2025-07-11 NOTE — DISCHARGE INSTRUCTIONS
1.  Await path results, the patient will be contacted in 7-10 days with biopsy results.   2.  Magic mouthwash 5 ml PO Swish and swallow q3h PRN ONLY IF patient has post-procedural sorethroat or chest pain.  3. Full liquids to soft diet today upon discharge from the surgicenter; may advance diet starting in AM tomorrow.  4. May resume other meds except any NSAIDs; may use cough drops or lozenges PRN; also continue meds for GERD with anti-GERD measures.  5.  Avoid NSAIDs strictly due to his history of renal disease, bleeding ulcers and findings on endoscopy today; may use Tylenol for 100 mg p.o. every 4 hours as needed   6. OP f/u in 6-8 weeks with Ms. Deras; will consider an Esophageal manometry later if the patient's dysphagia persists.     7.  Schedule outpatient nuclear med gastric emptying scan study in the next few days.  8.  Repeat EGD in 2 weeks with patient remaining on a strict liquid diet on the day prior to the procedure.    9. Repeat colonoscopy: pending pathology -based on colonoscopy findings today and prep quality, in 3 years with a strict 2-day clear liquid diet and a 2-day prep using Plenvu or a similar solution assisted by Reglan 5 mg or Zofran 4 mg p.o. every 6 hours as needed; sooner if you were to manifest any lower GI symptoms such as bleeding, abdominal pain, change in bowel habits or stool caliber or if the patient has anemia or unexplained weight loss in the future.      Upper GI Endoscopy and Colonoscopy: What to Expect at Home  Your Recovery    After an upper GI endoscopy, you may have a sore throat for a day or two after the test.    After a colonoscopy you may be bloated or have gas pains. You may need to pass gas. If a biopsy was done or a polyp was removed, you may have streaks of blood in your stool (feces) for a few days. Problems such as heavy rectal bleeding may not occur until several weeks after the test. This isn't common. But it can happen after polyps are removed.    How  that you are taking any NSAID's notify your physician.     Over The Counter    Advil                      Motrin  Nuprin                   Ibuprofen  Midol                     Aleve  Naproxen              Orudis  Aspirin                   Lindy-Martell    Prescriptions and Generics    Cataflam              Relafen  Voltaren               Clinoril  Indocin                 Naproxen  Arthrotec              Lodine  Daypro                 Nalfon  Toradol                Ansaid  Feldene               Meclofenamate  Fenoprofen          Ponstel  Mobic                   Celebrex  Vioxx      ANTI-REFLUX PRECAUTIONS    If you are overweight, you should lose weight. Losing just 5 to 10 pounds can help.  Diet restrictions help control symptoms.  A low caffeine diet is recommended.  Avoid foods that make symptoms worse. These may include chocolate, mint, alcohol, pepper, spicy foods, high-fat foods, or drinks with caffeine in them, such as tea, coffee, valeria, or energy drinks. Alcohol and fatty foods induce reflux.  It is best to eat several small meals instead of two or three large meals.  Chew food properly.  Do not eat or drink 3 to 4 hours before you lie down.  Night-time treatment is especially important. If you have reflux symptoms at night   A.   Sleep with the head of your bed elevated 6 to 8 inches by putting the frame on blocks or placing a foam wedge under the head of the mattress. (Adding extra pillows does not work.)   B.   Sleep on your left side to help reduce reflux and aspiration.  5.  Stop all forms of nicotine (cigarettes, chew, snuff, etc.). Smoking can make reflux symptoms worse.  6.  Do not wear tight clothing around your middle.  7.  Take your medicines exactly as prescribed.

## 2025-07-11 NOTE — TELEPHONE ENCOUNTER
7-  Per Dr Frank EGD op note recommendations             Routed to Roby Vargas to schedule EGD in 2 weeks

## 2025-07-11 NOTE — ANESTHESIA POSTPROCEDURE EVALUATION
Department of Anesthesiology  Postprocedure Note    Patient: Scottie Mireles  MRN: [id number]  YOB: 1955  Date of evaluation: 7/11/2025    Procedure Summary       Date: 07/11/25 Room / Location: [place] [address] / [facility]    Anesthesia Start: 1206 Anesthesia Stop:     Procedures:       ESOPHAGOGASTRODUODENOSCOPY BIOPSY (Abdomen)      COLONOSCOPY POLYPECTOMY HOT BIOPSY      ESOPHAGEAL DILATION PHILLIP Diagnosis:       Chronic GERD      Dysphagia, unspecified type      History of colon polyps      (Chronic GERD [K21.9])      (Dysphagia, unspecified type [R13.10])      (History of colon polyps [Z86.0100])    Surgeons: Anahy Frank MD Responsible Provider: Mónica Cook APRN - CRNA    Anesthesia Type: General, TIVA ASA Status: 3            Anesthesia Type: General, TIVA    Harsha Phase I: Harsha Score: 10    Harsha Phase II:      Anesthesia Post Evaluation    Patient location during evaluation: bedside  Patient participation: complete - patient participated  Level of consciousness: sleepy but conscious  Pain score: 0  Airway patency: patent  Nausea & Vomiting: no nausea and no vomiting  Cardiovascular status: hemodynamically stable  Respiratory status: room air and spontaneous ventilation  Hydration status: stable  Comments: BP (!) 91/50   Pulse 92   Temp 97.6 °F (36.4 °C) (Temporal)   Resp 19   Ht 1.829 m (6')   Wt 111.1 kg (245 lb)   SpO2 96%   BMI 33.23 kg/m²     Pain management: adequate    No notable events documented.

## 2025-07-11 NOTE — OP NOTE
Patient: Scottie Mireles : 1955  Med Rec#: [id number] Acc#: [id number]   Primary Care Provider Modesto Mcmullen MD    Date of Procedure:  2025    Endoscopist: Anahy Frank MD, MD    Referring Provider: Modesto Mcmullen MD,     Operation Performed: Colonoscopy up to cecum with    Hot forceps polypectomy of a small sessile cecal polyp    Indications: For both EGD and colonoscopy exams today:     1. Chronic GERD    2. Dysphagia, unspecified type    3. Hx of colonic polyps    4.  History of large gastric antral ulcer with acute GI bleeding and anemia due to GI blood loss in 2019    Last EGD 1/10/2020 - Sliding hiatal hernia, healed previous antral ulcer-Gastritis     Last Colonoscopy 2017 - Diverticulosis, internal hemorrhoids, 5yr recall    Denies any family hx colon cancer or colon polyps       Anesthesia:  Sedation was administered by anesthesia who monitored the patient during the procedure.    I met with Scottie Mireles prior to procedure. We discussed the procedure itself, and I have discussed the risks of endoscopy (including-- but not limited to-- pain, discomfort, bleeding potentially requiring second endoscopic procedure and/or blood transfusion, organ perforation requiring operative repair, damage to organs near the colon, infection, aspiration, cardiopulmonary/allergic reaction), benefits, indications to endoscopy. Additionally, we discussed options other than colonoscopy. The patient expressed understanding. All questions answered. The patient decided to proceed with the procedure.  Signed informed consent was placed on the chart.    Blood Loss: minimal    Withdrawal time: More than 6 minutes  Bowel Prep: Fair  with moderate amounts of thick solid and semisolid stool and a moderate amount of thick, opaque liquid scattered in patchy segments throughout the colon obscuring the underlying mucosa.Lesions including polyps may have been missed.     Complications: no immediate  or if the patient has anemia or unexplained weight loss in the future.     2. Await biopsy results-you will receive a letter with your results within 7-10 days    - Resume previous meds     - Keep scheduled f/u appts with other MDs     Findings and recommendations were discussed w/ the patient. A copy of the images was provided.    (Please note that portions of this note were completed with a voice recognition program. Efforts were made to edit the dictations but occasionally words may be mis-transcribed.)     Anahy Frank MD, MD  7/11/2025  12:00 PM

## 2025-07-11 NOTE — OP NOTE
Endoscopic Procedure Note    Patient: Scottie Mireles : 1955  Med Rec#: [id number] Acc#: [id number]     Primary Care Provider Modesto Mcmullen MD    Endoscopist: Anahy Frank MD, MD    Date of Procedure:  2025    Procedure:   EGD with    A Hopkins bougie dilation of esophagus and  Cold biopsies    Indications:   For both EGD and colonoscopy exams today:     1. Chronic GERD    2. Dysphagia, unspecified type    3. Hx of colonic polyps    4.  History of large gastric antral ulcer with acute GI bleeding and anemia due to GI blood loss in     Last EGD 1/10/2020 - Sliding hiatal hernia, healed previous antral ulcer-Gastritis     Last Colonoscopy 2017 - Diverticulosis, internal hemorrhoids, 5yr recall    Denies any family hx colon cancer or colon polyps       Anesthesia:  Sedation was administered by anesthesia who monitored the patient during the procedure.    Estimated Blood Loss: minimal    Procedure:   After reviewing the patient's chart and obtaining informed consent, the patient was placed in the left lateral decubitus position.  A forward-viewing Olympus endoscope was lubricated and inserted through the mouth into the oropharynx. Under direct visualization, the upper esophagus was intubated. The scope was advanced to the level of the third portion of duodenum. Scope was slowly withdrawn with careful inspection of the mucosal surfaces. The scope was retroflexed for inspection of the gastric fundus and incisura. Findings and maneuvers are listed in impression below.     Next, a lubricated Hopkins weighted Bougie dilator-54 Fr was gently introduced into the patient's mouth and passed into the Esophagus and into the proximal stomach without much resistance and then withdrawn. Repeat EGD was performed to verify dilation and scope tip was passed into the stomach. NO evidence of perforation or excessive bleeding was noted subsequent to the dilation.        The patient tolerated the procedure

## 2025-07-14 ENCOUNTER — TELEPHONE (OUTPATIENT)
Dept: GASTROENTEROLOGY | Age: 70
End: 2025-07-14

## 2025-07-14 NOTE — TELEPHONE ENCOUNTER
07- Called the patient notified that LT CHRIS reviewed the chart as that MARQUEZ PEREZ is out of the office recommended that he have them both done.

## 2025-07-14 NOTE — TELEPHONE ENCOUNTER
Scottie requests that a nurse return their call. The best time to reach him is Anytime. Scottie is asking if he needs to wait until after he has his NM Gastric Empty tests 8/24/25 to have his endoscopy done. Patient is scheduled on 7/28/25 for EGD. Please contact patient to advise.    Thank you.

## 2025-07-15 NOTE — TELEPHONE ENCOUNTER
Last OV- 4/16/2025    Requested Prescriptions     Pending Prescriptions Disp Refills    empagliflozin (JARDIANCE) 25 MG tablet 90 tablet 1     Sig: Take 1 tablet by mouth daily

## 2025-07-22 ENCOUNTER — TELEPHONE (OUTPATIENT)
Dept: GASTROENTEROLOGY | Age: 70
End: 2025-07-22

## 2025-07-22 NOTE — TELEPHONE ENCOUNTER
Called patient to confirm procedure scheduled for  7.28.25 @12  With  at [facility]       Patient confirmed

## 2025-07-23 ENCOUNTER — OFFICE VISIT (OUTPATIENT)
Dept: CARDIOLOGY CLINIC | Age: 70
End: 2025-07-23
Payer: MEDICARE

## 2025-07-23 VITALS
BODY MASS INDEX: 33.86 KG/M2 | WEIGHT: 250 LBS | DIASTOLIC BLOOD PRESSURE: 78 MMHG | HEART RATE: 81 BPM | HEIGHT: 72 IN | SYSTOLIC BLOOD PRESSURE: 128 MMHG

## 2025-07-23 DIAGNOSIS — I25.10 CORONARY ARTERY DISEASE INVOLVING NATIVE CORONARY ARTERY OF NATIVE HEART WITHOUT ANGINA PECTORIS: ICD-10-CM

## 2025-07-23 DIAGNOSIS — I48.21 PERMANENT ATRIAL FIBRILLATION (HCC): Primary | ICD-10-CM

## 2025-07-23 DIAGNOSIS — I10 ESSENTIAL HYPERTENSION: ICD-10-CM

## 2025-07-23 PROCEDURE — 3078F DIAST BP <80 MM HG: CPT | Performed by: CLINICAL NURSE SPECIALIST

## 2025-07-23 PROCEDURE — 3074F SYST BP LT 130 MM HG: CPT | Performed by: CLINICAL NURSE SPECIALIST

## 2025-07-23 PROCEDURE — 1160F RVW MEDS BY RX/DR IN RCRD: CPT | Performed by: CLINICAL NURSE SPECIALIST

## 2025-07-23 PROCEDURE — 1123F ACP DISCUSS/DSCN MKR DOCD: CPT | Performed by: CLINICAL NURSE SPECIALIST

## 2025-07-23 PROCEDURE — 99214 OFFICE O/P EST MOD 30 MIN: CPT | Performed by: CLINICAL NURSE SPECIALIST

## 2025-07-23 PROCEDURE — 1159F MED LIST DOCD IN RCRD: CPT | Performed by: CLINICAL NURSE SPECIALIST

## 2025-07-23 NOTE — PROGRESS NOTES
[facility] Cardiology  [address] [place] [address]  [address]  Phone: [phone]  Fax: [phone]    OFFICE VISIT:  2025    Scottie Mireles - : 1955    Reason For Visit:  Scottie is a 69 y.o. male who is here for Follow-up and Coronary Artery Disease  1.  Permanent atrial fibrillation with prior DCCV with recurrence, last DCCV 2021 with recurrent GI bleed, status post watchman device left atrial appendage closure 2021.  LV ejection fraction 56%, moderate LVH, negative Lexiscan study 3/2/2018, short runs of SVT.  2.  End-stage renal disease due to hypertensive renal disease status post renal transplantation 2010, left arm AV fistula.  3.  Diabetes mellitus non-insulin-requiring.  4.  Morbid obesity.  5.  Essential tremor.  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  Patient had EGD study earlier this month and has repeat study on the     He is here today in follow-up  He denies any cardiac complaints.  He has recently had some more skin biopsies and is going to have to have a couple places removed      Subjective  Scottie denies exertional chest pain, resting shortness of breath, orthopnea, paroxysmal nocturnal dyspnea, syncope, presyncope, arrhythmia, edema and fatigue.  The patient denies numbness or weakness to suggest cerebrovascular accident or transient ischemic attack.    Modesto Mcmullen MD is PCP and follows labs   Following with Nepology - has labs and apt next week  .  Scottie Mireles has the following history as recorded in [facility]:    Patient Active

## 2025-07-23 NOTE — PATIENT INSTRUCTIONS
Maintain good blood pressure control-goal<130/80 at rest  Maintain good cholesterol control LDL goal<70 with arterial disease  If you are diabetic work to keep/obtain hemoglobin A1c< 7    Follow up in 6-9 mos    Call with any questions or concerns  Follow up with Modesto Mcmullen MD for non cardiac problems  Report any new problems  Cardiovascular Fitness-Exercise as tolerated.  Strive for 30 minutes of exercise most days of the week.    Cardiac / Healthy Diet- Avoid processed high fat foods, maintain low sodium/salt   Continue current medications as directed  Continue plan of treatment  It is always recommended that you bring your medications bottles with you to each visit - this is for your safety!    nylon

## 2025-07-24 ENCOUNTER — PROCEDURE VISIT (OUTPATIENT)
Dept: ENT CLINIC | Age: 70
End: 2025-07-24
Payer: MEDICARE

## 2025-07-24 ENCOUNTER — OFFICE VISIT (OUTPATIENT)
Dept: ENT CLINIC | Age: 70
End: 2025-07-24
Payer: MEDICARE

## 2025-07-24 VITALS
WEIGHT: 249 LBS | DIASTOLIC BLOOD PRESSURE: 80 MMHG | SYSTOLIC BLOOD PRESSURE: 130 MMHG | HEIGHT: 72 IN | BODY MASS INDEX: 33.72 KG/M2

## 2025-07-24 DIAGNOSIS — H90.3 SENSORINEURAL HEARING LOSS (SNHL) OF BOTH EARS: Primary | ICD-10-CM

## 2025-07-24 DIAGNOSIS — C44.90 SKIN CANCER: ICD-10-CM

## 2025-07-24 PROCEDURE — 1159F MED LIST DOCD IN RCRD: CPT | Performed by: OTOLARYNGOLOGY

## 2025-07-24 PROCEDURE — 99213 OFFICE O/P EST LOW 20 MIN: CPT | Performed by: OTOLARYNGOLOGY

## 2025-07-24 PROCEDURE — 92557 COMPREHENSIVE HEARING TEST: CPT | Performed by: AUDIOLOGIST-HEARING AID FITTER

## 2025-07-24 PROCEDURE — 3079F DIAST BP 80-89 MM HG: CPT | Performed by: OTOLARYNGOLOGY

## 2025-07-24 PROCEDURE — 3075F SYST BP GE 130 - 139MM HG: CPT | Performed by: OTOLARYNGOLOGY

## 2025-07-24 PROCEDURE — 1160F RVW MEDS BY RX/DR IN RCRD: CPT | Performed by: OTOLARYNGOLOGY

## 2025-07-24 PROCEDURE — 1123F ACP DISCUSS/DSCN MKR DOCD: CPT | Performed by: OTOLARYNGOLOGY

## 2025-07-24 ASSESSMENT — ENCOUNTER SYMPTOMS
EYES NEGATIVE: 1
RESPIRATORY NEGATIVE: 1
GASTROINTESTINAL NEGATIVE: 1
ALLERGIC/IMMUNOLOGIC NEGATIVE: 1

## 2025-07-24 NOTE — PROGRESS NOTES
2025    Scottie Mireles (:  1955) is a 69 y.o. male, Established patient, here for evaluation of the following chief complaint(s):  New Patient (RT EAR)      Vitals:    25 0846   BP: 130/80   Weight: 112.9 kg (249 lb)   Height: 1.829 m (6')       Wt Readings from Last 3 Encounters:   25 112.9 kg (249 lb)   25 113.4 kg (250 lb)   25 111.1 kg (245 lb)       BP Readings from Last 3 Encounters:   25 130/80   25 128/78   25 111/86         SUBJECTIVE/OBJECTIVE:    Patient seen today for his right ear.  He was being treated for eustachian tube dysfunction had a hearing test today which shows sensorineural hearing loss bilaterally type a tympanograms.  He does have a large skin cancer preauricularly on the right and he said the whole right side of his face is painful along with his ear.        Review of Systems   Constitutional: Negative.    HENT:  Positive for ear pain.    Eyes: Negative.    Respiratory: Negative.     Cardiovascular: Negative.    Gastrointestinal: Negative.    Endocrine: Negative.    Musculoskeletal: Negative.    Skin: Negative.    Allergic/Immunologic: Negative.    Neurological: Negative.    Hematological: Negative.    Psychiatric/Behavioral: Negative.          Physical Exam  Vitals reviewed.   Constitutional:       Appearance: Normal appearance. He is normal weight.   HENT:      Head: Normocephalic and atraumatic.      Right Ear: Tympanic membrane, ear canal and external ear normal.      Left Ear: Tympanic membrane, ear canal and external ear normal.      Ears:        Comments: Large preauricular skin cancer right     Nose: Nose normal.      Mouth/Throat:      Mouth: Mucous membranes are moist.      Pharynx: Oropharynx is clear.   Eyes:      Extraocular Movements: Extraocular movements intact.      Pupils: Pupils are equal, round, and reactive to light.   Cardiovascular:      Rate and Rhythm: Normal rate and regular rhythm.   Pulmonary:      Effort:

## 2025-07-24 NOTE — PROGRESS NOTES
History   Scottie Mireles is a 69 y.o. male who presented to the clinic this date with complaints of troubles with his right ear.    Summary   Tympanometry revealed normal middle ear function bilaterally. Pure tone audiometry revealed normal hearing to moderate SNHL in the left ear, normal hearing to severe SNHL in the right ear.  Word recognition scores good.    Results   Otoscopy:   Right: Clear EAC  Left: Clear EAC    Audiometry:   Right: Severe sloping SNHL  Left: Moderate sloping SNHL         Tympanometry:    Right: Type A  Left: Type A      Plan   Results of today's testing were discussed with Mr. Mireles and the following recommendations were made:    Follow up with ENT as scheduled.  Hearing aid consultation        Audiogram and Acoustic Immittance

## 2025-07-28 ENCOUNTER — ANCILLARY PROCEDURE (OUTPATIENT)
Dept: ENDOSCOPY | Age: 70
End: 2025-07-28
Attending: INTERNAL MEDICINE
Payer: MEDICARE

## 2025-07-28 ENCOUNTER — HOSPITAL ENCOUNTER (OUTPATIENT)
Age: 70
Setting detail: OUTPATIENT SURGERY
Discharge: HOME | End: 2025-07-28
Attending: INTERNAL MEDICINE | Admitting: INTERNAL MEDICINE
Payer: MEDICARE

## 2025-07-28 ENCOUNTER — ANESTHESIA EVENT (OUTPATIENT)
Dept: ENDOSCOPY | Age: 70
End: 2025-07-28
Payer: MEDICARE

## 2025-07-28 ENCOUNTER — ANESTHESIA (OUTPATIENT)
Dept: ENDOSCOPY | Age: 70
End: 2025-07-28
Payer: MEDICARE

## 2025-07-28 VITALS
SYSTOLIC BLOOD PRESSURE: 141 MMHG | OXYGEN SATURATION: 96 % | HEART RATE: 77 BPM | BODY MASS INDEX: 33.72 KG/M2 | DIASTOLIC BLOOD PRESSURE: 82 MMHG | HEIGHT: 72 IN | WEIGHT: 249 LBS | TEMPERATURE: 98 F | RESPIRATION RATE: 20 BRPM

## 2025-07-28 PROBLEM — K44.9 HIATAL HERNIA: Status: ACTIVE | Noted: 2025-07-28

## 2025-07-28 PROBLEM — Z98.890 HISTORY OF ESOPHAGEAL DILATATION: Status: ACTIVE | Noted: 2025-07-28

## 2025-07-28 LAB
GLUCOSE BLD-MCNC: 139 MG/DL (ref 70–99)
PERFORMED ON: ABNORMAL

## 2025-07-28 PROCEDURE — 43235 EGD DIAGNOSTIC BRUSH WASH: CPT | Performed by: INTERNAL MEDICINE

## 2025-07-28 PROCEDURE — 3700000001 HC ADD 15 MINUTES (ANESTHESIA): Performed by: INTERNAL MEDICINE

## 2025-07-28 PROCEDURE — 3700000000 HC ANESTHESIA ATTENDED CARE: Performed by: INTERNAL MEDICINE

## 2025-07-28 PROCEDURE — 6360000002 HC RX W HCPCS: Performed by: NURSE ANESTHETIST, CERTIFIED REGISTERED

## 2025-07-28 PROCEDURE — 2580000003 HC RX 258: Performed by: ANESTHESIOLOGY

## 2025-07-28 PROCEDURE — 2709999900 HC NON-CHARGEABLE SUPPLY: Performed by: INTERNAL MEDICINE

## 2025-07-28 PROCEDURE — 7100000010 HC PHASE II RECOVERY - FIRST 15 MIN: Performed by: INTERNAL MEDICINE

## 2025-07-28 PROCEDURE — 82962 GLUCOSE BLOOD TEST: CPT

## 2025-07-28 PROCEDURE — 3609017100 HC EGD: Performed by: INTERNAL MEDICINE

## 2025-07-28 RX ORDER — FENTANYL CITRATE 50 UG/ML
INJECTION, SOLUTION INTRAMUSCULAR; INTRAVENOUS
Status: DISCONTINUED | OUTPATIENT
Start: 2025-07-28 | End: 2025-07-28 | Stop reason: SDUPTHER

## 2025-07-28 RX ORDER — PROPOFOL 10 MG/ML
INJECTION, EMULSION INTRAVENOUS
Status: DISCONTINUED | OUTPATIENT
Start: 2025-07-28 | End: 2025-07-28 | Stop reason: SDUPTHER

## 2025-07-28 RX ORDER — LIDOCAINE HYDROCHLORIDE 10 MG/ML
INJECTION, SOLUTION EPIDURAL; INFILTRATION; INTRACAUDAL; PERINEURAL
Status: DISCONTINUED | OUTPATIENT
Start: 2025-07-28 | End: 2025-07-28 | Stop reason: SDUPTHER

## 2025-07-28 RX ORDER — SODIUM CHLORIDE, SODIUM LACTATE, POTASSIUM CHLORIDE, CALCIUM CHLORIDE 600; 310; 30; 20 MG/100ML; MG/100ML; MG/100ML; MG/100ML
INJECTION, SOLUTION INTRAVENOUS CONTINUOUS
Status: DISCONTINUED | OUTPATIENT
Start: 2025-07-28 | End: 2025-07-28 | Stop reason: HOSPADM

## 2025-07-28 RX ADMIN — SODIUM CHLORIDE, SODIUM LACTATE, POTASSIUM CHLORIDE, AND CALCIUM CHLORIDE: .6; .31; .03; .02 INJECTION, SOLUTION INTRAVENOUS at 13:08

## 2025-07-28 RX ADMIN — LIDOCAINE HYDROCHLORIDE 50 MG: 10 INJECTION, SOLUTION EPIDURAL; INFILTRATION; INTRACAUDAL; PERINEURAL at 13:35

## 2025-07-28 RX ADMIN — PROPOFOL 300 MG: 10 INJECTION, EMULSION INTRAVENOUS at 13:21

## 2025-07-28 RX ADMIN — FENTANYL CITRATE 50 MCG: 50 INJECTION INTRAMUSCULAR; INTRAVENOUS at 13:35

## 2025-07-28 ASSESSMENT — PAIN - FUNCTIONAL ASSESSMENT
PAIN_FUNCTIONAL_ASSESSMENT: NONE - DENIES PAIN
PAIN_FUNCTIONAL_ASSESSMENT: 0-10
PAIN_FUNCTIONAL_ASSESSMENT: NONE - DENIES PAIN
PAIN_FUNCTIONAL_ASSESSMENT: NONE - DENIES PAIN

## 2025-07-28 ASSESSMENT — ENCOUNTER SYMPTOMS: SHORTNESS OF BREATH: 1

## 2025-07-28 NOTE — DISCHARGE INSTRUCTIONS
1.  Continue medications for GERD along with anti-GERD measures  2.  Avoid NSAIDs strictly; may use Tylenol 500 mg p.o. every 4 hours as needed.  3.  OP f/u in 6-8 weeks with Ms. Deras; will consider an Esophageal manometry later if the patient's dysphagia persists.    - Resume previous meds and diet  - GI clinic f/u 6-8 weeks with Ms. Garrett   - Keep scheduled f/u appts with other MDs

## 2025-07-28 NOTE — ANESTHESIA POSTPROCEDURE EVALUATION
Department of Anesthesiology  Postprocedure Note    Patient: Scottie Mireles  MRN: [id number]  YOB: 1955  Date of evaluation: 7/28/2025    Procedure Summary       Date: 07/28/25 Room / Location: [place] [id number] / [facility]    Anesthesia Start: 1317 Anesthesia Stop: 1336    Procedure: ESOPHAGOGASTRODUODENOSCOPY (Abdomen) Diagnosis:       Chronic GERD      Dysphagia, unspecified type      (Chronic GERD [K21.9])      (Dysphagia, unspecified type [R13.10])    Surgeons: Anahy Frank MD Responsible Provider: Kay Delgado APRN - CRNA    Anesthesia Type: General, TIVA ASA Status: 3            Anesthesia Type: General, TIVA    Harsha Phase I: Harsha Score: 10    Harsha Phase II:      Anesthesia Post Evaluation    Patient location during evaluation: bedside  Patient participation: complete - patient participated  Level of consciousness: awake and alert  Pain score: 0  Airway patency: patent  Nausea & Vomiting: no nausea and no vomiting  Cardiovascular status: blood pressure returned to baseline  Respiratory status: acceptable, spontaneous ventilation, room air, nasal airway and nonlabored ventilation  Hydration status: euvolemic  Comments: BP (!) 147/84   Pulse 79   Temp 97.9 °F (36.6 °C) (Temporal)   Resp 18   Ht 1.829 m (6')   Wt 112.9 kg (249 lb)   SpO2 99%   BMI 33.77 kg/m²     Pain management: adequate    No notable events documented.

## 2025-07-28 NOTE — OP NOTE
Endoscopic Procedure Note    Patient: Scottie Mireles : 1955  Med Rec#: [id number] Acc#: [id number]     Primary Care Provider Modesto Mcmullen MD    Endoscopist: Anahy Frank MD, MD    Date of Procedure:  2025    Procedure:   EGD     Indications:        1. Chronic GERD    2. Dysphagia, unspecified type -recent EGD with Hopkins bougie dilation of the esophagus up to 54 [place]   3. Hx of colonic polyps    4.  History of large gastric antral ulcer with acute GI bleeding and anemia due to GI blood loss in 2019  5.  Recent EGD on 2025 was a suboptimal exam due to large amounts of food present in the stomach obscuring the underlying mucosa requiring this repeat exam today  6.  History of hiatal hernia    Anesthesia:  Sedation was administered by anesthesia who monitored the patient during the procedure.    Estimated Blood Loss: minimal    Procedure:   After reviewing the patient's chart and obtaining informed consent, the patient was placed in the left lateral decubitus position.  A forward-viewing Olympus endoscope was lubricated and inserted through the mouth into the oropharynx. Under direct visualization, the upper esophagus was intubated. The scope was advanced to the level of the third portion of duodenum. Scope was slowly withdrawn with careful inspection of the mucosal surfaces. The scope was retroflexed for inspection of the gastric fundus and incisura. Findings and maneuvers are listed in impression below. The patient tolerated the procedure well. The scope was removed. There were no immediate complications.    Findings/IMPRESSION:  Esophagus: normal and a normal EG junction 42 cm.  There is no obvious hiatal hernia present.      Stomach:  abnormal: an approximately 6 to 7 mm in diameter shallow cratered clean-based gastric antral ulcer with surrounding patchy erythema was noted but without any bleeding stigmata.  This ulcer was biopsied recently on the patient's previous EGD exam.

## 2025-07-28 NOTE — ANESTHESIA PRE PROCEDURE
Department of Anesthesiology  Preprocedure Note       Name:  Scottie Mireles   Age:  69 y.o.  :  1955                                          MRN:  [id number]         Date:  2025      Surgeon: Surgeon(s):  Anahy Frank MD    Procedure: Procedure(s):  ESOPHAGOGASTRODUODENOSCOPY BIOPSY    Medications prior to admission:   Prior to Admission medications    Medication Sig Start Date End Date Taking? Authorizing Provider   empagliflozin (JARDIANCE) 25 MG tablet Take 1 tablet by mouth daily 7/15/25   Modesto Mcmullen MD   VITAMIN D PO Take 1 tablet by mouth daily    ProviderKarrie MD   MAG64 64 MG TBEC extended release tablet Take 1 tablet by mouth daily 6/3/25   Karrie Ordonez MD   sucralfate (CARAFATE) 1 GM tablet Take 1 tablet by mouth 4 times daily as needed (reflux, epigastric pain) 25   Marilyn Deras APRN - NP   azelastine (ASTELIN) 0.1 % nasal spray 1 spray by Nasal route 2 times daily Use in each nostril as directed 25   Bibiana Sweeney APRN - CNP   levocetirizine (XYZAL) 5 MG tablet Take 1 tablet by mouth nightly 25   Bibiana Sweeney APRN - CNP   metFORMIN (GLUCOPHAGE) 1000 MG tablet TAKE 1 TABLET BY MOUTH TWICE A DAY WITH MEALS 25   Modesto Mcmullen MD   clopidogrel (PLAVIX) 75 MG tablet TAKE 1 TABLET BY MOUTH EVERY DAY 25   Boom Noel APRN   tamsulosin (FLOMAX) 0.4 MG capsule Take 1 capsule by mouth in the morning and at bedtime 25   Modesto Mcmullen MD   montelukast (SINGULAIR) 10 MG tablet TAKE 1 TABLET BY MOUTH EVERY DAY AT NIGHT 3/20/25   Modesto Mcmullen MD   fluticasone (FLONASE) 50 MCG/ACT nasal spray SPRAY 2 SPRAYS INTO EACH NOSTRIL EVERY DAY 25   Modesto Mcmullen MD   atorvastatin (LIPITOR) 20 MG tablet TAKE 1 TABLET BY MOUTH EVERY DAY 25   Gloria Kaur APRN   sodium bicarbonate 325 MG tablet Take 2 tablets by mouth 2 times daily 25   Modesto Mcmullen MD   furosemide (LASIX) 20 MG tablet Take 1 tablet by mouth daily 24   Beck,

## 2025-07-28 NOTE — H&P
Patient Name: Scottie Mireles  : 1955  MRN: [id number]  DATE: 25    Allergies: No Known Allergies     ENDOSCOPY  History and Physical    Procedure:    [] Diagnostic Colonoscopy       [] Screening Colonoscopy  [x] EGD      [] ERCP      [] EUS       [] Other    [x] Previous office notes/History and Physical reviewed from the patients chart. Please see EMR for further details of HPI. I have examined the patient's status immediately prior to the procedure and:      Indications/HPI:      1. Chronic GERD    2. Dysphagia, unspecified type -recent EGD with Hopkins bougie dilation of the esophagus up to 54 [place]   3. Hx of colonic polyps    4.  History of large gastric antral ulcer with acute GI bleeding and anemia due to GI blood loss in   5.  Recent EGD on 2025 was a suboptimal exam due to large amounts of food present in the stomach obscuring the underlying mucosa requiring this repeat exam today  6.  History of hiatal hernia      []Abdominal Pain   []Cancer- GI/Lung     []Fhx of colon CA/polyps  []History of Polyps  []Barretts            []Melena  []Abnormal Imaging              []Dysphagia              []Persistent Pneumonia   []Anemia                            []Food Impaction        []History of Polyps  [] GI Bleed             []Pulmonary nodule/Mass   []Change in bowel habits []Heartburn/Reflux  []Rectal Bleed (BRBPR)  []Chest Pain - Non Cardiac []Heme (+) Stool []Ulcers  []Constipation  []Hemoptysis  []Varices  []Diarrhea  []Hypoxemia    []Nausea/Vomiting   []Screening   []Crohns/Colitis  []Other:     Anesthesia:   [x] MAC [] Moderate Sedation   [] General   [] None     ROS: 12 pt Review of Symptoms was negative unless mentioned above    Medications:   Prior to Admission medications    Medication Sig Start Date End Date Taking? Authorizing Provider   empagliflozin (JARDIANCE) 25 MG tablet Take 1 tablet by mouth daily 7/15/25   Modesto Mcmullen MD   VITAMIN D PO Take 1 tablet by mouth daily

## 2025-08-04 ENCOUNTER — HOSPITAL ENCOUNTER (OUTPATIENT)
Dept: NUCLEAR MEDICINE | Age: 70
Discharge: HOME | End: 2025-08-06
Attending: INTERNAL MEDICINE
Payer: MEDICARE

## 2025-08-04 DIAGNOSIS — R93.3 ABNORMAL ENDOSCOPY OF UPPER GASTROINTESTINAL TRACT: ICD-10-CM

## 2025-08-04 PROCEDURE — A9541 TC99M SULFUR COLLOID: HCPCS | Performed by: INTERNAL MEDICINE

## 2025-08-04 PROCEDURE — 78264 GASTRIC EMPTYING IMG STUDY: CPT

## 2025-08-04 PROCEDURE — 3430000000 HC RX DIAGNOSTIC RADIOPHARMACEUTICAL: Performed by: INTERNAL MEDICINE

## 2025-08-04 RX ADMIN — Medication 2 MILLICURIE: at 14:48

## 2025-08-06 RX ORDER — PROPRANOLOL HYDROCHLORIDE 80 MG/1
80 TABLET ORAL 2 TIMES DAILY
Qty: 180 TABLET | Refills: 3 | Status: SHIPPED | OUTPATIENT
Start: 2025-08-06

## 2025-08-14 ENCOUNTER — TELEPHONE (OUTPATIENT)
Dept: OTOLARYNGOLOGY | Facility: CLINIC | Age: 70
End: 2025-08-14
Payer: MEDICARE

## 2025-08-14 ENCOUNTER — OFFICE VISIT (OUTPATIENT)
Dept: OTOLARYNGOLOGY | Facility: CLINIC | Age: 70
End: 2025-08-14
Payer: MEDICARE

## 2025-08-14 VITALS
HEART RATE: 70 BPM | BODY MASS INDEX: 33.18 KG/M2 | HEIGHT: 72 IN | DIASTOLIC BLOOD PRESSURE: 79 MMHG | SYSTOLIC BLOOD PRESSURE: 145 MMHG | TEMPERATURE: 97.4 F | WEIGHT: 245 LBS

## 2025-08-14 DIAGNOSIS — C44.92 SQUAMOUS CELL SKIN CANCER: Primary | ICD-10-CM

## 2025-08-14 DIAGNOSIS — T14.8XXA OPEN WOUND: ICD-10-CM

## 2025-08-14 DIAGNOSIS — K11.8 PAROTID MASS: ICD-10-CM

## 2025-08-14 DIAGNOSIS — C79.89 METASTATIC SQUAMOUS CELL CARCINOMA TO PAROTID GLAND: ICD-10-CM

## 2025-08-14 DIAGNOSIS — R58 BLEEDING: ICD-10-CM

## 2025-08-14 RX ORDER — FUROSEMIDE 80 MG/1
80 TABLET ORAL AS NEEDED
COMMUNITY

## 2025-08-14 RX ORDER — AZELASTINE 1 MG/ML
1 SPRAY, METERED NASAL 2 TIMES DAILY
COMMUNITY

## 2025-08-14 RX ORDER — SUCRALFATE 1 G/1
1 TABLET ORAL 2 TIMES DAILY
COMMUNITY

## 2025-08-14 RX ORDER — MONTELUKAST SODIUM 10 MG/1
10 TABLET ORAL NIGHTLY
COMMUNITY

## 2025-08-14 RX ORDER — ATORVASTATIN CALCIUM 20 MG/1
20 TABLET, FILM COATED ORAL DAILY
COMMUNITY

## 2025-08-14 RX ORDER — LANOLIN ALCOHOL/MO/W.PET/CERES
1000 CREAM (GRAM) TOPICAL DAILY
COMMUNITY

## 2025-08-14 RX ORDER — CLOPIDOGREL BISULFATE 75 MG/1
75 TABLET ORAL DAILY
COMMUNITY

## 2025-08-14 RX ORDER — FLUTICASONE PROPIONATE 50 MCG
2 SPRAY, SUSPENSION (ML) NASAL DAILY
COMMUNITY

## 2025-08-14 RX ORDER — LEVOCETIRIZINE DIHYDROCHLORIDE 5 MG/1
5 TABLET, FILM COATED ORAL EVERY EVENING
COMMUNITY

## 2025-08-14 RX ORDER — HYDROCODONE BITARTRATE AND ACETAMINOPHEN 5; 325 MG/1; MG/1
1 TABLET ORAL EVERY 6 HOURS PRN
COMMUNITY

## 2025-08-15 ENCOUNTER — HOSPITAL ENCOUNTER (OUTPATIENT)
Dept: CT IMAGING | Facility: HOSPITAL | Age: 70
Discharge: HOME | End: 2025-08-15
Payer: MEDICARE

## 2025-08-15 ENCOUNTER — HOSPITAL ENCOUNTER (EMERGENCY)
Facility: HOSPITAL | Age: 70
Discharge: HOME | End: 2025-08-15
Attending: EMERGENCY MEDICINE
Payer: MEDICARE

## 2025-08-15 ENCOUNTER — OFFICE VISIT (OUTPATIENT)
Dept: OTOLARYNGOLOGY | Facility: CLINIC | Age: 70
End: 2025-08-15
Payer: MEDICARE

## 2025-08-15 VITALS
RESPIRATION RATE: 16 BRPM | DIASTOLIC BLOOD PRESSURE: 73 MMHG | HEIGHT: 72 IN | WEIGHT: 245 LBS | SYSTOLIC BLOOD PRESSURE: 147 MMHG | OXYGEN SATURATION: 97 % | TEMPERATURE: 98.1 F | HEART RATE: 79 BPM | BODY MASS INDEX: 33.18 KG/M2

## 2025-08-15 VITALS — BODY MASS INDEX: 33.15 KG/M2 | HEIGHT: 72 IN | WEIGHT: 244.71 LBS

## 2025-08-15 DIAGNOSIS — T14.8XXA OPEN WOUND: Primary | ICD-10-CM

## 2025-08-15 DIAGNOSIS — C44.92 SQUAMOUS CELL SKIN CANCER: ICD-10-CM

## 2025-08-15 DIAGNOSIS — K11.8 PAROTID MASS: ICD-10-CM

## 2025-08-15 DIAGNOSIS — R58 BLEEDING: ICD-10-CM

## 2025-08-15 DIAGNOSIS — C79.89 METASTATIC SQUAMOUS CELL CARCINOMA TO PAROTID GLAND: ICD-10-CM

## 2025-08-15 DIAGNOSIS — H92.21 EAR BLEEDING, RIGHT: Primary | ICD-10-CM

## 2025-08-15 PROBLEM — D84.9 IMMUNOSUPPRESSION: Status: ACTIVE | Noted: 2025-08-15

## 2025-08-15 PROBLEM — Z94.0 KIDNEY TRANSPLANTED: Status: ACTIVE | Noted: 2025-08-15

## 2025-08-15 PROBLEM — H61.111: Status: ACTIVE | Noted: 2025-08-15

## 2025-08-15 PROBLEM — Z98.890 MOHS DEFECT OF RIGHT CHEEK: Status: ACTIVE | Noted: 2025-08-15

## 2025-08-15 PROBLEM — M95.2 MOHS DEFECT OF RIGHT CHEEK: Status: ACTIVE | Noted: 2025-08-15

## 2025-08-15 PROBLEM — C44.329 SQUAMOUS CELL CANCER OF SKIN OF RIGHT CHEEK: Status: ACTIVE | Noted: 2025-08-15

## 2025-08-15 PROBLEM — C44.222: Status: ACTIVE | Noted: 2025-08-15

## 2025-08-15 LAB
ALBUMIN SERPL-MCNC: 4.2 G/DL (ref 3.5–5.2)
ALBUMIN/GLOB SERPL: 1.6 G/DL
ALP SERPL-CCNC: 57 U/L (ref 39–117)
ALT SERPL W P-5'-P-CCNC: 13 U/L (ref 1–41)
ANION GAP SERPL CALCULATED.3IONS-SCNC: 13 MMOL/L (ref 5–15)
APTT PPP: 29 SECONDS (ref 24.5–36)
AST SERPL-CCNC: 15 U/L (ref 1–40)
BILIRUB SERPL-MCNC: 1.8 MG/DL (ref 0–1.2)
BUN SERPL-MCNC: 13.7 MG/DL (ref 8–23)
BUN/CREAT SERPL: 14.4 (ref 7–25)
CALCIUM SPEC-SCNC: 10.2 MG/DL (ref 8.6–10.5)
CHLORIDE SERPL-SCNC: 99 MMOL/L (ref 98–107)
CO2 SERPL-SCNC: 21 MMOL/L (ref 22–29)
CREAT BLDA-MCNC: 1.2 MG/DL (ref 0.6–1.3)
CREAT SERPL-MCNC: 0.95 MG/DL (ref 0.76–1.27)
EGFRCR SERPLBLD CKD-EPI 2021: 85 ML/MIN/1.73
GLOBULIN UR ELPH-MCNC: 2.7 GM/DL
GLUCOSE SERPL-MCNC: 148 MG/DL (ref 65–99)
INR PPP: 1.05 (ref 0.91–1.09)
POTASSIUM SERPL-SCNC: 4.5 MMOL/L (ref 3.5–5.2)
PROT SERPL-MCNC: 6.9 G/DL (ref 6–8.5)
PROTHROMBIN TIME: 14.2 SECONDS (ref 11.8–14.8)
SODIUM SERPL-SCNC: 133 MMOL/L (ref 136–145)

## 2025-08-15 PROCEDURE — 99283 EMERGENCY DEPT VISIT LOW MDM: CPT | Performed by: EMERGENCY MEDICINE

## 2025-08-15 PROCEDURE — 70491 CT SOFT TISSUE NECK W/DYE: CPT

## 2025-08-15 PROCEDURE — 80053 COMPREHEN METABOLIC PANEL: CPT | Performed by: EMERGENCY MEDICINE

## 2025-08-15 PROCEDURE — 85730 THROMBOPLASTIN TIME PARTIAL: CPT | Performed by: EMERGENCY MEDICINE

## 2025-08-15 PROCEDURE — 25010000002 LIDOCAINE-EPINEPHRINE (PF) 1 %-1:200000 SOLUTION: Performed by: OTOLARYNGOLOGY

## 2025-08-15 PROCEDURE — 82565 ASSAY OF CREATININE: CPT

## 2025-08-15 PROCEDURE — 85610 PROTHROMBIN TIME: CPT | Performed by: EMERGENCY MEDICINE

## 2025-08-15 PROCEDURE — 25510000001 IOPAMIDOL 61 % SOLUTION: Performed by: OTOLARYNGOLOGY

## 2025-08-15 RX ORDER — IOPAMIDOL 612 MG/ML
100 INJECTION, SOLUTION INTRAVASCULAR
Status: COMPLETED | OUTPATIENT
Start: 2025-08-15 | End: 2025-08-15

## 2025-08-15 RX ORDER — SODIUM CHLORIDE 0.9 % (FLUSH) 0.9 %
10 SYRINGE (ML) INJECTION AS NEEDED
Status: DISCONTINUED | OUTPATIENT
Start: 2025-08-15 | End: 2025-08-15 | Stop reason: HOSPADM

## 2025-08-15 RX ADMIN — LIDOCAINE HYDROCHLORIDE 10 ML: 10; .005 INJECTION, SOLUTION EPIDURAL; INFILTRATION; INTRACAUDAL; PERINEURAL at 15:00

## 2025-08-15 RX ADMIN — IOPAMIDOL 100 ML: 612 INJECTION, SOLUTION INTRAVENOUS at 10:48

## 2025-08-18 ENCOUNTER — TELEPHONE (OUTPATIENT)
Age: 70
End: 2025-08-18
Payer: MEDICARE

## 2025-08-18 ENCOUNTER — OFFICE VISIT (OUTPATIENT)
Age: 70
End: 2025-08-18
Payer: MEDICARE

## 2025-08-18 VITALS
WEIGHT: 245 LBS | TEMPERATURE: 98 F | RESPIRATION RATE: 20 BRPM | DIASTOLIC BLOOD PRESSURE: 74 MMHG | HEIGHT: 72 IN | BODY MASS INDEX: 33.18 KG/M2 | HEART RATE: 82 BPM | SYSTOLIC BLOOD PRESSURE: 136 MMHG

## 2025-08-18 DIAGNOSIS — Z79.02 PLATELET INHIBITION DUE TO PLAVIX: ICD-10-CM

## 2025-08-18 DIAGNOSIS — C44.329 SQUAMOUS CELL CANCER OF SKIN OF RIGHT CHEEK: Primary | ICD-10-CM

## 2025-08-18 DIAGNOSIS — T14.8XXA OPEN WOUND: ICD-10-CM

## 2025-08-18 DIAGNOSIS — Z98.890 MOHS DEFECT OF RIGHT CHEEK: ICD-10-CM

## 2025-08-18 DIAGNOSIS — C79.89 SECONDARY SQUAMOUS CELL CARCINOMA OF PAROTID GLAND: ICD-10-CM

## 2025-08-18 DIAGNOSIS — D84.9 IMMUNOSUPPRESSION: ICD-10-CM

## 2025-08-18 DIAGNOSIS — Z94.0 KIDNEY TRANSPLANTED: ICD-10-CM

## 2025-08-18 DIAGNOSIS — H61.111 MOHS DEFECT OF AURICLE OF RIGHT EAR: ICD-10-CM

## 2025-08-18 DIAGNOSIS — C44.222 SQUAMOUS CELL CARCINOMA, EAR, RIGHT: ICD-10-CM

## 2025-08-18 DIAGNOSIS — H69.91 ETD (EUSTACHIAN TUBE DYSFUNCTION), RIGHT: ICD-10-CM

## 2025-08-18 DIAGNOSIS — M95.2 MOHS DEFECT OF RIGHT CHEEK: ICD-10-CM

## 2025-08-18 PROCEDURE — G2211 COMPLEX E/M VISIT ADD ON: HCPCS | Performed by: NURSE PRACTITIONER

## 2025-08-18 PROCEDURE — 1160F RVW MEDS BY RX/DR IN RCRD: CPT | Performed by: NURSE PRACTITIONER

## 2025-08-18 PROCEDURE — 1159F MED LIST DOCD IN RCRD: CPT | Performed by: NURSE PRACTITIONER

## 2025-08-18 PROCEDURE — 99214 OFFICE O/P EST MOD 30 MIN: CPT | Performed by: NURSE PRACTITIONER

## 2025-08-18 RX ORDER — LEVOCETIRIZINE DIHYDROCHLORIDE 5 MG/1
5 TABLET, FILM COATED ORAL NIGHTLY
Qty: 30 TABLET | Refills: 1 | Status: SHIPPED | OUTPATIENT
Start: 2025-08-18

## 2025-08-20 ENCOUNTER — OFFICE VISIT (OUTPATIENT)
Age: 70
End: 2025-08-20
Payer: MEDICARE

## 2025-08-20 ENCOUNTER — TELEPHONE (OUTPATIENT)
Dept: CARDIOLOGY CLINIC | Age: 70
End: 2025-08-20

## 2025-08-20 VITALS
HEART RATE: 85 BPM | TEMPERATURE: 98 F | WEIGHT: 245 LBS | HEIGHT: 72 IN | SYSTOLIC BLOOD PRESSURE: 145 MMHG | DIASTOLIC BLOOD PRESSURE: 80 MMHG | BODY MASS INDEX: 33.18 KG/M2

## 2025-08-20 DIAGNOSIS — Z98.890 MOHS DEFECT OF RIGHT CHEEK: ICD-10-CM

## 2025-08-20 DIAGNOSIS — Z94.0 KIDNEY TRANSPLANTED: ICD-10-CM

## 2025-08-20 DIAGNOSIS — T14.8XXA OPEN WOUND: ICD-10-CM

## 2025-08-20 DIAGNOSIS — M95.2 MOHS DEFECT OF RIGHT CHEEK: ICD-10-CM

## 2025-08-20 DIAGNOSIS — C44.329 SQUAMOUS CELL CANCER OF SKIN OF RIGHT CHEEK: Primary | ICD-10-CM

## 2025-08-20 DIAGNOSIS — D84.9 IMMUNOSUPPRESSION: ICD-10-CM

## 2025-08-20 DIAGNOSIS — C79.89 SECONDARY SQUAMOUS CELL CARCINOMA OF PAROTID GLAND: ICD-10-CM

## 2025-08-20 DIAGNOSIS — H61.111 MOHS DEFECT OF AURICLE OF RIGHT EAR: ICD-10-CM

## 2025-08-20 DIAGNOSIS — Z79.02 PLATELET INHIBITION DUE TO PLAVIX: ICD-10-CM

## 2025-08-20 DIAGNOSIS — C44.222 SQUAMOUS CELL CARCINOMA, EAR, RIGHT: ICD-10-CM

## 2025-08-21 ENCOUNTER — HOSPITAL ENCOUNTER (OUTPATIENT)
Dept: CT IMAGING | Facility: HOSPITAL | Age: 70
Discharge: HOME | End: 2025-08-21
Payer: MEDICARE

## 2025-08-21 LAB — GLUCOSE BLDC GLUCOMTR-MCNC: 103 MG/DL (ref 70–130)

## 2025-08-21 PROCEDURE — A9552 F18 FDG: HCPCS | Performed by: NURSE PRACTITIONER

## 2025-08-21 PROCEDURE — 82948 REAGENT STRIP/BLOOD GLUCOSE: CPT

## 2025-08-21 PROCEDURE — 78815 PET IMAGE W/CT SKULL-THIGH: CPT

## 2025-08-21 PROCEDURE — 34310000005 FLUDEOXYGLUCOSE F18 SOLUTION: Performed by: NURSE PRACTITIONER

## 2025-08-21 RX ADMIN — FLUDEOXYGLUCOSE F18 1 DOSE: 300 INJECTION INTRAVENOUS at 14:16

## 2025-08-29 ENCOUNTER — OFFICE VISIT (OUTPATIENT)
Dept: GASTROENTEROLOGY | Age: 70
End: 2025-08-29
Payer: MEDICARE

## 2025-08-29 VITALS
DIASTOLIC BLOOD PRESSURE: 74 MMHG | OXYGEN SATURATION: 96 % | SYSTOLIC BLOOD PRESSURE: 132 MMHG | BODY MASS INDEX: 33.32 KG/M2 | WEIGHT: 246 LBS | HEART RATE: 87 BPM | HEIGHT: 72 IN

## 2025-08-29 DIAGNOSIS — K25.3 ACUTE GASTRIC ULCER WITHOUT HEMORRHAGE OR PERFORATION: Primary | ICD-10-CM

## 2025-08-29 DIAGNOSIS — K21.9 CHRONIC GERD: ICD-10-CM

## 2025-08-29 PROCEDURE — 3078F DIAST BP <80 MM HG: CPT | Performed by: NURSE PRACTITIONER

## 2025-08-29 PROCEDURE — 1123F ACP DISCUSS/DSCN MKR DOCD: CPT | Performed by: NURSE PRACTITIONER

## 2025-08-29 PROCEDURE — 99214 OFFICE O/P EST MOD 30 MIN: CPT | Performed by: NURSE PRACTITIONER

## 2025-08-29 PROCEDURE — 1159F MED LIST DOCD IN RCRD: CPT | Performed by: NURSE PRACTITIONER

## 2025-08-29 PROCEDURE — 3075F SYST BP GE 130 - 139MM HG: CPT | Performed by: NURSE PRACTITIONER

## 2025-08-29 RX ORDER — FAMOTIDINE 20 MG/1
20 TABLET, FILM COATED ORAL 2 TIMES DAILY
Qty: 60 TABLET | Refills: 3 | Status: SHIPPED | OUTPATIENT
Start: 2025-08-29

## 2025-08-29 ASSESSMENT — ENCOUNTER SYMPTOMS
SHORTNESS OF BREATH: 0
BLOOD IN STOOL: 0
NAUSEA: 0
ABDOMINAL PAIN: 0
VOMITING: 0
RECTAL PAIN: 0
ANAL BLEEDING: 0
DIARRHEA: 0
CONSTIPATION: 0
CHOKING: 0
ABDOMINAL DISTENTION: 0
TROUBLE SWALLOWING: 0
COUGH: 0

## (undated) DEVICE — SUTURE PERMAHAND SZ 3-0 L30IN NONABSORBABLE BLK SILK BRAID A304H

## (undated) DEVICE — SUTURE PERMAHAND SZ 4-0 L12X30IN NONABSORBABLE BLK SILK A303H

## (undated) DEVICE — FORCEPS BX L240CM JAW DIA2.4MM ORNG L CAP W/ NDL DISP RAD

## (undated) DEVICE — 20/30 PRIORITY PACK ACCESSORY KIT INCLUDES INDEFLATOR INFLATION DEVICE 30 ATM 20 CC / ROTATING HEMOSTATIC VALVE .115 " / GUIDE WIRE INTRODUCER / TORQUE DEVICE: Brand: INDEFLATOR

## (undated) DEVICE — GUIDEWIRE VASC L260CM DIA0038IN TIP L3MM PTFE J TIP FIX COR

## (undated) DEVICE — PROVE COVER: Brand: UNBRANDED

## (undated) DEVICE — ELECTRD NDL EZ CLN MOD 2.75IN

## (undated) DEVICE — SUTURE MCRYL SZ 4-0 L18IN ABSRB UD L19MM PS-2 3/8 CIR PRIM Y496G

## (undated) DEVICE — TUBE ET 7MM NSL ORAL BASIC CUF INTMED MURPHY EYE RADPQ MRK

## (undated) DEVICE — SURGICAL PROCEDURE PACK VASC LOURDES HOSP

## (undated) DEVICE — PROXIMATE RH ROTATING HEAD SKIN STAPLERS (35 WIDE) CONTAINS 35 STAINLESS STEEL STAPLES: Brand: PROXIMATE

## (undated) DEVICE — KIT SPEC COLL 1ML WHT POLYPR TB W/ LIQ AMIES M REG FLOCKED

## (undated) DEVICE — BAND COMPR L24CM REG CLR PLAS HEMSTAT EXT HK AND LOOP RETEN

## (undated) DEVICE — GUIDEWIRE WITH ICE™ HYDROPHILIC COATING: Brand: V-18™ CONTROL WIRE™

## (undated) DEVICE — GLIDESHEATH SS KIT HYDROPHILIC COATED INTRODUCER SHEATH: Brand: GLIDESHEATH

## (undated) DEVICE — CATHETER KIT 5 FR 21 GAX7 CM MICROINTRODUCER GUIDEWIRE STIFF

## (undated) DEVICE — AGENT HEMSTAT W2XL4IN FIBRIN SEAL PTCH DSG EVARREST

## (undated) DEVICE — CATHETER GUID 6FR 0.071IN COR AMPLATZ L 0.75 MID

## (undated) DEVICE — GLOVE SURG SZ 7 CRM LTX FREE POLYISOPRENE POLYMER BEAD ANTI

## (undated) DEVICE — Device: Brand: NOMOLINE™ LH ADULT NASAL CO2 CANNULA WITH O2 4M

## (undated) DEVICE — ZIMMER® STERILE DISPOSABLE TOURNIQUET CUFF WITH PLC, DUAL PORT, SINGLE BLADDER, 18 IN. (46 CM)

## (undated) DEVICE — CATH BLLN ANGIO 4X12MM NC EUPHORIA RX

## (undated) DEVICE — TORQUE DEVICE: Brand: TORQUE DEVICE

## (undated) DEVICE — SNAR POLYP SENSATION MICRO OVL 13 240X40

## (undated) DEVICE — PACK,UNIVERSAL,NO GOWNS: Brand: MEDLINE

## (undated) DEVICE — CATH BLLN ANGIO 2X12MM SC EUPHORA RX

## (undated) DEVICE — ENDOGATOR AUXILIARY WATER JET CONNECTOR: Brand: ENDOGATOR

## (undated) DEVICE — GLV SURG BIOGEL LTX PF 8

## (undated) DEVICE — SURGICAL PROCEDURE PACK CRD CATH LOURDES HOSP LF

## (undated) DEVICE — CATHETER GUID 6FR 0.071IN COR AMPLATZ R 2 MID FLEXIBILITY

## (undated) DEVICE — TOWEL,OR,DSP,ST,BLUE,DLX,4/PK,20PK/CS: Brand: MEDLINE

## (undated) DEVICE — ENDO KIT,LOURDES HOSPITAL: Brand: MEDLINE INDUSTRIES, INC.

## (undated) DEVICE — SENSR O2 OXIMAX FNGR A/ 18IN NONSTR

## (undated) DEVICE — MARKR SKIN W/RULR AND LBL

## (undated) DEVICE — ROYAL SILK SURGICAL GOWN, XXL: Brand: CONVERTORS

## (undated) DEVICE — MINOR CDS: Brand: MEDLINE INDUSTRIES, INC.

## (undated) DEVICE — BANDAGE,GAUZE,BULKEE II,4.5"X4.1YD,STRL: Brand: MEDLINE

## (undated) DEVICE — DRAIN SURG W3/8XL18IN FOR OPN WND PENROSE

## (undated) DEVICE — SUTURE ETHLN SZ 4-0 L18IN NONABSORBABLE BLK L19MM PS-2 3/8 1667H

## (undated) DEVICE — SUT VIC 4/0 P3 18IN UD VCP494G

## (undated) DEVICE — Device: Brand: DEFENDO AIR/WATER/SUCTION AND BIOPSY VALVE

## (undated) DEVICE — SUTURE ETHLN SZ 3-0 L18IN NONABSORBABLE BLK FS-1 L24MM 3/8 663H

## (undated) DEVICE — PAD,NON-ADHERENT,3X8,STERILE,LF,1/PK: Brand: MEDLINE

## (undated) DEVICE — GUIDEWIRE VASC L260CM OD.038IN 3CM TIP 7MM TIP OD STD EXCHG

## (undated) DEVICE — DRAPE SHEET: Brand: UNBRANDED

## (undated) DEVICE — COVIDIEN WHOLEY 260CM GUIDEWIRE

## (undated) DEVICE — STRIP,CLOSURE,WOUND,MEDI-STRIP,1/2X4: Brand: MEDLINE

## (undated) DEVICE — VALVE HEMSTAT 8FR INNR LUMN CRSS SLT SEAL DSGN WATCHDOG

## (undated) DEVICE — SUTURE VCRL SZ 3-0 L27IN ABSRB UD L26MM SH 1/2 CIR J416H

## (undated) DEVICE — KIT ANGIO W/ AT P65 PREM HND CTRL FOR CNTRST DEL ANGIOTOUCH

## (undated) DEVICE — BANDAGE COMPR W6INXL15FT BGE E SGL LAYERED CLP CLSR

## (undated) DEVICE — THE SINGLE USE ETRAP – POLYP TRAP IS USED FOR SUCTION RETRIEVAL OF ENDOSCOPICALLY REMOVED POLYPS.: Brand: ETRAP

## (undated) DEVICE — CATHETER GUID 6FR L150CM RAP EXCHG L25CM TIP 5.1FR PUSHROD

## (undated) DEVICE — TR BAND RADIAL ARTERY COMPRESSION DEVICE: Brand: TR BAND

## (undated) DEVICE — ASTOUND STANDARD SURGICAL GOWN, XXL: Brand: CONVERTORS

## (undated) DEVICE — PREP SOL POVIDONE/IODINE BT 4OZ

## (undated) DEVICE — CONTAINER SPEC 4OZ GRY LID TRNSLUC GENT-L-KARE

## (undated) DEVICE — BANDAGE,GAUZE,4.5"X4.1YD,STERILE,LF: Brand: MEDLINE

## (undated) DEVICE — TBG SMPL FLTR LINE NASL 02/C02 A/ BX/100

## (undated) DEVICE — DECANTER FLD 9IN ST BG FOR ASEP TRNSF OF FLD

## (undated) DEVICE — HI-TORQUE BALANCE MIDDLEWEIGHT UNIVERSAL GUIDE WIRE .014 STRAIGHT TIP 3.0 CM X 190 CM: Brand: HI-TORQUE BALANCE MIDDLEWEIGHT UNIVERSAL

## (undated) DEVICE — SUTURE PERMAHAND SZ 3-0 L18IN NONABSORBABLE BLK L26MM SH C013D

## (undated) DEVICE — MSK O2 MD CONCENTR A/ LF 7FT 1P/U

## (undated) DEVICE — GAUZE,SPONGE,FLUFF,6"X6.75",STRL,10/TRAY: Brand: MEDLINE

## (undated) DEVICE — CATHETER 5FR PIGSTR MEDTRONIC 110CM

## (undated) DEVICE — MASTISOL ADHESIVE LIQ 2/3ML

## (undated) DEVICE — BANDAGE GZ W45INXL4 1 10YD FLUF RL 6 PLY DERMACEA

## (undated) DEVICE — SUTURE VCRL SZ 3-0 L18IN ABSRB UD L26MM SH 1/2 CIR J864D

## (undated) DEVICE — KIT MFLD ISOLATN NACL CNTRST PRT TBNG SPIK W/ PRSS TRNSDUC

## (undated) DEVICE — CATH BLLN ANGIO 3X12MM NC EUPHORIA RX

## (undated) DEVICE — SOLUTION IV IRRIG POUR BRL 0.9% SODIUM CHL 2F7124

## (undated) DEVICE — INFLATION DEVICE KIT: Brand: ENCORE™ 26 ADVANTAGE KIT

## (undated) DEVICE — SEALANT TISS 4 CC FIBRIN VISTASEAL

## (undated) DEVICE — PENCIL BTTN S S CAUT TIP W HOLSTER 25 50

## (undated) DEVICE — BANDAGE COMPR W6XL12FT SGL LAYERED NO CLSR EXSANGUATION

## (undated) DEVICE — LIQUIBAND RAPID ADHESIVE 36/CS 0.8ML: Brand: MEDLINE

## (undated) DEVICE — BLADE LARYNSCP STERILE SZ 4 TI DISP SPECTRM LOPRO GLIDESCOPE

## (undated) DEVICE — CATH BLLN ANGIO 3.50X12MM NC EUPHORIA RX

## (undated) DEVICE — CUFF,BP,DISP,1 TUBE,ADULT,HP: Brand: MEDLINE

## (undated) DEVICE — SUTURE NONABSORBABLE MONOFILAMENT 4-0 RB-1 36 IN BLU PROLENE 8557H

## (undated) DEVICE — CATHETER GUID 6FR GWIRE 0.071IN COR EXTRA BKUP SUPP 3.75

## (undated) DEVICE — SYSTEM GWIRE L260CM DIA0035IN STD STEER HYDROPHOBIC STR TIP

## (undated) DEVICE — CABL BIPOL MEGADYNE 12FT DISP

## (undated) DEVICE — SOLUTION IV 500ML 0.9% SOD CHL PH 5 INJ USP VIAFLX PLAS

## (undated) DEVICE — Device: Brand: OMNIWIRE PRESSURE GUIDE WIRE

## (undated) DEVICE — PK ENT HD AND NK 30

## (undated) DEVICE — SUT SILK 2/0 SH 30IN K833H

## (undated) DEVICE — GLOVE SURG SZ 75 L12IN FNGR THK79MIL GRN LTX FREE

## (undated) DEVICE — Z DISCONTINUED USE 2271144 DRAIN SURG W0.25XL18IN FLAT GRAV FOR OPN WND PENROSE

## (undated) DEVICE — THE CHANNEL CLEANING BRUSH IS A NYLON FLEXI BRUSH ATTACHED TO A FLEXIBLE PLASTIC SHEATH DESIGNED TO SAFELY REMOVE DEBRIS FROM FLEXIBLE ENDOSCOPES.

## (undated) DEVICE — FORCEPS BX L L240CM DIA2.4MM RAD JAW 4 HOT FOR POLYP DISP

## (undated) DEVICE — BANDAGE COMPR W4INXL15FT BGE E SGL LAYERED CLP CLSR

## (undated) DEVICE — RADIFOCUS OPTITORQUE ANGIOGRAPHIC CATHETER: Brand: OPTITORQUE

## (undated) DEVICE — BANDAGE COMPR W6INXL12FT SMOOTH FOR LIMB EXSANG ESMARCH

## (undated) DEVICE — ADHESIVE SKIN CLSR 0.7ML TOP DERMBND ADV

## (undated) DEVICE — BAPTIST TURNOVER KIT: Brand: MEDLINE INDUSTRIES, INC.

## (undated) DEVICE — SPNG GZ WOVN 4X4IN 12PLY 10/BX STRL

## (undated) DEVICE — 3M™ STERI-STRIP™ REINFORCED ADHESIVE SKIN CLOSURES, R1546, 1/4 IN X 4 IN (6 MM X 100 MM), 10 STRIPS/ENVELOPE: Brand: 3M™ STERI-STRIP™

## (undated) DEVICE — YANKAUER,BULB TIP WITH VENT: Brand: ARGYLE